# Patient Record
Sex: FEMALE | Race: BLACK OR AFRICAN AMERICAN | NOT HISPANIC OR LATINO | Employment: FULL TIME | URBAN - METROPOLITAN AREA
[De-identification: names, ages, dates, MRNs, and addresses within clinical notes are randomized per-mention and may not be internally consistent; named-entity substitution may affect disease eponyms.]

---

## 2017-01-05 ENCOUNTER — OFFICE VISIT (OUTPATIENT)
Dept: NEPHROLOGY | Facility: CLINIC | Age: 67
End: 2017-01-05
Payer: COMMERCIAL

## 2017-01-05 VITALS
BODY MASS INDEX: 44.92 KG/M2 | SYSTOLIC BLOOD PRESSURE: 130 MMHG | WEIGHT: 269.63 LBS | HEIGHT: 65 IN | DIASTOLIC BLOOD PRESSURE: 70 MMHG

## 2017-01-05 DIAGNOSIS — E66.01 MORBID OBESITY, UNSPECIFIED OBESITY TYPE: ICD-10-CM

## 2017-01-05 DIAGNOSIS — I10 ESSENTIAL HYPERTENSION: ICD-10-CM

## 2017-01-05 DIAGNOSIS — N18.1 CKD (CHRONIC KIDNEY DISEASE) STAGE 1, GFR 90 ML/MIN OR GREATER: Primary | ICD-10-CM

## 2017-01-05 PROCEDURE — 3078F DIAST BP <80 MM HG: CPT | Mod: S$GLB,,, | Performed by: INTERNAL MEDICINE

## 2017-01-05 PROCEDURE — 1160F RVW MEDS BY RX/DR IN RCRD: CPT | Mod: S$GLB,,, | Performed by: INTERNAL MEDICINE

## 2017-01-05 PROCEDURE — 1157F ADVNC CARE PLAN IN RCRD: CPT | Mod: S$GLB,,, | Performed by: INTERNAL MEDICINE

## 2017-01-05 PROCEDURE — 99999 PR PBB SHADOW E&M-EST. PATIENT-LVL III: CPT | Mod: PBBFAC,,, | Performed by: INTERNAL MEDICINE

## 2017-01-05 PROCEDURE — 99213 OFFICE O/P EST LOW 20 MIN: CPT | Mod: S$GLB,,, | Performed by: INTERNAL MEDICINE

## 2017-01-05 PROCEDURE — 1159F MED LIST DOCD IN RCRD: CPT | Mod: S$GLB,,, | Performed by: INTERNAL MEDICINE

## 2017-01-05 PROCEDURE — 3075F SYST BP GE 130 - 139MM HG: CPT | Mod: S$GLB,,, | Performed by: INTERNAL MEDICINE

## 2017-01-05 PROCEDURE — 1126F AMNT PAIN NOTED NONE PRSNT: CPT | Mod: S$GLB,,, | Performed by: INTERNAL MEDICINE

## 2017-01-05 NOTE — PROGRESS NOTES
Patient is here today for annual follow up evaluation of CKD. She has a solitary kidney (donation) ; Baseline Cr 1.0-1.1 mg/dl Her most recent lab (12/30/16) Cr 1.1 mg/dl; eGFR > 60 ml/min; potassium 4.0 mmol/L. She does not have clinically significant proteinuria. There is a hx of morbid obesity and hypertension. Followed by Endocrine for multinoduar goiter. Today she has no new complaints    ROS;    Mood and Affect; Appropriate  Otherwise non-contributory    Exam  Morbidly obese woman; no acute distress; oriented x 3  HEENT; WNL  CHEST; Clear P&A  HEART; RR; S1&S2 no murmur rub gallop  ABD; Obese  EXT; (-)Edema    Impression  CKD Stable  Hypertension Satisfactory control    Plan  Return Visit; 1 year

## 2017-03-16 ENCOUNTER — OFFICE VISIT (OUTPATIENT)
Dept: ENDOCRINOLOGY | Facility: CLINIC | Age: 67
End: 2017-03-16
Payer: COMMERCIAL

## 2017-03-16 VITALS
BODY MASS INDEX: 45.26 KG/M2 | WEIGHT: 271.63 LBS | SYSTOLIC BLOOD PRESSURE: 158 MMHG | HEIGHT: 65 IN | HEART RATE: 61 BPM | DIASTOLIC BLOOD PRESSURE: 87 MMHG

## 2017-03-16 DIAGNOSIS — E55.9 VITAMIN D DEFICIENCY: ICD-10-CM

## 2017-03-16 DIAGNOSIS — E04.2 MULTINODULAR NON-TOXIC GOITER: Primary | ICD-10-CM

## 2017-03-16 DIAGNOSIS — E66.01 MORBID OBESITY WITH BMI OF 45.0-49.9, ADULT: ICD-10-CM

## 2017-03-16 DIAGNOSIS — I10 ESSENTIAL HYPERTENSION: ICD-10-CM

## 2017-03-16 PROCEDURE — 1157F ADVNC CARE PLAN IN RCRD: CPT | Mod: S$GLB,,, | Performed by: INTERNAL MEDICINE

## 2017-03-16 PROCEDURE — 1126F AMNT PAIN NOTED NONE PRSNT: CPT | Mod: S$GLB,,, | Performed by: INTERNAL MEDICINE

## 2017-03-16 PROCEDURE — 3077F SYST BP >= 140 MM HG: CPT | Mod: S$GLB,,, | Performed by: INTERNAL MEDICINE

## 2017-03-16 PROCEDURE — 1159F MED LIST DOCD IN RCRD: CPT | Mod: S$GLB,,, | Performed by: INTERNAL MEDICINE

## 2017-03-16 PROCEDURE — 99999 PR PBB SHADOW E&M-EST. PATIENT-LVL IV: CPT | Mod: PBBFAC,,, | Performed by: INTERNAL MEDICINE

## 2017-03-16 PROCEDURE — 3079F DIAST BP 80-89 MM HG: CPT | Mod: S$GLB,,, | Performed by: INTERNAL MEDICINE

## 2017-03-16 PROCEDURE — 1160F RVW MEDS BY RX/DR IN RCRD: CPT | Mod: S$GLB,,, | Performed by: INTERNAL MEDICINE

## 2017-03-16 PROCEDURE — 99214 OFFICE O/P EST MOD 30 MIN: CPT | Mod: S$GLB,,, | Performed by: INTERNAL MEDICINE

## 2017-03-16 RX ORDER — UBIDECARENONE 75 MG
500 CAPSULE ORAL DAILY
COMMUNITY
End: 2020-08-17 | Stop reason: ALTCHOICE

## 2017-03-16 RX ORDER — VIT C/E/ZN/COPPR/LUTEIN/ZEAXAN 250MG-90MG
1000 CAPSULE ORAL DAILY
Refills: 0
Start: 2017-03-16 | End: 2021-07-02

## 2017-03-16 NOTE — PROGRESS NOTES
Subjective:       Patient ID: Ankita Campo is a 66 y.o. female.    Chief Complaint: Goiter    HPI Comments: Ms. Campo is presenting for follow-up of non-toxic MNG. Previously seen by Dr Garrett in 1/2016    Reports worsening goiter symptoms. When she turns her head to side feels that breathing is affected and dyspneic. Symptoms have been worsening for past few months  Denies hoarseness. Denies dyphagia    Pt noted swelling on the left side of her neck and went to see her PCP in late 2015     Subsequently thyroid nodule was confirmed on thyroid US from 1/2016:  The right lobe of the thyroid measures 8.0 x 1.8 x 1.2 cm. Multiple nodules are seen within the right lobe of the thyroid.    The left lobe of the thyroid measures 9.0 x 3.2 x 3.8 cm. Multiple nodules are seen within the left lobe of the thyroid.    The largest nodule is seen within the interpole the left lobe of the thyroid measures 3.4 cm.     Impression   There is multinodular thyroid goiter with a dominant nodule made for pole the left lobe of the thyroid. If not already performed FNA of this nodule for further evaluation is recommended.   FNA left thyroid nodule from 1/2016 was benign     FNA of left thyroid nodule in 1/2016 was benign     Mom had thyroid surgery - unsure of reason for removal  Sister with total thyroidectomy for goiter    No h/o neck irradiation.  No FH of thyroid cancer.    Works as     Gained 5lbs since 2016    Does gardening      Review of Systems   Constitutional: Negative for fatigue and unexpected weight change.   Eyes: Negative for visual disturbance.   Respiratory: Positive for shortness of breath.    Cardiovascular: Negative for chest pain.   Gastrointestinal: Negative for abdominal pain.   Musculoskeletal: Positive for myalgias. Negative for arthralgias.   Skin: Negative for wound.   Neurological: Negative for headaches.   Hematological: Does not bruise/bleed easily.   Psychiatric/Behavioral: Negative for  sleep disturbance.       Objective:      Physical Exam   Constitutional: She appears well-developed and well-nourished.   HENT:   Head: Normocephalic.   Neck: Normal range of motion. Neck supple. Thyromegaly (left thyroid nodule/lobe prominent) present.   + darek's sign   Cardiovascular: Normal rate and regular rhythm.    Pulmonary/Chest: Effort normal.   Abdominal: Soft.   Musculoskeletal: Normal range of motion. She exhibits no edema.   Neurological: She is alert.   Skin: Skin is warm. No erythema.   Psychiatric: She has a normal mood and affect.   Vitals reviewed.      Assessment:       1. Multinodular non-toxic goiter    2. Morbid obesity with BMI of 45.0-49.9, adult    3. Vitamin D deficiency    4. Essential hypertension        Plan:       1.Repeat US thyroid. Likely worsening goiter size and referral to endocrine surgery ordered for consideration of thyroidectomy. Informed patient of post-operative hypothyroidism and need for thyroid hormone replacement.  2. Recommend exercise, weight loss  3. Recommend vit D 1000 IU daily  4. Bp elevated today. On norvasc 5mg. Follow-up with PCP    Discussed with Dr. Ligia Baltazar MD

## 2017-03-16 NOTE — PROGRESS NOTES
I  Rory Strong have personally taken the history and examined this patient and agree with the fellow's note.

## 2017-03-16 NOTE — MR AVS SNAPSHOT
[facility]  [address]  [facility] [address]  Phone: [phone]  Fax: [phone]                  Ankita Campo   3/16/2017 10:00 AM   Office Visit    Description:  Female : 1950   Provider:  Chester Baltazar MD   Department:  [facility]           Reason for Visit     Goiter           Diagnoses this Visit        Comments    Multinodular non-toxic goiter    -  Primary     Morbid obesity with BMI of 45.0-49.9, adult         Vitamin D deficiency         Essential hypertension                To Do List           Future Appointments        Provider Department Dept Phone    2017 7:30 AM [facility]  [phone]    2017 8:00 AM Lucy Ch MD [facility] - Endo Surgery [phone]      Goals (5 Years of Data)     None       These Medications        Disp Refills Start End    cholecalciferol, vitamin D3, 1,000 unit capsule  0 3/16/2017     Take 1 capsule (1,000 Units total) by mouth once daily. - Oral    [facility] [address] [place] [address] [facility] Ph #: [phone]         [facility]     [facility] Care [facility] -  Assistance  Registered nurses in the [facility] On Call Center provide clinical advisement, health education, appointment booking, and other advisory services.  Call for this free service at [phone].             Medications           Message regarding Medications     Verify the changes and/or additions to your medication regime listed below are the same as discussed with your clinician today.  If any of these changes or additions are incorrect, please notify your healthcare provider.        START taking these NEW medications        Refills    cholecalciferol, vitamin D3, 1,000 unit capsule 0    Sig: Take 1 capsule (1,000 Units total) by mouth once daily.    Class: No Print    Route: Oral      STOP taking these medications     losartan (COZAAR)  "50 MG tablet Take 1 tablet (50 mg total) by mouth 2 (two) times daily.           Verify that the below list of medications is an accurate representation of the medications you are currently taking.  If none reported, the list may be blank. If incorrect, please contact your healthcare provider. Carry this list with you in case of emergency.           Current Medications     amlodipine (NORVASC) 5 MG tablet Take 1 tablet (5 mg total) by mouth once daily.    cyanocobalamin 500 MCG tablet Take 500 mcg by mouth once daily.    multivitamin capsule Take 1 capsule by mouth once daily.    PETROLATUM,WHITE (WHITE PETROLATUM, BULK,) Gel 1 application by Misc.(Non-Drug; Combo Route) route 4 (four) times daily.    VITAMIN E ACETATE (VITAMIN E ORAL) Take by mouth.    cholecalciferol, vitamin D3, 1,000 unit capsule Take 1 capsule (1,000 Units total) by mouth once daily.           Clinical Reference Information           Your Vitals Were     BP Pulse Height Weight BMI    158/87 61 5' 5" (1.651 m) 123.2 kg (271 lb 9.7 oz) 45.2 kg/m2      Blood Pressure          Most Recent Value    BP  (!)  158/87      Allergies as of 3/16/2017     Doxycycline    Neosporin [Benzalkonium Chloride]      Immunizations Administered on Date of Encounter - 3/16/2017     None      Orders Placed During Today's Visit      Normal Orders This Visit    Ambulatory consult to Endocrine Surgery     Future Labs/Procedures Expected by Expires    US Soft Tissue Head Neck Thyroid  3/16/2017 3/16/2018      [facility] Sign-Up     Activating your [facility] account is as easy as 1-2-3!     1) Visit [email or web address], select Sign Up Now, enter this activation code and your date of birth, then select Next.  Activation code not generated  Current Patient Portal Status: Account disabled      2) Create a username and password to use when you visit [facility] in the future and select a security question in case you lose your password and select Next.    3) Enter your e-mail address " and click Sign Up!    Additional Information  If you have questions, please e-mail [email or web address] or call [phone] to talk to our [facility] staff. Remember, [facility] is NOT to be used for urgent needs. For medical emergencies, dial 911.         Language Assistance Services     ATTENTION: Language assistance services are available, free of charge. Please call [phone].      ATENCIÓN: Si habla español, tiene a bruno disposición servicios gratuitos de asistencia lingüística. Llame al [phone].     CHÚ Ý: N?u b?n nói Ti?ng Vi?t, có các d?ch v? h? tr? ngôn ng? mi?n phí dành cho b?n. G?i s? [phone].         [facility] complies with applicable Federal civil rights laws and does not discriminate on the basis of race, color, national origin, age, disability, or sex.

## 2017-04-18 ENCOUNTER — OFFICE VISIT (OUTPATIENT)
Dept: ENDOCRINOLOGY | Facility: CLINIC | Age: 67
End: 2017-04-18
Payer: COMMERCIAL

## 2017-04-18 ENCOUNTER — HOSPITAL ENCOUNTER (OUTPATIENT)
Dept: ENDOCRINOLOGY | Facility: CLINIC | Age: 67
Discharge: HOME | End: 2017-04-18
Attending: INTERNAL MEDICINE
Payer: COMMERCIAL

## 2017-04-18 VITALS
HEIGHT: 65 IN | HEART RATE: 64 BPM | WEIGHT: 259.25 LBS | DIASTOLIC BLOOD PRESSURE: 84 MMHG | BODY MASS INDEX: 43.19 KG/M2 | SYSTOLIC BLOOD PRESSURE: 140 MMHG

## 2017-04-18 DIAGNOSIS — E04.2 MULTINODULAR NON-TOXIC GOITER: ICD-10-CM

## 2017-04-18 DIAGNOSIS — I10 ESSENTIAL HYPERTENSION: ICD-10-CM

## 2017-04-18 DIAGNOSIS — E04.2 MULTINODULAR NON-TOXIC GOITER: Primary | ICD-10-CM

## 2017-04-18 PROCEDURE — 99999 PR PBB SHADOW E&M-EST. PATIENT-LVL III: CPT | Mod: PBBFAC,,, | Performed by: INTERNAL MEDICINE

## 2017-04-18 PROCEDURE — 1159F MED LIST DOCD IN RCRD: CPT | Mod: S$GLB,,, | Performed by: INTERNAL MEDICINE

## 2017-04-18 PROCEDURE — 1126F AMNT PAIN NOTED NONE PRSNT: CPT | Mod: S$GLB,,, | Performed by: INTERNAL MEDICINE

## 2017-04-18 PROCEDURE — 1160F RVW MEDS BY RX/DR IN RCRD: CPT | Mod: S$GLB,,, | Performed by: INTERNAL MEDICINE

## 2017-04-18 PROCEDURE — 76536 US EXAM OF HEAD AND NECK: CPT | Mod: S$GLB,,, | Performed by: INTERNAL MEDICINE

## 2017-04-18 PROCEDURE — 3079F DIAST BP 80-89 MM HG: CPT | Mod: S$GLB,,, | Performed by: INTERNAL MEDICINE

## 2017-04-18 PROCEDURE — 99214 OFFICE O/P EST MOD 30 MIN: CPT | Mod: S$GLB,,, | Performed by: INTERNAL MEDICINE

## 2017-04-18 PROCEDURE — 3077F SYST BP >= 140 MM HG: CPT | Mod: S$GLB,,, | Performed by: INTERNAL MEDICINE

## 2017-04-18 PROCEDURE — 1157F ADVNC CARE PLAN IN RCRD: CPT | Mod: 8P,S$GLB,, | Performed by: INTERNAL MEDICINE

## 2017-04-18 NOTE — MR AVS SNAPSHOT
[facility]  [address]  [facility] [address]  Phone: [phone]  Fax: [phone]                  Ankita Campo   2017 10:00 AM   Office Visit    Description:  Female : 1950   Provider:  Peter Crowe II, MD   Department:  [facility]           Reason for Visit     Thyroid Nodule           Diagnoses this Visit        Comments    Multinodular non-toxic goiter    -  Primary     Essential hypertension                To Do List           Future Appointments        Provider Department Dept Phone    2017 9:10 AM LAB, APPOINTMENT [place] [facility] [phone]    2017 10:00 AM MD [facility] Arte[facility] II [facility] - Endo/Diab/Metab [phone]    2017 7:15 AM [facility] CT2 64- LIMIT 600 LBS [facility] [phone]    2017 8:00 AM Lucy Ch MD [facility] [phone]      Goals (5 Years of Data)     None      [facility] On Call     [facility] Nurse Care Line -  Assistance  Unless otherwise directed by your provider, please contact [facility], our nurse care line that is available for  assistance.     Registered nurses in the [facility] Center provide: appointment scheduling, clinical advisement, health education, and other advisory services.  Call: [phone] (toll free)               Medications           Message regarding Medications     Verify the changes and/or additions to your medication regime listed below are the same as discussed with your clinician today.  If any of these changes or additions are incorrect, please notify your healthcare provider.             Verify that the below list of medications is an accurate representation of the medications you are currently taking.  If none reported, the list may be blank. If incorrect, please contact your healthcare provider. Carry this list with you in case of emergency.           Current  "Medications     amlodipine (NORVASC) 5 MG tablet Take 1 tablet (5 mg total) by mouth once daily.    cholecalciferol, vitamin D3, 1,000 unit capsule Take 1 capsule (1,000 Units total) by mouth once daily.    cyanocobalamin 500 MCG tablet Take 500 mcg by mouth once daily.    multivitamin capsule Take 1 capsule by mouth once daily.    PETROLATUM,WHITE (WHITE PETROLATUM, BULK,) Gel 1 application by Misc.(Non-Drug; Combo Route) route 4 (four) times daily.    VITAMIN E ACETATE (VITAMIN E ORAL) Take 1 capsule by mouth once daily.            Clinical Reference Information           Your Vitals Were     BP Pulse Height Weight BMI    140/84 (BP Location: Left arm, Patient Position: Sitting, BP Method: Manual) 64 5' 5" (1.651 m) 117.6 kg (259 lb 4.2 oz) 43.14 kg/m2      Blood Pressure          Most Recent Value    BP  (!)  140/84      Allergies as of 4/18/2017     Doxycycline    Neosporin [Benzalkonium Chloride]      Immunizations Administered on Date of Encounter - 4/18/2017     None      Orders Placed During Today's Visit     Future Labs/Procedures Expected by Expires    CBC auto differential  4/18/2017 4/18/2018    Comprehensive metabolic panel  4/18/2017 4/18/2018    CT Soft Tissue Neck WO Contrast  4/18/2017 4/18/2018    TSH  4/18/2017 6/17/2018      Language Assistance Services     ATTENTION: Language assistance services are available, free of charge. Please call [phone].      ATENCIÓN: Si habla español, tiene a bruno disposición servicios gratuitos de asistencia lingüística. Llame al [phone].     [facility] Ý: N?u b?n nói Ti?ng Vi?t, có các d?ch v? h? tr? ngôn ng? mi?n phí dành cho b?n. G?i s? [phone].         [facility] complies with applicable Federal civil rights laws and does not discriminate on the basis of race, color, national origin, age, disability, or sex.        "

## 2017-04-18 NOTE — PROGRESS NOTES
Subjective:       Patient ID: Ankita Campo is a 66 y.o. female.    Chief Complaint: Thyroid Nodule    HPI Comments: Ms. Campo is presenting for follow-up of non-toxic MNG. Previously seen by Dr Garrett in 1/2016    Reports worsening goiter symptoms. When she turns her head to side feels that breathing is affected and dyspneic. Symptoms have been worsening for past few months  Denies hoarseness. Denies dyphagia    Pt noted swelling on the left side of her neck and went to see her PCP in late 2015     Subsequently thyroid nodule was confirmed on thyroid US from 1/2016:  The right lobe of the thyroid measures 8.0 x 1.8 x 1.2 cm. Multiple nodules are seen within the right lobe of the thyroid.    The left lobe of the thyroid measures 9.0 x 3.2 x 3.8 cm. Multiple nodules are seen within the left lobe of the thyroid.    The largest nodule is seen within the interpole the left lobe of the thyroid measures 3.4 cm.     Impression   There is multinodular thyroid goiter with a dominant nodule made for pole the left lobe of the thyroid. If not already performed FNA of this nodule for further evaluation is recommended.   FNA left thyroid nodule from 1/2016 was benign     FNA of left thyroid nodule in 1/2016 was benign     Mom had thyroid surgery - unsure of reason for removal  Sister with total thyroidectomy for goiter    No h/o neck irradiation.  No FH of thyroid cancer.    Works as     Gained 5lbs since 2016    Does gardening      Review of Systems   Constitutional: Negative for fatigue and unexpected weight change.   Eyes: Negative for visual disturbance.   Respiratory: Positive for shortness of breath.    Cardiovascular: Negative for chest pain.   Gastrointestinal: Negative for abdominal pain.   Musculoskeletal: Positive for myalgias. Negative for arthralgias.   Skin: Negative for wound.   Neurological: Negative for headaches.   Hematological: Does not bruise/bleed easily.   Psychiatric/Behavioral: Negative  for sleep disturbance.       Objective:      Physical Exam   Constitutional: She appears well-developed and well-nourished.   HENT:   Head: Normocephalic.   Neck: Normal range of motion. Neck supple. Thyromegaly (left thyroid nodule/lobe prominent) present.   + darek's sign   Cardiovascular: Normal rate and regular rhythm.    Pulmonary/Chest: Effort normal.   Abdominal: Soft.   Musculoskeletal: Normal range of motion. She exhibits no edema.   Neurological: She is alert.   Skin: Skin is warm. No erythema.   Psychiatric: She has a normal mood and affect.   Vitals reviewed.      Assessment:       1. Multinodular non-toxic goiter    2. Essential hypertension     3. Hard enlarging Mass left lobe with possible tracheal deviation to right..  Plan:       1.Surgiical consult  2. Check CT of neck.  3. Check labs.

## 2017-04-19 ENCOUNTER — HOSPITAL ENCOUNTER (OUTPATIENT)
Dept: RADIOLOGY | Facility: HOSPITAL | Age: 67
Discharge: HOME | End: 2017-04-19
Attending: INTERNAL MEDICINE
Payer: COMMERCIAL

## 2017-04-19 DIAGNOSIS — E04.2 MULTINODULAR NON-TOXIC GOITER: ICD-10-CM

## 2017-04-19 DIAGNOSIS — I10 ESSENTIAL HYPERTENSION: ICD-10-CM

## 2017-04-19 PROCEDURE — 70490 CT SOFT TISSUE NECK W/O DYE: CPT | Mod: 26,,, | Performed by: RADIOLOGY

## 2017-04-19 PROCEDURE — 70490 CT SOFT TISSUE NECK W/O DYE: CPT | Mod: TC

## 2017-04-25 ENCOUNTER — TELEPHONE (OUTPATIENT)
Dept: ENDOCRINOLOGY | Facility: CLINIC | Age: 67
End: 2017-04-25

## 2017-04-25 NOTE — TELEPHONE ENCOUNTER
Discussed results of ultrasound thyroid and ct neck with patient. Endocrine surgery appt on April 27.

## 2017-04-27 ENCOUNTER — INITIAL CONSULT (OUTPATIENT)
Dept: SURGERY | Facility: CLINIC | Age: 67
End: 2017-04-27
Payer: COMMERCIAL

## 2017-04-27 VITALS
WEIGHT: 267 LBS | DIASTOLIC BLOOD PRESSURE: 88 MMHG | HEIGHT: 65 IN | SYSTOLIC BLOOD PRESSURE: 147 MMHG | TEMPERATURE: 99 F | BODY MASS INDEX: 44.48 KG/M2 | HEART RATE: 57 BPM

## 2017-04-27 DIAGNOSIS — R13.10 DYSPHAGIA, UNSPECIFIED TYPE: ICD-10-CM

## 2017-04-27 DIAGNOSIS — E04.2 MULTINODULAR NON-TOXIC GOITER: Primary | ICD-10-CM

## 2017-04-27 PROCEDURE — 1159F MED LIST DOCD IN RCRD: CPT | Mod: S$GLB,,, | Performed by: SURGERY

## 2017-04-27 PROCEDURE — 3079F DIAST BP 80-89 MM HG: CPT | Mod: S$GLB,,, | Performed by: SURGERY

## 2017-04-27 PROCEDURE — 1157F ADVNC CARE PLAN IN RCRD: CPT | Mod: 8P,S$GLB,, | Performed by: SURGERY

## 2017-04-27 PROCEDURE — 99999 PR PBB SHADOW E&M-EST. PATIENT-LVL III: CPT | Mod: PBBFAC,,, | Performed by: SURGERY

## 2017-04-27 PROCEDURE — 1126F AMNT PAIN NOTED NONE PRSNT: CPT | Mod: S$GLB,,, | Performed by: SURGERY

## 2017-04-27 PROCEDURE — 99204 OFFICE O/P NEW MOD 45 MIN: CPT | Mod: S$GLB,,, | Performed by: SURGERY

## 2017-04-27 PROCEDURE — 3077F SYST BP >= 140 MM HG: CPT | Mod: S$GLB,,, | Performed by: SURGERY

## 2017-04-27 PROCEDURE — 1160F RVW MEDS BY RX/DR IN RCRD: CPT | Mod: S$GLB,,, | Performed by: SURGERY

## 2017-04-27 NOTE — PROGRESS NOTES
Consult Note  Endocrine Surgery    Visit Diagnosis: Multinodular non-toxic goiter [E04.2]    SUBJECTIVE:     Ankita Campo is a 66 y.o. female seen at the request of Dr. Chester Baltazar today in the Endocrine Surgery Clinic for evaluation of a goiter. Her history dates back to 2016 years when patient self-identified a thyroid mass and presented to PCP for evaluation. Subsequent evaluation included referral to an endocrinologist, neck ultrasound and fine needle aspiration. Ankita Campo complains of difficulty with swallowing, difficulty with shortness of breath especially with postural changes and palpable neck mass.  She notes specifically that dysphagia with pills, finds that meat gets stuck, and that she occasionally has SOB when raising her arms .The patient denies hot/cold intolerance, fatigue and unexplained weight changes. She does not have a history of head and neck radiation therapy. She does have a family history of thyroid disease (Mother and sister with MNG, both had surgery).  She is not taking thyroid hormone supplementation. She has not undergone radioactive iodine treatment.    Review of patient's allergies indicates:   Allergen Reactions    Doxycycline     Neosporin [benzalkonium chloride]        Current Outpatient Prescriptions   Medication Sig Dispense Refill    amlodipine (NORVASC) 5 MG tablet Take 1 tablet (5 mg total) by mouth once daily. 30 tablet 11    cholecalciferol, vitamin D3, 1,000 unit capsule Take 1 capsule (1,000 Units total) by mouth once daily.  0    cyanocobalamin 500 MCG tablet Take 500 mcg by mouth once daily. Pt says she only takes it if she is tired.      MULTIVIT-IRON-MIN-FOLIC ACID 3,500-18-0.4 UNIT-MG-MG ORAL CHEW Take by mouth.      multivitamin capsule Take 1 capsule by mouth once daily.      PETROLATUM,WHITE (WHITE PETROLATUM, BULK,) Gel 1 application by Misc.(Non-Drug; Combo Route) route 4 (four) times daily. (Patient taking differently: 1 application by  "Misc.(Non-Drug; Combo Route) route 4 (four) times daily. Pt uses for dry legs due to vein removal) 500 g 0    VITAMIN E ACETATE (VITAMIN E ORAL) Take 1 capsule by mouth once daily.        No current facility-administered medications for this visit.        Past Medical History:   Diagnosis Date    Arthritis     Family history of gastric cancer 2016    Goiter     Gout 2016    Hypertension     Multiple thyroid nodules      Past Surgical History:   Procedure Laterality Date    cararact       SECTION      CHOLECYSTECTOMY      Donor Nephrectomy      fibroidectomy       Social History   Substance Use Topics    Smoking status: Never Smoker    Smokeless tobacco: None    Alcohol use 1.2 oz/week     2 Shots of liquor per week      Comment: Rare use of alcohol          Review of Systems:    Review of Systems  Constitutional: negative for chills, fatigue, fevers, malaise and night sweats  Eyes: negative for icterus and irritation  Respiratory: negative for cough and dyspnea on exertion  Cardiovascular: negative for chest pain and palpitations  Gastrointestinal: negative for constipation, diarrhea and dysphagia  Genitourinary:negative for dysuria, hematuria and nocturia  Integument/breast: negative for rash and skin color change  Hematologic/lymphatic: negative for bleeding and easy bruising  Neurological: negative for gait problems, headaches and seizures  Behavioral/Psych: negative for anxiety and depression  Endocrine: negative    ENT ROS: negative  Endocrine ROS:   negative for - hair pattern changes, malaise/lethargy, mood swings, palpitations or polydipsia/polyuria      OBJECTIVE:     Vital Signs:  BP (!) 147/88 (BP Location: Right arm, Patient Position: Sitting, BP Method: Automatic)  Pulse (!) 57  Temp 98.6 °F (37 °C) (Oral)   Ht 5' 5" (1.651 m)  Wt 121.1 kg (267 lb)  BMI 44.43 kg/m2   Body mass index is 44.43 kg/(m^2).      Physical Exam:    General:  no distress, see vitals for BMI    " Eyes:  conjunctivae/corneas clear   Neck: trachea midline and symmetric, no adenopathy    Thyroid:  thyroid enlarged and nodular and thyroid left side larger than right   Lung: clear to auscultation bilaterally   Heart:  regular rate and rhythm   Abdomen: soft, non-tender; bowel sounds normal; no masses,  no organomegaly   Skin/Extremities: warm and well-perfused   Pulses: 2+ and symmetric   Neuro: normal without focal findings and mental status, speech normal, alert and oriented x3       Laboratory/Radiologic Studies    Studies:  Date:       Results:     DEXA:   1/28/2013 Spine T Score: 1.2, Hip T Score: 1.3,   Femoral neck T Score: 1.8,   Ultrasound:  4/18/2017 THYROID GLAND is enlarged.  Vascularity is normal.    RIGHT LOBE of the thyroid measures:>6x2.77x3.04 cm.  1)Inferior  Complex cyst measures 1.59x1.47x1.9 cm.   2)Midlobe nodule measures 1.5x0.79x1 cm.No microcalcifications or increased vascularity.  3)Superior nodule measures 1.7x1.02x1.37 cm. No microcalcifications or increased vascularity.    ISTHMUS:6cm. Hetereogeneous nodule with microcalcifications and Grade 2 vascularity.      LEFT LOBE measures:>6 cm.Complex nodule measures 3.19x2.72x3.16 cm.     LYMPH NODES:Benign    COMPARISON:1/6/2016. Previous left lobe/isthmus benign in 2016.   Pathology::  1/28/2016 [facility] FOR REPORTING THYROID CYTOPATHOLOGY  II BENIGN  Innocuous follicular clusters. Abundant colloid.   CT Soft Tissue Neck: 4/19/2017 The thyroid is enlarged and heterogenous, with innumerable nodules.    The left lobe measures 3.7 x 4.4 x 9.1 cm. The right lobe measures 2.4 x 2.4 x 5.4 cm.. There is a dominant nodule in the inferior pole of the left lobe of the thyroid  , measuring approximately 4.3 cm in transverse dimension.    There is mass effect of the thyroid on the trachea, which is laterally displaced towards the right.  The trachea appears patent, noting the esophagus protrudes on the posterior aspect of the tracheal wall  in the mid portion of the neck.  The cartilage of the left lateral wall of the trachea appears to pinch the esophagus, and may be a cause of dysphagia.    There are several lymph nodes at the upper limits of normal in the upper portions of the neck, with enlarged lymph nodes within segment II bilaterally.  The largest on the right side measures 1.3 cm and the largest left-sided measures 1.1 cm.      The submandibular and parotid glands are unremarkable.  Paranasal sinuses and mastoid air cells are clear.    The lung apices are unremarkable.      The osseous structures demonstrate mild degenerative changes of the cervical spine.         Component Value Date   TSH 0.765 04/18/2017   Free T4 0.96 01/07/2016   T3, Free 2.1 (L) 01/07/2016   Vit D, 25-Hydroxy 26 (L) 04/28/2016   Sodium 144 04/18/2017   Potassium 4.1 04/18/2017   Chloride 106 04/18/2017   CO2 27 04/18/2017   Glucose 99 04/18/2017   BUN, Bld 17 04/18/2017   Creatinine 1.3 04/18/2017   Calcium 9.3 04/18/2017   Anion Gap 11 04/18/2017   eGFR if  49.1 (A) 04/18/2017   eGFR if non  42.6 (A) 04/18/2017       ASSESSMENT/PLAN:       Assessment    Ms. Campo is a 66 y.o. female who presents with evidence of Multinodular non-toxic goiter [E04.2].    Plan    During this visit, lab and imaging results were reviewed with the patient. Thyroid anatomy and physiology were reviewed and she was given a copy of our patient education booklet, Understanding Thyroid Disease. The above testing and examination support the diagnosis of multinodular thyroid goiter and compressive symptoms.     Total thyroidectomy is recommended. This will alleviate her symptoms of compression.   Continued observation was also discussed with the patient.  Potential complications of this operation were reviewed with the patient.     The risks and benefits of my recommendations, as well as other treatment options were discussed with the patient today. In addition, I  discussed the nature of the disease process and the risk of surgery including, temporary or permanent hypoparathyroidism resulting in low blood calcium levels that require extensive medication to avoid serious degenerative conditions such as cataracts, brittle bones, muscle weakness, and muscle irritability. Other risks include bleeding, infection, injury to the recurrent laryngeal nerves resulting in hoarseness or impairment of speech and the risks of general anesthetic including MI, CVA, sudden death, or even reaction to anesthetic medications. The patient understands the risks, benefits, and treatment alternatives. Any and all questions were answered to the patient's satisfaction.  Patient's questions were answered and she wishes to proceed with surgery.    Thyroid surgery is scheduled in the near future.  She will notify our office when she is ready to proceed. Prior to this we will ensure that the patient is medically optimized prior to procedure.

## 2017-04-27 NOTE — LETTER
Endocrine/General Surgery  [address] [place] [address]  Phone: [phone]  Fax: [phone] April 27, 2017         Peter Crowe II, MD  [address]  [place] [address]    Patient: Ankita Campo   YOB: 1950   Date of Visit: 4/27/2017     Dear Dr. Crowe:    I saw Ms. Campo today in clinic. Attached you will find a copy of my note.    As you know, she is a 66 y.o. female who presented with a multi-nodular goiter and associated compressive symmptoms. I was able to discuss the indications for surgery in addition to the expected rachel-operative course. The current plan includes thyroid surgery in the near future.    If you have any questions or concerns, please don't hesitate to contact my office. Again, thank you kindly for this referral.    Regards,      Lucy Ch MD        Chester Baltazar MD  [address]  [place] [address]  VIA In Basket     Michael Elias MD  [address] [facility] [address]  VIA In Basket

## 2017-04-27 NOTE — Clinical Note
April 27, 2017      Chester Baltazar MD  [address]  [facility] [address]           [facility]  [address]  [facility] [address]  Phone: [phone]          Patient: Ankita Campo   MR Number: [id number]   YOB: 1950   Date of Visit: 4/27/2017       Dear Dr. Chester Baltazar:    Thank you for referring Ankita Campo to me for evaluation. Attached you will find relevant portions of my assessment and plan of care.    If you have questions, please do not hesitate to call me. I look forward to following Ankita Campo along with you.    Sincerely,    Lucy Ch MD    Enclosure  CC:  No Recipients    If you would like to receive this communication electronically, please contact [email or web address] or [phone] to request more information on [facility] Link access.    For providers and/or their staff who would like to refer a patient to [facility], please contact us through our one-stop-shop provider referral line, Nael Smith, at [phone].    If you feel you have received this communication in error or would no longer like to receive these types of communications, please e-mail [email or web address]

## 2017-04-27 NOTE — MR AVS SNAPSHOT
[facility]  [address]  [place] [address]  Phone: [phone]                  Ankita Campo   2017 8:00 AM   Initial consult    Description:  Female : 1950   Provider:  Lucy Ch MD   Department:  [facility] Endo Surgery           Reason for Visit     Consult                To Do List           Goals (5 Years of Data)     None      [facility] On Call     [facility] On Call Nurse Care Line -  Assistance  Unless otherwise directed by your provider, please contact [facility] On-Call, our nurse care line that is available for  assistance.     Registered nurses in the [facility] On Call Center provide: appointment scheduling, clinical advisement, health education, and other advisory services.  Call: [phone] (toll free)               Medications           Message regarding Medications     Verify the changes and/or additions to your medication regime listed below are the same as discussed with your clinician today.  If any of these changes or additions are incorrect, please notify your healthcare provider.             Verify that the below list of medications is an accurate representation of the medications you are currently taking.  If none reported, the list may be blank. If incorrect, please contact your healthcare provider. Carry this list with you in case of emergency.           Current Medications     amlodipine (NORVASC) 5 MG tablet Take 1 tablet (5 mg total) by mouth once daily.    cholecalciferol, vitamin D3, 1,000 unit capsule Take 1 capsule (1,000 Units total) by mouth once daily.    cyanocobalamin 500 MCG tablet Take 500 mcg by mouth once daily. Pt says she only takes it if she is tired.    MULTIVIT-IRON-MIN-FOLIC ACID 3,500-18-0.4 UNIT-MG-MG ORAL CHEW Take by mouth.    multivitamin capsule Take 1 capsule by mouth once daily.    PETROLATUM,WHITE (WHITE PETROLATUM, BULK,) Gel 1 application by Misc.(Non-Drug; Combo Route) route 4 (four)  "times daily.    VITAMIN E ACETATE (VITAMIN E ORAL) Take 1 capsule by mouth once daily.            Clinical Reference Information           Your Vitals Were     BP Pulse Temp Height Weight BMI    147/88 (BP Location: Right arm, Patient Position: Sitting, BP Method: Automatic) 57 98.6 °F (37 °C) (Oral) 5' 5" (1.651 m) 121.1 kg (267 lb) 44.43 kg/m2      Blood Pressure          Most Recent Value    BP  (!)  147/88      Allergies as of 4/27/2017     Doxycycline    Neosporin [Benzalkonium Chloride]      Immunizations Administered on Date of Encounter - 4/27/2017     None      Language Assistance Services     ATTENTION: Language assistance services are available, free of charge. Please call [phone].      ATENCIÓN: Si fatoumatala ewa, tiene a bruno disposición servicios gratuitos de asistencia lingüística. Llame al [phone].     XIAO Ý: N?u b?n nói Ti?ng Vi?t, có các d?ch v? h? tr? ngôn ng? mi?n phí dành cho b?n. G?i s? [phone].         [facility] complies with applicable Federal civil rights laws and does not discriminate on the basis of race, color, national origin, age, disability, or sex.        "

## 2017-05-03 ENCOUNTER — TELEPHONE (OUTPATIENT)
Dept: SURGERY | Facility: CLINIC | Age: 67
End: 2017-05-03

## 2017-05-03 NOTE — TELEPHONE ENCOUNTER
[place] from pt asking for call back.     Called pt back who confirmed that she is ready for sx. Informed that pt i will sched her on 6/9/17 and will call her bACK W/ rest of her appts. She v/u

## 2017-05-09 DIAGNOSIS — E04.2 MULTINODULAR NON-TOXIC GOITER: Primary | ICD-10-CM

## 2017-05-09 RX ORDER — SODIUM CHLORIDE 9 MG/ML
INJECTION, SOLUTION INTRAVENOUS CONTINUOUS
Status: CANCELLED | OUTPATIENT
Start: 2017-05-09

## 2017-05-10 ENCOUNTER — OFFICE VISIT (OUTPATIENT)
Dept: INTERNAL MEDICINE | Facility: CLINIC | Age: 67
End: 2017-05-10
Payer: COMMERCIAL

## 2017-05-10 VITALS
SYSTOLIC BLOOD PRESSURE: 126 MMHG | HEART RATE: 80 BPM | WEIGHT: 267 LBS | DIASTOLIC BLOOD PRESSURE: 80 MMHG | BODY MASS INDEX: 44.48 KG/M2 | HEIGHT: 65 IN

## 2017-05-10 DIAGNOSIS — I83.009 VENOUS STASIS ULCER: Primary | ICD-10-CM

## 2017-05-10 DIAGNOSIS — I87.2 VENOUS STASIS DERMATITIS OF BOTH LOWER EXTREMITIES: ICD-10-CM

## 2017-05-10 DIAGNOSIS — L97.909 VENOUS STASIS ULCER: Primary | ICD-10-CM

## 2017-05-10 DIAGNOSIS — L13.9 BULLOUS DERMATITIS: ICD-10-CM

## 2017-05-10 PROCEDURE — 3079F DIAST BP 80-89 MM HG: CPT | Mod: S$GLB,,, | Performed by: INTERNAL MEDICINE

## 2017-05-10 PROCEDURE — 3074F SYST BP LT 130 MM HG: CPT | Mod: S$GLB,,, | Performed by: INTERNAL MEDICINE

## 2017-05-10 PROCEDURE — 99999 PR PBB SHADOW E&M-EST. PATIENT-LVL III: CPT | Mod: PBBFAC,,, | Performed by: INTERNAL MEDICINE

## 2017-05-10 PROCEDURE — 99214 OFFICE O/P EST MOD 30 MIN: CPT | Mod: S$GLB,,, | Performed by: INTERNAL MEDICINE

## 2017-05-10 RX ORDER — POTASSIUM CHLORIDE 600 MG/1
8 CAPSULE, EXTENDED RELEASE ORAL DAILY
Qty: 90 CAPSULE | Refills: 0 | Status: SHIPPED | OUTPATIENT
Start: 2017-05-10 | End: 2020-05-19 | Stop reason: SDUPTHER

## 2017-05-10 RX ORDER — CLOBETASOL PROPIONATE 0.5 MG/G
CREAM TOPICAL 2 TIMES DAILY
Qty: 30 G | Refills: 0 | Status: SHIPPED | OUTPATIENT
Start: 2017-05-10 | End: 2017-05-30

## 2017-05-10 RX ORDER — FUROSEMIDE 40 MG/1
40 TABLET ORAL DAILY
Qty: 90 TABLET | Refills: 0 | Status: SHIPPED | OUTPATIENT
Start: 2017-05-10 | End: 2020-05-19 | Stop reason: SDUPTHER

## 2017-05-10 NOTE — PATIENT INSTRUCTIONS
Recommendations for today    Blisters along the ankle may be related to multiple factors.  This may be an ALLERGIC reaction.  It may also be a reaction to chronic fluid retention in the ankles.    We recommend starting the medication Lasix and potassium once daily to reduce ankle swelling.  If ankle swelling does not start to improve after taking Lasix for 7 days in a row please contact the clinic.  We may need to recommend Lasix for daily use indefinitely.     We recommend clobetasol steroids cream for the next 14 days.  Apply this around the blisters daily.  If blisters do not improve or they get worse stop using clobetasol and contact the clinic          Chronic Venous Insufficiency: Treating Ulcers    If leg swelling because of chronic venous insufficiency isnt controlled, an open wound (ulcer) can form. Although ulcers vary in size and shape, they usually appear on the inside of the ankle.  Treating an ulcer  · Visit your healthcare provider. Ulcers need frequent medical care. Special dressings may be applied. You may be given antibiotics to fight infection.  · Your healthcare provider may prescribe medicines, such as aspirin or pentoxifylline, to help the ulcer heal.  · Your healthcare provider may prescribe compression hose to help with the swelling.   · Elevate your legs often to reduce swelling. The ulcer needs oxygen-rich blood to heal. This blood cant reach the ulcer until swelling is reduced.  When to call your healthcare provider  Seek immediate medical attention if:   · You have an increase in pain  · You develop a fever of 100.4°F (38°C) or higher, or as directed by your healthcare provider  · The area around the ulcer becomes red, tender, or both  · The ulcer oozes discolored fluid or smells bad  · Swelling increases suddenly or the dressing feels tight   Date Last Reviewed: 5/1/2016  © 6510-5684 [employer]. [address] [place] [address], [place] [address]. All rights reserved. This  information is not intended as a substitute for professional medical care. Always follow your healthcare professional's instructions.

## 2017-05-10 NOTE — MR AVS SNAPSHOT
[facility] Internal Medicine   [place]  [place] [address]  Phone: [phone]  Fax: [phone]                  Ankita Campo   5/10/2017 1:20 PM   Office Visit    Description:  Female : 1950   Provider:  Michael Elias MD   Department:  [facility] Medicine           Reason for Visit     Follow-up           Diagnoses this Visit        Comments    Venous stasis ulcer    -  Primary     Venous stasis dermatitis of both lower extremities         Bullous dermatitis                To Do List           Future Appointments        Provider Department Dept Phone    2017 8:00 AM LAB, [facility] [phone]    2017 8:00 AM Michael Elias MD Counts include 234 beds at the [facility] [phone]    2017 10:00 AM Lucy Ch MD [facility] Surgery [phone]    2017 11:30 AM [facility]ADMIT, [facility] [phone]    2017 2:00 PM Lucy Ch MD [facility] Endo Surgery [phone]      Your Future Surgeries/Procedures     2017   Surgery with Lucy Ch MD   [facility] ([facility])    [address] [place] [address]   [phone]              Goals (5 Years of Data)     None       These Medications        Disp Refills Start End    furosemide (LASIX) 40 MG tablet 90 tablet 0 5/10/2017     Take 1 tablet (40 mg total) by mouth once daily. - Oral    Pharmacy: [facility] [address] [place] [address] [address] [facility] #: [phone]       potassium chloride (MICRO-K) 8 mEq CpSR 90 capsule 0 5/10/2017     Take 1 capsule (8 mEq total) by mouth once daily. - Oral    Pharmacy: [facility] [address] [place] [address] [address] [facility] #: [phone]       clobetasol (TEMOVATE) 0.05 % cream 30 g 0 5/10/2017 2017    Apply topically 2 (two) times daily. stop once blisters resolve or if they worsen - Topical (Top)     Pharmacy: [facility] [address] [place] [address] [facility] #: [phone]         [facility]     [facility] Nurse Care Line - 24/7 Assistance  Unless otherwise directed by your provider, please contact [facility], our nurse care line that is available for 24/7 assistance.     Registered nurses in the [facility] provide: appointment scheduling, clinical advisement, health education, and other advisory services.  Call: [phone] (toll free)               Medications           Message regarding Medications     Verify the changes and/or additions to your medication regime listed below are the same as discussed with your clinician today.  If any of these changes or additions are incorrect, please notify your healthcare provider.        START taking these NEW medications        Refills    potassium chloride (MICRO-K) 8 mEq CpSR 0    Sig: Take 1 capsule (8 mEq total) by mouth once daily.    Class: Normal    Route: Oral    clobetasol (TEMOVATE) 0.05 % cream 0    Sig: Apply topically 2 (two) times daily. stop once blisters resolve or if they worsen    Class: Normal    Route: Topical (Top)      CHANGE how you are taking these medications     Start Taking Instead of    furosemide (LASIX) 40 MG tablet furosemide (LASIX) 40 MG tablet  (Previously Discontinued)    Dosage:  Take 1 tablet (40 mg total) by mouth once daily. Dosage:  Take 1 tablet (40 mg total) by mouth 2 (two) times daily.    Reason for Change:  Alternate therapy       STOP taking these medications     clobetasol 0.05% (TEMOVATE) 0.05 % Oint Apply topically 2 (two) times daily. Not more than 2 weeks straight in same location. Avoid use on face or groin           Verify that the below list of medications is an accurate representation of the medications you are currently taking.  If none reported, the list may be blank. If incorrect, please contact your healthcare provider. Carry this list with you in case of emergency.     "       Current Medications     amlodipine (NORVASC) 5 MG tablet Take 1 tablet (5 mg total) by mouth once daily.    cholecalciferol, vitamin D3, 1,000 unit capsule Take 1 capsule (1,000 Units total) by mouth once daily.    cyanocobalamin 500 MCG tablet Take 500 mcg by mouth once daily. Pt says she only takes it if she is tired.    MULTIVIT-IRON-MIN-FOLIC ACID 3,500-18-0.4 UNIT-MG-MG ORAL CHEW Take by mouth.    multivitamin capsule Take 1 capsule by mouth once daily.    PETROLATUM,WHITE (WHITE PETROLATUM, BULK,) Gel 1 application by Misc.(Non-Drug; Combo Route) route 4 (four) times daily.    VITAMIN E ACETATE (VITAMIN E ORAL) Take 1 capsule by mouth once daily.     clobetasol (TEMOVATE) 0.05 % cream Apply topically 2 (two) times daily. stop once blisters resolve or if they worsen    furosemide (LASIX) 40 MG tablet Take 1 tablet (40 mg total) by mouth once daily.    potassium chloride (MICRO-K) 8 mEq CpSR Take 1 capsule (8 mEq total) by mouth once daily.           Clinical Reference Information           Your Vitals Were     BP Pulse Height Weight BMI    126/80 (BP Location: Right arm, Patient Position: Sitting, BP Method: Manual) 80 5' 5" (1.651 m) 121.1 kg (266 lb 15.6 oz) 44.43 kg/m2      Blood Pressure          Most Recent Value    BP  126/80      Allergies as of 5/10/2017     Doxycycline    Neosporin [Benzalkonium Chloride]      Immunizations Administered on Date of Encounter - 5/10/2017     None      Orders Placed During Today's Visit      Normal Orders This Visit    Ambulatory Referral to Wound Clinic     Future Labs/Procedures Expected by Expires    Basic metabolic panel  5/10/2017 8/8/2017    CBC auto differential  5/10/2017 8/8/2017      Instructions      Recommendations for today    Blisters along the ankle may be related to multiple factors.  This may be an ALLERGIC reaction.  It may also be a reaction to chronic fluid retention in the ankles.    We recommend starting the medication Lasix and potassium once " daily to reduce ankle swelling.  If ankle swelling does not start to improve after taking Lasix for 7 days in a row please contact the clinic.  We may need to recommend Lasix for daily use indefinitely.     We recommend clobetasol steroids cream for the next 14 days.  Apply this around the blisters daily.  If blisters do not improve or they get worse stop using clobetasol and contact the clinic          Chronic Venous Insufficiency: Treating Ulcers    If leg swelling because of chronic venous insufficiency isnt controlled, an open wound (ulcer) can form. Although ulcers vary in size and shape, they usually appear on the inside of the ankle.  Treating an ulcer  · Visit your healthcare provider. Ulcers need frequent medical care. Special dressings may be applied. You may be given antibiotics to fight infection.  · Your healthcare provider may prescribe medicines, such as aspirin or pentoxifylline, to help the ulcer heal.  · Your healthcare provider may prescribe compression hose to help with the swelling.   · Elevate your legs often to reduce swelling. The ulcer needs oxygen-rich blood to heal. This blood cant reach the ulcer until swelling is reduced.  When to call your healthcare provider  Seek immediate medical attention if:   · You have an increase in pain  · You develop a fever of 100.4°F (38°C) or higher, or as directed by your healthcare provider  · The area around the ulcer becomes red, tender, or both  · The ulcer oozes discolored fluid or smells bad  · Swelling increases suddenly or the dressing feels tight   Date Last Reviewed: 5/1/2016  © 9836-1461 [employer] [address] [place] [address], [place] [address]. All rights reserved. This information is not intended as a substitute for professional medical care. Always follow your healthcare professional's instructions.             Language Assistance Services     ATTENTION: Language assistance services are available, free of charge. Please call  [phone].      ATENCIÓN: Si habla español, tiene a bruno disposición servicios gratuitos de asistencia lingüística. Llame al [phone].     CHÚ Ý: N?u b?n nói Ti?ng Vi?t, có các d?ch v? h? tr? ngôn ng? mi?n phí dành cho b?n. G?i s? [phone].         [facility] Internal Medicine complies with applicable Federal civil rights laws and does not discriminate on the basis of race, color, national origin, age, disability, or sex.

## 2017-05-10 NOTE — PROGRESS NOTES
Portions of this note are generated with voice recognition software. Typographical errors may exist.     SUBJECTIVE:    This is a/an 66 y.o. female here for primary care visit for  Chief Complaint   Patient presents with    Follow-up     Urgent Care on 5/2/2017     Patient states that she started to develop blisters along the right ankle approximately 2 weeks ago.  States that she had similar symptoms more than a year ago that prompted dermatology evaluation.  States that she was given steroids cream at the time but in the end she did not receive definitive information on what might have caused the condition.  States that she has been off of diuretic therapy in that ankle swelling continues to be an issue off and on.  She denies claudication symptoms.  Denies constitutional symptoms.  No nausea or vomiting or recent gastroenteritis.  States that she went to urgent care recently for the evaluation and treatment of bullous lesions.  States that she was given hydrocortisone.        Medications Reviewed and Updated    Past medical, family, and social histories were reviewed and updated.    Review of Systems negative unless otherwise noted in history of present illness-   General ROS: negative  Psychological ROS: negative  ENT ROS: negative  Allergy and Immunology ROS: negative  Genito-Urinary ROS: negative  Musculoskeletal ROS: negative  Neurological ROS: negative  Dermatological ROS: negative      Allergic:    Review of patient's allergies indicates:   Allergen Reactions    Doxycycline     Neosporin [benzalkonium chloride]        OBJECTIVE:  BP: 126/80 Pulse: 80    Wt Readings from Last 3 Encounters:   05/10/17 121.1 kg (266 lb 15.6 oz)   04/27/17 121.1 kg (267 lb)   04/18/17 117.6 kg (259 lb 4.2 oz)    Body mass index is 44.43 kg/(m^2).  Previous Blood Pressure Readings :   BP Readings from Last 3 Encounters:   05/10/17 126/80   04/27/17 (!) 147/88   04/18/17 (!) 140/84       GEN: No apparent distress  HEENT:  sclera non-icteric, conjunctiva clear  CV: no peripheral edema  PULM: breathing non-labored  ABD: Obese, protuberant abdomen.  PSYCH: appropriate affect  MSK: able to rise from chair without assistance  SKIN: normal skin turgor    Pertinent Labs Reviewed       ASSESSMENT/PLAN:    Venous stasis ulcer.  Diastolic dysfunction possible.  Plan to discuss scheduling echocardiogram upcoming appointment.  -     Ambulatory Referral to Wound Clinic  -     Basic metabolic panel; Future; Expected date: 5/10/17  -     CBC auto differential; Future; Expected date: 5/10/17    Venous stasis dermatitis of both lower extremities  -     furosemide (LASIX) 40 MG tablet; Take 1 tablet (40 mg total) by mouth once daily.  Dispense: 90 tablet; Refill: 0    Bullous dermatitis.  Etiology unclear.  Patient may benefit from dermatologic follow-up if symptoms fail to improve    Other orders  -     potassium chloride (MICRO-K) 8 mEq CpSR; Take 1 capsule (8 mEq total) by mouth once daily.  Dispense: 90 capsule; Refill: 0  -     clobetasol (TEMOVATE) 0.05 % cream; Apply topically 2 (two) times daily. stop once blisters resolve or if they worsen  Dispense: 30 g; Refill: 0          Future Appointments  Date Time Provider Department Center   5/19/2017 8:00 AM LAB, [facility]   5/24/2017 8:00 AM Michael Elias MD [facility]   6/5/2017 10:00 AM [facility]   6/5/2017 11:30 AM [facility]   6/29/2017 2:00 PM Lucy Ch MD [facility]       Michael Elias  5/11/2017  12:24 PM

## 2017-05-12 ENCOUNTER — TELEPHONE (OUTPATIENT)
Dept: DERMATOLOGY | Facility: CLINIC | Age: 67
End: 2017-05-12

## 2017-05-23 ENCOUNTER — TELEPHONE (OUTPATIENT)
Dept: SURGERY | Facility: CLINIC | Age: 67
End: 2017-05-23

## 2017-05-23 NOTE — TELEPHONE ENCOUNTER
Called pt but no answer.  LVM to remind her that she will need pcp clearance for sx on 6/20/17 w/ Dr. Jimenez.      Requested a call back from pt to make sure that she did receive my message.

## 2017-05-24 ENCOUNTER — HOSPITAL ENCOUNTER (OUTPATIENT)
Dept: WOUND CARE | Facility: HOSPITAL | Age: 67
Discharge: HOME | End: 2017-05-24
Attending: SURGERY
Payer: COMMERCIAL

## 2017-05-24 NOTE — PROGRESS NOTES
Much of the documentation for this visit was completed in the [facility] system. Please see the attached documentation for further details about this patient's care.     Heather Landeros MD

## 2017-05-30 ENCOUNTER — OFFICE VISIT (OUTPATIENT)
Dept: DERMATOLOGY | Facility: CLINIC | Age: 67
End: 2017-05-30
Payer: COMMERCIAL

## 2017-05-30 DIAGNOSIS — L30.9 DERMATITIS: Primary | ICD-10-CM

## 2017-05-30 PROCEDURE — 99213 OFFICE O/P EST LOW 20 MIN: CPT | Mod: S$GLB,,, | Performed by: DERMATOLOGY

## 2017-05-30 PROCEDURE — 1159F MED LIST DOCD IN RCRD: CPT | Mod: S$GLB,,, | Performed by: DERMATOLOGY

## 2017-05-30 PROCEDURE — 99999 PR PBB SHADOW E&M-EST. PATIENT-LVL I: CPT | Mod: PBBFAC,,, | Performed by: DERMATOLOGY

## 2017-05-30 NOTE — PROGRESS NOTES
Subjective:       Patient ID:  Ankita Campo is a 66 y.o. female who presents for   Chief Complaint   Patient presents with    Rash     lower legs     Pt presents for recurrent rash to lower legs x years.   Tender when inflamed and itch.    Pt usually will get 1 or 2 lesions she states but most recent flare in early may was mores severe. She was rx steroid shot and was also on oral abx.  This helped and has not had new lesions since .  Pt does note she has severe swelling in her legs before her last severe flare and was given a fluid pill by her pcp.    denies eye pain  Or oral lesions  Does take tylenol intermittently but does not associate taking this with flares.       Rash         Review of Systems   Constitutional: Negative for fever, chills, weight gain, fatigue and malaise.   HENT: Negative for mouth sores.    Eyes: Negative for eye irritation.   Cardiovascular: Positive for pedal edema.   Skin: Positive for itching, rash and dry skin.   Hematologic/Lymphatic: Bruises/bleeds easily.        Objective:    Physical Exam   Constitutional: She appears well-developed and well-nourished. No distress.   Neurological: She is alert and oriented to person, place, and time. She is not disoriented.   Psychiatric: She has a normal mood and affect.   Skin:   Areas Examined (abnormalities noted in diagram):   RLE Inspected  LLE Inspection Performed  Nails and Digits Inspection Performed                  Diagram Legend     Erythematous scaling macule/papule c/w actinic keratosis       Vascular papule c/w angioma      Pigmented verrucoid papule/plaque c/w seborrheic keratosis      Yellow umbilicated papule c/w sebaceous hyperplasia      Irregularly shaped tan macule c/w lentigo     1-2 mm smooth white papules consistent with Milia      Movable subcutaneous cyst with punctum c/w epidermal inclusion cyst      Subcutaneous movable cyst c/w pilar cyst      Firm pink to brown papule c/w dermatofibroma      Pedunculated fleshy  papule(s) c/w skin tag(s)      Evenly pigmented macule c/w junctional nevus     Mildly variegated pigmented, slightly irregular-bordered macule c/w mildly atypical nevus      Flesh colored to evenly pigmented papule c/w intradermal nevus       Pink pearly papule/plaque c/w basal cell carcinoma      Erythematous hyperkeratotic cursted plaque c/w SCC      Surgical scar with no sign of skin cancer recurrence      Open and closed comedones      Inflammatory papules and pustules      Verrucoid papule consistent consistent with wart     Erythematous eczematous patches and plaques     Dystrophic onycholytic nail with subungual debris c/w onychomycosis     Umbilicated papule    Erythematous-base heme-crusted tan verrucoid plaque consistent with inflamed seborrheic keratosis     Erythematous Silvery Scaling Plaque c/w Psoriasis     See annotation      Assessment / Plan:        Dermatitis  Pt was seen about 3-4 years ago and bx negative for autoimmune bullous disorder  Pt to be very diligent about taking note of any prn meds she takes before a flare  Will consider repeat bx if needed  Use diprolene prn flare   Leg edema may be contributing. Pt states she is taking a diuretic regularly   She will call if an acute flare and we will see her as soon as possible  Good skin care regimen discussed including limiting to one bath or shower/day, using lukewarm water with mild soap and moisturizing cream to skin 1 - 2x/day. Brochure was provided and reviewed with patient.  cerave cream or vaseline to LE           Return if symptoms worsen or fail to improve.

## 2017-06-05 ENCOUNTER — ANESTHESIA EVENT (OUTPATIENT)
Dept: SURGERY | Facility: OTHER | Age: 67
End: 2017-06-05
Payer: COMMERCIAL

## 2017-06-05 ENCOUNTER — OFFICE VISIT (OUTPATIENT)
Dept: SURGERY | Facility: CLINIC | Age: 67
End: 2017-06-05
Attending: SURGERY
Payer: COMMERCIAL

## 2017-06-05 ENCOUNTER — HOSPITAL ENCOUNTER (OUTPATIENT)
Dept: PREADMISSION TESTING | Facility: OTHER | Age: 67
Discharge: HOME | End: 2017-06-05
Attending: SURGERY
Payer: COMMERCIAL

## 2017-06-05 VITALS
OXYGEN SATURATION: 64 % | HEIGHT: 65 IN | BODY MASS INDEX: 44.32 KG/M2 | HEART RATE: 54 BPM | DIASTOLIC BLOOD PRESSURE: 86 MMHG | WEIGHT: 266 LBS | SYSTOLIC BLOOD PRESSURE: 153 MMHG | TEMPERATURE: 98 F

## 2017-06-05 VITALS
WEIGHT: 266.75 LBS | HEART RATE: 60 BPM | BODY MASS INDEX: 44.44 KG/M2 | DIASTOLIC BLOOD PRESSURE: 72 MMHG | SYSTOLIC BLOOD PRESSURE: 138 MMHG | HEIGHT: 65 IN

## 2017-06-05 DIAGNOSIS — E04.2 MULTINODULAR GOITER (NONTOXIC): ICD-10-CM

## 2017-06-05 DIAGNOSIS — E04.2 MULTINODULAR THYROID: Primary | ICD-10-CM

## 2017-06-05 DIAGNOSIS — Z01.818 PREOPERATIVE TESTING: ICD-10-CM

## 2017-06-05 LAB
ANION GAP SERPL CALC-SCNC: 9 MMOL/L
BUN SERPL-MCNC: 26 MG/DL
CALCIUM SERPL-MCNC: 9.1 MG/DL
CHLORIDE SERPL-SCNC: 105 MMOL/L
CO2 SERPL-SCNC: 26 MMOL/L
CREAT SERPL-MCNC: 1.3 MG/DL
EST. GFR  (AFRICAN AMERICAN): 49 ML/MIN/1.73 M^2
EST. GFR  (NON AFRICAN AMERICAN): 43 ML/MIN/1.73 M^2
GLUCOSE SERPL-MCNC: 83 MG/DL
HCT VFR BLD AUTO: 38.1 %
HGB BLD-MCNC: 11.8 G/DL
POTASSIUM SERPL-SCNC: 3.9 MMOL/L
SODIUM SERPL-SCNC: 140 MMOL/L

## 2017-06-05 PROCEDURE — 36415 COLL VENOUS BLD VENIPUNCTURE: CPT

## 2017-06-05 PROCEDURE — 99499 UNLISTED E&M SERVICE: CPT | Mod: S$GLB,,, | Performed by: SURGERY

## 2017-06-05 PROCEDURE — 99999 PR PBB SHADOW E&M-EST. PATIENT-LVL III: CPT | Mod: PBBFAC,,, | Performed by: SURGERY

## 2017-06-05 PROCEDURE — 93005 ELECTROCARDIOGRAM TRACING: CPT

## 2017-06-05 PROCEDURE — 85018 HEMOGLOBIN: CPT

## 2017-06-05 PROCEDURE — 85014 HEMATOCRIT: CPT

## 2017-06-05 PROCEDURE — 80048 BASIC METABOLIC PNL TOTAL CA: CPT

## 2017-06-05 PROCEDURE — 93010 ELECTROCARDIOGRAM REPORT: CPT | Mod: ,,, | Performed by: INTERNAL MEDICINE

## 2017-06-05 RX ORDER — SODIUM CHLORIDE, SODIUM LACTATE, POTASSIUM CHLORIDE, CALCIUM CHLORIDE 600; 310; 30; 20 MG/100ML; MG/100ML; MG/100ML; MG/100ML
INJECTION, SOLUTION INTRAVENOUS CONTINUOUS
Status: CANCELLED | OUTPATIENT
Start: 2017-06-05

## 2017-06-05 RX ORDER — LIDOCAINE HYDROCHLORIDE 10 MG/ML
1 INJECTION, SOLUTION EPIDURAL; INFILTRATION; INTRACAUDAL; PERINEURAL ONCE
Status: CANCELLED | OUTPATIENT
Start: 2017-06-05 | End: 2017-06-05

## 2017-06-05 RX ORDER — ACETAMINOPHEN 325 MG/1
325 TABLET ORAL EVERY 6 HOURS PRN
COMMUNITY
End: 2019-04-17

## 2017-06-05 NOTE — ANESTHESIA PREPROCEDURE EVALUATION
06/05/2017  Ankita Campo is a 66 y.o., female.    Anesthesia Evaluation    I have reviewed the Patient Summary Reports.    I have reviewed the Nursing Notes.   I have reviewed the Medications.     Review of Systems  Anesthesia Hx:  No problems with previous Anesthesia    Social:  Non-Smoker, Alcohol Use    Hematology/Oncology:     Oncology Normal    -- Anemia:   EENT/Dental:EENT/Dental Normal   Cardiovascular:   Exercise tolerance: good Hypertension, well controlled ECG has been reviewed.    Pulmonary:  Pulmonary Normal    Renal/:   Chronic Renal Disease, CRI    Hepatic/GI:   Liver Disease, Hepatomegaly   Musculoskeletal:   Arthritis     Neurological:  Neurology Normal    Endocrine:  Endocrine Normal    Dermatological:  Skin Normal    Psych:  Psychiatric Normal           Physical Exam  General:  Obesity    Airway/Jaw/Neck:  Airway Findings: Mouth Opening: Normal Tongue: Normal  General Airway Assessment: Adult  Mallampati: II  TM Distance: Normal, at least 6 cm  Jaw/Neck Findings:      Dental:  Dental Findings: In tact         Mental Status:  Mental Status Findings:  Cooperative, Alert and Oriented         Anesthesia Plan  Type of Anesthesia, risks & benefits discussed:  Anesthesia Type:  general  Patient's Preference:   Intra-op Monitoring Plan:   Intra-op Monitoring Plan Comments:   Post Op Pain Control Plan:   Post Op Pain Control Plan Comments:   Induction:    Beta Blocker:         Informed Consent: Patient understands risks and agrees with Anesthesia plan.  Questions answered. Anesthesia consent signed with patient.  ASA Score: 3     Day of Surgery Review of History & Physical:    H&P update referred to the surgeon.     Anesthesia Plan Notes: Labs and EKG ordered. To be cleared by primary MD.    preop clinic note.  Apparently there was communication with primary and Dr Chao re proceeding with  surgery and some further cardiac workup that could be done at later date (DK)    Day of surgery update: labs mild anemia and renal insufficiency, EKG SB 55        Ready For Surgery From Anesthesia Perspective.

## 2017-06-05 NOTE — DISCHARGE INSTRUCTIONS
PRE-ADMIT TESTING -  [phone]    [address]  [facility]        OUTPATIENT SURGERY UNIT - [phone]    Your surgery has been scheduled at [facility]. We are pleased to have the opportunity to serve you. For Further Information please call [phone].    On the day of surgery please report to the Information Desk on the 1st floor.    · CONTACT YOUR PHYSICIAN'S OFFICE THE DAY PRIOR TO YOUR SURGERY TO OBTAIN YOUR ARRIVAL TIME.     · The evening before surgery do not eat anything after 9 p.m. ( this includes hard candy, chewing gum and mints).  You may only have GATORADE, POWERADE AND WATER  from 9 p.m. until you leave your home.   DO NOT DRINK ANY LIQUIDS ON THE WAY TO THE HOSPITAL.      SPECIAL MEDICATION INSTRUCTIONS: TAKE medications checked off by the Anesthesiologist on your Medication List.    Angiogram Patients: Take medications as instructed by your physician, including aspirin.     Surgery Patients:    If you take ASPIRIN - Your PHYSICIAN/SURGEON will need to inform you IF/OR when you need to stop taking aspirin prior to your surgery.     Do Not take any medications containing IBUPROFEN.  Do Not Wear any make-up or dark nail polish   (especially eye make-up) to surgery. If you come to surgery with makeup on you will be required to remove the makeup or nail polish.    Do not shave your surgical area at least 5 days prior to your surgery. The surgical prep will be performed at the hospital according to Infection Control regulations.    Leave all valuables at home.   Do Not wear any jewelry or watches, including any metal in body piercings.  Contact Lens must be removed before surgery. Either do not wear the contact lens or bring a case and solution for storage.  Please bring a container for eyeglasses or dentures as required.  Bring any paperwork your physician has provided, such as consent forms,  history and physicals, doctor's orders, etc.   Bring comfortable clothes  that are loose fitting to wear upon discharge. Take into consideration the type of surgery being performed.  Maintain your diet as advised per your physician the day prior to surgery.      Adequate rest the night before surgery is advised.   Park in the Parking lot behind the hospital or in the [facility] across the street from the parking lot. Parking is complimentary.  If you will be discharged the same day as your procedure, please arrange for a responsible adult to drive you home or to accompany you if traveling by taxi.   YOU WILL NOT BE PERMITTED TO DRIVE OR TO LEAVE THE HOSPITAL ALONE AFTER SURGERY.   It is strongly recommended that you arrange for someone to remain with you for the first 24 hrs following your surgery.       Thank you for your cooperation.  The Staff of [facility].        Bathing Instructions                                                                 Please shower the evening before and morning of your procedure with    ANTIBACTERIAL SOAP. ( DIAL, etc )  Concentrate on the surgical area   for at least 3 minutes and rinse completely. Dry off as usual.   Do not use any deodorant, powder, body lotions, perfume, after shave or    cologne.

## 2017-06-11 NOTE — PROGRESS NOTES
Patient presents for follow-up prior to scheduled thyroid surgery.  I discussed the nature of the disease process and the risk of surgery including, temporary or permanent hypoparathyroidism resulting in low blood calcium levels that require extensive medication to avoid serious degenerative conditions such as cataracts, brittle bones, muscle weakness and muscle irritability.  Other risks include bleeding, infection, injury to the recurrent laryngeal nerves resulting in hoarseness or impairment of speech and the risks of general anesthetic including MI, CVA, sudden death or even reaction to anesthetic medications. The patient understands the risks, benefits and treatment alternatives.  Any and all questions were answered to the patient's satisfaction. Written surgery and blood consent were obtained.    See the original consult note below:    Consult Note  Endocrine Surgery    Visit Diagnosis: Multinodular non-toxic goiter [E04.2]    SUBJECTIVE:     Ankita Campo is a 66 y.o. female seen at the request of Dr. Chester Baltazar today in the Endocrine Surgery Clinic for evaluation of a goiter. Her history dates back to 2016 years when patient self-identified a thyroid mass and presented to PCP for evaluation. Subsequent evaluation included referral to an endocrinologist, neck ultrasound and fine needle aspiration. Ankita Campo complains of difficulty with swallowing, difficulty with shortness of breath especially with postural changes and palpable neck mass.  She notes specifically that dysphagia with pills, finds that meat gets stuck, and that she occasionally has SOB when raising her arms .The patient denies hot/cold intolerance, fatigue and unexplained weight changes. She does not have a history of head and neck radiation therapy. She does have a family history of thyroid disease (Mother and sister with MNG, both had surgery).  She is not taking thyroid hormone supplementation. She has not undergone radioactive  iodine treatment.    Review of patient's allergies indicates:   Allergen Reactions    Doxycycline     Neosporin [benzalkonium chloride]        Current Outpatient Prescriptions   Medication Sig Dispense Refill    amlodipine (NORVASC) 5 MG tablet Take 1 tablet (5 mg total) by mouth once daily. 30 tablet 11    cholecalciferol, vitamin D3, 1,000 unit capsule Take 1 capsule (1,000 Units total) by mouth once daily.  0    cyanocobalamin 500 MCG tablet Take 500 mcg by mouth once daily. Pt says she only takes it if she is tired.      MULTIVIT-IRON-MIN-FOLIC ACID 3,500-18-0.4 UNIT-MG-MG ORAL CHEW Take by mouth.      multivitamin capsule Take 1 capsule by mouth once daily.      PETROLATUM,WHITE (WHITE PETROLATUM, BULK,) Gel 1 application by Misc.(Non-Drug; Combo Route) route 4 (four) times daily. (Patient taking differently: 1 application by Misc.(Non-Drug; Combo Route) route 4 (four) times daily. Pt uses for dry legs due to vein removal) 500 g 0    VITAMIN E ACETATE (VITAMIN E ORAL) Take 1 capsule by mouth once daily.        No current facility-administered medications for this visit.        Past Medical History:   Diagnosis Date    Arthritis     Family history of gastric cancer 2016    Goiter     Gout 2016    Hypertension     Multiple thyroid nodules      Past Surgical History:   Procedure Laterality Date    cararact       SECTION      CHOLECYSTECTOMY      Donor Nephrectomy      fibroidectomy       Social History   Substance Use Topics    Smoking status: Never Smoker    Smokeless tobacco: None    Alcohol use 1.2 oz/week     2 Shots of liquor per week      Comment: Rare use of alcohol          Review of Systems:    Review of Systems  Constitutional: negative for chills, fatigue, fevers, malaise and night sweats  Eyes: negative for icterus and irritation  Respiratory: negative for cough and dyspnea on exertion  Cardiovascular: negative for chest pain and palpitations  Gastrointestinal:  "negative for constipation, diarrhea and dysphagia  Genitourinary:negative for dysuria, hematuria and nocturia  Integument/breast: negative for rash and skin color change  Hematologic/lymphatic: negative for bleeding and easy bruising  Neurological: negative for gait problems, headaches and seizures  Behavioral/Psych: negative for anxiety and depression  Endocrine: negative    ENT ROS: negative  Endocrine ROS:   negative for - hair pattern changes, malaise/lethargy, mood swings, palpitations or polydipsia/polyuria      OBJECTIVE:     Vital Signs:  BP (!) 147/88 (BP Location: Right arm, Patient Position: Sitting, BP Method: Automatic)  Pulse (!) 57  Temp 98.6 °F (37 °C) (Oral)   Ht 5' 5" (1.651 m)  Wt 121.1 kg (267 lb)  BMI 44.43 kg/m2   Body mass index is 44.43 kg/(m^2).      Physical Exam:    General:  no distress, see vitals for BMI    Eyes:  conjunctivae/corneas clear   Neck: trachea midline and symmetric, no adenopathy    Thyroid:  thyroid enlarged and nodular and thyroid left side larger than right   Lung: clear to auscultation bilaterally   Heart:  regular rate and rhythm   Abdomen: soft, non-tender; bowel sounds normal; no masses,  no organomegaly   Skin/Extremities: warm and well-perfused   Pulses: 2+ and symmetric   Neuro: normal without focal findings and mental status, speech normal, alert and oriented x3       Laboratory/Radiologic Studies    Studies:  Date:       Results:     DEXA:   1/28/2013 Spine T Score: 1.2, Hip T Score: 1.3,   Femoral neck T Score: 1.8,   Ultrasound:  4/18/2017 THYROID GLAND is enlarged.  Vascularity is normal.    RIGHT LOBE of the thyroid measures:>6x2.77x3.04 cm.  1)Inferior  Complex cyst measures 1.59x1.47x1.9 cm.   2)Midlobe nodule measures 1.5x0.79x1 cm.No microcalcifications or increased vascularity.  3)Superior nodule measures 1.7x1.02x1.37 cm. No microcalcifications or increased vascularity.    ISTHMUS:6cm. Hetereogeneous nodule with microcalcifications and Grade 2 " vascularity.      LEFT LOBE measures:>6 cm.Complex nodule measures 3.19x2.72x3.16 cm.     LYMPH NODES:Benign    COMPARISON:1/6/2016. Previous left lobe/isthmus benign in 2016.   Pathology::  1/28/2016 [facility] FOR REPORTING THYROID CYTOPATHOLOGY  II BENIGN  Innocuous follicular clusters. Abundant colloid.   CT Soft Tissue Neck: 4/19/2017 The thyroid is enlarged and heterogenous, with innumerable nodules.    The left lobe measures 3.7 x 4.4 x 9.1 cm. The right lobe measures 2.4 x 2.4 x 5.4 cm.. There is a dominant nodule in the inferior pole of the left lobe of the thyroid  , measuring approximately 4.3 cm in transverse dimension.    There is mass effect of the thyroid on the trachea, which is laterally displaced towards the right.  The trachea appears patent, noting the esophagus protrudes on the posterior aspect of the tracheal wall in the mid portion of the neck.  The cartilage of the left lateral wall of the trachea appears to pinch the esophagus, and may be a cause of dysphagia.    There are several lymph nodes at the upper limits of normal in the upper portions of the neck, with enlarged lymph nodes within segment II bilaterally.  The largest on the right side measures 1.3 cm and the largest left-sided measures 1.1 cm.      The submandibular and parotid glands are unremarkable.  Paranasal sinuses and mastoid air cells are clear.    The lung apices are unremarkable.      The osseous structures demonstrate mild degenerative changes of the cervical spine.         Component Value Date   TSH 0.765 04/18/2017   Free T4 0.96 01/07/2016   T3, Free 2.1 (L) 01/07/2016   Vit D, 25-Hydroxy 26 (L) 04/28/2016   Sodium 144 04/18/2017   Potassium 4.1 04/18/2017   Chloride 106 04/18/2017   CO2 27 04/18/2017   Glucose 99 04/18/2017   BUN, Bld 17 04/18/2017   Creatinine 1.3 04/18/2017   Calcium 9.3 04/18/2017   Anion Gap 11 04/18/2017   eGFR if  49.1 (A) 04/18/2017   eGFR if non African American 42.6  (A) 04/18/2017       ASSESSMENT/PLAN:       Assessment    Ms. Campo is a 66 y.o. female who presents with evidence of Multinodular non-toxic goiter [E04.2].    Plan    During this visit, lab and imaging results were reviewed with the patient. Thyroid anatomy and physiology were reviewed and she was given a copy of our patient education booklet, Understanding Thyroid Disease. The above testing and examination support the diagnosis of multinodular thyroid goiter and compressive symptoms.     Total thyroidectomy is recommended. This will alleviate her symptoms of compression.   Continued observation was also discussed with the patient.  Potential complications of this operation were reviewed with the patient.     The risks and benefits of my recommendations, as well as other treatment options were discussed with the patient today. In addition, I discussed the nature of the disease process and the risk of surgery including, temporary or permanent hypoparathyroidism resulting in low blood calcium levels that require extensive medication to avoid serious degenerative conditions such as cataracts, brittle bones, muscle weakness, and muscle irritability. Other risks include bleeding, infection, injury to the recurrent laryngeal nerves resulting in hoarseness or impairment of speech and the risks of general anesthetic including MI, CVA, sudden death, or even reaction to anesthetic medications. The patient understands the risks, benefits, and treatment alternatives. Any and all questions were answered to the patient's satisfaction.  Patient's questions were answered and she wishes to proceed with surgery.    Thyroid surgery is scheduled in the near future.  She will notify our office when she is ready to proceed. Prior to this we will ensure that the patient is medically optimized prior to procedure.

## 2017-06-13 ENCOUNTER — OFFICE VISIT (OUTPATIENT)
Dept: INTERNAL MEDICINE | Facility: CLINIC | Age: 67
End: 2017-06-13
Payer: COMMERCIAL

## 2017-06-13 VITALS
WEIGHT: 263.88 LBS | HEART RATE: 76 BPM | BODY MASS INDEX: 43.96 KG/M2 | DIASTOLIC BLOOD PRESSURE: 70 MMHG | HEIGHT: 65 IN | SYSTOLIC BLOOD PRESSURE: 132 MMHG

## 2017-06-13 DIAGNOSIS — Z01.810 PREOP CARDIOVASCULAR EXAM: ICD-10-CM

## 2017-06-13 DIAGNOSIS — R60.0 PERIPHERAL EDEMA: ICD-10-CM

## 2017-06-13 DIAGNOSIS — L13.9 BULLOUS DERMATITIS: Primary | ICD-10-CM

## 2017-06-13 PROCEDURE — 1126F AMNT PAIN NOTED NONE PRSNT: CPT | Mod: S$GLB,,, | Performed by: INTERNAL MEDICINE

## 2017-06-13 PROCEDURE — 99999 PR PBB SHADOW E&M-EST. PATIENT-LVL III: CPT | Mod: PBBFAC,,, | Performed by: INTERNAL MEDICINE

## 2017-06-13 PROCEDURE — 99214 OFFICE O/P EST MOD 30 MIN: CPT | Mod: S$GLB,,, | Performed by: INTERNAL MEDICINE

## 2017-06-13 PROCEDURE — 1159F MED LIST DOCD IN RCRD: CPT | Mod: S$GLB,,, | Performed by: INTERNAL MEDICINE

## 2017-06-13 NOTE — PROGRESS NOTES
Portions of this note are generated with voice recognition software. Typographical errors may exist.     SUBJECTIVE:    This is a/an 66 y.o. female here for primary care visit for  Chief Complaint   Patient presents with    Rash     x3 weeks (both)               Medications Reviewed and Updated    Past medical, family, and social histories were reviewed and updated.    Review of Systems negative unless noted otherwise in history of present illness-  General ROS: negative  Allergy and Immunology ROS: negative  Hematological and Lymphatic ROS: negative  Endocrine ROS: negative  Cardiovascular ROS: negative  Dermatological ROS: negative    Allergic:    Review of patient's allergies indicates:   Allergen Reactions    Neosporin [benzalkonium chloride] Rash    Doxycycline Rash     Skin peels       OBJECTIVE:  BP: 132/70 Pulse: 76    Wt Readings from Last 3 Encounters:   06/13/17 119.7 kg (263 lb 14.3 oz)   06/05/17 120.7 kg (266 lb)   06/05/17 121 kg (266 lb 12.1 oz)    Body mass index is 43.91 kg/m².  Previous Blood Pressure Readings :   BP Readings from Last 3 Encounters:   06/13/17 132/70   06/05/17 (!) 153/86   06/05/17 138/72       GEN: No apparent distress  HEENT: sclera non-icteric, conjunctiva clear  CV: no peripheral edema.  Grade 2/6 systolic murmur heard loudest at the base.  Stable compared to previous examination  PULM: breathing non-labored  ABD: Obese, protuberant abdomen.  PSYCH: appropriate affect  MSK: able to rise from chair without assistance  SKIN: normal skin turgor    Pertinent Labs Reviewed       ASSESSMENT/PLAN:    Bullous dermatitis    Preop cardiovascular exam  -     EKG 12-lead; Future    Peripheral edema  -     2D Echo w/ Color Flow Doppler; Future        Future Appointments  Date Time Provider Department Center   6/29/2017 2:00 PM Lucy Ch MD [facility] ENDSURG [facility]   7/10/2017 1:40 PM Nathalie Mccallum MD [facility] DERM [place]   8/14/2017 3:30 PM LOVE COSTA [facility] CARDIO  [place]   8/22/2017 9:20 AM Michael Elias MD [facility]       Michael Elias  6/13/2017  1:26 PM

## 2017-06-15 ENCOUNTER — OFFICE VISIT (OUTPATIENT)
Dept: DERMATOLOGY | Facility: CLINIC | Age: 67
End: 2017-06-15
Payer: COMMERCIAL

## 2017-06-15 ENCOUNTER — LAB VISIT (OUTPATIENT)
Dept: LAB | Facility: HOSPITAL | Age: 67
End: 2017-06-15
Attending: DERMATOLOGY
Payer: COMMERCIAL

## 2017-06-15 DIAGNOSIS — L13.9 BULLOUS DISORDER: ICD-10-CM

## 2017-06-15 DIAGNOSIS — L13.9 BULLOUS DISORDER: Primary | ICD-10-CM

## 2017-06-15 PROCEDURE — 30000890 ARUP MISCELLANEOUS TEST 1

## 2017-06-15 PROCEDURE — 83516 IMMUNOASSAY NONANTIBODY: CPT

## 2017-06-15 PROCEDURE — 88305 TISSUE EXAM BY PATHOLOGIST: CPT | Performed by: PATHOLOGY

## 2017-06-15 PROCEDURE — 1126F AMNT PAIN NOTED NONE PRSNT: CPT | Mod: S$GLB,,, | Performed by: DERMATOLOGY

## 2017-06-15 PROCEDURE — 36415 COLL VENOUS BLD VENIPUNCTURE: CPT | Mod: PO

## 2017-06-15 PROCEDURE — 99213 OFFICE O/P EST LOW 20 MIN: CPT | Mod: 25,S$GLB,, | Performed by: DERMATOLOGY

## 2017-06-15 PROCEDURE — 99999 PR PBB SHADOW E&M-EST. PATIENT-LVL III: CPT | Mod: PBBFAC,,, | Performed by: DERMATOLOGY

## 2017-06-15 PROCEDURE — 1159F MED LIST DOCD IN RCRD: CPT | Mod: S$GLB,,, | Performed by: DERMATOLOGY

## 2017-06-15 PROCEDURE — 11100 PR BIOPSY OF SKIN LESION: CPT | Mod: S$GLB,,, | Performed by: DERMATOLOGY

## 2017-06-15 NOTE — PROGRESS NOTES
Subjective:       Patient ID:  Ankita Campo is a 66 y.o. female who presents for   Chief Complaint   Patient presents with    Rash     2 weel fu flare left leg     Pt presents for follow up rash lower legs.  Flared on left leg x 6 days ago.  tx with diprolene cream. Lesions itch around the areas affected.  Pt has no new lesions today, only ones from flare last week.no sores elsewhere. Not currently on any oral steroids.  Pt did take tylenol last weel  . No one at home with bites.  Did have stray cats in yard and was sprayed for fleas. Has 2 dogs which have been treated.       Rash         Review of Systems   Constitutional: Positive for fatigue. Negative for fever, chills and malaise.   HENT: Negative for mouth sores.    Genitourinary: Negative for genital sores.   Skin: Positive for itching, rash and dry skin.        Objective:    Physical Exam   Constitutional: She appears well-developed and well-nourished. No distress.   Neurological: She is alert and oriented to person, place, and time. She is not disoriented.   Psychiatric: She has a normal mood and affect.   Skin:   Areas Examined (abnormalities noted in diagram):   RUE Inspected  LUE Inspection Performed  RLE Inspected  LLE Inspection Performed  Nails and Digits Inspection Performed                              Diagram Legend     Erythematous scaling macule/papule c/w actinic keratosis       Vascular papule c/w angioma      Pigmented verrucoid papule/plaque c/w seborrheic keratosis      Yellow umbilicated papule c/w sebaceous hyperplasia      Irregularly shaped tan macule c/w lentigo     1-2 mm smooth white papules consistent with Milia      Movable subcutaneous cyst with punctum c/w epidermal inclusion cyst      Subcutaneous movable cyst c/w pilar cyst      Firm pink to brown papule c/w dermatofibroma      Pedunculated fleshy papule(s) c/w skin tag(s)      Evenly pigmented macule c/w junctional nevus     Mildly variegated pigmented, slightly  irregular-bordered macule c/w mildly atypical nevus      Flesh colored to evenly pigmented papule c/w intradermal nevus       Pink pearly papule/plaque c/w basal cell carcinoma      Erythematous hyperkeratotic cursted plaque c/w SCC      Surgical scar with no sign of skin cancer recurrence      Open and closed comedones      Inflammatory papules and pustules      Verrucoid papule consistent consistent with wart     Erythematous eczematous patches and plaques     Dystrophic onycholytic nail with subungual debris c/w onychomycosis     Umbilicated papule    Erythematous-base heme-crusted tan verrucoid plaque consistent with inflamed seborrheic keratosis     Erythematous Silvery Scaling Plaque c/w Psoriasis     See annotation      Assessment / Plan:      Pathology Orders:      Normal Orders This Visit    Tissue Specimen To Pathology, Dermatology     Questions:    Directional Terms:  Other(comment)    Clinical information:  r/o bullous pemphigoid v fixed drug v arthropod v other-    Specific Site:  left upper foot        Bullous disorder  Shave biopsy procedure note:    Shave biopsy performed after verbal consent including risk of infection, scar, recurrence, need for additional treatment of site. Area prepped with alcohol, anesthetized with approximately 1.0cc of 1% lidocaine with epinephrine. Lesional tissue shaved with razor blade. Hemostasis achieved with application of aluminum chloride followed by hyfrecation. No complications. Dressing applied. Wound care explained.    Diprolene cream bid or Olux foam bid  Cannot use doxy as steroid sparing agent as pt allergic  -     Tissue Specimen To Pathology, Dermatology  -     Miscellaneous Sendout Test Blood; Future             Return in about 2 weeks (around 6/29/2017).

## 2017-06-19 ENCOUNTER — TELEPHONE (OUTPATIENT)
Dept: SURGERY | Facility: CLINIC | Age: 67
End: 2017-06-19

## 2017-06-19 NOTE — TELEPHONE ENCOUNTER
Discussed case w/ Dr. Jimenez since this AM.  She advised that per pcp, pt has diastolic dysfunction in which can be worked up after surgery.  She also advised that this note was sent to the anesthesiologist Dr. Chao and informed of this also. Spoke w/ supervisor Tina PERRY In preadmit who called inquiring about clearance. Informed her of the above info regarding diastolic dysfx.  She also called Dr. Chao in which he confirmed the same but is not here to day.   Dr. Jimenez advised that pt will be ok for surgery.

## 2017-06-19 NOTE — TELEPHONE ENCOUNTER
Advised pt to come in for 500 am to [facility] for sx. Advised her sx time will start at 0700. Reminded of a few preop instructions. Pt v/u.

## 2017-06-20 ENCOUNTER — ANESTHESIA (OUTPATIENT)
Dept: SURGERY | Facility: OTHER | Age: 67
End: 2017-06-20
Payer: COMMERCIAL

## 2017-06-20 ENCOUNTER — HOSPITAL ENCOUNTER (OUTPATIENT)
Facility: OTHER | Age: 67
Discharge: HOME | End: 2017-06-21
Attending: SURGERY | Admitting: SURGERY
Payer: COMMERCIAL

## 2017-06-20 DIAGNOSIS — E04.2 MULTINODULAR NON-TOXIC GOITER: Primary | ICD-10-CM

## 2017-06-20 DIAGNOSIS — E04.2 MULTINODULAR THYROID: ICD-10-CM

## 2017-06-20 LAB
ABO + RH BLD: NORMAL
BLD GP AB SCN CELLS X3 SERPL QL: NORMAL
PTH-INTACT SERPL-MCNC: 64.6 PG/ML

## 2017-06-20 PROCEDURE — 71000033 HC RECOVERY, INTIAL HOUR: Performed by: SURGERY

## 2017-06-20 PROCEDURE — 63600175 PHARM REV CODE 636 W HCPCS: Performed by: NURSE ANESTHETIST, CERTIFIED REGISTERED

## 2017-06-20 PROCEDURE — 63600175 PHARM REV CODE 636 W HCPCS: Performed by: SURGERY

## 2017-06-20 PROCEDURE — 88305 TISSUE EXAM BY PATHOLOGIST: CPT | Performed by: PATHOLOGY

## 2017-06-20 PROCEDURE — 36000707: Performed by: SURGERY

## 2017-06-20 PROCEDURE — 36415 COLL VENOUS BLD VENIPUNCTURE: CPT

## 2017-06-20 PROCEDURE — 25000003 PHARM REV CODE 250: Performed by: SPECIALIST

## 2017-06-20 PROCEDURE — 25000003 PHARM REV CODE 250: Performed by: SURGERY

## 2017-06-20 PROCEDURE — 25000003 PHARM REV CODE 250: Performed by: NURSE ANESTHETIST, CERTIFIED REGISTERED

## 2017-06-20 PROCEDURE — 37000008 HC ANESTHESIA 1ST 15 MINUTES: Performed by: SURGERY

## 2017-06-20 PROCEDURE — 71000039 HC RECOVERY, EACH ADD'L HOUR: Performed by: SURGERY

## 2017-06-20 PROCEDURE — 27201423 OPTIME MED/SURG SUP & DEVICES STERILE SUPPLY: Performed by: SURGERY

## 2017-06-20 PROCEDURE — 94761 N-INVAS EAR/PLS OXIMETRY MLT: CPT

## 2017-06-20 PROCEDURE — 63600175 PHARM REV CODE 636 W HCPCS: Performed by: ANESTHESIOLOGY

## 2017-06-20 PROCEDURE — C1729 CATH, DRAINAGE: HCPCS | Performed by: SURGERY

## 2017-06-20 PROCEDURE — 88305 TISSUE EXAM BY PATHOLOGIST: CPT | Mod: 26,,, | Performed by: PATHOLOGY

## 2017-06-20 PROCEDURE — 83970 ASSAY OF PARATHORMONE: CPT

## 2017-06-20 PROCEDURE — 88307 TISSUE EXAM BY PATHOLOGIST: CPT | Mod: 26,,, | Performed by: PATHOLOGY

## 2017-06-20 PROCEDURE — 86901 BLOOD TYPING SEROLOGIC RH(D): CPT

## 2017-06-20 PROCEDURE — 88331 PATH CONSLTJ SURG 1 BLK 1SPC: CPT | Mod: 26,,, | Performed by: PATHOLOGY

## 2017-06-20 PROCEDURE — 37000009 HC ANESTHESIA EA ADD 15 MINS: Performed by: SURGERY

## 2017-06-20 PROCEDURE — 86920 COMPATIBILITY TEST SPIN: CPT

## 2017-06-20 PROCEDURE — 86900 BLOOD TYPING SEROLOGIC ABO: CPT

## 2017-06-20 PROCEDURE — 36000706: Performed by: SURGERY

## 2017-06-20 PROCEDURE — 99900035 HC TECH TIME PER 15 MIN (STAT)

## 2017-06-20 PROCEDURE — 25000242 PHARM REV CODE 250 ALT 637 W/ HCPCS: Performed by: ANESTHESIOLOGY

## 2017-06-20 RX ORDER — RAMELTEON 8 MG/1
8 TABLET ORAL NIGHTLY PRN
Status: DISCONTINUED | OUTPATIENT
Start: 2017-06-20 | End: 2017-06-21 | Stop reason: HOSPADM

## 2017-06-20 RX ORDER — ONDANSETRON 2 MG/ML
INJECTION INTRAMUSCULAR; INTRAVENOUS
Status: DISCONTINUED | OUTPATIENT
Start: 2017-06-20 | End: 2017-06-20

## 2017-06-20 RX ORDER — OXYCODONE AND ACETAMINOPHEN 5; 325 MG/1; MG/1
1 TABLET ORAL EVERY 6 HOURS PRN
Qty: 31 TABLET | Refills: 0 | Status: SHIPPED | OUTPATIENT
Start: 2017-06-20 | End: 2017-06-30

## 2017-06-20 RX ORDER — PROPOFOL 10 MG/ML
VIAL (ML) INTRAVENOUS
Status: DISCONTINUED | OUTPATIENT
Start: 2017-06-20 | End: 2017-06-20

## 2017-06-20 RX ORDER — FENTANYL CITRATE 50 UG/ML
INJECTION, SOLUTION INTRAMUSCULAR; INTRAVENOUS
Status: DISCONTINUED | OUTPATIENT
Start: 2017-06-20 | End: 2017-06-20

## 2017-06-20 RX ORDER — ACETAMINOPHEN 10 MG/ML
1000 INJECTION, SOLUTION INTRAVENOUS EVERY 8 HOURS
Status: COMPLETED | OUTPATIENT
Start: 2017-06-20 | End: 2017-06-20

## 2017-06-20 RX ORDER — SODIUM CHLORIDE 0.9 % (FLUSH) 0.9 %
3 SYRINGE (ML) INJECTION EVERY 8 HOURS
Status: DISCONTINUED | OUTPATIENT
Start: 2017-06-20 | End: 2017-06-20 | Stop reason: HOSPADM

## 2017-06-20 RX ORDER — SODIUM CHLORIDE, SODIUM LACTATE, POTASSIUM CHLORIDE, CALCIUM CHLORIDE 600; 310; 30; 20 MG/100ML; MG/100ML; MG/100ML; MG/100ML
INJECTION, SOLUTION INTRAVENOUS CONTINUOUS
Status: DISCONTINUED | OUTPATIENT
Start: 2017-06-20 | End: 2017-06-21 | Stop reason: HOSPADM

## 2017-06-20 RX ORDER — OXYCODONE HYDROCHLORIDE 5 MG/1
5 TABLET ORAL EVERY 4 HOURS PRN
Status: DISCONTINUED | OUTPATIENT
Start: 2017-06-20 | End: 2017-06-21 | Stop reason: HOSPADM

## 2017-06-20 RX ORDER — MIDAZOLAM HYDROCHLORIDE 1 MG/ML
INJECTION INTRAMUSCULAR; INTRAVENOUS
Status: DISCONTINUED | OUTPATIENT
Start: 2017-06-20 | End: 2017-06-20

## 2017-06-20 RX ORDER — IPRATROPIUM BROMIDE AND ALBUTEROL SULFATE 2.5; .5 MG/3ML; MG/3ML
3 SOLUTION RESPIRATORY (INHALATION) ONCE
Status: COMPLETED | OUTPATIENT
Start: 2017-06-20 | End: 2017-06-20

## 2017-06-20 RX ORDER — AMOXICILLIN 250 MG
1 CAPSULE ORAL 2 TIMES DAILY
Status: DISCONTINUED | OUTPATIENT
Start: 2017-06-20 | End: 2017-06-21 | Stop reason: HOSPADM

## 2017-06-20 RX ORDER — ROCURONIUM BROMIDE 10 MG/ML
INJECTION, SOLUTION INTRAVENOUS
Status: DISCONTINUED | OUTPATIENT
Start: 2017-06-20 | End: 2017-06-20

## 2017-06-20 RX ORDER — SUCCINYLCHOLINE CHLORIDE 20 MG/ML
INJECTION INTRAMUSCULAR; INTRAVENOUS
Status: DISCONTINUED | OUTPATIENT
Start: 2017-06-20 | End: 2017-06-20

## 2017-06-20 RX ORDER — HYDROXYZINE HYDROCHLORIDE 25 MG/1
25 TABLET, FILM COATED ORAL EVERY 4 HOURS PRN
Status: DISCONTINUED | OUTPATIENT
Start: 2017-06-20 | End: 2017-06-21 | Stop reason: HOSPADM

## 2017-06-20 RX ORDER — HYDROMORPHONE HYDROCHLORIDE 2 MG/ML
0.4 INJECTION, SOLUTION INTRAMUSCULAR; INTRAVENOUS; SUBCUTANEOUS EVERY 5 MIN PRN
Status: DISCONTINUED | OUTPATIENT
Start: 2017-06-20 | End: 2017-06-20 | Stop reason: HOSPADM

## 2017-06-20 RX ORDER — LIDOCAINE HYDROCHLORIDE 10 MG/ML
1 INJECTION, SOLUTION EPIDURAL; INFILTRATION; INTRACAUDAL; PERINEURAL ONCE
Status: DISCONTINUED | OUTPATIENT
Start: 2017-06-20 | End: 2017-06-20 | Stop reason: HOSPADM

## 2017-06-20 RX ORDER — FENTANYL CITRATE 50 UG/ML
25 INJECTION, SOLUTION INTRAMUSCULAR; INTRAVENOUS EVERY 5 MIN PRN
Status: DISCONTINUED | OUTPATIENT
Start: 2017-06-20 | End: 2017-06-20 | Stop reason: HOSPADM

## 2017-06-20 RX ORDER — SODIUM CHLORIDE 0.9 % (FLUSH) 0.9 %
3 SYRINGE (ML) INJECTION
Status: DISCONTINUED | OUTPATIENT
Start: 2017-06-20 | End: 2017-06-21 | Stop reason: HOSPADM

## 2017-06-20 RX ORDER — HYDROMORPHONE HYDROCHLORIDE 2 MG/ML
0.2 INJECTION, SOLUTION INTRAMUSCULAR; INTRAVENOUS; SUBCUTANEOUS
Status: DISCONTINUED | OUTPATIENT
Start: 2017-06-20 | End: 2017-06-21 | Stop reason: HOSPADM

## 2017-06-20 RX ORDER — OXYCODONE HYDROCHLORIDE 5 MG/1
10 TABLET ORAL EVERY 4 HOURS PRN
Status: DISCONTINUED | OUTPATIENT
Start: 2017-06-20 | End: 2017-06-21 | Stop reason: HOSPADM

## 2017-06-20 RX ORDER — LEVOTHYROXINE SODIUM 125 UG/1
125 TABLET ORAL
Status: DISCONTINUED | OUTPATIENT
Start: 2017-06-21 | End: 2017-06-21 | Stop reason: HOSPADM

## 2017-06-20 RX ORDER — DOCUSATE SODIUM 100 MG/1
100 CAPSULE, LIQUID FILLED ORAL 2 TIMES DAILY
Refills: 0 | COMMUNITY
Start: 2017-06-20 | End: 2017-08-15

## 2017-06-20 RX ORDER — MEPERIDINE HYDROCHLORIDE 50 MG/ML
12.5 INJECTION INTRAMUSCULAR; INTRAVENOUS; SUBCUTANEOUS ONCE AS NEEDED
Status: DISCONTINUED | OUTPATIENT
Start: 2017-06-20 | End: 2017-06-20 | Stop reason: HOSPADM

## 2017-06-20 RX ORDER — ONDANSETRON 8 MG/1
8 TABLET, ORALLY DISINTEGRATING ORAL EVERY 8 HOURS PRN
Status: DISCONTINUED | OUTPATIENT
Start: 2017-06-20 | End: 2017-06-21 | Stop reason: HOSPADM

## 2017-06-20 RX ORDER — ACETAMINOPHEN 10 MG/ML
INJECTION, SOLUTION INTRAVENOUS
Status: DISCONTINUED | OUTPATIENT
Start: 2017-06-20 | End: 2017-06-20

## 2017-06-20 RX ORDER — LIDOCAINE HCL/PF 100 MG/5ML
SYRINGE (ML) INTRAVENOUS
Status: DISCONTINUED | OUTPATIENT
Start: 2017-06-20 | End: 2017-06-20

## 2017-06-20 RX ORDER — SODIUM CHLORIDE 9 MG/ML
INJECTION, SOLUTION INTRAVENOUS CONTINUOUS
Status: DISCONTINUED | OUTPATIENT
Start: 2017-06-20 | End: 2017-06-21 | Stop reason: HOSPADM

## 2017-06-20 RX ORDER — DIPHENHYDRAMINE HYDROCHLORIDE 50 MG/ML
12.5 INJECTION INTRAMUSCULAR; INTRAVENOUS EVERY 30 MIN PRN
Status: DISCONTINUED | OUTPATIENT
Start: 2017-06-20 | End: 2017-06-20 | Stop reason: HOSPADM

## 2017-06-20 RX ORDER — LEVOTHYROXINE SODIUM 112 UG/1
125 TABLET ORAL DAILY
Qty: 30 TABLET | Refills: 2 | Status: SHIPPED | OUTPATIENT
Start: 2017-06-20 | End: 2017-08-15 | Stop reason: SDUPTHER

## 2017-06-20 RX ORDER — ONDANSETRON 2 MG/ML
4 INJECTION INTRAMUSCULAR; INTRAVENOUS ONCE AS NEEDED
Status: DISCONTINUED | OUTPATIENT
Start: 2017-06-20 | End: 2017-06-20 | Stop reason: HOSPADM

## 2017-06-20 RX ORDER — DEXAMETHASONE SODIUM PHOSPHATE 4 MG/ML
INJECTION, SOLUTION INTRA-ARTICULAR; INTRALESIONAL; INTRAMUSCULAR; INTRAVENOUS; SOFT TISSUE
Status: DISCONTINUED | OUTPATIENT
Start: 2017-06-20 | End: 2017-06-20

## 2017-06-20 RX ORDER — OXYCODONE HYDROCHLORIDE 5 MG/1
5 TABLET ORAL
Status: DISCONTINUED | OUTPATIENT
Start: 2017-06-20 | End: 2017-06-20 | Stop reason: HOSPADM

## 2017-06-20 RX ADMIN — ROCURONIUM BROMIDE 10 MG: 10 INJECTION, SOLUTION INTRAVENOUS at 07:06

## 2017-06-20 RX ADMIN — FENTANYL CITRATE 50 MCG: 50 INJECTION, SOLUTION INTRAMUSCULAR; INTRAVENOUS at 07:06

## 2017-06-20 RX ADMIN — PROPOFOL 20 MG: 10 INJECTION, EMULSION INTRAVENOUS at 08:06

## 2017-06-20 RX ADMIN — STANDARDIZED SENNA CONCENTRATE AND DOCUSATE SODIUM 1 TABLET: 8.6; 5 TABLET, FILM COATED ORAL at 09:06

## 2017-06-20 RX ADMIN — PROPOFOL 10 MG: 10 INJECTION, EMULSION INTRAVENOUS at 07:06

## 2017-06-20 RX ADMIN — EPHEDRINE SULFATE 10 MG: 50 INJECTION INTRAMUSCULAR; INTRAVENOUS; SUBCUTANEOUS at 10:06

## 2017-06-20 RX ADMIN — EPHEDRINE SULFATE 10 MG: 50 INJECTION INTRAMUSCULAR; INTRAVENOUS; SUBCUTANEOUS at 09:06

## 2017-06-20 RX ADMIN — ONDANSETRON 4 MG: 2 INJECTION INTRAMUSCULAR; INTRAVENOUS at 09:06

## 2017-06-20 RX ADMIN — SODIUM CHLORIDE, SODIUM LACTATE, POTASSIUM CHLORIDE, AND CALCIUM CHLORIDE: 600; 310; 30; 20 INJECTION, SOLUTION INTRAVENOUS at 06:06

## 2017-06-20 RX ADMIN — SODIUM CHLORIDE: 0.9 INJECTION, SOLUTION INTRAVENOUS at 03:06

## 2017-06-20 RX ADMIN — EPHEDRINE SULFATE 5 MG: 50 INJECTION INTRAMUSCULAR; INTRAVENOUS; SUBCUTANEOUS at 07:06

## 2017-06-20 RX ADMIN — ACETAMINOPHEN 1000 MG: 10 INJECTION, SOLUTION INTRAVENOUS at 04:06

## 2017-06-20 RX ADMIN — ACETAMINOPHEN 1000 MG: 10 INJECTION, SOLUTION INTRAVENOUS at 07:06

## 2017-06-20 RX ADMIN — LIDOCAINE HYDROCHLORIDE 75 MG: 20 INJECTION, SOLUTION INTRAVENOUS at 07:06

## 2017-06-20 RX ADMIN — PROPOFOL 100 MG: 10 INJECTION, EMULSION INTRAVENOUS at 07:06

## 2017-06-20 RX ADMIN — CARBOXYMETHYLCELLULOSE SODIUM 2 DROP: 2.5 SOLUTION/ DROPS OPHTHALMIC at 07:06

## 2017-06-20 RX ADMIN — PROPOFOL 40 MG: 10 INJECTION, EMULSION INTRAVENOUS at 07:06

## 2017-06-20 RX ADMIN — ACETAMINOPHEN 1000 MG: 10 INJECTION, SOLUTION INTRAVENOUS at 09:06

## 2017-06-20 RX ADMIN — FENTANYL CITRATE 25 MCG: 50 INJECTION, SOLUTION INTRAMUSCULAR; INTRAVENOUS at 10:06

## 2017-06-20 RX ADMIN — PROPOFOL 20 MG: 10 INJECTION, EMULSION INTRAVENOUS at 09:06

## 2017-06-20 RX ADMIN — EPHEDRINE SULFATE 5 MG: 50 INJECTION INTRAMUSCULAR; INTRAVENOUS; SUBCUTANEOUS at 10:06

## 2017-06-20 RX ADMIN — CEFAZOLIN SODIUM 150 ML: 2 SOLUTION INTRAVENOUS at 07:06

## 2017-06-20 RX ADMIN — IPRATROPIUM BROMIDE AND ALBUTEROL SULFATE 3 ML: .5; 3 SOLUTION RESPIRATORY (INHALATION) at 11:06

## 2017-06-20 RX ADMIN — SUCCINYLCHOLINE CHLORIDE 160 MG: 20 INJECTION, SOLUTION INTRAMUSCULAR; INTRAVENOUS at 07:06

## 2017-06-20 RX ADMIN — PROPOFOL 10 MG: 10 INJECTION, EMULSION INTRAVENOUS at 09:06

## 2017-06-20 RX ADMIN — PROPOFOL 50 MG: 10 INJECTION, EMULSION INTRAVENOUS at 07:06

## 2017-06-20 RX ADMIN — SODIUM CHLORIDE, SODIUM LACTATE, POTASSIUM CHLORIDE, AND CALCIUM CHLORIDE: 600; 310; 30; 20 INJECTION, SOLUTION INTRAVENOUS at 09:06

## 2017-06-20 RX ADMIN — DEXAMETHASONE SODIUM PHOSPHATE 8 MG: 4 INJECTION, SOLUTION INTRAMUSCULAR; INTRAVENOUS at 07:06

## 2017-06-20 RX ADMIN — FENTANYL CITRATE 150 MCG: 50 INJECTION, SOLUTION INTRAMUSCULAR; INTRAVENOUS at 07:06

## 2017-06-20 RX ADMIN — HYDROMORPHONE HYDROCHLORIDE 0.4 MG: 2 INJECTION INTRAMUSCULAR; INTRAVENOUS; SUBCUTANEOUS at 11:06

## 2017-06-20 RX ADMIN — MIDAZOLAM HYDROCHLORIDE 2 MG: 1 INJECTION, SOLUTION INTRAMUSCULAR; INTRAVENOUS at 06:06

## 2017-06-20 RX ADMIN — FENTANYL CITRATE 25 MCG: 50 INJECTION, SOLUTION INTRAMUSCULAR; INTRAVENOUS at 09:06

## 2017-06-20 NOTE — ANESTHESIA POSTPROCEDURE EVALUATION
"Anesthesia Post Evaluation    Patient: Ankita Campo    Procedure(s) Performed: Procedure(s) (LRB):  THYROIDECTOMY Total (N/A)    Final Anesthesia Type: general  Patient location during evaluation: PACU  Patient participation: Yes- Able to Participate  Level of consciousness: awake and alert  Post-procedure vital signs: reviewed and stable  Pain management: adequate  Airway patency: patent  PONV status at discharge: No PONV  Anesthetic complications: no      Cardiovascular status: blood pressure returned to baseline  Respiratory status: unassisted  Hydration status: euvolemic  Follow-up not needed.        Visit Vitals  BP (!) 168/81   Pulse 77   Temp 36.7 °C (98.1 °F)   Resp 18   Ht 5' 5" (1.651 m)   Wt 120.7 kg (266 lb)   SpO2 98%   Breastfeeding? No   BMI 44.26 kg/m²       Pain/Ellen Score: Pain Assessment Performed: Yes (6/20/2017 12:12 PM)  Presence of Pain: complains of pain/discomfort (6/20/2017 12:12 PM)  Pain Rating Prior to Med Admin: 5 (6/20/2017 11:04 AM)  Pain Rating Post Med Admin: 4 (6/20/2017 12:12 PM)  Ellen Score: 8 (6/20/2017 11:28 AM)  Modified Ellen Score: 18 (6/20/2017 12:12 PM)      "

## 2017-06-20 NOTE — PLAN OF CARE
Patient prefers to have Monica (sister) present for discharge teaching. Please contact them @[phone].

## 2017-06-20 NOTE — OP NOTE
[facility]  Endocrine Surgery  Operative Report    SUMMARY     Date of Procedure: 6/20/2017     Procedure: Procedure(s) (LRB):  THYROIDECTOMY Total (N/A)     Indications: This patient presents with a palpable nodule on the bilateral side of the neck (left greater than right.   Fine needle aspiration cytology of largest left nodule revealed findings consistent with a colloid nodule. The patient now presents for a total thyroidectomy.     Surgeon(s) and Role:     * Lucy Ch MD - Primary    Assisting Surgeon: None    Pre-Operative Diagnosis: Multinodular non-toxic goiter [E04.2]    Post-Operative Diagnosis: Multinodular non-toxic goiter [E04.2]    Anesthesia: General    Intraoperative Findings:     1.  A nodule was found within the thyroid lobe, located in the lower pole.  This measured approximately 6 cm in diameter and mobile from surrounding structures, there was no adenopathy noted upon exploration of the neck.   2. The bilateral recurrent laryngeal nerve was identified.  Function was verified using the NIMS system.  3. Parathyroid tissue was identified and preserved with a viable blood supply.     Description of the Findings of the Procedure:    The patient was seen in the Holding Room. The risks, benefits, complications, treatment options, and expected outcomes were discussed with the patient. The possibilities of reaction to medication, pulmonary aspiration, perforation of viscus, bleeding, recurrent infection, finding a normal thyroid, recurrently laryngeal nerve damage, the need for additional procedures, failure to diagnose a condition, and creating a complication requiring transfusion or operation were discussed with the patient. The patient concurred with the proposed plan, giving informed consent.  The site of surgery properly noted/marked. The patient was taken to Operating Room, identified as Ankita Campo and the procedure verified as Thyroidectomy. A Time Out was held and the  above information confirmed.    The patient was placed supine after induction of a general anesthetic. The neck was extended and prepped and draped in standard fashion. An 9 cm transverse cervical incision was created above the sternal notch within a natural skin fold. Dissection was carried down through the platysma layer. Once this was completed, sub-platysmal flaps were raised superiorly to the thyroid cartilage and inferiorly to the sternal notch. The strap muscles were identified and divided at the midline. Sharp and blunt dissection were used to mobilize the left thyroid lobe in a medial direction. A nodule was found within the thyroid lobe, located in the lower pole.  This measured approximately 6 cm in diameter and mobile from surrounding structures, there was no adenopathy noted upon exploration of the neck.  Dissection continued posteriorly to expose the tracheoesophageal groove and left carotid artery. The left thyroid lobe was mobilized further and the superior and inferior pole vessels were divided with silk ties and the harmonic scalpel. The middle thyroid vein was similarly divided with the harmonic scalpel. The left recurrent laryngeal nerve was identified and preserved. Function was verified using the NIMS.The left inferior parathyroid gland was not identified, and the left superior parathyroid was noted dorsal to the nerve and left in situ. Small vessels were likewise divided. The gland was rotated in a medial direction and taken off the trachea using the bipolar cautery. The isthmus was removed off the thyroid cartilage also using the bipolar cautery.     Attention was then give to the right lobe, which was much smaller than the left.  Sharp and blunt dissection were again used to mobilize the right thyroid lobe in a medial direction. Dissection continued posteriorly to expose the tracheoesophageal groove and right carotid artery. Similar to the left, the right thyroid lobe was mobilized further  and the superior and inferior pole vessels were divided with silk ties and the harmonic scalpel. The middle thyroid vein was similarly divided with the harmonic scalpel. The right recurrent laryngeal nerve was identified and preserved. Function was verified using the NIMS. Small vessels were likewise divided. The right superior and inferior parathyroid glands were identified and preserved in situ. The gland was rotated in a medial direction and taken off the trachea using the bipolar cautery. The specimen was submitted to pathology for permanent evaluation. A suture was placed for orientation purposes (stitch marks the right superior pole).     The wound was irrigated and inspected carefully. Multiple Valsalva maneuvers were performed at 30 cm of water and additional hemostasis was achieved as necessary with focal application of bipolar cautery. This was augmented with Fibrillar which was placed in the thyroid fossa. The parathyroid tissue was found to be viable and the bilateral recurrent laryngeal nerve were left intact in their anatomic locations. Function was again verified using the NIMS prior to closure. 20 mL of Exparel mixed with Marcaine was placed into the strap muscles and subcutaneous tissues for post-op analgesia. The strap muscles were then closed with interrupted 3-0 Vicryl suture.  The platysma was closed with interrupted 3-0 Vicryl suture, and the skin incision was closed with a 5-0 Monocryl subcuticular knot-less closure. Sterile dressings were was applied across the incision.    Instrument, sponge, and needle counts were correct prior to closure and at the conclusion of the case.     Significant Surgical Tasks Conducted by the Assistant(s), if Applicable: none    Complications: No    Total IV Fluids: see anesthesia record    Estimated Blood Loss (EBL): less than 50 mL           Drains: 10 Fr. Round Jj    Implants: none    Specimens: Total thyroid - stitch marks the right superior pole             Condition: stable    Disposition: PACU - hemodynamically stable.    Attestation: I was present and scrubbed for the entire procedure.

## 2017-06-20 NOTE — INTERVAL H&P NOTE
The patient has been examined and the H&P has been reviewed:    I concur with the findings and no changes have occurred since H&P was written.    Anesthesia/Surgery risks, benefits and alternative options discussed and understood by patient/family.          Active Hospital Problems    Diagnosis  POA    Multinodular goiter (nontoxic) [E04.2]  Yes    Multinodular non-toxic goiter [E04.2]  Yes      Resolved Hospital Problems    Diagnosis Date Resolved POA   No resolved problems to display.       Will proceed with total thyroidectomy.

## 2017-06-20 NOTE — TRANSFER OF CARE
"Anesthesia Transfer of Care Note    Patient: Ankita Campo    Procedure(s) Performed: Procedure(s) (LRB):  THYROIDECTOMY Total (N/A)    Patient location: PACU    Transport from OR: Transported from OR on 2-3 L/min O2 by NC with adequate spontaneous ventilation    Post pain: adequate analgesia    Post assessment: no apparent anesthetic complications    Post vital signs: stable    Level of consciousness: awake, alert and oriented    Nausea/Vomiting: no nausea/vomiting    Complications: none    Transfer of care protocol was followed      Last vitals:   Visit Vitals  BP (!) 171/81 (BP Location: Right arm, Patient Position: Sitting, BP Method: Automatic)   Pulse (!) 56   Temp 36.8 °C (98.3 °F) (Oral)   Resp 16   Ht 5' 5" (1.651 m)   Wt 120.7 kg (266 lb)   SpO2 96%   Breastfeeding? No   BMI 44.26 kg/m²     "

## 2017-06-20 NOTE — PLAN OF CARE
06/20/17 1654   Discharge Assessment   Assessment Type Discharge Planning Assessment   Confirmed/corrected address and phone number on facesheet? No   Assessment information obtained from? Caregiver;Medical Record   Current cognitive status: Coma/Sedated/Intubated   Able to Return to Prior Arrangements unable to determine at this time (comments)   Is patient able to care for self after discharge? Unable to determine at this time (comments)   Patient currently being followed by outpatient case management? Unable to determine (comments)   On Dialysis? No   Discharge Plan A Home with family   Patient/Family In Agreement With Plan unable to assess

## 2017-06-20 NOTE — DISCHARGE INSTRUCTIONS
Recovering after Endocrine Surgery    Type of Procedure: Thyroidectomy    Postoperative clinic appointment date: One week after surgery    Activity:  You may resume normal daily activities upon discharge.  This includes walking and climbing stairs.  Avoid heavy lifting and vigorous exercise for approximately 2 weeks.    Showering:  You may resume normal showering and bathing approximately 2 days after surgery.  Your incision does not require special care and it may get wet.    Incision:  Your neck incision is closed with absorbable sutures under the skin.  You will not need to have any stitches removed.  Small pieces of tape called Steri-Strips cover the outside of your incision.  These strips will be removed during your postoperative visit.    Pain:  After surgery you may experience pain at the incision, generalized neck pain, or a sore throat.  You will be given a prescription for pain relief.  Most patients will still have discomfort 2-3 days after surgery.  Many times, over the counter pain medications, such as Extra Strength Tylenol, are effective.    Driving:  Use your judgment in resuming to drive.  Avoid driving if you are still experiencing neck pain and are using prescription pain medications.      Return to Work:  Recovery after surgery depends on the individual.  Most patients can expect to return to work approximately 1-2 weeks after surgery.    Diet:  You may resume your normal diet after surgery without any restrictions.    Constipation:  Anesthesia and pain medication can cause a decrease in bowel motility.  If you experience constipation postoperatively, you may take over the counter laxatives such as Milk of Magnesia.    Calcium Supplements:  Calcium tablets may be recommended after your surgery.  Most often this is to promote bone health and prevent low blood calcium during recovery.  Numbness and tingling in your fingertips or around your mouth may be experienced when calcium levels are low.  If  tingling or numbness occurs, call you doctor.  You may chew up several extra tablets to relieve symptoms from low calcium.  Tums may be substituted for calcium tablets.    Medications:  You may resume taking medication as instructed by your MD at discharge from the hospital.     Questions/Concerns:  If you have any questions or concerns please call your doctor's office: Lucy Ch MD,  [phone] or after hours call [phone] and ask for the General Surgery Resident on call.

## 2017-06-21 ENCOUNTER — TELEPHONE (OUTPATIENT)
Dept: DERMATOLOGY | Facility: CLINIC | Age: 67
End: 2017-06-21

## 2017-06-21 VITALS
BODY MASS INDEX: 44.32 KG/M2 | WEIGHT: 266 LBS | HEIGHT: 65 IN | DIASTOLIC BLOOD PRESSURE: 77 MMHG | HEART RATE: 60 BPM | TEMPERATURE: 98 F | RESPIRATION RATE: 18 BRPM | SYSTOLIC BLOOD PRESSURE: 173 MMHG | OXYGEN SATURATION: 97 %

## 2017-06-21 PROCEDURE — 25000003 PHARM REV CODE 250: Performed by: SURGERY

## 2017-06-21 RX ADMIN — LEVOTHYROXINE SODIUM 125 MCG: 125 TABLET ORAL at 05:06

## 2017-06-21 RX ADMIN — STANDARDIZED SENNA CONCENTRATE AND DOCUSATE SODIUM 1 TABLET: 8.6; 5 TABLET, FILM COATED ORAL at 09:06

## 2017-06-21 NOTE — DISCHARGE SUMMARY
[facility]  Discharge Summary  Endocrine Surgery    Admit Date: 6/20/2017    Discharge Date and Time:  06/21/2017 12:01 PM    Attending Physician: Lucy Ch MD     Discharge Provider: Lucy Ch    Reason for Admission: s/p total thyroidectomy    Procedures Performed: Procedure(s) (LRB):  THYROIDECTOMY Total (N/A)    Hospital Course (synopsis of major diagnoses, care, treatment, and services provided during the course of the hospital stay): The patient underwent a THYROIDECTOMY Total (N/A) on 6/20/2017. On the evening of surgery, the patient's serum parathyroid hormone was 64.9.  The patient did not have symptoms of hypocalcemia.. Patient is not taking calcium supplementation.. The patient was not started on Calcitriol supplementation. The patient had a Jared-Cox drain placed at the time of surgery.  The drain output was 65 cc over the last 18 hours and was serous drainage.  The drain was not removed prior to discharge. . At the time of discharge, the patient's pain was well-controlled.  She had voided, was ambulatory, and tolerating a diet.    Consults: none    Significant Diagnostic Studies: see above    Final Diagnoses:   Principal Problem: Multinodular non-toxic goiter   Secondary Diagnoses:   Active Hospital Problems    Diagnosis  POA    *Multinodular non-toxic goiter [E04.2]  Yes    Multinodular goiter (nontoxic) [E04.2]  Yes      Resolved Hospital Problems    Diagnosis Date Resolved POA   No resolved problems to display.       Discharged Condition: good    Disposition: Home or Self Care    Follow Up/Patient Instructions:   The thyroidectomy discharge instruction sheet was provided. The patient was instructed to call the surgeon's office with any additional questions or concerns.    Medications:  Reconciled Home Medications:   Current Discharge Medication List      START taking these medications    Details   docusate sodium (COLACE) 100 MG capsule Take 1 capsule  (100 mg total) by mouth 2 (two) times daily. Take in conjunction with Calcium and narcotic pain medication.    May substitute any OTC stool softener.  Refills: 0      levothyroxine (SYNTHROID) 112 MCG tablet Take 1 tablet (112 mcg total) by mouth once daily.  Qty: 30 tablet, Refills: 2      oxycodone-acetaminophen (PERCOCET) 5-325 mg per tablet Take 1 tablet by mouth every 6 (six) hours as needed for Pain. Maximum dose of acetaminophen is 3000 mg from all sources in 24 hours.  Qty: 31 tablet, Refills: 0         CONTINUE these medications which have NOT CHANGED    Details   acetaminophen (TYLENOL) 325 MG tablet Take 325 mg by mouth every 6 (six) hours as needed for Pain.      amlodipine (NORVASC) 5 MG tablet Take 1 tablet (5 mg total) by mouth once daily.  Qty: 30 tablet, Refills: 11    Associated Diagnoses: Essential hypertension      cyanocobalamin 500 MCG tablet Take 500 mcg by mouth once daily. Pt says she only takes it if she is tired.      multivitamin capsule Take 1 capsule by mouth once daily.      potassium chloride (MICRO-K) 8 mEq CpSR Take 1 capsule (8 mEq total) by mouth once daily.  Qty: 90 capsule, Refills: 0      cholecalciferol, vitamin D3, 1,000 unit capsule Take 1 capsule (1,000 Units total) by mouth once daily.  Refills: 0      furosemide (LASIX) 40 MG tablet Take 1 tablet (40 mg total) by mouth once daily.  Qty: 90 tablet, Refills: 0    Associated Diagnoses: Venous stasis dermatitis of both lower extremities      MULTIVIT-IRON-MIN-FOLIC ACID 3,500-18-0.4 UNIT-MG-MG ORAL CHEW Take by mouth.      omega-3 fatty acids-vitamin E (FISH OIL) 1,000 mg Cap Take by mouth. Kal red      PETROLATUM,WHITE (WHITE PETROLATUM, BULK,) Gel 1 application by Misc.(Non-Drug; Combo Route) route 4 (four) times daily.  Qty: 500 g, Refills: 0    Associated Diagnoses: Venous stasis dermatitis of both lower extremities      VITAMIN E ACETATE (VITAMIN E ORAL) Take 1 capsule by mouth once daily.              Discharge  Procedure Orders  Diet general     Activity as tolerated     Ice to affected area   Order Comments: 30 minutes on and then 30 minutes off. Repeat     Other restrictions (specify):   Order Comments: May shower immediately.  NO baths or soaks.  No driving while using narcotic pain medication.     Call MD for:  temperature >100.4     Call MD for:  persistent nausea and vomiting     Call MD for:  severe uncontrolled pain     Call MD for:  difficulty breathing, headache or visual disturbances     Call MD for:  redness, tenderness, or signs of infection (pain, swelling, redness, odor or green/yellow discharge around incision site)     Call MD for:  hives     Call MD for:  persistent dizziness or light-headedness     Call MD for:  extreme fatigue     Call MD for:   Order Comments: If you have numbness and/or tingling around the face, lips, fingertips, toes take four(4) 500mg tablets of calcium carbonate (Tums).  The symptoms should go away in 15-30 minutes.   If the symptoms persist at 30 minutes you can repeat this.  If they still don't go away, call the physician.     Remove dressing in 48 hours   Order Comments: May remove clear bandage and gauze 48 hours after surgery.  Leave steri strips in place.  The steri's are the only dressing needed after 48 hours.       Follow-up Information     Lucy Ch MD In 1 week.    Specialty:  General Surgery  Contact information:  [address] [facility]  [address] [address] [phone]

## 2017-06-21 NOTE — NURSING
Discharge instructions and pxs given and verbalizes understanding.  Family at bedside.  Hep loc out and catheter intact.  Verbalizes understanding of after care.  No distress noted and ready for discharge.

## 2017-06-21 NOTE — NURSING
PAT Drain education - pt verbalized understanding of how to maintain suction and how to empty drain.

## 2017-06-21 NOTE — PROGRESS NOTES
Progress Note     Endocrine Surgery   Postoperative Thyroidectomy    SUBJECTIVE:     Diet:  tolerating by mouth (clears currently)  Patient reports:  no symptoms of hypocalcemia  Patient:  Patient is not taking calcium supplementation.  Patient:  Patient is not taking vitamin D supplementation  The patient reports that her voice is: at baseline    OBJECTIVE:     Vitals  Temp:  [97.4 °F (36.3 °C)-98.3 °F (36.8 °C)] 97.8 °F (36.6 °C) (06/21 0735)  Pulse:  [60-80] 60 (06/21 0735)  BP: (147-175)/(67-86) 173/77 (06/21 0735)  Resp:  [16-18] 18 (06/21 0735)  SpO2:  [93 %-100 %] 97 % (06/21 0735)  Intake/Output:    Intake/Output Summary (Last 24 hours) at 06/21/17 1159  Last data filed at 06/21/17 0500   Gross per 24 hour   Intake              623 ml   Output              965 ml   Net             -342 ml     PAT present: yes       Closed/Suction Drain 06/20/17 0952 Left;Anterior Chest Bulb 10 Fr.-Output (mL): 5 mL  Laboratory values:       Ref. Range 6/20/2017 16:07   PTH Latest Ref Range: 9.0 - 77.0 pg/mL 64.6     Physical Examination  Overall: no apparent distress  Neck exam: Neck is: flat. Swelling is minimal. There is no evidence of ecchymosis or hematoma formation. Incision is clean, dry and intact. Phonation is at baseline.    ASSESSMENT/PLAN:     Assessment: patient is postoperative day # 1 s/p: Total thyroidectomy    Plan  Calcium supplementation: Patient is not taking calcium supplementation.  Vitamin D supplementation: Patient is not taking vitamin D supplementation  Other Medications: 125 mcg levothyroxine  daily and Percocet prn  Patient may be discharged pending tolerating by mouth and voiding  Drain: Drain teaching completed.   Patient verbalized understanding.  Follow-up visit with Lucy Ch M.D.  in 1 week.

## 2017-06-21 NOTE — PLAN OF CARE
Problem: Patient Care Overview  Goal: Plan of Care Review  Pt currently on   RA. Pt in no distress at this time. Sats 95  %. Will continue to monitor. IS complete

## 2017-06-22 ENCOUNTER — OFFICE VISIT (OUTPATIENT)
Dept: SURGERY | Facility: CLINIC | Age: 67
End: 2017-06-22
Attending: SURGERY
Payer: COMMERCIAL

## 2017-06-22 ENCOUNTER — TELEPHONE (OUTPATIENT)
Dept: SURGERY | Facility: CLINIC | Age: 67
End: 2017-06-22

## 2017-06-22 DIAGNOSIS — Z09 POSTOP CHECK: Primary | ICD-10-CM

## 2017-06-22 DIAGNOSIS — E04.2 MULTINODULAR THYROID: ICD-10-CM

## 2017-06-22 LAB — ARUP MISCELLANEOUS TEST 1: NORMAL

## 2017-06-22 PROCEDURE — 99999 PR PBB SHADOW E&M-EST. PATIENT-LVL II: CPT | Mod: PBBFAC,,, | Performed by: SURGERY

## 2017-06-22 PROCEDURE — 99024 POSTOP FOLLOW-UP VISIT: CPT | Mod: S$GLB,,, | Performed by: SURGERY

## 2017-06-22 NOTE — TELEPHONE ENCOUNTER
Pt reports that she is leaking outside of her drain. Says, drainage is not in the bulb.  Dr. NASRIN castillo and pt will be seen in [facility] by [facility].

## 2017-06-24 LAB
BLD PROD TYP BPU: NORMAL
BLD PROD TYP BPU: NORMAL
BLOOD UNIT EXPIRATION DATE: NORMAL
BLOOD UNIT EXPIRATION DATE: NORMAL
BLOOD UNIT TYPE CODE: 7300
BLOOD UNIT TYPE CODE: 7300
BLOOD UNIT TYPE: NORMAL
BLOOD UNIT TYPE: NORMAL
CODING SYSTEM: NORMAL
CODING SYSTEM: NORMAL
DISPENSE STATUS: NORMAL
DISPENSE STATUS: NORMAL
TRANS ERYTHROCYTES VOL PATIENT: NORMAL ML
TRANS ERYTHROCYTES VOL PATIENT: NORMAL ML

## 2017-06-29 ENCOUNTER — OFFICE VISIT (OUTPATIENT)
Dept: SURGERY | Facility: CLINIC | Age: 67
End: 2017-06-29
Attending: SURGERY
Payer: COMMERCIAL

## 2017-06-29 ENCOUNTER — TELEPHONE (OUTPATIENT)
Dept: ENDOCRINOLOGY | Facility: CLINIC | Age: 67
End: 2017-06-29

## 2017-06-29 VITALS
TEMPERATURE: 98 F | SYSTOLIC BLOOD PRESSURE: 133 MMHG | BODY MASS INDEX: 43.76 KG/M2 | HEART RATE: 58 BPM | HEIGHT: 65 IN | DIASTOLIC BLOOD PRESSURE: 76 MMHG | WEIGHT: 262.69 LBS

## 2017-06-29 DIAGNOSIS — E89.0 POST-SURGICAL HYPOTHYROIDISM: ICD-10-CM

## 2017-06-29 DIAGNOSIS — Z09 POSTOP CHECK: Primary | ICD-10-CM

## 2017-06-29 DIAGNOSIS — E04.2 MULTINODULAR NON-TOXIC GOITER: ICD-10-CM

## 2017-06-29 PROCEDURE — 99024 POSTOP FOLLOW-UP VISIT: CPT | Mod: S$GLB,,, | Performed by: SURGERY

## 2017-06-29 PROCEDURE — 99999 PR PBB SHADOW E&M-EST. PATIENT-LVL III: CPT | Mod: PBBFAC,,, | Performed by: SURGERY

## 2017-06-29 RX ORDER — BETAMETHASONE DIPROPIONATE 0.5 MG/G
OINTMENT TOPICAL DAILY PRN
COMMUNITY
Start: 2017-05-02 | End: 2018-04-12 | Stop reason: SDUPTHER

## 2017-06-29 NOTE — PROGRESS NOTES
"Subjective:       Ankita Campo presents to the clinic 1 weeks following total thyroidectomy. Eating a regular diet without difficulty. Bowel movements are Abnormal- she continues to have mild constipation. she is currently on daily stool softener.  Pain is controlled without any medications.. Patient admits swelling under her chin. Patient denies trouble swallowing, trouble speaking and fingers, toes and perioral numbness or tingling. She is not currently on Calcium or Rocaltrol. Patient is taking Levothyroxine (112 mcg daily).    She has had the drain and notes that it decreased in amount over the last 4-5 days.    Objective:      /76 (BP Location: Right arm, Patient Position: Sitting, BP Method: Automatic)   Pulse (!) 58   Temp 98 °F (36.7 °C) (Oral)   Ht 5' 5" (1.651 m)   Wt 119.2 kg (262 lb 10.9 oz)   BMI 43.71 kg/m²     General:   alert, appears stated age and cooperative. Chovstek's sign absent.   Incision:   well approximated, healing well, no signs of drainage, no erythema, no dehiscence, no hematoma, no ecchymosis, no seroma and swelling (moderate).  Drain was removed in the clinic today.   Voice:  normal       FINAL PATHOLOGIC DIAGNOSIS  1. THYROID: BENIGN THYROID TISSUE, NO PARATHYROID PRESENT.  2. THYROID (TOTAL THYROIDECTOMY, 129g): MULTINODULAR COLLOID GOITER.      Labs:  Lab Results   Component Value Date    CALCIUM 9.1 06/05/2017    CALCIUM 9.3 04/18/2017    CALCIUM 9.0 12/30/2016    TSH 0.765 04/18/2017    TSH 1.078 01/07/2016    TSH 0.631 06/02/2015    FREET4 0.96 01/07/2016    RUZFMMHN05CN 26 (L) 04/28/2016    [id number] 26 (L) 03/08/2016    PTH 64.6 06/20/2017    PTH 94 (H) 04/02/2007     (H) 02/07/2007    PHOS 2.9 12/30/2016    PHOS 3.4 09/18/2015    PHOS 3.6 10/23/2014            Assessment:     Ankita Campo is doing well post-operatively after total thyroidectomy.         Plan:        1. Continue any current medications.  2. Wound care and scar massage discussed.  3. Pt " is to increase activities as tolerated.  4. Follow up with endocrinology for the possibility for further treatment.

## 2017-06-29 NOTE — LETTER
Endocrine/General Surgery  [address] [place] [address]  Phone: [phone]  Fax: [phone] June 29, 2017         Peter Crowe II, MD  [address]  [place] [address]    Patient: Ankita Campo   YOB: 1950   Date of Visit: 6/29/2017     Dear Dr. Crowe:    I wanted to let you know that I operated on Ankita Campo. She underwent total thyroidectomy on 6/20/2017. You will find a copy of the operative report and final pathology in the [facility] system for your review. She did well after the surgery, and I saw her back in the clinic today. She is on thyroid replacement (Levothyroxine 112 mcg daily), and is not on calcium supplementation.  She plans on seeing you in 6-8 weeks with repeat laboratory studies for possible thyroid supplementation adjustment.     If you have any questions or concerns, please contact me. Again, thank you for allowing me to participate in the care of this patient.     Regards,        Lucy Ch MD      Michael Elias MD  [address] [facility] [place] [address]  VIA In Basket     Chester Baltazar MD  [address] [facility] [place] [address]  VIA In Basket

## 2017-06-29 NOTE — LETTER
Endocrine/General Surgery  [address] [place] [address]  Phone: [phone]  Fax: [phone] June 29, 2017     Patient: Ankita Campo   YOB: 1950   Date of Visit: 6/29/2017       To whom it may concern,    I saw Ms. Campo today in clinic. Ankita Marie has been under my care since 6/20/2017when she underwent surgery.    She are clear to return to school/work on 7/10/2017.  She will be on light duty from then until 7/17/2017, at which point they can perform all duties without restriction.     If you have any questions or concerns, please don't hesitate to contact my office. Thank you for accomodating Ankita  during this time of her treatment.        Regards,        Lucy Ch MD

## 2017-06-29 NOTE — Clinical Note
This patient did well after surgery and needs follow-up in 8 weeks with labs for possible thyroid hormone supplementation.

## 2017-06-30 ENCOUNTER — TELEPHONE (OUTPATIENT)
Dept: ENDOCRINOLOGY | Facility: CLINIC | Age: 67
End: 2017-06-30

## 2017-07-10 ENCOUNTER — OFFICE VISIT (OUTPATIENT)
Dept: DERMATOLOGY | Facility: CLINIC | Age: 67
End: 2017-07-10
Payer: COMMERCIAL

## 2017-07-10 DIAGNOSIS — L27.1 FIXED DRUG ERUPTION: Primary | ICD-10-CM

## 2017-07-10 DIAGNOSIS — M79.3 LIPODERMATOSCLEROSIS: ICD-10-CM

## 2017-07-10 PROCEDURE — 99213 OFFICE O/P EST LOW 20 MIN: CPT | Mod: S$GLB,,, | Performed by: DERMATOLOGY

## 2017-07-10 PROCEDURE — 1159F MED LIST DOCD IN RCRD: CPT | Mod: S$GLB,,, | Performed by: DERMATOLOGY

## 2017-07-10 PROCEDURE — 99999 PR PBB SHADOW E&M-EST. PATIENT-LVL III: CPT | Mod: PBBFAC,,, | Performed by: DERMATOLOGY

## 2017-07-10 RX ORDER — DIOSMIN COMPLEX NO.1 630 MG
630 TABLET ORAL DAILY
Qty: 30 TABLET | Refills: 5 | Status: SHIPPED | OUTPATIENT
Start: 2017-07-10 | End: 2018-08-27 | Stop reason: ALTCHOICE

## 2017-07-10 NOTE — PROGRESS NOTES
Subjective:       Patient ID:  Ankita Campo is a 66 y.o. female who presents for   Chief Complaint   Patient presents with    Rash     fu     Pt presents for 2 week post bx follow up rash left foot.  Better.  Uses diprolene cream PRN flares  Pt labs for BP ag were negative.  Path most consistent with fixed drug reaction. Pt has taken tylenol and did not get a rash           Rash         Review of Systems   Constitutional: Positive for fatigue. Negative for fever, chills and malaise.   HENT: Negative for mouth sores.    Skin: Negative for itching and rash.        Objective:    Physical Exam   Constitutional: She appears well-developed and well-nourished. No distress.   Neurological: She is alert and oriented to person, place, and time. She is not disoriented.   Psychiatric: She has a normal mood and affect.   Skin:   Areas Examined (abnormalities noted in diagram):   RLE Inspected  LLE Inspection Performed              Diagram Legend     Erythematous scaling macule/papule c/w actinic keratosis       Vascular papule c/w angioma      Pigmented verrucoid papule/plaque c/w seborrheic keratosis      Yellow umbilicated papule c/w sebaceous hyperplasia      Irregularly shaped tan macule c/w lentigo     1-2 mm smooth white papules consistent with Milia      Movable subcutaneous cyst with punctum c/w epidermal inclusion cyst      Subcutaneous movable cyst c/w pilar cyst      Firm pink to brown papule c/w dermatofibroma      Pedunculated fleshy papule(s) c/w skin tag(s)      Evenly pigmented macule c/w junctional nevus     Mildly variegated pigmented, slightly irregular-bordered macule c/w mildly atypical nevus      Flesh colored to evenly pigmented papule c/w intradermal nevus       Pink pearly papule/plaque c/w basal cell carcinoma      Erythematous hyperkeratotic cursted plaque c/w SCC      Surgical scar with no sign of skin cancer recurrence      Open and closed comedones      Inflammatory papules and pustules       Verrucoid papule consistent consistent with wart     Erythematous eczematous patches and plaques     Dystrophic onycholytic nail with subungual debris c/w onychomycosis     Umbilicated papule    Erythematous-base heme-crusted tan verrucoid plaque consistent with inflamed seborrheic keratosis     Erythematous Silvery Scaling Plaque c/w Psoriasis     See annotation      Assessment / Plan:        Fixed drug eruption  Pt to strictly monitor any otc meds or meds she takes intermittently as this rash is not continuous and is intermittent  Continue diprolene prn flare  Call with new outbreak   BP AG neg     Lipodermatosclerosis  Compression hose   -     VASCULERA 630 mg Tab; Take 630 mg by mouth once daily.  Dispense: 30 tablet; Refill: 5             Return if symptoms worsen or fail to improve.

## 2017-08-08 ENCOUNTER — LAB VISIT (OUTPATIENT)
Dept: LAB | Facility: HOSPITAL | Age: 67
End: 2017-08-08
Attending: INTERNAL MEDICINE
Payer: COMMERCIAL

## 2017-08-08 DIAGNOSIS — E89.0 POST-SURGICAL HYPOTHYROIDISM: ICD-10-CM

## 2017-08-08 LAB — TSH SERPL DL<=0.005 MIU/L-ACNC: 0.41 UIU/ML

## 2017-08-08 PROCEDURE — 36415 COLL VENOUS BLD VENIPUNCTURE: CPT | Mod: PO

## 2017-08-08 PROCEDURE — 84443 ASSAY THYROID STIM HORMONE: CPT

## 2017-08-09 ENCOUNTER — TELEPHONE (OUTPATIENT)
Dept: ENDOCRINOLOGY | Facility: CLINIC | Age: 67
End: 2017-08-09

## 2017-08-09 NOTE — TELEPHONE ENCOUNTER
Spoke with patient regarding thyroid function labs. Follow-up appt scheduled with Dr. Crowe next week.

## 2017-08-15 ENCOUNTER — OFFICE VISIT (OUTPATIENT)
Dept: ENDOCRINOLOGY | Facility: CLINIC | Age: 67
End: 2017-08-15
Payer: COMMERCIAL

## 2017-08-15 VITALS
SYSTOLIC BLOOD PRESSURE: 128 MMHG | BODY MASS INDEX: 43.93 KG/M2 | HEART RATE: 57 BPM | HEIGHT: 65 IN | WEIGHT: 263.69 LBS | DIASTOLIC BLOOD PRESSURE: 76 MMHG

## 2017-08-15 DIAGNOSIS — E89.0 POST-SURGICAL HYPOTHYROIDISM: Primary | ICD-10-CM

## 2017-08-15 DIAGNOSIS — Z80.0 FAMILY HISTORY OF GASTRIC CANCER: ICD-10-CM

## 2017-08-15 PROCEDURE — 1159F MED LIST DOCD IN RCRD: CPT | Mod: S$GLB,,, | Performed by: INTERNAL MEDICINE

## 2017-08-15 PROCEDURE — 3078F DIAST BP <80 MM HG: CPT | Mod: S$GLB,,, | Performed by: INTERNAL MEDICINE

## 2017-08-15 PROCEDURE — 3074F SYST BP LT 130 MM HG: CPT | Mod: S$GLB,,, | Performed by: INTERNAL MEDICINE

## 2017-08-15 PROCEDURE — 99214 OFFICE O/P EST MOD 30 MIN: CPT | Mod: S$GLB,,, | Performed by: INTERNAL MEDICINE

## 2017-08-15 PROCEDURE — 99999 PR PBB SHADOW E&M-EST. PATIENT-LVL III: CPT | Mod: PBBFAC,,, | Performed by: INTERNAL MEDICINE

## 2017-08-15 PROCEDURE — 3008F BODY MASS INDEX DOCD: CPT | Mod: S$GLB,,, | Performed by: INTERNAL MEDICINE

## 2017-08-15 PROCEDURE — 1125F AMNT PAIN NOTED PAIN PRSNT: CPT | Mod: S$GLB,,, | Performed by: INTERNAL MEDICINE

## 2017-08-15 RX ORDER — LEVOTHYROXINE SODIUM 112 UG/1
125 TABLET ORAL DAILY
Qty: 30 TABLET | Refills: 12 | Status: SHIPPED | OUTPATIENT
Start: 2017-08-15 | End: 2018-09-19 | Stop reason: SDUPTHER

## 2017-08-15 NOTE — PROGRESS NOTES
Subjective:       Patient ID: Ankita Campo is a 67 y.o. female.    Chief Complaint: Hypothyroidism    Ms. Campo is presenting for follow-up of non-toxic MNG. Previously seen by Dr Garrett in 1/2016    Reports worsening goiter symptoms. When she turns her head to side feels that breathing is affected and dyspneic. Symptoms have been worsening for past few months  Denies hoarseness. Denies dyphagia    Pt noted swelling on the left side of her neck and went to see her PCP in late 2015     Subsequently thyroid nodule was confirmed on thyroid US from 1/2016:  The right lobe of the thyroid measures 8.0 x 1.8 x 1.2 cm. Multiple nodules are seen within the right lobe of the thyroid.    The left lobe of the thyroid measures 9.0 x 3.2 x 3.8 cm. Multiple nodules are seen within the left lobe of the thyroid.    The largest nodule is seen within the interpole the left lobe of the thyroid measures 3.4 cm.     Impression   There is multinodular thyroid goiter with a dominant nodule made for pole the left lobe of the thyroid. If not already performed FNA of this nodule for further evaluation is recommended.   FNA left thyroid nodule from 1/2016 was benign     FNA of left thyroid nodule in 1/2016 was benign     Mom had thyroid surgery - unsure of reason for removal  Sister with total thyroidectomy for goiter    No h/o neck irradiation.  No FH of thyroid cancer.    Works as     Gained 5lbs since 2016    Does gardening      Pt had total thyroidectomy June, 2017.  Dose of levothyroxine 112 lupis.  Review of Systems   Constitutional: Positive for fatigue. Negative for unexpected weight change.   HENT: Negative for facial swelling, hearing loss and trouble swallowing.    Eyes: Negative for visual disturbance.   Respiratory: Positive for shortness of breath.    Cardiovascular: Negative for chest pain.   Gastrointestinal: Positive for constipation. Negative for abdominal pain and nausea.   Endocrine: Positive for cold  intolerance. Negative for heat intolerance.   Genitourinary: Positive for urgency. Negative for difficulty urinating and hematuria.   Musculoskeletal: Positive for myalgias. Negative for arthralgias.   Skin: Negative for wound.   Neurological: Positive for headaches.   Hematological: Does not bruise/bleed easily.   Psychiatric/Behavioral: Negative for sleep disturbance.       Objective:      Physical Exam   Constitutional: She appears well-developed and well-nourished.   HENT:   Head: Normocephalic.   Neck: Normal range of motion. Neck supple. Thyromegaly (left thyroid nodule/lobe prominent) present.   + darek's sign   Cardiovascular: Normal rate and regular rhythm.    Pulmonary/Chest: Effort normal.   Abdominal: Soft.   Musculoskeletal: Normal range of motion. She exhibits no edema.   Neurological: She is alert.   Skin: Skin is warm. No erythema.   Psychiatric: She has a normal mood and affect.   Vitals reviewed.    FINAL PATHOLOGIC DIAGNOSIS  1. THYROID: BENIGN THYROID TISSUE, NO PARATHYROID PRESENT.  2. THYROID (TOTAL THYROIDECTOMY, 129g): MULTINODULAR COLLOID GOITER.  [facility]  Diagnosed by: Milad Tavares M.D.  (Electronically Signed: 2017-06-23 08:23:39)  Microscopic Examination  Multiple sections reveal distended thyroid acini with abundant colloid. The follicular cells are compressed as they  surround the expanded acini. There are no atypical papillary nuclear changes. Some areas contain abundant  granulation tissue with hemosiderin, possibly representing an area of previous FNA.  Frozen Section Diagnosis  1. Thyroid tissue. BF to Dr Jimenez  [facility]  Milad Tavares M.D.    Results for orders placed or performed in visit on 08/08/17   TSH   Result Value Ref Range    TSH 0.410 0.400 - 4.000 uIU/mL     Assessment:       Post surgical hypothyroidism for large benign goiter  Plan:       1.GI consult 2 family members with gastric cancer.  2. Continue levothyroxine 112 lupis daily.

## 2017-10-03 ENCOUNTER — TELEPHONE (OUTPATIENT)
Dept: DERMATOLOGY | Facility: CLINIC | Age: 67
End: 2017-10-03

## 2017-10-03 NOTE — TELEPHONE ENCOUNTER
----- Message from Ashley Chavez sent at 10/3/2017  3:03 PM CDT -----  Contact: pt at [phone]  Xena pt-Pt states that she needs a fu appt before January due to the meds that she is on.

## 2017-10-05 ENCOUNTER — TELEPHONE (OUTPATIENT)
Dept: INTERNAL MEDICINE | Facility: CLINIC | Age: 67
End: 2017-10-05

## 2017-10-05 NOTE — TELEPHONE ENCOUNTER
----- Message from Jane Hannon sent at 10/5/2017  1:35 PM CDT -----  Contact: Self/ [phone]  Patient would like to speak with you about having a reoccurrence of Gout and needs medication sent to [facility] in [place]. Please advise.

## 2017-10-07 DIAGNOSIS — M10.9 GOUT, ARTHRITIS: Primary | ICD-10-CM

## 2017-10-07 RX ORDER — PREDNISONE 10 MG/1
20 TABLET ORAL 2 TIMES DAILY WITH MEALS
Qty: 20 TABLET | Refills: 0 | Status: SHIPPED | OUTPATIENT
Start: 2017-10-07 | End: 2017-10-09 | Stop reason: SDDI

## 2017-10-09 ENCOUNTER — LAB VISIT (OUTPATIENT)
Dept: LAB | Facility: HOSPITAL | Age: 67
End: 2017-10-09
Attending: INTERNAL MEDICINE
Payer: COMMERCIAL

## 2017-10-09 ENCOUNTER — OFFICE VISIT (OUTPATIENT)
Dept: INTERNAL MEDICINE | Facility: CLINIC | Age: 67
End: 2017-10-09
Payer: COMMERCIAL

## 2017-10-09 VITALS
DIASTOLIC BLOOD PRESSURE: 80 MMHG | HEIGHT: 65 IN | WEIGHT: 259.5 LBS | BODY MASS INDEX: 43.24 KG/M2 | HEART RATE: 54 BPM | SYSTOLIC BLOOD PRESSURE: 136 MMHG | OXYGEN SATURATION: 97 %

## 2017-10-09 DIAGNOSIS — M10.271 ACUTE DRUG-INDUCED GOUT OF RIGHT ANKLE: Primary | ICD-10-CM

## 2017-10-09 DIAGNOSIS — N18.30 CKD (CHRONIC KIDNEY DISEASE) STAGE 3, GFR 30-59 ML/MIN: ICD-10-CM

## 2017-10-09 DIAGNOSIS — M10.271 ACUTE DRUG-INDUCED GOUT OF RIGHT ANKLE: ICD-10-CM

## 2017-10-09 DIAGNOSIS — M1A.39X0 CHRONIC GOUT DUE TO RENAL IMPAIRMENT OF MULTIPLE SITES WITHOUT TOPHUS: ICD-10-CM

## 2017-10-09 DIAGNOSIS — Z13.220 ENCOUNTER FOR SCREENING FOR LIPID DISORDER: ICD-10-CM

## 2017-10-09 LAB
ALBUMIN SERPL BCP-MCNC: 3.3 G/DL
ANION GAP SERPL CALC-SCNC: 6 MMOL/L
BUN SERPL-MCNC: 24 MG/DL
CALCIUM SERPL-MCNC: 9 MG/DL
CHLORIDE SERPL-SCNC: 107 MMOL/L
CO2 SERPL-SCNC: 29 MMOL/L
CREAT SERPL-MCNC: 1.2 MG/DL
EST. GFR  (AFRICAN AMERICAN): 53.7 ML/MIN/1.73 M^2
EST. GFR  (NON AFRICAN AMERICAN): 46.5 ML/MIN/1.73 M^2
GLUCOSE SERPL-MCNC: 95 MG/DL
PHOSPHATE SERPL-MCNC: 3.4 MG/DL
POTASSIUM SERPL-SCNC: 4.1 MMOL/L
SODIUM SERPL-SCNC: 142 MMOL/L

## 2017-10-09 PROCEDURE — 99999 PR PBB SHADOW E&M-EST. PATIENT-LVL III: CPT | Mod: PBBFAC,,, | Performed by: INTERNAL MEDICINE

## 2017-10-09 PROCEDURE — 99213 OFFICE O/P EST LOW 20 MIN: CPT | Mod: S$GLB,,, | Performed by: INTERNAL MEDICINE

## 2017-10-09 PROCEDURE — 80069 RENAL FUNCTION PANEL: CPT

## 2017-10-09 PROCEDURE — 36415 COLL VENOUS BLD VENIPUNCTURE: CPT | Mod: PO

## 2017-10-09 NOTE — PROGRESS NOTES
Portions of this note are generated with voice recognition software. Typographical errors may exist.     SUBJECTIVE:    This is a/an 67 y.o. female here for primary care visit for  Chief Complaint   Patient presents with    Gout     x1 week     Patient states that episode was classic for a gout attack.  Nontraumatic sudden onset of ankle pain very severe with hyperalgesia of the skin.  Patient couldn't even tolerate putting on socks.  States that the location was on the lateral aspect of the ankle right lower extremity.  States that symptoms have gradually improved with over-the-counter medication.  She never received prednisone that was prescribed on Saturday from [facility].  Nemesio factors include significant dietary indiscretions including red meat and fatty foods.  Denies alcohol abuse.  Previous exacerbation last year resulting in emergency room evaluation and treatment.  Patient is reluctant to pursue daily treatment for the time being.    Medications Reviewed and Updated    Past medical, family, and social histories were reviewed and updated.    Review of Systems negative unless otherwise noted in history of present illness-  ROS    General ROS: negative  Psychological ROS: negative  ENT ROS: negative  Allergy and Immunology ROS: negative  Cardiovascular ROS: negative  Gastrointestinal ROS: negative  Genito-Urinary ROS: negative  Musculoskeletal ROS: negative  Neurological ROS: negative  Dermatological ROS: negative        Allergic:    Review of patient's allergies indicates:   Allergen Reactions    Neosporin [benzalkonium chloride] Rash    Doxycycline Rash     Skin peels       OBJECTIVE:  BP: 136/80 Pulse: (!) 54    Wt Readings from Last 3 Encounters:   10/09/17 117.7 kg (259 lb 7.7 oz)   08/15/17 119.6 kg (263 lb 10.7 oz)   06/29/17 119.2 kg (262 lb 10.9 oz)    Body mass index is 43.18 kg/m².  Previous Blood Pressure Readings :   BP Readings from Last 3 Encounters:   10/09/17 136/80   08/15/17 128/76    06/29/17 133/76       Physical Exam    GEN: No apparent distress  HEENT: sclera non-icteric, conjunctiva clear  CV: no peripheral edema  PULM: breathing non-labored  ABD: Obese, protuberant abdomen.  PSYCH: appropriate affect  MSK: able to rise from chair without assistance  SKIN: normal skin turgor. Possible tophus right foot.  Second digit.    Pertinent Labs Reviewed       ASSESSMENT/PLAN:    Acute drug-induced gout of right ankle  -     Renal function panel; Future; Expected date: 10/09/2017    Encounter for screening for lipid disorder  -     Lipid panel; Future; Expected date: 10/09/2017    Chronic gout due to renal impairment of multiple sites without tophus  -     Comprehensive metabolic panel; Future; Expected date: 10/09/2017  -     URIC ACID; Future; Expected date: 10/09/2017          Future Appointments  Date Time Provider Department [place]   4/9/2018 7:00 AM LAB, SHRADDHA KENH LAB [place]   4/12/2018 9:00 AM Michael Elias MD [facility] Marissa Elias  10/10/2017  9:57 AM

## 2017-10-09 NOTE — PATIENT INSTRUCTIONS
Recommendations for today    Please Contact [facility] pharmacy to verify that they have a good contact number for you. Inform them that you did not receive call for prednisone but that doctor no longer recommends the medicine because symptoms have resolved.           Eating to Prevent Gout  Gout is a painful form of arthritis caused by an excess of uric acid. This is a waste product made by the body. It builds up in the body and forms crystals that collect in the joints, bringing on a gout attack. Alcohol and certain foods can trigger a gout attack. Below are some guidelines for changing your diet to help you manage gout. Your healthcare provider can work with you to determine the best eating plan for you. Know that diet is only one part of managing gout. Take your medicines as prescribed and follow the other guidelines your healthcare provider has given you.  Foods to limit  Eating too many foods containing purines may increase the levels of uric acid in your body and increase your risk for a gout attack. It may be best to limit these high-purine foods:  · Alcohol (beer, red wine). You may be told to avoid alcohol completely.  · Certain fish (anchovies, sardines, fish roes, herring, tuna, mussels, codfish, scallops, trout, and chio)  · Certain meats (red meat, processed meat, kidd, turkey, wild game, and goose)  · Sauces and gravies made with meat  · Organ meats (such as liver, kidneys, sweetbreads, and tripe)  · Legumes (such as dried beans, peas)  · Mushrooms, spinach, asparagus, and cauliflower  · Yeast and yeast extract supplements  Foods to try  Some foods may be helpful for people with gout. You may want to try adding some of the following foods to your diet:  · Dark berries: These include blueberries, blackberries, and cherries. These berries contain chemicals that may lower uric acid.  · Tofu: Tofu, which is made from soy, is a good source of protein. Studies have shown that it may be a better choice than  meat for people with gout.  · Omega fatty acids: These acids are found in fatty fish (such as salmon), certain oils (such as flax, olive, or nut oils), or nuts. They may help prevent inflammation due to gout.  The following guidelines are recommended by the American Medical Association for people with gout. Your diet should be:  · High in fiber, whole grains, fruits, and vegetables.  · Low in protein (15% of calories should come from protein. Choose lean sources such as soy, lean meats, and poultry).  · Low in fat (no more than 30% of calories should come from fat, with only 10% coming from animal fat).   Date Last Reviewed: 6/17/2015  © 4052-2554 [employer]. [address] [place] [address], [place] [address]. All rights reserved. This information is not intended as a substitute for professional medical care. Always follow your healthcare professional's instructions.

## 2017-10-10 PROBLEM — M1A.39X0 CHRONIC GOUT DUE TO RENAL IMPAIRMENT OF MULTIPLE SITES WITHOUT TOPHUS: Status: ACTIVE | Noted: 2017-10-10

## 2017-10-10 PROBLEM — N18.30 CKD (CHRONIC KIDNEY DISEASE) STAGE 3, GFR 30-59 ML/MIN: Status: ACTIVE | Noted: 2017-10-10

## 2017-11-05 DIAGNOSIS — I10 ESSENTIAL HYPERTENSION: ICD-10-CM

## 2017-11-06 RX ORDER — AMLODIPINE BESYLATE 5 MG/1
TABLET ORAL
Qty: 90 TABLET | Refills: 2 | Status: SHIPPED | OUTPATIENT
Start: 2017-11-06 | End: 2018-01-23 | Stop reason: SDUPTHER

## 2018-01-23 ENCOUNTER — OFFICE VISIT (OUTPATIENT)
Dept: INTERNAL MEDICINE | Facility: CLINIC | Age: 68
End: 2018-01-23
Payer: COMMERCIAL

## 2018-01-23 ENCOUNTER — OFFICE VISIT (OUTPATIENT)
Dept: NEPHROLOGY | Facility: CLINIC | Age: 68
End: 2018-01-23
Payer: COMMERCIAL

## 2018-01-23 ENCOUNTER — LAB VISIT (OUTPATIENT)
Dept: LAB | Facility: HOSPITAL | Age: 68
End: 2018-01-23
Attending: INTERNAL MEDICINE
Payer: COMMERCIAL

## 2018-01-23 VITALS
HEART RATE: 64 BPM | DIASTOLIC BLOOD PRESSURE: 90 MMHG | WEIGHT: 261.94 LBS | BODY MASS INDEX: 43.64 KG/M2 | SYSTOLIC BLOOD PRESSURE: 134 MMHG | HEIGHT: 65 IN

## 2018-01-23 VITALS
WEIGHT: 262.38 LBS | BODY MASS INDEX: 43.71 KG/M2 | DIASTOLIC BLOOD PRESSURE: 80 MMHG | OXYGEN SATURATION: 95 % | HEIGHT: 65 IN | SYSTOLIC BLOOD PRESSURE: 140 MMHG | HEART RATE: 89 BPM

## 2018-01-23 DIAGNOSIS — M1A.39X0 CHRONIC GOUT DUE TO RENAL IMPAIRMENT OF MULTIPLE SITES WITHOUT TOPHUS: ICD-10-CM

## 2018-01-23 DIAGNOSIS — N18.30 CHRONIC KIDNEY DISEASE (CKD), STAGE III (MODERATE): Primary | ICD-10-CM

## 2018-01-23 DIAGNOSIS — I10 ESSENTIAL HYPERTENSION: Primary | ICD-10-CM

## 2018-01-23 DIAGNOSIS — N18.30 CHRONIC KIDNEY DISEASE, STAGE III (MODERATE): Primary | ICD-10-CM

## 2018-01-23 DIAGNOSIS — M17.0 PRIMARY OSTEOARTHRITIS OF BOTH KNEES: ICD-10-CM

## 2018-01-23 DIAGNOSIS — N18.30 CKD (CHRONIC KIDNEY DISEASE) STAGE 3, GFR 30-59 ML/MIN: ICD-10-CM

## 2018-01-23 DIAGNOSIS — N18.30 CHRONIC KIDNEY DISEASE, STAGE III (MODERATE): ICD-10-CM

## 2018-01-23 DIAGNOSIS — I51.7 LEFT ATRIAL ENLARGEMENT: ICD-10-CM

## 2018-01-23 LAB
ALBUMIN SERPL BCP-MCNC: 3.7 G/DL
ANION GAP SERPL CALC-SCNC: 13 MMOL/L
BUN SERPL-MCNC: 28 MG/DL
CALCIUM SERPL-MCNC: 9.7 MG/DL
CHLORIDE SERPL-SCNC: 104 MMOL/L
CO2 SERPL-SCNC: 24 MMOL/L
CREAT SERPL-MCNC: 1.4 MG/DL
EST. GFR  (AFRICAN AMERICAN): 44.5 ML/MIN/1.73 M^2
EST. GFR  (NON AFRICAN AMERICAN): 38.6 ML/MIN/1.73 M^2
GLUCOSE SERPL-MCNC: 100 MG/DL
PHOSPHATE SERPL-MCNC: 3.8 MG/DL
POTASSIUM SERPL-SCNC: 4 MMOL/L
SODIUM SERPL-SCNC: 141 MMOL/L
URATE SERPL-MCNC: 7.9 MG/DL

## 2018-01-23 PROCEDURE — 3008F BODY MASS INDEX DOCD: CPT | Mod: S$GLB,,, | Performed by: INTERNAL MEDICINE

## 2018-01-23 PROCEDURE — 99999 PR PBB SHADOW E&M-EST. PATIENT-LVL III: CPT | Mod: PBBFAC,,, | Performed by: INTERNAL MEDICINE

## 2018-01-23 PROCEDURE — 1159F MED LIST DOCD IN RCRD: CPT | Mod: S$GLB,,, | Performed by: INTERNAL MEDICINE

## 2018-01-23 PROCEDURE — 84550 ASSAY OF BLOOD/URIC ACID: CPT

## 2018-01-23 PROCEDURE — 99213 OFFICE O/P EST LOW 20 MIN: CPT | Mod: S$GLB,,, | Performed by: INTERNAL MEDICINE

## 2018-01-23 PROCEDURE — 36415 COLL VENOUS BLD VENIPUNCTURE: CPT | Mod: PO

## 2018-01-23 PROCEDURE — 99214 OFFICE O/P EST MOD 30 MIN: CPT | Mod: S$GLB,,, | Performed by: INTERNAL MEDICINE

## 2018-01-23 PROCEDURE — 1125F AMNT PAIN NOTED PAIN PRSNT: CPT | Mod: S$GLB,,, | Performed by: INTERNAL MEDICINE

## 2018-01-23 PROCEDURE — 80069 RENAL FUNCTION PANEL: CPT

## 2018-01-23 RX ORDER — AMLODIPINE BESYLATE 5 MG/1
5 TABLET ORAL DAILY
Qty: 90 TABLET | Refills: 3 | Status: SHIPPED | OUTPATIENT
Start: 2018-01-23 | End: 2019-01-25

## 2018-01-23 RX ORDER — LOSARTAN POTASSIUM 50 MG/1
50 TABLET ORAL DAILY
Qty: 90 TABLET | Refills: 1 | Status: SHIPPED | OUTPATIENT
Start: 2018-01-23 | End: 2019-01-25

## 2018-01-23 NOTE — PROGRESS NOTES
Portions of this note are generated with voice recognition software. Typographical errors may exist.     SUBJECTIVE:    This is a/an 67 y.o. female here for primary care visit for  Chief Complaint   Patient presents with    Medication Review     States that she has sporadic palpitations only with exertion.  Short duration.  Lasting less than 5 seconds.  No orthostatic episodes.    Patient unsure if she is still on losartan.  Medication  from the patient's medication list without clear indication.  She is certain that she stop hydrochlorothiazide as recommended by this provider to help prevent gouty arthritis symptoms in the foot.    States that she has had at least one mild episode since the last medical evaluation of podagra.  Self-limited.  Only lasted for a few hours and resolved without needing pharmacotherapy.      Medications Reviewed and Updated    Past medical, family, and social histories were reviewed and updated.    Review of Systems negative unless otherwise noted in history of present illness-  ROS    General ROS: negative  Psychological ROS: negative  ENT ROS: negative  Allergy and Immunology ROS: negative  Cardiovascular ROS: negative  Gastrointestinal ROS: negative  Genito-Urinary ROS: negative  Musculoskeletal ROS: negative  Neurological ROS: negative  Dermatological ROS: negative        Allergic:    Review of patient's allergies indicates:   Allergen Reactions    Neosporin [benzalkonium chloride] Rash    Doxycycline Rash     Skin peels       OBJECTIVE:  BP: (!) 134/90 Pulse: 64    Wt Readings from Last 3 Encounters:   18 118.8 kg (261 lb 14.5 oz)   10/09/17 117.7 kg (259 lb 7.7 oz)   08/15/17 119.6 kg (263 lb 10.7 oz)    Body mass index is 43.58 kg/m².  Previous Blood Pressure Readings :   BP Readings from Last 3 Encounters:   18 (!) 134/90   10/09/17 136/80   08/15/17 128/76       Physical Exam    GEN: No apparent distress  HEENT: sclera non-icteric, conjunctiva clear  CV:  stable peripheral edema.  Regular rate and rhythm.  PULM: breathing non-labored  ABD: Obese, protuberant abdomen.  PSYCH: appropriate affect  MSK: able to rise from chair without assistance  SKIN: normal skin turgor    Pertinent Labs Reviewed       ASSESSMENT/PLAN:    Essential hypertension.This is a Chronic problem. The etiology is known. The problem is not adequately controlled. The risk of medical complications is moderate. Treatment recommendations are to Refill medication that has run out.. The patient advised if symptoms change or intensify to seek medical care.   -     losartan (COZAAR) 50 MG tablet; Take 1 tablet (50 mg total) by mouth once daily.  Dispense: 90 tablet; Refill: 1  -     amLODIPine (NORVASC) 5 MG tablet; Take 1 tablet (5 mg total) by mouth once daily.  Dispense: 90 tablet; Refill: 3    Primary osteoarthritis of both knees.This is a Chronic problem. The etiology is known. The problem is not adequately controlled. The risk of medical complications is moderate. Treatment recommendations are to Recommend interventional strategies so patient can avoid NSAID therapies.. The patient advised if symptoms change or intensify to seek medical care.   -     X-Ray Knee Complete 4 Or More Views Bilat; Future; Expected date: 01/23/2018  -     Ambulatory referral to Orthopedics    CKD (chronic kidney disease) stage 3, GFR 30-59 ml/min.This is a Chronic problem. The etiology is Likely multifactorial.  Gout.  Hypertension.  NSAIDs. The problem is worsening. The risk of medical complications is moderate. Treatment recommendations are to Abstain from NSAIDs.  Closer control of blood pressure.  May need nephrology referral soon.. The patient advised if symptoms change or intensify to seek medical care.   -     losartan (COZAAR) 50 MG tablet; Take 1 tablet (50 mg total) by mouth once daily.  Dispense: 90 tablet; Refill: 1    Left atrial enlargement.This is a Chronic problem. The etiology is known. The problem is  stable. The risk of medical complications is low. Treatment recommendations are to Continue to monitor for A. fib signs and symptoms. The patient advised if symptoms change or intensify to seek medical care.     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 The etiology is known. The problem is not adequately controlled. The risk of medical complications is moderate. Treatment recommendations are to Consider allopurinol at future date.. The patient advised if symptoms change or intensify to seek medical care.   -     URIC ACID; Future; Expected date: 01/23/2018        Future Appointments  Date Time Provider Department Center   1/23/2018 9:15 AM SPE[facility] [place]   1/23/2018 11:40 AM [facility] Palmer [facility]   4/9/2018 7:00 AM LAB, SHR[facility]HA KENH LAB [place]   4/12/2018 9:00 AM Michael Elias MD [facility]       Michael Elias  1/23/2018  8:44 AM

## 2018-01-30 NOTE — PROGRESS NOTES
Patient is here today for annual evaluation of CKD. Last seen in enal office 1/5/17 Baseline Cr 1.0-1.2 mg/dl  Lab today Cr 1.4 mg d;    Impression  CKD III. Interval change in Cr    Plan  Repeat lab; 1 mo

## 2018-02-02 ENCOUNTER — HOSPITAL ENCOUNTER (OUTPATIENT)
Dept: RADIOLOGY | Facility: HOSPITAL | Age: 68
Discharge: HOME | End: 2018-02-02
Attending: PHYSICIAN ASSISTANT
Payer: COMMERCIAL

## 2018-02-02 ENCOUNTER — OFFICE VISIT (OUTPATIENT)
Dept: ORTHOPEDICS | Facility: CLINIC | Age: 68
End: 2018-02-02
Payer: COMMERCIAL

## 2018-02-02 VITALS — HEIGHT: 65 IN | BODY MASS INDEX: 44.25 KG/M2 | WEIGHT: 265.56 LBS

## 2018-02-02 DIAGNOSIS — M76.62 ACHILLES TENDINITIS OF LEFT LOWER EXTREMITY: ICD-10-CM

## 2018-02-02 DIAGNOSIS — M25.561 PAIN IN BOTH KNEES, UNSPECIFIED CHRONICITY: ICD-10-CM

## 2018-02-02 DIAGNOSIS — M17.0 PRIMARY OSTEOARTHRITIS OF BOTH KNEES: Primary | ICD-10-CM

## 2018-02-02 DIAGNOSIS — M25.562 PAIN IN BOTH KNEES, UNSPECIFIED CHRONICITY: ICD-10-CM

## 2018-02-02 DIAGNOSIS — M79.672 PAIN OF LEFT HEEL: ICD-10-CM

## 2018-02-02 PROCEDURE — 99214 OFFICE O/P EST MOD 30 MIN: CPT | Mod: 25,S$GLB,, | Performed by: PHYSICIAN ASSISTANT

## 2018-02-02 PROCEDURE — 1159F MED LIST DOCD IN RCRD: CPT | Mod: S$GLB,,, | Performed by: PHYSICIAN ASSISTANT

## 2018-02-02 PROCEDURE — 73564 X-RAY EXAM KNEE 4 OR MORE: CPT | Mod: 26,50,, | Performed by: RADIOLOGY

## 2018-02-02 PROCEDURE — 3008F BODY MASS INDEX DOCD: CPT | Mod: S$GLB,,, | Performed by: PHYSICIAN ASSISTANT

## 2018-02-02 PROCEDURE — 73564 X-RAY EXAM KNEE 4 OR MORE: CPT | Mod: TC,50

## 2018-02-02 PROCEDURE — 73610 X-RAY EXAM OF ANKLE: CPT | Mod: 26,LT,, | Performed by: RADIOLOGY

## 2018-02-02 PROCEDURE — 73610 X-RAY EXAM OF ANKLE: CPT | Mod: TC,LT

## 2018-02-02 PROCEDURE — 99999 PR PBB SHADOW E&M-EST. PATIENT-LVL III: CPT | Mod: PBBFAC,,, | Performed by: PHYSICIAN ASSISTANT

## 2018-02-02 PROCEDURE — 20610 DRAIN/INJ JOINT/BURSA W/O US: CPT | Mod: 50,S$GLB,, | Performed by: PHYSICIAN ASSISTANT

## 2018-02-02 PROCEDURE — 1125F AMNT PAIN NOTED PAIN PRSNT: CPT | Mod: S$GLB,,, | Performed by: PHYSICIAN ASSISTANT

## 2018-02-02 RX ORDER — METHYLPREDNISOLONE ACETATE 80 MG/ML
80 INJECTION, SUSPENSION INTRA-ARTICULAR; INTRALESIONAL; INTRAMUSCULAR; SOFT TISSUE
Status: COMPLETED | OUTPATIENT
Start: 2018-02-02 | End: 2018-02-02

## 2018-02-02 RX ADMIN — METHYLPREDNISOLONE ACETATE 80 MG: 80 INJECTION, SUSPENSION INTRA-ARTICULAR; INTRALESIONAL; INTRAMUSCULAR; SOFT TISSUE at 01:02

## 2018-02-02 NOTE — LETTER
February 2, 2018      Michael Elias MD  [address] [facility] [address]           [facility]  [address], [address]th [address]  [facility] [address]  Phone: [phone]          Patient: Ankita Campo   MR Number: [id number]   YOB: 1950   Date of Visit: 2/2/2018       Dear Dr. Michael Elias:    Thank you for referring Ankita Campo to me for evaluation. Attached you will find relevant portions of my assessment and plan of care.    If you have questions, please do not hesitate to call me. I look forward to following Ankita Campo along with you.    Sincerely,    Pili Iyer PA-C    Enclosure  CC:  No Recipients    If you would like to receive this communication electronically, please contact [email or web address] or [phone] to request more information on [email or web address] Link access.    For providers and/or their staff who would like to refer a patient to [facility], please contact us through our one-stop-shop provider referral line, Nael Smith, at [phone].    If you feel you have received this communication in error or would no longer like to receive these types of communications, please e-mail [email or web address]

## 2018-02-02 NOTE — PROGRESS NOTES
Subjective:      Patient ID: Ankita Campo is a 67 y.o. female.    Chief Complaint: No chief complaint on file.    HPI  67 year old female presents with chief complaint of bilateral knee pain L>R since November 2017. She denies trauma. Pain is posterior. It is worse with sitting and standing. Sometimes she has pain with walking. Tylenol gives some relief. She reports swelling, popping, and giving way. She sometimes uses a cane. She has received cortisone injection in the past with good relief.   Patient reports left heel pain x couple of months. She denies trauma. Pain is posterior aspect. She has pain with walking. She does ankle exercises which help. She reports mild swelling.   Review of Systems   Constitution: Negative for chills, fever and night sweats.   Cardiovascular: Negative for chest pain.   Respiratory: Negative for cough and shortness of breath.    Hematologic/Lymphatic: Does not bruise/bleed easily.   Skin: Negative for color change.   Gastrointestinal: Negative for heartburn.   Genitourinary: Negative for dysuria.   Neurological: Negative for numbness and paresthesias.   Psychiatric/Behavioral: Negative for altered mental status.   Allergic/Immunologic: Negative for persistent infections.         Objective:                    Left Ankle/Foot Exam     Inspection  Erythema: absent    Tenderness   The patient is tender to palpation of the Achilles insertion.    Range of Motion   The patient has normal left ankle ROM.   Hairston Test:  normal    Muscle Strength   The patient has normal left ankle strength.    Other   Sensation: normal      Vascular Exam       Left Pulses  Dorsalis Pedis:      2+            Bilateral Knee Exam:  ROM 0-125 degrees  +crepitus  No effusion  TTP medial joint line        X-ray knee: ordered and reviewed by myself. Advanced DJD.  X-ray ankle: ordered and reviewed by myself. No fx or dislocation.       Assessment:       Encounter Diagnoses   Name Primary?    Primary  osteoarthritis of both knees Yes    Achilles tendinitis of left lower extremity           Plan:       Discussed treatment options with patient. She is not interested in surgery. She would like bilateral knee injections. She was placed in a boot. RTC in 4 weeks for re-evaluation of her heel.     PROCEDURE:  I have explained the risks, benefits, and alternatives of the procedure in detail.  The patient voices understanding and all questions have been answered.  The patient agrees to proceed as planned. So after I performed a sterile prep of the skin in the normal fashion the bilateral knee is injected using a 22 gauge needle from the anterolateral approach with a combination of 4cc 1% plain lidocaine and 80 mg of depo medrol.  The patient is cautioned and immediate relief of pain is secondary to the local anesthetic and will be temporary.  After the anesthetic wears off there may be a increase in pain that may last for a few hours or a few days and they should use ice to help alleviate this flair up of pain.

## 2018-03-02 ENCOUNTER — OFFICE VISIT (OUTPATIENT)
Dept: ORTHOPEDICS | Facility: CLINIC | Age: 68
End: 2018-03-02
Payer: COMMERCIAL

## 2018-03-02 VITALS — BODY MASS INDEX: 44.11 KG/M2 | HEIGHT: 65 IN | WEIGHT: 264.75 LBS

## 2018-03-02 DIAGNOSIS — M17.0 PRIMARY OSTEOARTHRITIS OF BOTH KNEES: Primary | ICD-10-CM

## 2018-03-02 DIAGNOSIS — M76.62 ACHILLES TENDINITIS OF LEFT LOWER EXTREMITY: ICD-10-CM

## 2018-03-02 PROCEDURE — 99999 PR PBB SHADOW E&M-EST. PATIENT-LVL III: CPT | Mod: PBBFAC,,, | Performed by: PHYSICIAN ASSISTANT

## 2018-03-02 PROCEDURE — 99214 OFFICE O/P EST MOD 30 MIN: CPT | Mod: S$GLB,,, | Performed by: PHYSICIAN ASSISTANT

## 2018-03-02 NOTE — PROGRESS NOTES
- Baseline creatine around 2.6 - 3.0  - Given presentation of suspected HHS, likely pre renal  - UA and lytes with IVF, consider US given history of obstruction currently with lindsay   - Strict ins and outs, will monitor         Subjective:      Patient ID: Ankita Campo is a 67 y.o. female.    Chief Complaint: No chief complaint on file.    HPI  Patient returns to clinic regarding her left heel and bilateral knee pain L>R. She was seen 4 weeks ago and placed in a boot for achilles tendinitis. She wore the boot for 2 weeks and says it helped. She denies heel pain at this time. She received bilateral cortisone injections in her knees 4 weeks ago for her advanced OA. She says it gave short relief. She takes tylenol prn with mild relief. She reports giving way. She cannot take nsaids due to CKD.   Review of Systems   Constitution: Negative for chills, fever and night sweats.   Cardiovascular: Negative for chest pain.   Respiratory: Negative for cough and shortness of breath.    Hematologic/Lymphatic: Does not bruise/bleed easily.   Skin: Negative for color change.   Gastrointestinal: Negative for heartburn.   Genitourinary: Negative for dysuria.   Neurological: Negative for numbness and paresthesias.   Psychiatric/Behavioral: Negative for altered mental status.   Allergic/Immunologic: Negative for persistent infections.         Objective:                    Left Ankle/Foot Exam     Inspection  Erythema: absent    Tenderness   The patient is tender to palpation of the Achilles insertion.    Range of Motion   The patient has normal left ankle ROM.   Hairston Test:  normal    Other   Sensation: normal    Comments:  Mild TTP.       Vascular Exam       Left Pulses  Dorsalis Pedis:      2+            Bilateral Knee Exam:  ROM 0-125 degrees  +crepitus  No effusion  TTP medial joint line            Assessment:       Encounter Diagnoses   Name Primary?    Primary osteoarthritis of both knees Yes    Achilles tendinitis of left lower extremity           Plan:       Discussed treatment options with patient. Her knees are still painful but her left heel is better. She would like to try PT. Order was placed. She was told to d/c PT if it causes increased  knee pain. She is not interested in knee surgery. She will think about euflexxa injections and call if she wants to try that. Pamphlet was provided. RTC prn.

## 2018-04-09 ENCOUNTER — LAB VISIT (OUTPATIENT)
Dept: LAB | Facility: HOSPITAL | Age: 68
End: 2018-04-09
Attending: INTERNAL MEDICINE
Payer: COMMERCIAL

## 2018-04-09 DIAGNOSIS — Z13.220 ENCOUNTER FOR SCREENING FOR LIPID DISORDER: ICD-10-CM

## 2018-04-09 DIAGNOSIS — M1A.39X0 CHRONIC GOUT DUE TO RENAL IMPAIRMENT OF MULTIPLE SITES WITHOUT TOPHUS: ICD-10-CM

## 2018-04-09 LAB
ALBUMIN SERPL BCP-MCNC: 3.7 G/DL
ALP SERPL-CCNC: 128 U/L
ALT SERPL W/O P-5'-P-CCNC: 11 U/L
ANION GAP SERPL CALC-SCNC: 10 MMOL/L
AST SERPL-CCNC: 20 U/L
BILIRUB SERPL-MCNC: 0.5 MG/DL
BUN SERPL-MCNC: 26 MG/DL
CALCIUM SERPL-MCNC: 9.2 MG/DL
CHLORIDE SERPL-SCNC: 105 MMOL/L
CHOLEST SERPL-MCNC: 196 MG/DL
CHOLEST/HDLC SERPL: 2.8 {RATIO}
CO2 SERPL-SCNC: 26 MMOL/L
CREAT SERPL-MCNC: 1.2 MG/DL
EST. GFR  (AFRICAN AMERICAN): 53.7 ML/MIN/1.73 M^2
EST. GFR  (NON AFRICAN AMERICAN): 46.5 ML/MIN/1.73 M^2
GLUCOSE SERPL-MCNC: 94 MG/DL
HDLC SERPL-MCNC: 70 MG/DL
HDLC SERPL: 35.7 %
LDLC SERPL CALC-MCNC: 111.8 MG/DL
NONHDLC SERPL-MCNC: 126 MG/DL
POTASSIUM SERPL-SCNC: 4.6 MMOL/L
PROT SERPL-MCNC: 7.9 G/DL
SODIUM SERPL-SCNC: 141 MMOL/L
TRIGL SERPL-MCNC: 71 MG/DL
URATE SERPL-MCNC: 7.4 MG/DL

## 2018-04-09 PROCEDURE — 36415 COLL VENOUS BLD VENIPUNCTURE: CPT | Mod: PO

## 2018-04-09 PROCEDURE — 80053 COMPREHEN METABOLIC PANEL: CPT

## 2018-04-09 PROCEDURE — 80061 LIPID PANEL: CPT

## 2018-04-09 PROCEDURE — 84550 ASSAY OF BLOOD/URIC ACID: CPT

## 2018-04-12 ENCOUNTER — TELEPHONE (OUTPATIENT)
Dept: INTERNAL MEDICINE | Facility: CLINIC | Age: 68
End: 2018-04-12

## 2018-04-12 ENCOUNTER — OFFICE VISIT (OUTPATIENT)
Dept: INTERNAL MEDICINE | Facility: CLINIC | Age: 68
End: 2018-04-12
Payer: COMMERCIAL

## 2018-04-12 VITALS
WEIGHT: 263 LBS | HEART RATE: 100 BPM | OXYGEN SATURATION: 98 % | SYSTOLIC BLOOD PRESSURE: 138 MMHG | DIASTOLIC BLOOD PRESSURE: 86 MMHG | BODY MASS INDEX: 43.77 KG/M2

## 2018-04-12 DIAGNOSIS — L13.9 BULLOUS DERMATITIS: Primary | ICD-10-CM

## 2018-04-12 DIAGNOSIS — R52 ACUTE PAIN: ICD-10-CM

## 2018-04-12 DIAGNOSIS — M1A.30X0 CHRONIC GOUT DUE TO RENAL IMPAIRMENT WITHOUT TOPHUS, UNSPECIFIED SITE: ICD-10-CM

## 2018-04-12 PROCEDURE — 99214 OFFICE O/P EST MOD 30 MIN: CPT | Mod: S$GLB,,, | Performed by: INTERNAL MEDICINE

## 2018-04-12 PROCEDURE — 3075F SYST BP GE 130 - 139MM HG: CPT | Mod: CPTII,S$GLB,, | Performed by: INTERNAL MEDICINE

## 2018-04-12 PROCEDURE — 3079F DIAST BP 80-89 MM HG: CPT | Mod: CPTII,S$GLB,, | Performed by: INTERNAL MEDICINE

## 2018-04-12 PROCEDURE — 99999 PR PBB SHADOW E&M-EST. PATIENT-LVL III: CPT | Mod: PBBFAC,,, | Performed by: INTERNAL MEDICINE

## 2018-04-12 RX ORDER — TRAMADOL HYDROCHLORIDE 50 MG/1
50 TABLET ORAL EVERY 8 HOURS PRN
Qty: 21 TABLET | Refills: 0 | Status: SHIPPED | OUTPATIENT
Start: 2018-04-12 | End: 2018-04-19

## 2018-04-12 RX ORDER — BETAMETHASONE DIPROPIONATE 0.5 MG/G
OINTMENT TOPICAL DAILY PRN
Qty: 45 G | Refills: 0 | Status: SHIPPED | OUTPATIENT
Start: 2018-04-12 | End: 2021-04-19

## 2018-04-12 NOTE — PATIENT INSTRUCTIONS
Recommendations for today    We recommend that you start prescription cream to help prevent blisters from spreading and worsening.  Do not apply cream directly to the blisters.  If blisters progress despite using prescription cream please contact the clinic for additional recommendations.  You should receive a phone call from dermatology in the next several days regarding follow-up appointment.  If you do not please contact my clinic for additional support.        Tramadol tablets  What is this medicine?  TRAMADOL (TRA ma dole) is a pain reliever. It is used to treat moderate to severe pain in adults.  How should I use this medicine?  Take this medicine by mouth with a full glass of water. Follow the directions on the prescription label. You can take it with or without food. If it upsets your stomach, take it with food. Do not take your medicine more often than directed.  A special MedGuide will be given to you by the pharmacist with each prescription and refill. Be sure to read this information carefully each time.  Talk to your pediatrician regarding the use of this medicine in children. Special care may be needed.    What side effects may I notice from receiving this medicine?  Side effects like these are very rare but that you should report to your doctor or health care professional as soon as possible:  · allergic reactions like skin rash, itching or hives, swelling of the face, lips, or tongue  · confusion  · signs and symptoms of low blood pressure like dizziness; feeling faint or lightheaded, falls; unusually weak or tired  · trouble passing urine or change in the amount of urine  Side effects that usually do not require medical attention (report to your doctor or health care professional if they continue or are bothersome):  · constipation  · dry mouth  · nausea, vomiting  · tiredness  What may interact with this medicine?  Do not take this medication with any of the following medicines:  This medicine may  also interact with the following medications:  · alcohol  · antihistamines for allergy, cough and cold  · certain medicines for anxiety or sleep    What if I miss a dose?  If you miss a dose, take it as soon as you can. If it is almost time for your next dose, take only that dose. Do not take double or extra doses.  Where should I keep my medicine?  Keep out of the reach of children.  This medicine may cause accidental overdose and death if it taken by other adults, children, or pets. Mix any unused medicine with a substance like cat litter or coffee grounds. Then throw the medicine away in a sealed container like a sealed bag or a coffee can with a lid. Do not use the medicine after the expiration date.  Store at room temperature between 15 and 30 degrees C (59 and 86 degrees F).    What should I watch for while using this medicine?  Tell your doctor or health care professional if your pain does not go away, if it gets worse, or if you have new or a different type of pain. You may develop tolerance to the medicine. Tolerance means that you will need a higher dose of the medicine for pain relief. Tolerance is normal and is expected if you take this medicine for a long time.  Do not suddenly stop taking your medicine because you may develop a severe reaction. Your body becomes used to the medicine. This does NOT mean you are addicted. Addiction is a behavior related to getting and using a drug for a non-medical reason. If you have pain, you have a medical reason to take pain medicine. Your doctor will tell you how much medicine to take. If your doctor wants you to stop the medicine, the dose will be slowly lowered over time to avoid any side effects.  There are different types of narcotic medicines (opiates). If you take more than one type at the same time or if you are taking another medicine that also causes drowsiness, you may have more side effects. Give your health care provider a list of all medicines you use.  Your doctor will tell you how much medicine to take. Do not take more medicine than directed. Call emergency for help if you have problems breathing or unusual sleepiness.  You may get drowsy or dizzy. Do not drive, use machinery, or do anything that needs mental alertness until you know how this medicine affects you. Do not stand or sit up quickly, especially if you are an older patient. This reduces the risk of dizzy or fainting spells. Alcohol can increase or decrease the effects of this medicine. Avoid alcoholic drinks.  You may have constipation. Try to have a bowel movement at least every 2 to 3 days. If you do not have a bowel movement for 3 days, call your doctor or health care professional.  Your mouth may get dry. Chewing sugarless gum or sucking hard candy, and drinking plenty of water may help. Contact your doctor if the problem does not go away or is severe.  NOTE:This sheet is a summary. It may not cover all possible information. If you have questions about this medicine, talk to your doctor, pharmacist, or health care provider. Copyright© 2017 [employer]

## 2018-04-12 NOTE — PROGRESS NOTES
Portions of this note are generated with voice recognition software. Typographical errors may exist.     SUBJECTIVE:    This is a/an 67 y.o. female here for primary care visit for  Chief Complaint   Patient presents with    Hypertension     follow up     Rash     on right ankle     Patient states that starting on Saturday she had the sudden development of vesicular rash again on the right ankle.  Vesicular rash turning into bullae.  They are tense.  Patient denies constitutional symptoms.  Patient has not started any new prescription or over-the-counter medications prior to the onset of the rash.  She no longer has high potency betamethasone.  States that at times the pain associated with the rash is 10 out of 10 with typical activities of daily living.  Patient is taking Tylenol and avoiding NSAID medication to avoid problems with kidney function.  Patient stating that pain control and adequate.  She has taken opiate medications postoperatively in the past without problems.  She denies a history of opiate misuse or abuse.    Patient states that she has not been having significant gout arthritis symptoms.  She has never been on allopurinol before but would consider starting this medication if necessary.    Medications Reviewed and Updated    Past medical, family, and social histories were reviewed and updated.    Review of Systems negative unless otherwise noted in history of present illness-  ROS    General ROS: negative  Psychological ROS: negative  Allergy and Immunology ROS: negative  Gastrointestinal ROS: negative  Genito-Urinary ROS: negative  Musculoskeletal ROS: negative  Neurological ROS: negative  Dermatological ROS: negative        Allergic:    Review of patient's allergies indicates:   Allergen Reactions    Neosporin [benzalkonium chloride] Rash    Doxycycline Rash     Skin peels       OBJECTIVE:  BP: 138/86 Pulse: 100    Wt Readings from Last 3 Encounters:   04/12/18 119.3 kg (263 lb 0.1 oz)    03/02/18 120.1 kg (264 lb 12.4 oz)   02/02/18 120.5 kg (265 lb 8.7 oz)    Body mass index is 43.77 kg/m².  Previous Blood Pressure Readings :   BP Readings from Last 3 Encounters:   04/12/18 138/86   01/23/18 (!) 140/80   01/23/18 (!) 134/90       Physical Exam    GEN: No apparent distress  HEENT: sclera non-icteric, conjunctiva clear  CV: no peripheral edema  PULM: breathing non-labored  ABD: Obese, protuberant abdomen.  PSYCH: appropriate affect  MSK: able to rise from chair without assistance  SKIN: normal skin turgor.  Skin changes as photographed.    Pertinent Labs Reviewed       ASSESSMENT/PLAN:    Bullous dermatitis.This is a Acute on Chronic problem. The etiology is unknown. The problem is not adequately controlled. The risk of medical complications is moderate. Treatment/diagnostic recommendations are to modify the diagnostic/treatment plan as follows in addition to instructions noted on the After Visit Summary. The patient advised if symptoms change or intensify to seek medical care.   -     betamethasone dipropionate (DIPROLENE) 0.05 % ointment; Apply topically daily as needed. Do not apply to damaged skin  Dispense: 45 g; Refill: 0    Acute pain.Condition not optimally controlled. Detailed counseling on self care measures. Plan to monitor clinically in addition to plan below.   -     traMADol (ULTRAM) 50 mg tablet; Take 1 tablet (50 mg total) by mouth every 8 (eight) hours as needed for Pain.  Dispense: 21 tablet; Refill: 0    Chronic gout due to renal impairment without tophus, unspecified site.Condition stable.  Counseling on self-care measures. Plan to monitor clinically. Continue current medical plan.       Future Appointments  Date Time Provider Department Center   [facility]2018 [facility]   5/3/2018 10:40 AM Michael Elias MD [facility]       Michael Elias  4/12/2018  12:24 PM

## 2018-04-12 NOTE — TELEPHONE ENCOUNTER
----- Message from Nathalie Mccallum MD sent at 4/12/2018  4:42 PM CDT -----  She should see us.  We will get her an appt asap   ----- Message -----  From: Michael Elias MD  Sent: 4/12/2018   9:24 AM  To: Nathalie Mccallum MD    Patient comes back again with vesicular rash. Very painful. Took pictures. Wondering if she should get back in with you or if you would recommend someone in the allergy division?

## 2018-04-17 ENCOUNTER — TELEPHONE (OUTPATIENT)
Dept: DERMATOLOGY | Facility: CLINIC | Age: 68
End: 2018-04-17

## 2018-04-17 ENCOUNTER — TELEPHONE (OUTPATIENT)
Dept: FAMILY MEDICINE | Facility: CLINIC | Age: 68
End: 2018-04-17

## 2018-04-17 NOTE — TELEPHONE ENCOUNTER
----- Message from Michael Elias MD sent at 4/16/2018  5:24 PM CDT -----  Contact: [phone]/ self   Patient calling saying she's getting worse. Tried to page Dr. Mccallum and reach over Skype today but couldn't get through. Is there an opening at all this week for Mrs Campo?    ----- Message -----  From: Roz Hernandez MA  Sent: 4/16/2018   1:45 PM  To: Michael Elias MD    Please advise  ----- Message -----  From: Mer Gonzalez  Sent: 4/16/2018   8:13 AM  To: Curt ALVAREZ Staff    Pt called stating she saw Dr Elias on 04/12/18 for a rash on her leg . Pt states she is still has the rash and its getting bigger . Pt want to know what else she can do . Please advise

## 2018-04-17 NOTE — TELEPHONE ENCOUNTER
----- Message from Maggie Khan MA sent at 4/17/2018  9:45 AM CDT -----  I called the pt yesterday and this morning. I left a message for her to call me.   ----- Message -----  From: Michael Elias MD  Sent: 4/16/2018   6:41 PM  To: Nathalie Mccallum MD, Xena ALVAREZ Staff    Can your staff please coordinate and contact the patient about this time? I don't know the patient's personal schedule. Sorry I couldn't get in touch with you faster    ----- Message -----  From: Nathalie Mccallum MD  Sent: 4/16/2018   5:05 PM  To: Michael Elias MD, #    Can pt come wed at 3?  Sorry chelsy I am not in the office trev  ----- Message -----  From: Nathalie Mccallum MD  Sent: 4/12/2018   4:42 PM  To: Maggie Khan MA, #    She should see us.  We will get her an appt asap   ----- Message -----  From: Michael Elias MD  Sent: 4/12/2018   9:24 AM  To: Nathalie Mccallum MD    Patient comes back again with vesicular rash. Very painful. Took pictures. Wondering if she should get back in with you or if you would recommend someone in the allergy division?

## 2018-04-19 ENCOUNTER — OFFICE VISIT (OUTPATIENT)
Dept: DERMATOLOGY | Facility: CLINIC | Age: 68
End: 2018-04-19
Payer: COMMERCIAL

## 2018-04-19 ENCOUNTER — TELEPHONE (OUTPATIENT)
Dept: ADMINISTRATIVE | Facility: HOSPITAL | Age: 68
End: 2018-04-19

## 2018-04-19 DIAGNOSIS — L13.9 BULLOUS DISORDER: Primary | ICD-10-CM

## 2018-04-19 PROCEDURE — 99999 PR PBB SHADOW E&M-EST. PATIENT-LVL II: CPT | Mod: PBBFAC,,, | Performed by: DERMATOLOGY

## 2018-04-19 PROCEDURE — 99213 OFFICE O/P EST LOW 20 MIN: CPT | Mod: S$GLB,,, | Performed by: DERMATOLOGY

## 2018-04-19 NOTE — PROGRESS NOTES
Subjective:       Patient ID:  Ankita Campo is a 67 y.o. female who presents for   Chief Complaint   Patient presents with    Rash     Pt presnets for blister/rash right ankle.  X 1 week.  First blisters appeared wed April 11th. Pt did not take any new meds . Lesions are  Tender, very itchy in periphery when they start. She has no other lesions elsewhere on her body . .  Sometimes swells, particularly at night/  tx with tylenol for swelling and diprolene ointment for itch. Has helped a little.   Pt has not had a flare since July, if she did it was very minor and she used topical steroid cream and it resolved.  She does take tylenol intermittently.  Pt last dose of tylenol was Wednesday. She is unsure what BP meds she is on and what she takes regularly v prn.       Rash         Review of Systems   Constitutional: Positive for fatigue. Negative for fever, chills and malaise.   HENT: Negative for mouth sores.    Genitourinary: Negative for genital sores.   Skin: Positive for itching, rash and tendency to form keloidal scars.   Hematologic/Lymphatic: Does not bruise/bleed easily.        Objective:    Physical Exam   Constitutional: She appears well-developed and well-nourished. No distress.   Neurological: She is alert and oriented to person, place, and time. She is not disoriented.   Psychiatric: She has a normal mood and affect.   Skin:   Areas Examined (abnormalities noted in diagram):   RUE Inspected  LUE Inspection Performed  RLE Inspected  LLE Inspection Performed              Diagram Legend     Erythematous scaling macule/papule c/w actinic keratosis       Vascular papule c/w angioma      Pigmented verrucoid papule/plaque c/w seborrheic keratosis      Yellow umbilicated papule c/w sebaceous hyperplasia      Irregularly shaped tan macule c/w lentigo     1-2 mm smooth white papules consistent with Milia      Movable subcutaneous cyst with punctum c/w epidermal inclusion cyst      Subcutaneous movable cyst c/w  pilar cyst      Firm pink to brown papule c/w dermatofibroma      Pedunculated fleshy papule(s) c/w skin tag(s)      Evenly pigmented macule c/w junctional nevus     Mildly variegated pigmented, slightly irregular-bordered macule c/w mildly atypical nevus      Flesh colored to evenly pigmented papule c/w intradermal nevus       Pink pearly papule/plaque c/w basal cell carcinoma      Erythematous hyperkeratotic cursted plaque c/w SCC      Surgical scar with no sign of skin cancer recurrence      Open and closed comedones      Inflammatory papules and pustules      Verrucoid papule consistent consistent with wart     Erythematous eczematous patches and plaques     Dystrophic onycholytic nail with subungual debris c/w onychomycosis     Umbilicated papule    Erythematous-base heme-crusted tan verrucoid plaque consistent with inflamed seborrheic keratosis     Erythematous Silvery Scaling Plaque c/w Psoriasis     See annotation          Assessment / Plan:        Bullous disorder  Fixed drug v EM  Pt will monitor very closely meds she takes and when she flares.   Consider valtrex for next flare  Diprolene cream bid   no signs of in fection and non inflammatory today.   Other consideration is pseudo-PCT but way too many inflammatory cells on path   Her BP ag was neg in the past.          Follow-up in about 3 weeks (around 5/10/2018).

## 2018-04-19 NOTE — TELEPHONE ENCOUNTER
Outgoing call to patient regarding scheduling mammogram. Left message to contact oofice to schedule.

## 2018-04-19 NOTE — Clinical Note
Hi, do you know what BP meds she is taking? She is unsure.  Her path indicates fixed drug so I am trying to figure out what she takes regularly v prn.  Its also possible this is EM and if she gets another outbreak I can consider doing valtrex mainetance and see if this helps. Thanks, alhaji

## 2018-05-02 ENCOUNTER — TELEPHONE (OUTPATIENT)
Dept: INTERNAL MEDICINE | Facility: CLINIC | Age: 68
End: 2018-05-02

## 2018-05-02 NOTE — TELEPHONE ENCOUNTER
----- Message from Roly Palmer MD sent at 5/2/2018  3:44 PM CDT -----  Needs to be renal dose adjusted  ----- Message -----  From: Michael Elias MD  Sent: 4/12/2018   9:26 AM  To: Roly Palmer MD    Patient seems to have chronic gout. I am tempted to start her on allopurinol in like of the CKD. What do you think?

## 2018-06-15 DIAGNOSIS — Z12.39 BREAST CANCER SCREENING: ICD-10-CM

## 2018-07-09 ENCOUNTER — LAB VISIT (OUTPATIENT)
Dept: LAB | Facility: HOSPITAL | Age: 68
End: 2018-07-09
Attending: INTERNAL MEDICINE
Payer: COMMERCIAL

## 2018-07-09 DIAGNOSIS — N18.30 CHRONIC KIDNEY DISEASE (CKD), STAGE III (MODERATE): ICD-10-CM

## 2018-07-09 LAB
ALBUMIN SERPL BCP-MCNC: 3.5 G/DL
ANION GAP SERPL CALC-SCNC: 8 MMOL/L
BUN SERPL-MCNC: 17 MG/DL
CALCIUM SERPL-MCNC: 8.9 MG/DL
CHLORIDE SERPL-SCNC: 108 MMOL/L
CO2 SERPL-SCNC: 26 MMOL/L
CREAT SERPL-MCNC: 0.9 MG/DL
EST. GFR  (AFRICAN AMERICAN): >60 ML/MIN/1.73 M^2
EST. GFR  (NON AFRICAN AMERICAN): >60 ML/MIN/1.73 M^2
GLUCOSE SERPL-MCNC: 87 MG/DL
PHOSPHATE SERPL-MCNC: 3.3 MG/DL
POTASSIUM SERPL-SCNC: 4.1 MMOL/L
SODIUM SERPL-SCNC: 142 MMOL/L

## 2018-07-09 PROCEDURE — 80069 RENAL FUNCTION PANEL: CPT

## 2018-07-09 PROCEDURE — 36415 COLL VENOUS BLD VENIPUNCTURE: CPT | Mod: PO

## 2018-08-23 ENCOUNTER — OFFICE VISIT (OUTPATIENT)
Dept: INTERNAL MEDICINE | Facility: CLINIC | Age: 68
End: 2018-08-23
Payer: COMMERCIAL

## 2018-08-23 ENCOUNTER — LAB VISIT (OUTPATIENT)
Dept: LAB | Facility: HOSPITAL | Age: 68
End: 2018-08-23
Attending: INTERNAL MEDICINE
Payer: COMMERCIAL

## 2018-08-23 VITALS
SYSTOLIC BLOOD PRESSURE: 134 MMHG | HEART RATE: 86 BPM | HEIGHT: 65 IN | WEIGHT: 261.44 LBS | BODY MASS INDEX: 43.56 KG/M2 | DIASTOLIC BLOOD PRESSURE: 90 MMHG

## 2018-08-23 DIAGNOSIS — Z12.31 BREAST CANCER SCREENING BY MAMMOGRAM: ICD-10-CM

## 2018-08-23 DIAGNOSIS — E89.0 POST-SURGICAL HYPOTHYROIDISM: ICD-10-CM

## 2018-08-23 DIAGNOSIS — L13.9 BULLOUS DERMATITIS: ICD-10-CM

## 2018-08-23 DIAGNOSIS — N18.30 CKD (CHRONIC KIDNEY DISEASE) STAGE 3, GFR 30-59 ML/MIN: ICD-10-CM

## 2018-08-23 DIAGNOSIS — R60.0 PEDAL EDEMA: Primary | ICD-10-CM

## 2018-08-23 DIAGNOSIS — Z00.00 LABORATORY EXAMINATION ORDERED AS PART OF A ROUTINE GENERAL MEDICAL EXAMINATION: ICD-10-CM

## 2018-08-23 DIAGNOSIS — R60.0 PEDAL EDEMA: ICD-10-CM

## 2018-08-23 LAB
ALBUMIN SERPL BCP-MCNC: 3.7 G/DL
ALP SERPL-CCNC: 116 U/L
ALT SERPL W/O P-5'-P-CCNC: 11 U/L
ANION GAP SERPL CALC-SCNC: 8 MMOL/L
AST SERPL-CCNC: 18 U/L
BILIRUB SERPL-MCNC: 0.7 MG/DL
BNP SERPL-MCNC: 23 PG/ML
BUN SERPL-MCNC: 21 MG/DL
CALCIUM SERPL-MCNC: 9.2 MG/DL
CHLORIDE SERPL-SCNC: 106 MMOL/L
CO2 SERPL-SCNC: 27 MMOL/L
CREAT SERPL-MCNC: 1.3 MG/DL
EST. GFR  (AFRICAN AMERICAN): 48.4 ML/MIN/1.73 M^2
EST. GFR  (NON AFRICAN AMERICAN): 42 ML/MIN/1.73 M^2
GLUCOSE SERPL-MCNC: 90 MG/DL
POTASSIUM SERPL-SCNC: 4.3 MMOL/L
PROT SERPL-MCNC: 7.6 G/DL
SODIUM SERPL-SCNC: 141 MMOL/L
TSH SERPL DL<=0.005 MIU/L-ACNC: 1.26 UIU/ML

## 2018-08-23 PROCEDURE — 99214 OFFICE O/P EST MOD 30 MIN: CPT | Mod: S$GLB,,, | Performed by: INTERNAL MEDICINE

## 2018-08-23 PROCEDURE — 83880 ASSAY OF NATRIURETIC PEPTIDE: CPT

## 2018-08-23 PROCEDURE — 99999 PR PBB SHADOW E&M-EST. PATIENT-LVL III: CPT | Mod: PBBFAC,,, | Performed by: INTERNAL MEDICINE

## 2018-08-23 PROCEDURE — 84443 ASSAY THYROID STIM HORMONE: CPT

## 2018-08-23 PROCEDURE — 3080F DIAST BP >= 90 MM HG: CPT | Mod: CPTII,S$GLB,, | Performed by: INTERNAL MEDICINE

## 2018-08-23 PROCEDURE — 36415 COLL VENOUS BLD VENIPUNCTURE: CPT | Mod: PO

## 2018-08-23 PROCEDURE — 80053 COMPREHEN METABOLIC PANEL: CPT

## 2018-08-23 PROCEDURE — 3075F SYST BP GE 130 - 139MM HG: CPT | Mod: CPTII,S$GLB,, | Performed by: INTERNAL MEDICINE

## 2018-08-23 NOTE — PATIENT INSTRUCTIONS
Recommendations for today    Blood work to be scheduled soon will be to help evaluate whether swelling in the lower extremities is related to blood pressure medication amlodipine or if it is related to an underlying heart condition.  Once blood work and heart ultrasound are completed we will contact you with recommendations based on the results.  For the time being simply continue with the medications as they have been prescribed.

## 2018-08-23 NOTE — PROGRESS NOTES
Portions of this note are generated with voice recognition software. Typographical errors may exist.     SUBJECTIVE:    This is a/an 68 y.o. female here for primary care visit for  Chief Complaint   Patient presents with    Edema     The patient states that on a recurring basis she has painless swelling involving the ankles and the wrists.  She states that it tends to get worse as the day goes on and improves over night.  She denies any unusual dyspnea with typical activities of daily living.  Denies any unusual cardiac symptoms such as palpitations or tachycardia.  No dizzy episodes.  The patient feels that this might be related to amlodipine exposure.    The patient has a history of solitary kidney.  She denies NSAID misuse or abuse    .  The patient states that she has not had a recurrence of bolus dermatitis involving the ankles.  She does have problems with dryness and scaling of the skin of the hands that has been recurring for about the last 3 weeks.  She states that the dermatologist has given her some corticosteroid cream and she finds this to be a half effective but she still has recurrence of the dermatitis and she does not know what is triggering it.        Medications Reviewed and Updated    Past medical, family, and social histories were reviewed and updated.    Review of Systems negative unless otherwise noted in history of present illness-  ROS    General ROS: negative  Psychological ROS: negative  ENT ROS: negative  Endocrine ROS: Negative  Allergy and Immunology ROS: negative  Cardiovascular ROS: negative  Pulmonary ROS: Negative  Gastrointestinal ROS: negative  Genito-Urinary ROS: negative  Musculoskeletal ROS: negative  Dermatological ROS: negative        Allergic:    Review of patient's allergies indicates:   Allergen Reactions    Neosporin [benzalkonium chloride] Rash    Doxycycline Rash     Skin peels       OBJECTIVE:  BP: (!) 134/90 Pulse: 86    Wt Readings from Last 3 Encounters:    08/23/18 118.6 kg (261 lb 7.5 oz)   04/12/18 119.3 kg (263 lb 0.1 oz)   03/02/18 120.1 kg (264 lb 12.4 oz)    Body mass index is 43.51 kg/m².  Previous Blood Pressure Readings :   BP Readings from Last 3 Encounters:   08/23/18 (!) 134/90   04/12/18 138/86   01/23/18 (!) 140/80       Physical Exam    GEN: No apparent distress  HEENT: sclera non-icteric, conjunctiva clear  CV:  1+ peripheral edema bilateral lower extremities at the level of the ankle.  Regular rate and rhythm.   PULM: breathing non-labored  ABD: Obese, protuberant abdomen.  PSYCH: appropriate affect  MSK: able to rise from chair without assistance  SKIN: normal skin turgor.  Pigmented areas along the skin of the ankles.  Bullous changes completely healed.    Pertinent Labs Reviewed       ASSESSMENT/PLAN:    Pedal edema.Etiology unclear. Not controlled. Further evaluation warranted.  Recommendations as below or as written on After Visit Summary.   -     Brain natriuretic peptide; Standing  -     Comprehensive metabolic panel; Standing  -     2D Echo w/ Color Flow Doppler; Future    Breast cancer screening by mammogram  -     Mammo Digital Screening Bilat with CAD; Standing    Post-surgical hypothyroidism.Condition stable.  Counseling given today on self-care measures. Plan to monitor clinically. Continue current medical plan.   -     TSH; Standing    Bullous dermatitis.Condition improving.  Counseling on self-care measures today.  Continue with current plan in addition to recommendations written on After Visit Summary.     Laboratory examination ordered as part of a routine general medical examination  -     Lipid panel; Standing    CKD (chronic kidney disease) stage 3, GFR 30-59 ml/min.Etiology unclear. Not controlled. Further evaluation warranted.  Recommendations as below or as written on After Visit Summary.   -     Uric acid; Standing        Future Appointments   Date Time Provider Department Center   9/28/2018 10:00 AM [facility] OI-M[facility]2 [facility]  [facility]   9/28/2018 11:00 [facility]   1/21/2019  8:45 AM LAB, [facility] LAB [place]   1/25/2019 10:40 AM Michael Elias MD \[facility] [place] [facility]\"" Marissa Elias  8/24/2018  12:34 PM

## 2018-08-27 ENCOUNTER — OFFICE VISIT (OUTPATIENT)
Dept: DERMATOLOGY | Facility: CLINIC | Age: 68
End: 2018-08-27
Payer: COMMERCIAL

## 2018-08-27 DIAGNOSIS — L23.9 ALLERGIC CONTACT DERMATITIS, UNSPECIFIED TRIGGER: Primary | ICD-10-CM

## 2018-08-27 PROCEDURE — 99213 OFFICE O/P EST LOW 20 MIN: CPT | Mod: S$GLB,,, | Performed by: DERMATOLOGY

## 2018-08-27 PROCEDURE — 99999 PR PBB SHADOW E&M-EST. PATIENT-LVL II: CPT | Mod: PBBFAC,,, | Performed by: DERMATOLOGY

## 2018-08-27 NOTE — PROGRESS NOTES
Subjective:       Patient ID:  Ankita Campo is a 68 y.o. female who presents for   Chief Complaint   Patient presents with    Rash     Pt presnets for break out on right dorsal hand x 2 months.  Itchy, spreading.  Unsure if related to leg swelling.  PCP waiting on lab results.  tx with diprolene cream. No change. Pt states rash never clears completely.  Worse with a lot of hand washing.  Does wear artificial nails regularly.   Pt has been taking tylenol as only otc medication. No herbal supplements, takes centrum daily .   Pt has been washing her hands a lot and using different cleaning products.    Pt with hx of bullous dermatitis on ankles.  IIF and DIF  neg for BP in the past. - either bullous em v fixed drug.  Hands appear different than her ankles today.         Rash         Review of Systems   Constitutional: Positive for fatigue. Negative for fever, chills and malaise.   Skin: Positive for itching and rash.        Objective:    Physical Exam   Constitutional: She appears well-developed and well-nourished. No distress.   Neurological: She is alert and oriented to person, place, and time. She is not disoriented.   Psychiatric: She has a normal mood and affect.   Skin:   Areas Examined (abnormalities noted in diagram):   RUE Inspected  LUE Inspection Performed  RLE Inspected  LLE Inspection Performed                  Diagram Legend     Erythematous scaling macule/papule c/w actinic keratosis       Vascular papule c/w angioma      Pigmented verrucoid papule/plaque c/w seborrheic keratosis      Yellow umbilicated papule c/w sebaceous hyperplasia      Irregularly shaped tan macule c/w lentigo     1-2 mm smooth white papules consistent with Milia      Movable subcutaneous cyst with punctum c/w epidermal inclusion cyst      Subcutaneous movable cyst c/w pilar cyst      Firm pink to brown papule c/w dermatofibroma      Pedunculated fleshy papule(s) c/w skin tag(s)      Evenly pigmented macule c/w junctional  nevus     Mildly variegated pigmented, slightly irregular-bordered macule c/w mildly atypical nevus      Flesh colored to evenly pigmented papule c/w intradermal nevus       Pink pearly papule/plaque c/w basal cell carcinoma      Erythematous hyperkeratotic cursted plaque c/w SCC      Surgical scar with no sign of skin cancer recurrence      Open and closed comedones      Inflammatory papules and pustules      Verrucoid papule consistent consistent with wart     Erythematous eczematous patches and plaques     Dystrophic onycholytic nail with subungual debris c/w onychomycosis     Umbilicated papule    Erythematous-base heme-crusted tan verrucoid plaque consistent with inflamed seborrheic keratosis     Erythematous Silvery Scaling Plaque c/w Psoriasis     See annotation      Assessment / Plan:        Allergic contact dermatitis, unspecified trigger  I feel this is different than her lower leg bullous disorder.   Diprolene cream bid and moisturize hands as often as possible and avoid contact with cleaning products.   -     Patch Testing; Future             Follow-up for as above.

## 2018-08-31 ENCOUNTER — TELEPHONE (OUTPATIENT)
Dept: ENDOCRINOLOGY | Facility: CLINIC | Age: 68
End: 2018-08-31

## 2018-08-31 NOTE — TELEPHONE ENCOUNTER
----- Message from Najma Fernando sent at 8/30/2018  9:07 AM CDT -----  Contact: pt  Pt needs appt    12 mo check up   For her medication    I put on wait list and told her to keep calling to check for opening    Don't know if you have a work in     Or  If he wants to run blood work   To check med level      Please call pt if needs labs   [phone]  if no answer   Leave message with instructions     Thanks

## 2018-09-10 ENCOUNTER — CLINICAL SUPPORT (OUTPATIENT)
Dept: DERMATOLOGY | Facility: CLINIC | Age: 68
End: 2018-09-10
Payer: COMMERCIAL

## 2018-09-10 DIAGNOSIS — L23.9 ALLERGIC CONTACT DERMATITIS, UNSPECIFIED TRIGGER: ICD-10-CM

## 2018-09-10 PROCEDURE — 95044 PATCH/APPLICATION TESTS: CPT | Mod: S$GLB,,, | Performed by: DERMATOLOGY

## 2018-09-10 PROCEDURE — 99999 PR PBB SHADOW E&M-EST. PATIENT-LVL II: CPT | Mod: PBBFAC,,,

## 2018-09-10 NOTE — PROGRESS NOTES
[employer] screening series applied to patients back. Follow up on Wed for 1st reading.   70 allergens applied

## 2018-09-12 ENCOUNTER — OFFICE VISIT (OUTPATIENT)
Dept: DERMATOLOGY | Facility: CLINIC | Age: 68
End: 2018-09-12
Payer: COMMERCIAL

## 2018-09-12 DIAGNOSIS — L23.3 ALLERGIC CONTACT DERMATITIS DUE TO DRUGS IN CONTACT WITH SKIN: Primary | ICD-10-CM

## 2018-09-12 PROCEDURE — 99212 OFFICE O/P EST SF 10 MIN: CPT | Mod: S$GLB,,, | Performed by: DERMATOLOGY

## 2018-09-12 PROCEDURE — 1101F PT FALLS ASSESS-DOCD LE1/YR: CPT | Mod: CPTII,S$GLB,, | Performed by: DERMATOLOGY

## 2018-09-13 ENCOUNTER — OFFICE VISIT (OUTPATIENT)
Dept: DERMATOLOGY | Facility: CLINIC | Age: 68
End: 2018-09-13
Payer: COMMERCIAL

## 2018-09-13 DIAGNOSIS — L23.9 ALLERGIC CONTACT DERMATITIS, UNSPECIFIED TRIGGER: Primary | ICD-10-CM

## 2018-09-13 PROCEDURE — 99999 PR PBB SHADOW E&M-EST. PATIENT-LVL I: CPT | Mod: PBBFAC,,, | Performed by: DERMATOLOGY

## 2018-09-13 PROCEDURE — 99212 OFFICE O/P EST SF 10 MIN: CPT | Mod: S$GLB,,, | Performed by: DERMATOLOGY

## 2018-09-13 PROCEDURE — 1101F PT FALLS ASSESS-DOCD LE1/YR: CPT | Mod: CPTII,S$GLB,, | Performed by: DERMATOLOGY

## 2018-09-13 NOTE — PROGRESS NOTES
Pt here for final patch test reading  3+ reaction to bacitracin  Handout on bacitracin provided  Pt to call if has a flare and note any new OTC meds she has taken in past week.

## 2018-09-19 DIAGNOSIS — E89.0 POSTSURGICAL HYPOTHYROIDISM: Primary | ICD-10-CM

## 2018-09-19 RX ORDER — LEVOTHYROXINE SODIUM 112 UG/1
125 TABLET ORAL DAILY
Qty: 90 TABLET | Refills: 3 | Status: SHIPPED | OUTPATIENT
Start: 2018-09-19 | End: 2018-12-10 | Stop reason: SDUPTHER

## 2018-09-19 NOTE — TELEPHONE ENCOUNTER
----- Message from Linda Cheng sent at 9/19/2018  1:41 PM CDT -----  Contact: Self [phone]  .Refill request.  levothyroxine (SYNTHROID) 112 MCG tablet     ..  [facility] [address] [place], [place] [address] [address] [facility]  [address] [facility] [address]  Phone: [phone] Fax: [phone]    PT is out of medication.

## 2018-09-28 ENCOUNTER — HOSPITAL ENCOUNTER (OUTPATIENT)
Dept: CARDIOLOGY | Facility: CLINIC | Age: 68
Discharge: HOME | End: 2018-09-28
Attending: INTERNAL MEDICINE
Payer: COMMERCIAL

## 2018-09-28 ENCOUNTER — HOSPITAL ENCOUNTER (OUTPATIENT)
Dept: RADIOLOGY | Facility: HOSPITAL | Age: 68
Discharge: HOME | End: 2018-09-28
Attending: INTERNAL MEDICINE
Payer: COMMERCIAL

## 2018-09-28 DIAGNOSIS — R60.0 PEDAL EDEMA: ICD-10-CM

## 2018-09-28 DIAGNOSIS — Z12.31 BREAST CANCER SCREENING BY MAMMOGRAM: ICD-10-CM

## 2018-09-28 DIAGNOSIS — I36.9 NONRHEUMATIC TRICUSPID VALVE DISORDER: ICD-10-CM

## 2018-09-28 LAB
ESTIMATED PA SYSTOLIC PRESSURE: 27.01
MITRAL VALVE REGURGITATION: NORMAL
RETIRED EF AND QEF - SEE NOTES: 60 (ref 55–65)
TRICUSPID VALVE REGURGITATION: NORMAL

## 2018-09-28 PROCEDURE — 77063 BREAST TOMOSYNTHESIS BI: CPT | Mod: 26,,, | Performed by: RADIOLOGY

## 2018-09-28 PROCEDURE — 93306 TTE W/DOPPLER COMPLETE: CPT | Mod: S$GLB,,, | Performed by: INTERNAL MEDICINE

## 2018-09-28 PROCEDURE — 77063 BREAST TOMOSYNTHESIS BI: CPT | Mod: TC

## 2018-09-28 PROCEDURE — 77067 SCR MAMMO BI INCL CAD: CPT | Mod: 26,,, | Performed by: RADIOLOGY

## 2018-11-07 ENCOUNTER — TELEPHONE (OUTPATIENT)
Dept: DERMATOLOGY | Facility: CLINIC | Age: 68
End: 2018-11-07

## 2018-11-07 DIAGNOSIS — L30.9 DERMATITIS: Primary | ICD-10-CM

## 2018-11-07 RX ORDER — PREDNISONE 10 MG/1
TABLET ORAL
Qty: 21 TABLET | Refills: 0 | Status: SHIPPED | OUTPATIENT
Start: 2018-11-07 | End: 2019-03-13

## 2018-11-07 NOTE — TELEPHONE ENCOUNTER
----- Message from Maggie Khan MA sent at 11/7/2018 12:40 PM CST -----  Contact: pt       ----- Message -----  From: Regina Curiel  Sent: 11/7/2018   9:52 AM  To: Xena ALVAREZ Staff    DILMA- ptPatient Requesting Sooner Appointment.     Reason for sooner appt.: pt is calling to speak with the nurse pt needs to be seen asap for a rash  flare up on her leg   When is the first available appointment?Jorge next year   Communication Preference: pt   Additional Information:[phone]    NINO

## 2018-12-10 ENCOUNTER — OFFICE VISIT (OUTPATIENT)
Dept: ENDOCRINOLOGY | Facility: CLINIC | Age: 68
End: 2018-12-10
Payer: COMMERCIAL

## 2018-12-10 VITALS
WEIGHT: 255.75 LBS | HEIGHT: 65 IN | DIASTOLIC BLOOD PRESSURE: 84 MMHG | HEART RATE: 72 BPM | BODY MASS INDEX: 42.61 KG/M2 | SYSTOLIC BLOOD PRESSURE: 124 MMHG

## 2018-12-10 DIAGNOSIS — E89.0 HYPOTHYROIDISM, POSTSURGICAL: ICD-10-CM

## 2018-12-10 DIAGNOSIS — E55.9 VITAMIN D DEFICIENCY: ICD-10-CM

## 2018-12-10 DIAGNOSIS — E89.0 POSTSURGICAL HYPOTHYROIDISM: ICD-10-CM

## 2018-12-10 DIAGNOSIS — E04.2 MULTINODULAR NON-TOXIC GOITER: ICD-10-CM

## 2018-12-10 DIAGNOSIS — E89.0 POST-SURGICAL HYPOTHYROIDISM: Primary | ICD-10-CM

## 2018-12-10 PROCEDURE — 3079F DIAST BP 80-89 MM HG: CPT | Mod: CPTII,S$GLB,, | Performed by: INTERNAL MEDICINE

## 2018-12-10 PROCEDURE — 3074F SYST BP LT 130 MM HG: CPT | Mod: CPTII,S$GLB,, | Performed by: INTERNAL MEDICINE

## 2018-12-10 PROCEDURE — 99214 OFFICE O/P EST MOD 30 MIN: CPT | Mod: S$GLB,,, | Performed by: INTERNAL MEDICINE

## 2018-12-10 PROCEDURE — 1101F PT FALLS ASSESS-DOCD LE1/YR: CPT | Mod: CPTII,S$GLB,, | Performed by: INTERNAL MEDICINE

## 2018-12-10 PROCEDURE — 99999 PR PBB SHADOW E&M-EST. PATIENT-LVL III: CPT | Mod: PBBFAC,,, | Performed by: INTERNAL MEDICINE

## 2018-12-10 RX ORDER — LEVOTHYROXINE SODIUM 112 UG/1
112 TABLET ORAL DAILY
Qty: 90 TABLET | Refills: 3 | Status: SHIPPED | OUTPATIENT
Start: 2018-12-10 | End: 2018-12-11

## 2018-12-10 NOTE — PROGRESS NOTES
Endocrinology Return Visit     Ankita Campo is a 68 y.o. female who returns for follow-up of postsurgical hypothyroidism following total thyroidectomy for benign nodule    Previously seen by Dr. Crowe and Gerry, new to me    HPI  Pt noted swelling on the left side of her neck and went to see her PCP in late 2015     Subsequently thyroid nodule was confirmed on thyroid US from 1/2016    Due to worsening compressive symptoms pt had total thyroidectomy June, 2017.    Path 6/2017:  FINAL PATHOLOGIC DIAGNOSIS  1. THYROID: BENIGN THYROID TISSUE, NO PARATHYROID PRESENT.  2. THYROID (TOTAL THYROIDECTOMY, 129g): MULTINODULAR COLLOID GOITER.    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    Vitamin D deficiency  Taking 1000 units daily  Last checked in 2016 and was 26      REVIEW  OF SYSTEMS:  Constitutional: Weight loss with diet change, +fatigue which is stable and chronic  Eyes: itchy eyes today  Thyroid: no heat/cold intolerance  CV: no CP, palpitations, LE swelling  GI: no diarrhea, constipation  MSK: L shoulder pain    MEDICATIONS    Current Outpatient Medications:     acetaminophen (TYLENOL) 325 MG tablet, Take 325 mg by mouth every 6 (six) hours as needed for Pain., Disp: , Rfl:     amLODIPine (NORVASC) 5 MG tablet, Take 1 tablet (5 mg total) by mouth once daily., Disp: 90 tablet, Rfl: 3    betamethasone dipropionate (DIPROLENE) 0.05 % ointment, Apply topically daily as needed. Do not apply to damaged skin, Disp: 45 g, Rfl: 0    cholecalciferol, vitamin D3, 1,000 unit capsule, Take 1 capsule (1,000 Units total) by mouth once daily., Disp: , Rfl: 0    cyanocobalamin 500 MCG tablet, Take 500 mcg by mouth once daily. Pt says she only takes it if she is tired., Disp: , Rfl:     furosemide (LASIX) 40 MG tablet, Take 1 tablet (40 mg total)  "by mouth once daily., Disp: 90 tablet, Rfl: 0    levothyroxine (SYNTHROID) 112 MCG tablet, Take 1 tablet (112 mcg total) by mouth once daily., Disp: 90 tablet, Rfl: 3    losartan (COZAAR) 50 MG tablet, Take 1 tablet (50 mg total) by mouth once daily., Disp: 90 tablet, Rfl: 1    multivitamin capsule, Take 1 capsule by mouth once daily., Disp: , Rfl:     omega-3 fatty acids-vitamin E (FISH OIL) 1,000 mg Cap, Take by mouth. Kal red, Disp: , Rfl:     PETROLATUM,WHITE (WHITE PETROLATUM, BULK,) Gel, 1 application by Misc.(Non-Drug; Combo Route) route 4 (four) times daily. (Patient taking differently: 1 application by Misc.(Non-Drug; Combo Route) route 4 (four) times daily. Pt uses for dry legs due to vein removal), Disp: 500 g, Rfl: 0    potassium chloride (MICRO-K) 8 mEq CpSR, Take 1 capsule (8 mEq total) by mouth once daily., Disp: 90 capsule, Rfl: 0    predniSONE (DELTASONE) 10 MG tablet, Take 4 pills po qam x 3 days then 2 pills po qam x 3 days then 1 pill po qam x 3 days, Disp: 21 tablet, Rfl: 0    VITAMIN E ACETATE (VITAMIN E ORAL), Take 1 capsule by mouth once a week. , Disp: , Rfl:     ALLERGIES  Review of patient's allergies indicates:   Allergen Reactions    Neosporin [benzalkonium chloride] Rash    Doxycycline Rash     Skin peels       PHYSICAL EXAM  /84   Pulse 72   Ht 5' 5" (1.651 m)   Wt 116 kg (255 lb 11.7 oz)   BMI 42.56 kg/m²   Body mass index is 42.56 kg/m².   Wt Readings from Last 3 Encounters:   12/10/18 116 kg (255 lb 11.7 oz)   08/23/18 118.6 kg (261 lb 7.5 oz)   04/12/18 119.3 kg (263 lb 0.1 oz)   General Appearance:  Normal appearing, pleasant, NAD  HEENT:  Red, watery eyes  Neck: well healed necklace scar; no palpable thyroid tissue or LAD  Neurologic:  A&O x3  Psychiatric:  normal mood and affect      ASSESSMENT/PLAN  1. Post-surgical hypothyroidism    2. Multinodular non-toxic goiter    3. Hypothyroidism, postsurgical    4. Vitamin D deficiency    5. Postsurgical " hypothyroidism      Postsurgical hypothyroidism  MNG s/p thyroidectomy  Repeat TSH today  Continue LT4 112 mcg daily    Vitamin D deficiency  Repeat level today as last 2 years ago  Continue 1000 units daily with further titration pending results    Weight loss  Encouraged to continue lifestyle changes    RTC 1 year    Sarah Hadley MD

## 2018-12-11 ENCOUNTER — TELEPHONE (OUTPATIENT)
Dept: ENDOCRINOLOGY | Facility: CLINIC | Age: 68
End: 2018-12-11

## 2018-12-11 DIAGNOSIS — E89.0 POST-SURGICAL HYPOTHYROIDISM: Primary | ICD-10-CM

## 2018-12-11 RX ORDER — LEVOTHYROXINE SODIUM 112 UG/1
TABLET ORAL
Qty: 96 TABLET | Refills: 3 | Status: SHIPPED | OUTPATIENT
Start: 2018-12-11 | End: 2020-04-02 | Stop reason: SDUPTHER

## 2018-12-11 NOTE — TELEPHONE ENCOUNTER
----- Message from Linda Cheng sent at 12/11/2018 11:26 AM CST -----  Contact: Self [phone]  PT returned call.

## 2018-12-11 NOTE — TELEPHONE ENCOUNTER
"Attempted to call pt per Dr. Hadley's request to give her the new directions related below:     "Currently taking levothyroxine 112 mcg daily. Will increase by half a tab a week for total 7.5 tabs.  Increase vitamin D to 2000 units daily  Repeat TSH in 6-8 weeks"      Pt did not answer phone. I left a message requesting a call back.     "

## 2018-12-11 NOTE — TELEPHONE ENCOUNTER
"Spoke with pt and relayed below information:   "Currently taking levothyroxine 112 mcg daily. Will increase by half a tab a week for total 7.5 tabs.  Increase vitamin D to 2000 units daily  Repeat TSH in 6-8 weeks"       Pt verbalized understanding.   Linked pt's lab order to a lab she has scheduled on 1/21/19.     "

## 2018-12-11 NOTE — TELEPHONE ENCOUNTER
Component      Latest Ref Rng & Units 12/10/2018   TSH      0.400 - 4.000 uIU/mL 5.326 (H)   Vit D, 25-Hydroxy      30 - 96 ng/mL 22 (L)   Free T4      0.71 - 1.51 ng/dL 0.90     Currently taking levothyroxine 112 mcg daily. Will increase by half a tab a week for total 7.5 tabs.  Increase vitamin D to 2000 units daily    Repeat TSH in 6-8 weeks

## 2019-01-22 ENCOUNTER — TELEPHONE (OUTPATIENT)
Dept: ENDOCRINOLOGY | Facility: CLINIC | Age: 69
End: 2019-01-22

## 2019-01-22 ENCOUNTER — LAB VISIT (OUTPATIENT)
Dept: LAB | Facility: HOSPITAL | Age: 69
End: 2019-01-22
Attending: INTERNAL MEDICINE
Payer: COMMERCIAL

## 2019-01-22 DIAGNOSIS — E89.0 POST-SURGICAL HYPOTHYROIDISM: ICD-10-CM

## 2019-01-22 DIAGNOSIS — R60.0 PEDAL EDEMA: ICD-10-CM

## 2019-01-22 LAB
ALBUMIN SERPL BCP-MCNC: 3.4 G/DL
ALP SERPL-CCNC: 111 U/L
ALT SERPL W/O P-5'-P-CCNC: 9 U/L
ANION GAP SERPL CALC-SCNC: 10 MMOL/L
AST SERPL-CCNC: 15 U/L
BILIRUB SERPL-MCNC: 0.6 MG/DL
BUN SERPL-MCNC: 19 MG/DL
CALCIUM SERPL-MCNC: 8.9 MG/DL
CHLORIDE SERPL-SCNC: 108 MMOL/L
CO2 SERPL-SCNC: 25 MMOL/L
CREAT SERPL-MCNC: 1.1 MG/DL
EST. GFR  (AFRICAN AMERICAN): 59.2 ML/MIN/1.73 M^2
EST. GFR  (NON AFRICAN AMERICAN): 51.3 ML/MIN/1.73 M^2
GLUCOSE SERPL-MCNC: 99 MG/DL
POTASSIUM SERPL-SCNC: 4.3 MMOL/L
PROT SERPL-MCNC: 7.4 G/DL
SODIUM SERPL-SCNC: 143 MMOL/L
TSH SERPL DL<=0.005 MIU/L-ACNC: 0.84 UIU/ML

## 2019-01-22 PROCEDURE — 84443 ASSAY THYROID STIM HORMONE: CPT

## 2019-01-22 PROCEDURE — 36415 COLL VENOUS BLD VENIPUNCTURE: CPT | Mod: PO

## 2019-01-22 PROCEDURE — 80053 COMPREHEN METABOLIC PANEL: CPT

## 2019-01-22 NOTE — TELEPHONE ENCOUNTER
Component      Latest Ref Rng & Units 1/22/2019   TSH      0.400 - 4.000 uIU/mL 0.838     TSH at goal. Continue LT4 112 mcg 7.5 tabs weekly

## 2019-01-25 ENCOUNTER — OFFICE VISIT (OUTPATIENT)
Dept: INTERNAL MEDICINE | Facility: CLINIC | Age: 69
End: 2019-01-25
Payer: COMMERCIAL

## 2019-01-25 VITALS
HEART RATE: 80 BPM | HEIGHT: 65 IN | DIASTOLIC BLOOD PRESSURE: 70 MMHG | SYSTOLIC BLOOD PRESSURE: 126 MMHG | WEIGHT: 246.5 LBS | BODY MASS INDEX: 41.07 KG/M2

## 2019-01-25 DIAGNOSIS — I10 ESSENTIAL HYPERTENSION: ICD-10-CM

## 2019-01-25 DIAGNOSIS — Z90.5 ACQUIRED SOLITARY KIDNEY: ICD-10-CM

## 2019-01-25 DIAGNOSIS — R60.9 EDEMA, UNSPECIFIED TYPE: ICD-10-CM

## 2019-01-25 DIAGNOSIS — E55.9 VITAMIN D INSUFFICIENCY: ICD-10-CM

## 2019-01-25 DIAGNOSIS — N18.2 CKD (CHRONIC KIDNEY DISEASE) STAGE 2, GFR 60-89 ML/MIN: ICD-10-CM

## 2019-01-25 DIAGNOSIS — Z00.00 ANNUAL PHYSICAL EXAM: Primary | ICD-10-CM

## 2019-01-25 PROCEDURE — 3078F DIAST BP <80 MM HG: CPT | Mod: CPTII,S$GLB,, | Performed by: INTERNAL MEDICINE

## 2019-01-25 PROCEDURE — 99397 PR PREVENTIVE VISIT,EST,65 & OVER: ICD-10-PCS | Mod: S$GLB,,, | Performed by: INTERNAL MEDICINE

## 2019-01-25 PROCEDURE — 99397 PER PM REEVAL EST PAT 65+ YR: CPT | Mod: S$GLB,,, | Performed by: INTERNAL MEDICINE

## 2019-01-25 PROCEDURE — 3078F PR MOST RECENT DIASTOLIC BLOOD PRESSURE < 80 MM HG: ICD-10-PCS | Mod: CPTII,S$GLB,, | Performed by: INTERNAL MEDICINE

## 2019-01-25 PROCEDURE — 3074F PR MOST RECENT SYSTOLIC BLOOD PRESSURE < 130 MM HG: ICD-10-PCS | Mod: CPTII,S$GLB,, | Performed by: INTERNAL MEDICINE

## 2019-01-25 PROCEDURE — 99999 PR PBB SHADOW E&M-EST. PATIENT-LVL III: CPT | Mod: PBBFAC,,, | Performed by: INTERNAL MEDICINE

## 2019-01-25 PROCEDURE — 99999 PR PBB SHADOW E&M-EST. PATIENT-LVL III: ICD-10-PCS | Mod: PBBFAC,,, | Performed by: INTERNAL MEDICINE

## 2019-01-25 PROCEDURE — 3074F SYST BP LT 130 MM HG: CPT | Mod: CPTII,S$GLB,, | Performed by: INTERNAL MEDICINE

## 2019-01-25 RX ORDER — LOSARTAN POTASSIUM 100 MG/1
100 TABLET ORAL DAILY
Qty: 90 TABLET | Refills: 1 | Status: SHIPPED | OUTPATIENT
Start: 2019-01-25 | End: 2019-04-17 | Stop reason: SDUPTHER

## 2019-01-25 NOTE — PROGRESS NOTES
Portions of this note are generated with voice recognition software. Typographical errors may exist.     SUBJECTIVE:    This is a/an 68 y.o. female here for primary care visit for  Chief Complaint   Patient presents with    Results     Patient states that she continues to have recurrence of puffiness painless recurring mostly toward the end of the day bilateral wrists and bilateral ankles.  Patient feels that this is something that comes and goes but it is frustrating to her.  She has been on amlodipine for many months at 5 mg.  She discontinued diuretic because of concerns of possible contribution to gout arthritis.  She has been on angiotensin receptor blocker at 50 mg and has been tolerating it well.  The patient does check her blood pressure at home periodically but has not been checking recently.  She would like to make an adjustment for amlodipine and agrees to check blood pressure at home after changing the medication.    Patient states that she is taking over-the-counter vitamin-D supplementation.    Patient states that the blistering rash that was addressed September last year has resolved without recurrences.    Medications Reviewed and Updated    Past medical, family, and social histories were reviewed and updated.    Review of Systems negative unless otherwise noted in history of present illness-  ROS    General ROS: negative  Psychological ROS: negative  ENT ROS: negative  Endocrine ROS: Negative  Allergy and Immunology ROS: negative  Cardiovascular ROS: negative  Pulmonary ROS: Negative  Gastrointestinal ROS: negative  Genito-Urinary ROS: negative  Musculoskeletal ROS: negative  Dermatological ROS: negative        Allergic:    Review of patient's allergies indicates:   Allergen Reactions    Neosporin [benzalkonium chloride] Rash    Doxycycline Rash     Skin peels       OBJECTIVE:  BP: 126/70 Pulse: 80    Wt Readings from Last 3 Encounters:   01/25/19 111.8 kg (246 lb 7.6 oz)   12/10/18 116 kg (255  lb 11.7 oz)   08/23/18 118.6 kg (261 lb 7.5 oz)    Body mass index is 41.02 kg/m².  Previous Blood Pressure Readings :   BP Readings from Last 3 Encounters:   01/25/19 126/70   12/10/18 124/84   08/23/18 (!) 134/90       Physical Exam    GEN: No apparent distress  HEENT: sclera non-icteric, conjunctiva clear  CV: no peripheral edema at this time.  Regular rate and rhythm.  No murmurs.  PULM: breathing non-labored  ABD: Obese, protuberant abdomen.  PSYCH: appropriate affect  MSK: able to rise from chair without assistance  SKIN: normal skin turgor dry skin bilateral ankles and hands.    Pertinent Labs Reviewed       ASSESSMENT/PLAN:    Annual physical exam    Essential hypertension    Acquired solitary kidney  -     losartan (COZAAR) 100 MG tablet; Take 1 tablet (100 mg total) by mouth once daily.  Dispense: 90 tablet; Refill: 1    CKD (chronic kidney disease) stage 2, GFR 60-89 ml/min  -     losartan (COZAAR) 100 MG tablet; Take 1 tablet (100 mg total) by mouth once daily.  Dispense: 90 tablet; Refill: 1    Vitamin D insufficiency  -     Vitamin D; Standing    Edema, unspecified type          Future Appointments   Date Time Provider Department Center   3/8/2019  7:30 AM LAB, [facility] [place]   3/13/2019  9:00 AM Michael Elias MD [facility] Marissa Elias  1/25/2019  4:31 PM

## 2019-01-25 NOTE — PATIENT INSTRUCTIONS
Recommendations for today    It is possible that amlodipine 5 mg is contributing to fluid retention.  We recommend that you take a holiday from amlodipine but at the same time increase the dose of your other blood pressure medication losartan.    The new recommended dose of losartan will be 100 mg once daily.  The pharmacy will have 100 mg strength tablets for you when ready to pick them up.    Two weeks after stopping amlodipine please contact our office letting us know if the swelling has improved or not.  At the same time 2 weeks after stopping amlodipine and adjusting the dosage of losartan the top number on the blood pressure should be consistently less than 140 in the bottom number should be consistently less that 90.  If the blood pressure is significantly elevated contact our office for additional recommendations

## 2019-02-20 ENCOUNTER — TELEPHONE (OUTPATIENT)
Dept: DERMATOLOGY | Facility: CLINIC | Age: 69
End: 2019-02-20

## 2019-02-20 NOTE — TELEPHONE ENCOUNTER
----- Message from Ashley Byrne sent at 2/20/2019  9:09 AM CST -----  Contact: pt at [phone]  Needs Advice    Reason for call:Pt is requestingting appt asap for her veins/Varicose in legs/having pain ..  Tried to send her to vascular but  refused.  She has already been there. States that [facility] is  treating her for this.    Wears her support hose and not helping her.      Communication Preference:call at above number [phone]    Additional Information:

## 2019-03-08 ENCOUNTER — LAB VISIT (OUTPATIENT)
Dept: LAB | Facility: HOSPITAL | Age: 69
End: 2019-03-08
Attending: INTERNAL MEDICINE
Payer: COMMERCIAL

## 2019-03-08 DIAGNOSIS — E55.9 VITAMIN D INSUFFICIENCY: ICD-10-CM

## 2019-03-08 DIAGNOSIS — R60.0 PEDAL EDEMA: ICD-10-CM

## 2019-03-08 LAB
25(OH)D3+25(OH)D2 SERPL-MCNC: 24 NG/ML
ALBUMIN SERPL BCP-MCNC: 3.7 G/DL
ALP SERPL-CCNC: 104 U/L
ALT SERPL W/O P-5'-P-CCNC: 10 U/L
ANION GAP SERPL CALC-SCNC: 8 MMOL/L
AST SERPL-CCNC: 20 U/L
BILIRUB SERPL-MCNC: 0.8 MG/DL
BUN SERPL-MCNC: 25 MG/DL
CALCIUM SERPL-MCNC: 9.3 MG/DL
CHLORIDE SERPL-SCNC: 106 MMOL/L
CO2 SERPL-SCNC: 26 MMOL/L
CREAT SERPL-MCNC: 1.3 MG/DL
EST. GFR  (AFRICAN AMERICAN): 48.4 ML/MIN/1.73 M^2
EST. GFR  (NON AFRICAN AMERICAN): 42 ML/MIN/1.73 M^2
GLUCOSE SERPL-MCNC: 95 MG/DL
POTASSIUM SERPL-SCNC: 4.1 MMOL/L
PROT SERPL-MCNC: 7.5 G/DL
SODIUM SERPL-SCNC: 140 MMOL/L

## 2019-03-08 PROCEDURE — 36415 COLL VENOUS BLD VENIPUNCTURE: CPT | Mod: PO

## 2019-03-08 PROCEDURE — 80053 COMPREHEN METABOLIC PANEL: CPT

## 2019-03-08 PROCEDURE — 82306 VITAMIN D 25 HYDROXY: CPT

## 2019-04-01 ENCOUNTER — OFFICE VISIT (OUTPATIENT)
Dept: DERMATOLOGY | Facility: CLINIC | Age: 69
End: 2019-04-01
Payer: COMMERCIAL

## 2019-04-01 DIAGNOSIS — M79.3 LIPODERMATOSCLEROSIS: Primary | ICD-10-CM

## 2019-04-01 DIAGNOSIS — L13.9 BULLOUS DERMATITIS: ICD-10-CM

## 2019-04-01 PROCEDURE — 99999 PR PBB SHADOW E&M-EST. PATIENT-LVL II: ICD-10-PCS | Mod: PBBFAC,,, | Performed by: DERMATOLOGY

## 2019-04-01 PROCEDURE — 1101F PR PT FALLS ASSESS DOC 0-1 FALLS W/OUT INJ PAST YR: ICD-10-PCS | Mod: CPTII,S$GLB,, | Performed by: DERMATOLOGY

## 2019-04-01 PROCEDURE — 1101F PT FALLS ASSESS-DOCD LE1/YR: CPT | Mod: CPTII,S$GLB,, | Performed by: DERMATOLOGY

## 2019-04-01 PROCEDURE — 99213 PR OFFICE/OUTPT VISIT, EST, LEVL III, 20-29 MIN: ICD-10-PCS | Mod: S$GLB,,, | Performed by: DERMATOLOGY

## 2019-04-01 PROCEDURE — 99999 PR PBB SHADOW E&M-EST. PATIENT-LVL II: CPT | Mod: PBBFAC,,, | Performed by: DERMATOLOGY

## 2019-04-01 PROCEDURE — 99213 OFFICE O/P EST LOW 20 MIN: CPT | Mod: S$GLB,,, | Performed by: DERMATOLOGY

## 2019-04-01 RX ORDER — DIOSMIN COMPLEX NO.1 630 MG
630 TABLET ORAL DAILY
Qty: 30 TABLET | Refills: 10 | Status: SHIPPED | OUTPATIENT
Start: 2019-04-01 | End: 2019-04-18 | Stop reason: SDUPTHER

## 2019-04-01 NOTE — PROGRESS NOTES
Subjective:       Patient ID:  Ankita Campo is a 68 y.o. female who presents for   Chief Complaint   Patient presents with    Follow-up     Pt c/o Lipo dermatosclerosis on both lower legs x a few years. Tx with VASCULERA 630 mg and Compression hose . Tx helps.  No new blisters on her legs  Pt has bumps on her hands.  Were itching. Used clobetasol and vaseline and this helps.  Does not get blisters.         Review of Systems   Constitutional: Negative for fever and chills.   Skin: Positive for itching and rash.        Objective:    Physical Exam   Constitutional: She appears well-developed and well-nourished. No distress.   Neurological: She is alert and oriented to person, place, and time. She is not disoriented.   Psychiatric: She has a normal mood and affect.   Skin:   Areas Examined (abnormalities noted in diagram):   RUE Inspected  LUE Inspection Performed  RLE Inspected  LLE Inspection Performed                      Diagram Legend     Erythematous scaling macule/papule c/w actinic keratosis       Vascular papule c/w angioma      Pigmented verrucoid papule/plaque c/w seborrheic keratosis      Yellow umbilicated papule c/w sebaceous hyperplasia      Irregularly shaped tan macule c/w lentigo     1-2 mm smooth white papules consistent with Milia      Movable subcutaneous cyst with punctum c/w epidermal inclusion cyst      Subcutaneous movable cyst c/w pilar cyst      Firm pink to brown papule c/w dermatofibroma      Pedunculated fleshy papule(s) c/w skin tag(s)      Evenly pigmented macule c/w junctional nevus     Mildly variegated pigmented, slightly irregular-bordered macule c/w mildly atypical nevus      Flesh colored to evenly pigmented papule c/w intradermal nevus       Pink pearly papule/plaque c/w basal cell carcinoma      Erythematous hyperkeratotic cursted plaque c/w SCC      Surgical scar with no sign of skin cancer recurrence      Open and closed comedones      Inflammatory papules and pustules       Verrucoid papule consistent consistent with wart     Erythematous eczematous patches and plaques     Dystrophic onycholytic nail with subungual debris c/w onychomycosis     Umbilicated papule    Erythematous-base heme-crusted tan verrucoid plaque consistent with inflamed seborrheic keratosis     Erythematous Silvery Scaling Plaque c/w Psoriasis     See annotation      Assessment / Plan:        Lipodermatosclerosis  -     VASCULERA 630 mg Tab; Take 630 mg by mouth once daily.  Dispense: 30 tablet; Refill: 10  Will message vascular surgery to see if there is anything that can be done to benefit.     ** vascular messaged back and recommended she f/u with dr katz as it has been about 3 years since last visit.  We will let pt know  Bullous dermatitis  CUrrently under control  cerave cream for maintenance  Clobetasol or diprolene for flares  Pt has been patch tested in the past and 3+ to bacitracin         Follow up if symptoms worsen or fail to improve.

## 2019-04-01 NOTE — Clinical Note
Hi, do you have anything to offer for pts like this with progressing lipodermatosclerosis? Looks like last vasular visit was 2016. She's gotten much worse in past year since I have been seeing her for other issues.  Thanks, alhaji Photo in chart

## 2019-04-04 ENCOUNTER — TELEPHONE (OUTPATIENT)
Dept: DERMATOLOGY | Facility: CLINIC | Age: 69
End: 2019-04-04

## 2019-04-05 ENCOUNTER — TELEPHONE (OUTPATIENT)
Dept: DERMATOLOGY | Facility: CLINIC | Age: 69
End: 2019-04-05

## 2019-04-05 NOTE — TELEPHONE ENCOUNTER
----- Message from Ashley Byrne sent at 4/5/2019  8:23 AM CDT -----  Contact: pt at [phone]  Patient Returning Call from [facility]    Who Left Message for Patient:[facility] staff  Communication Preference:call  Additional Information:Missed call yesterday.  Pl;ease try again

## 2019-04-17 ENCOUNTER — OFFICE VISIT (OUTPATIENT)
Dept: INTERNAL MEDICINE | Facility: CLINIC | Age: 69
End: 2019-04-17
Payer: COMMERCIAL

## 2019-04-17 VITALS
HEART RATE: 62 BPM | DIASTOLIC BLOOD PRESSURE: 92 MMHG | BODY MASS INDEX: 42.28 KG/M2 | SYSTOLIC BLOOD PRESSURE: 140 MMHG | WEIGHT: 253.75 LBS | TEMPERATURE: 98 F | HEIGHT: 65 IN

## 2019-04-17 DIAGNOSIS — N39.41 URINARY INCONTINENCE, URGE: ICD-10-CM

## 2019-04-17 DIAGNOSIS — E55.9 VITAMIN D INSUFFICIENCY: ICD-10-CM

## 2019-04-17 DIAGNOSIS — N18.2 CKD (CHRONIC KIDNEY DISEASE) STAGE 2, GFR 60-89 ML/MIN: ICD-10-CM

## 2019-04-17 DIAGNOSIS — I10 ESSENTIAL HYPERTENSION: Primary | ICD-10-CM

## 2019-04-17 LAB
BILIRUB UR QL STRIP: NEGATIVE
CLARITY UR REFRACT.AUTO: CLEAR
COLOR UR AUTO: ABNORMAL
GLUCOSE UR QL STRIP: NEGATIVE
HGB UR QL STRIP: ABNORMAL
KETONES UR QL STRIP: NEGATIVE
LEUKOCYTE ESTERASE UR QL STRIP: NEGATIVE
MICROSCOPIC COMMENT: ABNORMAL
NITRITE UR QL STRIP: NEGATIVE
PH UR STRIP: 6 [PH] (ref 5–8)
PROT UR QL STRIP: NEGATIVE
RBC #/AREA URNS AUTO: 5 /HPF (ref 0–4)
SP GR UR STRIP: 1.01 (ref 1–1.03)
SQUAMOUS #/AREA URNS AUTO: 2 /HPF
URN SPEC COLLECT METH UR: ABNORMAL
WBC #/AREA URNS AUTO: 1 /HPF (ref 0–5)

## 2019-04-17 PROCEDURE — 3077F SYST BP >= 140 MM HG: CPT | Mod: CPTII,S$GLB,, | Performed by: INTERNAL MEDICINE

## 2019-04-17 PROCEDURE — 1101F PT FALLS ASSESS-DOCD LE1/YR: CPT | Mod: CPTII,S$GLB,, | Performed by: INTERNAL MEDICINE

## 2019-04-17 PROCEDURE — 81001 URINALYSIS AUTO W/SCOPE: CPT

## 2019-04-17 PROCEDURE — 99213 OFFICE O/P EST LOW 20 MIN: CPT | Mod: S$GLB,,, | Performed by: INTERNAL MEDICINE

## 2019-04-17 PROCEDURE — 3077F PR MOST RECENT SYSTOLIC BLOOD PRESSURE >= 140 MM HG: ICD-10-PCS | Mod: CPTII,S$GLB,, | Performed by: INTERNAL MEDICINE

## 2019-04-17 PROCEDURE — 87086 URINE CULTURE/COLONY COUNT: CPT

## 2019-04-17 PROCEDURE — 99999 PR PBB SHADOW E&M-EST. PATIENT-LVL III: ICD-10-PCS | Mod: PBBFAC,,, | Performed by: INTERNAL MEDICINE

## 2019-04-17 PROCEDURE — 99999 PR PBB SHADOW E&M-EST. PATIENT-LVL III: CPT | Mod: PBBFAC,,, | Performed by: INTERNAL MEDICINE

## 2019-04-17 PROCEDURE — 3080F PR MOST RECENT DIASTOLIC BLOOD PRESSURE >= 90 MM HG: ICD-10-PCS | Mod: CPTII,S$GLB,, | Performed by: INTERNAL MEDICINE

## 2019-04-17 PROCEDURE — 3080F DIAST BP >= 90 MM HG: CPT | Mod: CPTII,S$GLB,, | Performed by: INTERNAL MEDICINE

## 2019-04-17 PROCEDURE — 1101F PR PT FALLS ASSESS DOC 0-1 FALLS W/OUT INJ PAST YR: ICD-10-PCS | Mod: CPTII,S$GLB,, | Performed by: INTERNAL MEDICINE

## 2019-04-17 PROCEDURE — 99213 PR OFFICE/OUTPT VISIT, EST, LEVL III, 20-29 MIN: ICD-10-PCS | Mod: S$GLB,,, | Performed by: INTERNAL MEDICINE

## 2019-04-17 RX ORDER — CALCIUM CARBONATE 600 MG
600 TABLET ORAL DAILY
Refills: 0 | COMMUNITY
Start: 2019-04-17 | End: 2021-07-02

## 2019-04-17 RX ORDER — LOSARTAN POTASSIUM 100 MG/1
100 TABLET ORAL DAILY
Qty: 90 TABLET | Refills: 1 | Status: SHIPPED | OUTPATIENT
Start: 2019-04-17 | End: 2020-04-02 | Stop reason: SDUPTHER

## 2019-04-18 DIAGNOSIS — M79.3 LIPODERMATOSCLEROSIS: ICD-10-CM

## 2019-04-18 LAB
BACTERIA UR CULT: NORMAL
BACTERIA UR CULT: NORMAL

## 2019-04-18 RX ORDER — DIOSMIN COMPLEX NO.1 630 MG
630 TABLET ORAL DAILY
Qty: 30 TABLET | Refills: 10 | Status: SHIPPED | OUTPATIENT
Start: 2019-04-18 | End: 2020-05-19

## 2019-04-30 ENCOUNTER — TELEPHONE (OUTPATIENT)
Dept: PHARMACY | Facility: CLINIC | Age: 69
End: 2019-04-30

## 2019-05-17 ENCOUNTER — LAB VISIT (OUTPATIENT)
Dept: LAB | Facility: HOSPITAL | Age: 69
End: 2019-05-17
Attending: INTERNAL MEDICINE
Payer: COMMERCIAL

## 2019-05-17 DIAGNOSIS — N18.30 CKD (CHRONIC KIDNEY DISEASE) STAGE 3, GFR 30-59 ML/MIN: ICD-10-CM

## 2019-05-17 DIAGNOSIS — R60.0 PEDAL EDEMA: ICD-10-CM

## 2019-05-17 LAB
ALBUMIN SERPL BCP-MCNC: 3.4 G/DL (ref 3.5–5.2)
ALP SERPL-CCNC: 99 U/L (ref 55–135)
ALT SERPL W/O P-5'-P-CCNC: 7 U/L (ref 10–44)
ANION GAP SERPL CALC-SCNC: 8 MMOL/L (ref 8–16)
AST SERPL-CCNC: 18 U/L (ref 10–40)
BILIRUB SERPL-MCNC: 0.7 MG/DL (ref 0.1–1)
BUN SERPL-MCNC: 19 MG/DL (ref 8–23)
CALCIUM SERPL-MCNC: 9 MG/DL (ref 8.7–10.5)
CHLORIDE SERPL-SCNC: 108 MMOL/L (ref 95–110)
CO2 SERPL-SCNC: 25 MMOL/L (ref 23–29)
CREAT SERPL-MCNC: 1 MG/DL (ref 0.5–1.4)
EST. GFR  (AFRICAN AMERICAN): >60 ML/MIN/1.73 M^2
EST. GFR  (NON AFRICAN AMERICAN): 57.6 ML/MIN/1.73 M^2
GLUCOSE SERPL-MCNC: 85 MG/DL (ref 70–110)
POTASSIUM SERPL-SCNC: 4.1 MMOL/L (ref 3.5–5.1)
PROT SERPL-MCNC: 7.1 G/DL (ref 6–8.4)
SODIUM SERPL-SCNC: 141 MMOL/L (ref 136–145)
URATE SERPL-MCNC: 7 MG/DL (ref 2.4–5.7)

## 2019-05-17 PROCEDURE — 84550 ASSAY OF BLOOD/URIC ACID: CPT

## 2019-05-17 PROCEDURE — 80053 COMPREHEN METABOLIC PANEL: CPT

## 2019-05-17 PROCEDURE — 36415 COLL VENOUS BLD VENIPUNCTURE: CPT | Mod: PO

## 2019-07-07 ENCOUNTER — HOSPITAL ENCOUNTER (EMERGENCY)
Facility: HOSPITAL | Age: 69
Discharge: HOME | End: 2019-07-07
Attending: SURGERY
Payer: COMMERCIAL

## 2019-07-07 VITALS
HEART RATE: 61 BPM | DIASTOLIC BLOOD PRESSURE: 72 MMHG | TEMPERATURE: 99 F | WEIGHT: 252 LBS | HEIGHT: 65 IN | BODY MASS INDEX: 41.99 KG/M2 | RESPIRATION RATE: 17 BRPM | OXYGEN SATURATION: 100 % | SYSTOLIC BLOOD PRESSURE: 151 MMHG

## 2019-07-07 DIAGNOSIS — H02.843 SWELLING OF RIGHT EYELID: ICD-10-CM

## 2019-07-07 DIAGNOSIS — H02.846 SWELLING OF LEFT EYELID: Primary | ICD-10-CM

## 2019-07-07 PROCEDURE — 99282 EMERGENCY DEPT VISIT SF MDM: CPT | Mod: ER

## 2019-07-07 NOTE — ED PROVIDER NOTES
Encounter Date: 2019       History     Chief Complaint   Patient presents with    Facial Swelling     c/o swelling to bilat upper eyelids. states on friday her eyes were itching used visine. when she woke up yesterday morning lids were swollen. mild swelling noted. clear drainage when she woke up this mornig.     68-year-old female presents to the emergency department for evaluation of swelling to her upper eyelids.  She reports that yesterday morning she began experiencing some mild itching and irritation to her eyes.  She reports that she put in some plain Visine drops which resolved the itching in her eyes.  She reports that this morning she has noticed some swelling to her upper eyelids bilaterally in that she wanted to get checked.  She reports that the same mildly swollen compared to normal, but she denies any redness, warmth or tenderness to palpation. She denies any new medications, soaps, facial cleansers, foods or makeup.  No treatment was attempted prior to arrival.  She denies any drainage from the eyes.  She denies any eye pain, vision changes or foreign body sensation.  She denies any history of styes or pain to the upper eyelids.  No treatment was attempted prior to arrival.  She reports that she does not were contact lenses or glasses.        Review of patient's allergies indicates:   Allergen Reactions    Neosporin [benzalkonium chloride] Rash    Doxycycline Rash     Skin peels     Past Medical History:   Diagnosis Date    Arthritis     Family history of gastric cancer 2016    Goiter     Gout 2016    Hypertension     Hypothyroidism, postsurgical     Multiple thyroid nodules      Past Surgical History:   Procedure Laterality Date    cararact       SECTION      CHOLECYSTECTOMY      Donor Nephrectomy      fibroidectomy      HAND SURGERY      left    THYROIDECTOMY Total N/A 2017    [facility]    TOTAL THYROIDECTOMY        Family History   Problem Relation Age of Onset    Cancer Father         stomach    Goiter Mother     Breast cancer Paternal Aunt     Cancer Brother         stomach    Kidney disease Brother     Diabetes Sister     Seizures Sister     Hypertension Sister     Eczema Neg Hx     Lupus Neg Hx     Psoriasis Neg Hx     Melanoma Neg Hx      Social History     Tobacco Use    Smoking status: Never Smoker    Smokeless tobacco: Never Used   Substance Use Topics    Alcohol use: Yes     Alcohol/week: 1.2 oz     Types: 2 Shots of liquor per week     Comment: Rare use of alcohol    Drug use: No     Review of Systems   Constitutional: Negative for activity change, appetite change and fever.   HENT: Negative for postnasal drip, rhinorrhea, sore throat, trouble swallowing and voice change.    Eyes: Positive for itching (Resolved). Negative for photophobia, pain, discharge, redness and visual disturbance.   Respiratory: Negative for cough, chest tightness, shortness of breath and wheezing.    Cardiovascular: Negative for chest pain.   Gastrointestinal: Negative for abdominal pain, constipation, diarrhea, nausea and vomiting.   Genitourinary: Negative for decreased urine volume.   Musculoskeletal: Negative for back pain and neck pain.   Neurological: Negative for dizziness, syncope, weakness, light-headedness and headaches.       Physical Exam     Initial Vitals [07/07/19 1416]   BP Pulse Resp Temp SpO2   (!) 151/72 61 17 98.7 °F (37.1 °C) 100 %      MAP       --         Physical Exam    Nursing note and vitals reviewed.  Constitutional: She appears well-developed and well-nourished. She is not diaphoretic. No distress.   HENT:   Head: Normocephalic and atraumatic.   Right Ear: External ear normal.   Left Ear: External ear normal.   Nose: Nose normal.   Mouth/Throat: Oropharynx is clear and moist.   Eyes: Conjunctivae and EOM are normal. Pupils are equal, round, and reactive to light. Lids are everted and swept, no  foreign bodies found. Right eye exhibits no chemosis and no discharge. No foreign body present in the right eye. Left eye exhibits no chemosis and no discharge. No foreign body present in the left eye.       Neck: Normal range of motion. Neck supple.   Cardiovascular: Normal rate, regular rhythm and normal heart sounds.   Pulmonary/Chest: Breath sounds normal. No respiratory distress. She has no wheezes. She has no rhonchi. She has no rales. She exhibits no tenderness.   Abdominal: Soft. There is no tenderness.   Lymphadenopathy:     She has no cervical adenopathy.   Neurological: She is alert and oriented to person, place, and time.   Skin: Skin is warm and dry.   Psychiatric: She has a normal mood and affect.         ED Course   Procedures  Labs Reviewed - No data to display       Imaging Results    None          Medical Decision Making:   Initial Assessment:   68-year-old female presents for evaluation of bilateral eyelid swelling. Physical exam reveals a nontoxic-appearing female in no acute distress. Patient is afebrile vital signs within normal limits.  Neurological exam reveals an alert and oriented patient.Mild edema to the upper eyelids bilaterally.  No erythema, induration or warmth noted.  No fluctuance or abscesses noted. No foreign bodies noted. No vesicles, pustules or bulla noted. No periorbital erythema or warmth noted.  Conjunctiva are not injected bilaterally. No discharge noted bilaterally. No other facial swelling noted. TMs reveal no erythema.  Posterior pharynx reveals erythema, edema or tonsillar exudate.  Neck is supple, no meningeal signs noted. Lungs clear to auscultation bilaterally. No respiratory distress or accessory muscle use noted.  Differential Diagnosis:   Likely allergic response.  I carefully considered but doubt serious etiology including periorbital cellulitis.  ED Management:  Discussed these findings at length with the patient who verbalizes understanding and agreement course  of treatment.  Patient reports that she drove herself to the emergency department and will be driving herself home, so no Benadryl will be able to be given in the emergency department.  Instructed the patient to use Benadryl and cool compresses for symptom control.  Instructed patient to follow up with her primary care provider for re-evaluation and to return to the emergency department immediately for any new or worsening symptoms. I discussed this patient at length  who is in agreement course of treatment.                      Clinical Impression:       ICD-10-CM ICD-9-CM   1. Swelling of left eyelid H02.846 374.82   2. Swelling of right eyelid H02.843 374.82                                Fouzia Ahuja PA-C  07/07/19 9115

## 2019-07-07 NOTE — DISCHARGE INSTRUCTIONS
You are advised to take Benadryl home and  use cool compresses on your eyes to help with the swelling.   You are advised to follow up with her primary care provider for re-evaluation within 3 days.  You are instructed to return to the emergency department immediately for any new or worsening symptoms.

## 2019-10-24 ENCOUNTER — OFFICE VISIT (OUTPATIENT)
Dept: FAMILY MEDICINE | Facility: CLINIC | Age: 69
End: 2019-10-24
Attending: FAMILY MEDICINE
Payer: COMMERCIAL

## 2019-10-24 VITALS
BODY MASS INDEX: 42.83 KG/M2 | DIASTOLIC BLOOD PRESSURE: 82 MMHG | TEMPERATURE: 98 F | WEIGHT: 257.06 LBS | SYSTOLIC BLOOD PRESSURE: 128 MMHG | HEART RATE: 59 BPM | HEIGHT: 65 IN | OXYGEN SATURATION: 99 %

## 2019-10-24 DIAGNOSIS — L03.211 CELLULITIS OF FACE: Primary | ICD-10-CM

## 2019-10-24 DIAGNOSIS — H02.849 SWELLING OF EYELID, UNSPECIFIED LATERALITY: ICD-10-CM

## 2019-10-24 PROCEDURE — 3079F DIAST BP 80-89 MM HG: CPT | Mod: CPTII,S$GLB,, | Performed by: FAMILY MEDICINE

## 2019-10-24 PROCEDURE — 99214 OFFICE O/P EST MOD 30 MIN: CPT | Mod: S$GLB,,, | Performed by: FAMILY MEDICINE

## 2019-10-24 PROCEDURE — 99999 PR PBB SHADOW E&M-EST. PATIENT-LVL III: ICD-10-PCS | Mod: PBBFAC,,, | Performed by: FAMILY MEDICINE

## 2019-10-24 PROCEDURE — 3074F SYST BP LT 130 MM HG: CPT | Mod: CPTII,S$GLB,, | Performed by: FAMILY MEDICINE

## 2019-10-24 PROCEDURE — 1101F PT FALLS ASSESS-DOCD LE1/YR: CPT | Mod: CPTII,S$GLB,, | Performed by: FAMILY MEDICINE

## 2019-10-24 PROCEDURE — 99214 PR OFFICE/OUTPT VISIT, EST, LEVL IV, 30-39 MIN: ICD-10-PCS | Mod: S$GLB,,, | Performed by: FAMILY MEDICINE

## 2019-10-24 PROCEDURE — 99999 PR PBB SHADOW E&M-EST. PATIENT-LVL III: CPT | Mod: PBBFAC,,, | Performed by: FAMILY MEDICINE

## 2019-10-24 PROCEDURE — 3079F PR MOST RECENT DIASTOLIC BLOOD PRESSURE 80-89 MM HG: ICD-10-PCS | Mod: CPTII,S$GLB,, | Performed by: FAMILY MEDICINE

## 2019-10-24 PROCEDURE — 3074F PR MOST RECENT SYSTOLIC BLOOD PRESSURE < 130 MM HG: ICD-10-PCS | Mod: CPTII,S$GLB,, | Performed by: FAMILY MEDICINE

## 2019-10-24 PROCEDURE — 1101F PR PT FALLS ASSESS DOC 0-1 FALLS W/OUT INJ PAST YR: ICD-10-PCS | Mod: CPTII,S$GLB,, | Performed by: FAMILY MEDICINE

## 2019-10-24 RX ORDER — METHYLPREDNISOLONE 4 MG/1
TABLET ORAL
Qty: 1 PACKAGE | Refills: 0 | Status: SHIPPED | OUTPATIENT
Start: 2019-10-24 | End: 2019-11-14

## 2019-10-24 RX ORDER — OFLOXACIN 3 MG/ML
SOLUTION/ DROPS OPHTHALMIC
Refills: 0 | COMMUNITY
Start: 2019-08-07 | End: 2020-08-17 | Stop reason: ALTCHOICE

## 2019-10-24 RX ORDER — SULFAMETHOXAZOLE AND TRIMETHOPRIM 800; 160 MG/1; MG/1
1 TABLET ORAL 2 TIMES DAILY
Qty: 20 TABLET | Refills: 0 | Status: SHIPPED | OUTPATIENT
Start: 2019-10-24 | End: 2019-11-03

## 2019-10-24 NOTE — PATIENT INSTRUCTIONS
Facial Cellulitis  Cellulitis is an infection of the deep layers of skin. A break in the skin, such as a cut or scratch, can let bacteria under the skin. It may also occur from an infected oil gland (pimple) or hair follicle. If the bacteria get to deep layers of the skin, it can be serious. If not treated, cellulitis can get into the bloodstream and lymph nodes. The infection can then spread throughout the body. This causes serious illness.  Cellulitis causes the affected skin to become red, swollen, warm, and sore. The reddened areas have a visible border. You may have a fever, chills, and pain.  Cellulitis is treated with antibiotics taken for 7 to 10 days. Symptoms should get better 1 to 2 days after treatment is started. Make sure to take all the antibiotics for the full number of days until they are gone. Keep taking the medication even if your symptoms go away.  Home care  Follow these tips:  · Take all of the antibiotic medicine exactly as directed until it is gone. Dont miss any doses, especially during the first 7 days. Dont stop taking it when your symptoms get better.  · Use a cool compress (face cloth soaked in cool water) on your face to help reduce swelling and pain.  · You may use acetaminophen or ibuprofen to reduce pain. Dont use these if you have chronic liver or kidney disease, or ever had a stomach ulcer or gastrointestinal bleeding. Talk with your healthcare provider first.  Follow-up care  Follow up with your healthcare provider, or as advised. If your infection does not go away on the first antibiotic, your healthcare provider will prescribe a different one.  When to seek medical advice  Call your healthcare provider right away if any of these occur:  · Fever higher of 100.4º F (38.0º C) or higher after 2 days on antibiotics  · Red areas that spread  · Swelling or pain that gets worse  · Fluid leaking from the skin (pus)  · An eyelid that swells shut or leaks fluid (pus)  · Headache or  neck pain that gets worse  · Unusual drowsiness or confusion  · Convulsions (seizure)  · Change in eyesight     Date Last Reviewed: 9/1/2016  © 2173-4980 [employer]. [address] [place] [address], [place] [address]. All rights reserved. This information is not intended as a substitute for professional medical care. Always follow your healthcare professional's instructions.

## 2019-10-25 NOTE — PROGRESS NOTES
Subjective:       Patient ID: Ankita Campo is a 69 y.o. female.    Chief Complaint: Edema (polina eyes x 5 days)    69 yr old pleasant female with multiple co morbidities presents today for urgent care c/o eyelid swelling and facial swelling. Onset 5 days ago and worsening. Mild itching as well. It all started after eating some seafood. No SOB or tongue/lip swelling. No fever or chills.     Edema   This is a new problem. The current episode started in the past 7 days. The problem occurs constantly. The problem has been unchanged. Pertinent negatives include no abdominal pain, arthralgias, chest pain, congestion, diaphoresis, fatigue, headaches, myalgias, nausea, neck pain, rash, sore throat, urinary symptoms or vomiting. Nothing aggravates the symptoms. She has tried nothing for the symptoms. The treatment provided no relief.     Review of Systems   Constitutional: Negative.  Negative for activity change, diaphoresis, fatigue and unexpected weight change.   HENT: Negative.  Negative for congestion, ear discharge, hearing loss, rhinorrhea, sore throat and voice change.    Eyes: Negative.  Negative for pain, discharge and visual disturbance.   Respiratory: Negative.  Negative for chest tightness, shortness of breath and wheezing.    Cardiovascular: Negative.  Negative for chest pain.   Gastrointestinal: Negative.  Negative for abdominal distention, abdominal pain, anal bleeding, constipation, nausea and vomiting.   Endocrine: Negative.  Negative for cold intolerance, polydipsia and polyuria.   Genitourinary: Negative.  Negative for decreased urine volume, difficulty urinating, dysuria, frequency, menstrual problem and vaginal pain.   Musculoskeletal: Negative.  Negative for arthralgias, gait problem, myalgias and neck pain.   Skin: Negative.  Negative for color change, pallor, rash and wound.   Allergic/Immunologic: Negative.  Negative for environmental allergies and immunocompromised state.   Neurological: Negative.   Negative for dizziness, tremors, seizures, speech difficulty and headaches.   Hematological: Negative.  Negative for adenopathy. Does not bruise/bleed easily.   Psychiatric/Behavioral: Negative.  Negative for agitation, confusion, decreased concentration, hallucinations, self-injury and suicidal ideas. The patient is not nervous/anxious.        PMH/PSH/FH/SH/MED/ALLERGY reviewed    Objective:       Vitals:    10/24/19 1805   BP: 128/82   Pulse: (!) 59   Temp: 97.7 °F (36.5 °C)       Physical Exam   Constitutional: She is oriented to person, place, and time. She appears well-developed and well-nourished. No distress.   HENT:   Head: Normocephalic and atraumatic.   Right Ear: External ear normal.   Left Ear: External ear normal.   Nose: Nose normal.   Mouth/Throat: Oropharynx is clear and moist. No oropharyngeal exudate.   Eyes: Pupils are equal, round, and reactive to light. Conjunctivae and EOM are normal. Right eye exhibits no discharge. Left eye exhibits chemosis. Left eye exhibits no discharge. No scleral icterus.       Neck: Normal range of motion. Neck supple. No JVD present. No tracheal deviation present. No thyromegaly present.   Cardiovascular: Normal rate, regular rhythm, normal heart sounds and intact distal pulses. Exam reveals no gallop and no friction rub.   No murmur heard.  Pulmonary/Chest: Effort normal and breath sounds normal. No stridor. She has no wheezes. She has no rales. She exhibits no tenderness.   Abdominal: Soft. Bowel sounds are normal. She exhibits no distension and no mass. There is no tenderness. There is no rebound and no guarding. No hernia.   Musculoskeletal: Normal range of motion. She exhibits no edema or tenderness.   Lymphadenopathy:     She has no cervical adenopathy.   Neurological: She is alert and oriented to person, place, and time. She has normal reflexes. She displays normal reflexes. No cranial nerve deficit. She exhibits normal muscle tone. Coordination normal.   Skin:  Skin is warm and dry. No rash noted. She is not diaphoretic. No erythema. No pallor.   Psychiatric: She has a normal mood and affect. Her behavior is normal. Judgment and thought content normal.       Assessment:       1. Cellulitis of face    2. Swelling of eyelid, unspecified laterality        Plan:           Ankita was seen today for edema.    Diagnoses and all orders for this visit:    Cellulitis of face  -     methylPREDNISolone (MEDROL DOSEPACK) 4 mg tablet; use as directed  -     sulfamethoxazole-trimethoprim 800-160mg (BACTRIM DS) 800-160 mg Tab; Take 1 tablet by mouth 2 (two) times daily. for 10 days    Swelling of eyelid, unspecified laterality  -     methylPREDNISolone (MEDROL DOSEPACK) 4 mg tablet; use as directed  -     sulfamethoxazole-trimethoprim 800-160mg (BACTRIM DS) 800-160 mg Tab; Take 1 tablet by mouth 2 (two) times daily. for 10 days      Cellulitis/swelling eyelid left  -medrol dose pack  -bactrim ds x 10 days    Spent adequate time in obtaining history and explaining differentials    Follow up if symptoms worsen or fail to improve.

## 2020-01-20 ENCOUNTER — TELEPHONE (OUTPATIENT)
Dept: ENDOCRINOLOGY | Facility: CLINIC | Age: 70
End: 2020-01-20

## 2020-01-20 NOTE — TELEPHONE ENCOUNTER
----- Message from Alejandra Wilkes sent at 1/20/2020  9:30 AM CST -----  Contact: Pt  Pt requesting a call back     Pt states that she needs to schedule appt with Dr Hadley for a F/U Appt,...  Appt that's available on 1/21/2020 she stated that was not good for her    Please call and advise    Phone [phone]

## 2020-02-18 NOTE — TELEPHONE ENCOUNTER
Talk to pt she was offer Jan 21 appointment but she will not be able to make to that appointment because she work in [facility] she garrett not get off until 8am. Told her the next appointment garrett be in June. Please call back the first week of February when dr muñoz scheduled automatically open please called earlier in the morning to scheduled. Pt said she will called back at that time.     Nsaids Counseling: NSAID Counseling: I discussed with the patient that NSAIDs should be taken with food. Prolonged use of NSAIDs can result in the development of stomach ulcers.  Patient advised to stop taking NSAIDs if abdominal pain occurs.  The patient verbalized understanding of the proper use and possible adverse effects of NSAIDs.  All of the patient's questions and concerns were addressed.

## 2020-04-02 ENCOUNTER — LAB VISIT (OUTPATIENT)
Dept: LAB | Facility: HOSPITAL | Age: 70
End: 2020-04-02
Attending: INTERNAL MEDICINE
Payer: COMMERCIAL

## 2020-04-02 ENCOUNTER — TELEPHONE (OUTPATIENT)
Dept: INTERNAL MEDICINE | Facility: CLINIC | Age: 70
End: 2020-04-02

## 2020-04-02 DIAGNOSIS — N18.2 CKD (CHRONIC KIDNEY DISEASE) STAGE 2, GFR 60-89 ML/MIN: ICD-10-CM

## 2020-04-02 DIAGNOSIS — E89.0 POST-SURGICAL HYPOTHYROIDISM: ICD-10-CM

## 2020-04-02 LAB
T4 FREE SERPL-MCNC: <0.4 NG/DL (ref 0.71–1.51)
TSH SERPL DL<=0.005 MIU/L-ACNC: 92.62 UIU/ML (ref 0.4–4)

## 2020-04-02 PROCEDURE — 84439 ASSAY OF FREE THYROXINE: CPT

## 2020-04-02 PROCEDURE — 84443 ASSAY THYROID STIM HORMONE: CPT

## 2020-04-02 PROCEDURE — 36415 COLL VENOUS BLD VENIPUNCTURE: CPT | Mod: PO

## 2020-04-02 RX ORDER — LOSARTAN POTASSIUM 100 MG/1
100 TABLET ORAL DAILY
Qty: 30 TABLET | Refills: 0 | Status: SHIPPED | OUTPATIENT
Start: 2020-04-02 | End: 2020-05-19 | Stop reason: SDUPTHER

## 2020-04-02 RX ORDER — LEVOTHYROXINE SODIUM 112 UG/1
TABLET ORAL
Qty: 96 TABLET | Refills: 0 | Status: SHIPPED | OUTPATIENT
Start: 2020-04-02 | End: 2020-05-19 | Stop reason: SDUPTHER

## 2020-04-02 NOTE — TELEPHONE ENCOUNTER
----- Message from Michael Elias MD sent at 4/2/2020 11:30 AM CDT -----    No labs.  Just make sure that the patient is reminded of the time for the phone call  ----- Message -----  From: Geeta Baez MA  Sent: 4/1/2020  11:07 AM CDT  To: Michael Elias MD    Does patient need any extra lab work?

## 2020-04-02 NOTE — TELEPHONE ENCOUNTER
Spoke with patient informed her lab results. Also advised patient to go to pharmacy to  medication patient voices understanding

## 2020-04-02 NOTE — TELEPHONE ENCOUNTER
Please contact patient about thyroid results.  Shows that she is not getting enough of the thyroid medication.  If she has run out of medication she needs to go to her local [facility] to get medication as soon as possible.

## 2020-04-07 ENCOUNTER — OFFICE VISIT (OUTPATIENT)
Dept: INTERNAL MEDICINE | Facility: CLINIC | Age: 70
End: 2020-04-07
Payer: COMMERCIAL

## 2020-04-07 DIAGNOSIS — N95.0 ABNORMAL VAGINAL BLEEDING IN POSTMENOPAUSAL PATIENT: ICD-10-CM

## 2020-04-07 DIAGNOSIS — E89.0 POST-SURGICAL HYPOTHYROIDISM: Primary | ICD-10-CM

## 2020-04-07 DIAGNOSIS — N18.2 CKD (CHRONIC KIDNEY DISEASE) STAGE 2, GFR 60-89 ML/MIN: ICD-10-CM

## 2020-04-07 DIAGNOSIS — I10 ESSENTIAL HYPERTENSION: ICD-10-CM

## 2020-04-07 PROCEDURE — 99212 PR OFFICE/OUTPT VISIT, EST, LEVL II, 10-19 MIN: ICD-10-PCS | Mod: 95,,, | Performed by: INTERNAL MEDICINE

## 2020-04-07 PROCEDURE — 99212 OFFICE O/P EST SF 10 MIN: CPT | Mod: 95,,, | Performed by: INTERNAL MEDICINE

## 2020-04-07 NOTE — PROGRESS NOTES
The patient location is: home  Visit type: Virtual visit with synchronous audio   Total time spent with patient: 20 min    Each patient to whom he or she provides medical services by telemedicine is:  (1) informed of the relationship between the physician and patient and the respective role of any other health care provider with respect to management of the patient; and (2) notified that he or she may decline to receive medical services by telemedicine and may withdraw from such care at any time.       Portions of this note are generated with voice recognition software. Typographical errors may exist.     SUBJECTIVE:    This is a/an 69 y.o. female here for primary care visit for  Chief Complaint   Patient presents with    Thyroid Problem     Patient states that she has been having occupational obligations which have made it very difficult to follow-up as planned with blood work and medical appointments.  Going forward the patient understands that there are flexible ways to stay connected to clinic and she will try harder to follow-up.  She has resume medication for blood pressure and hypothyroidism.  States that she is feeling better.    States that a few months ago she started to have abnormal vaginal bleeding.  States that she established with a gynecologist [facility].  Unfortunately she does not come prepared with the name of the physician or the practice.  States that after evaluation she was told that her treatment would be a D&C.  States that this has been postponed due to COVID-19 but that she plans to follow-up with this gynecologist to complete the evaluation process.    Medications Reviewed and Updated    Past medical, family, and social histories were reviewed and updated.    Review of Systems negative unless otherwise noted in history of present illness-  ROS    General ROS: negative  Psychological ROS: negative  ENT ROS: negative  Endocrine ROS: Negative  Allergy and Immunology ROS:  negative  Cardiovascular ROS: negative  Pulmonary ROS: Negative  Gastrointestinal ROS: negative  Genito-Urinary ROS: negative        Allergic:    Review of patient's allergies indicates:   Allergen Reactions    Neosporin [benzalkonium chloride] Rash    Doxycycline Rash     Skin peels       OBJECTIVE:         Wt Readings from Last 3 Encounters:   10/24/19 116.6 kg (257 lb 0.9 oz)   07/07/19 114.3 kg (252 lb)   04/17/19 115.1 kg (253 lb 12 oz)    There is no height or weight on file to calculate BMI.  Previous Blood Pressure Readings :   BP Readings from Last 3 Encounters:   10/24/19 128/82   07/07/19 (!) 151/72   04/17/19 (!) 140/92       Physical Exam      Pertinent Labs Reviewed       ASSESSMENT/PLAN:    Post-surgical hypothyroidism.Condition improving.  Counseling on self-care measures today.  Continue with current plan in addition to recommendations written on After Visit Summary.    - TSH 4 weeks    Abnormal vaginal bleeding in postmenopausal patient.Condition not optimally controlled. Detailed counseling on self care measures. Plan to monitor clinically in addition to plan below or as listed on After Visit Summary.   -     CBC auto differential; Standing; Expected date: 04/07/2020  - Follow with Gyn     CKD (chronic kidney disease) stage 2, GFR 60-89 ml/min  Further evaluation warranted.  Recommendations as below or as written on After Visit Summary.    - labs 4 weeks     Essential hypertension  Further evaluation warranted.  Recommendations as below or as written on After Visit Summary.     - labs 4 weeks           Future Appointments   Date Time Provider Department Center   5/15/2020  8:30 AM LAB, [facility] [place]   5/20/2020  1:40 PM Michael Elias MD [facility] [place]       Michael Elias  4/7/2020  1:49 PM

## 2020-05-05 ENCOUNTER — OFFICE VISIT (OUTPATIENT)
Dept: URGENT CARE | Facility: CLINIC | Age: 70
End: 2020-05-05
Payer: COMMERCIAL

## 2020-05-05 VITALS
OXYGEN SATURATION: 100 % | HEART RATE: 65 BPM | TEMPERATURE: 98 F | HEIGHT: 65 IN | WEIGHT: 257 LBS | BODY MASS INDEX: 42.82 KG/M2

## 2020-05-05 DIAGNOSIS — L13.9 BULLOUS DERMATITIS: Primary | ICD-10-CM

## 2020-05-05 PROCEDURE — U0002 COVID-19 LAB TEST NON-CDC: HCPCS

## 2020-05-05 PROCEDURE — 99214 OFFICE O/P EST MOD 30 MIN: CPT | Mod: 25,S$GLB,, | Performed by: PHYSICIAN ASSISTANT

## 2020-05-05 PROCEDURE — 96372 PR INJECTION,THERAP/PROPH/DIAG2ST, IM OR SUBCUT: ICD-10-PCS | Mod: S$GLB,,, | Performed by: EMERGENCY MEDICINE

## 2020-05-05 PROCEDURE — 96372 THER/PROPH/DIAG INJ SC/IM: CPT | Mod: S$GLB,,, | Performed by: EMERGENCY MEDICINE

## 2020-05-05 PROCEDURE — 99214 PR OFFICE/OUTPT VISIT, EST, LEVL IV, 30-39 MIN: ICD-10-PCS | Mod: 25,S$GLB,, | Performed by: PHYSICIAN ASSISTANT

## 2020-05-05 RX ORDER — TRIAMCINOLONE ACETONIDE 1 MG/G
CREAM TOPICAL 2 TIMES DAILY
Qty: 80 G | Refills: 0 | Status: SHIPPED | OUTPATIENT
Start: 2020-05-05 | End: 2021-04-19

## 2020-05-05 RX ORDER — DEXAMETHASONE SODIUM PHOSPHATE 100 MG/10ML
6 INJECTION INTRAMUSCULAR; INTRAVENOUS ONCE
Status: COMPLETED | OUTPATIENT
Start: 2020-05-05 | End: 2020-05-05

## 2020-05-05 RX ADMIN — DEXAMETHASONE SODIUM PHOSPHATE 6 MG: 100 INJECTION INTRAMUSCULAR; INTRAVENOUS at 02:05

## 2020-05-05 NOTE — PATIENT INSTRUCTIONS
Nonspecific Dermatitis (Child)  Dermatitis is a skin rash caused by something that makes the skin irritated and inflamed. Your childs skin may be red, swollen, dry, and cracked. Blisters may form and ooze. The rash will itch.  Dermatitis can form on the face and neck, backs of hands, forearms, genitals, and lower legs. Dermatitis is not passed from person to person.  Talk with your healthcare provider about what may be causing your childs rash. This will help to rule out any serious causes of a skin rash. In some cases, the cause of the dermatitis may not be found.  Treatment is done to relieve itching and prevent the rash from coming back. The rash should go away in a few days to a few weeks.  Home care  The healthcare provider may prescribe medicines to relieve swelling and itching. Follow all instructions when using these medicines on your child.  · Follow your healthcare providers instructions on how to care for your childs rash.  · Bathe your child in warm, but not hot, water with mild soap. Ask your childs healthcare provider if you should apply petroleum jelly or cream after bathing.  · Expose the affected skin to the air so that it dries completely. Don't use a hair dryer on the skin.  · Dress your child in loose cotton clothing.  · Make sure your child does not scratch the affected area. This can delay healing. It can also cause a bacterial infection. You may need to use soft scratch mittens that cover your childs hands.  · Apply cold compresses to your childs sores to help soothe symptoms. Do this for 30 minutes 3 to 4 times a day. You can make a cold compress by soaking a cloth in cold water. Squeeze out excess water. You can add colloidal oatmeal to the water to help reduce itching.  · You can also help relieve large areas of itching by giving your child a lukewarm bath with colloidal oatmeal added to the water.  Follow-up care  Follow up with your childs healthcare provider, or as advised.  Call your childs healthcare provider if the rash is not better in 2 weeks.  Special note to parents  Wash your hands well with soap and warm water before and after caring for your child.  When to seek medical advice  Call your child's healthcare provider right away if any of these occur:  · Fever of 100.4°F (38°C) or higher, or as directed by your child's healthcare provider  · Redness or swelling that gets worse  · Pain that gets worse. Babies may show pain with fussiness that cant be soothed.  · Foul-smelling fluid leaking from the skin  · Blisters  Date Last Reviewed: 1/1/2017 © 2000-2017 [employer]. [address] [place] [address], [place] [address]. All rights reserved. This information is not intended as a substitute for professional medical care. Always follow your healthcare professional's instructions.      Follow-up with your dermatologist as discussed.    Please follow up with your Primary care provider within 2-5 days if your signs and symptoms have not resolved or worsen.     If your condition worsens or fails to improve we recommend that you receive another evaluation at the emergency room immediately or contact your primary medical clinic to discuss your concerns.   You must understand that you have received an Urgent Care treatment only and that you may be released before all of your medical problems are known or treated. You, the patient, will arrange for follow up care as instructed.     RED FLAGS/WARNING SYMPTOMS DISCUSSED WITH PATIENT THAT WOULD WARRANT EMERGENT MEDICAL ATTENTION. PATIENT VERBALIZED UNDERSTANDING.

## 2020-05-05 NOTE — PROGRESS NOTES
"Subjective:       Patient ID: Ankita Campo is a 69 y.o. female.    Vitals:  height is 5' 5" (1.651 m) and weight is 116.6 kg (257 lb). Her oral temperature is 98 °F (36.7 °C). Her pulse is 65. Her oxygen saturation is 100%.     Chief Complaint: Rash    Rash   This is a new problem. Episode onset: one week ago. The problem has been gradually worsening since onset. The affected locations include the right ankle. The rash is characterized by blistering and itchiness. She was exposed to plant contact. Pertinent negatives include no anorexia, congestion, cough, diarrhea, eye pain, facial edema, fatigue, fever, joint pain, nail changes, rhinorrhea, shortness of breath, sore throat or vomiting. Treatments tried: alcohol, vaseline, iodine. The treatment provided no relief. There is no history of allergies, asthma, eczema or varicella.       Constitution: Negative for chills, fatigue and fever.   HENT: Negative for congestion and sore throat.    Neck: Negative for painful lymph nodes.   Cardiovascular: Negative for chest pain and leg swelling.   Eyes: Negative for eye pain, double vision and blurred vision.   Respiratory: Negative for cough and shortness of breath.    Gastrointestinal: Negative for nausea, vomiting and diarrhea.   Genitourinary: Negative for dysuria, frequency, urgency and history of kidney stones.   Musculoskeletal: Negative for joint pain, joint swelling, muscle cramps and muscle ache.   Skin: Positive for rash. Negative for color change, pale, erythema and bruising.   Allergic/Immunologic: Negative for seasonal allergies.   Neurological: Negative for dizziness, history of vertigo, light-headedness, passing out and headaches.   Hematologic/Lymphatic: Negative for swollen lymph nodes.   Psychiatric/Behavioral: Negative for nervous/anxious, sleep disturbance and depression. The patient is not nervous/anxious.        Objective:      Physical Exam   Constitutional: She is oriented to person, place, and time. " She appears well-developed and well-nourished.   HENT:   Head: Normocephalic and atraumatic. Head is without abrasion, without contusion and without laceration.   Right Ear: External ear normal.   Left Ear: External ear normal.   Nose: Nose normal.   Mouth/Throat: Oropharynx is clear and moist and mucous membranes are normal.   Eyes: Pupils are equal, round, and reactive to light. Conjunctivae, EOM and lids are normal.   Neck: Trachea normal, full passive range of motion without pain and phonation normal. Neck supple.   Cardiovascular: Normal rate, regular rhythm and normal heart sounds.   Pulmonary/Chest: Effort normal and breath sounds normal. No stridor. No respiratory distress.   Musculoskeletal: Normal range of motion.   Neurological: She is alert and oriented to person, place, and time.   Skin: Skin is warm, dry, intact and rash. Capillary refill takes less than 2 seconds. abrasion, burn, bruising, erythema and ecchymosis       Psychiatric: She has a normal mood and affect. Her speech is normal and behavior is normal. Judgment and thought content normal. Cognition and memory are normal.   Nursing note and vitals reviewed.            Assessment:       1. Bullous dermatitis        Plan:         Bullous dermatitis  -     dexamethasone injection 6 mg  -     triamcinolone acetonide 0.1% (KENALOG) 0.1 % cream; Apply topically 2 (two) times daily.  Dispense: 80 g; Refill: 0  -     COVID-19 Routine Screening     Patient also evaluated by Dr. Chi.  He agreed with assessment and plan. Discussed diagnosis with patient as well as treatment and home care. Discussed return to clinic precautions vs ER precautions. All patients questions answered. Patient verbalized understanding. Patient agreed with plan of care.         Nonspecific Dermatitis (Child)  Dermatitis is a skin rash caused by something that makes the skin irritated and inflamed. Your childs skin may be red, swollen, dry, and cracked. Blisters may form and  ooze. The rash will itch.  Dermatitis can form on the face and neck, backs of hands, forearms, genitals, and lower legs. Dermatitis is not passed from person to person.  Talk with your healthcare provider about what may be causing your childs rash. This will help to rule out any serious causes of a skin rash. In some cases, the cause of the dermatitis may not be found.  Treatment is done to relieve itching and prevent the rash from coming back. The rash should go away in a few days to a few weeks.  Home care  The healthcare provider may prescribe medicines to relieve swelling and itching. Follow all instructions when using these medicines on your child.  · Follow your healthcare providers instructions on how to care for your childs rash.  · Bathe your child in warm, but not hot, water with mild soap. Ask your childs healthcare provider if you should apply petroleum jelly or cream after bathing.  · Expose the affected skin to the air so that it dries completely. Don't use a hair dryer on the skin.  · Dress your child in loose cotton clothing.  · Make sure your child does not scratch the affected area. This can delay healing. It can also cause a bacterial infection. You may need to use soft scratch mittens that cover your childs hands.  · Apply cold compresses to your childs sores to help soothe symptoms. Do this for 30 minutes 3 to 4 times a day. You can make a cold compress by soaking a cloth in cold water. Squeeze out excess water. You can add colloidal oatmeal to the water to help reduce itching.  · You can also help relieve large areas of itching by giving your child a lukewarm bath with colloidal oatmeal added to the water.  Follow-up care  Follow up with your childs healthcare provider, or as advised. Call your childs healthcare provider if the rash is not better in 2 weeks.  Special note to parents  Wash your hands well with soap and warm water before and after caring for your child.  When to seek  medical advice  Call your child's healthcare provider right away if any of these occur:  · Fever of 100.4°F (38°C) or higher, or as directed by your child's healthcare provider  · Redness or swelling that gets worse  · Pain that gets worse. Babies may show pain with fussiness that cant be soothed.  · Foul-smelling fluid leaking from the skin  · Blisters  Date Last Reviewed: 1/1/2017 © 2000-2017 [employer]. [address] [place] [address]. All rights reserved. This information is not intended as a substitute for professional medical care. Always follow your healthcare professional's instructions.      Follow-up with your dermatologist as discussed.    Please follow up with your Primary care provider within 2-5 days if your signs and symptoms have not resolved or worsen.     If your condition worsens or fails to improve we recommend that you receive another evaluation at the emergency room immediately or contact your primary medical clinic to discuss your concerns.   You must understand that you have received an Urgent Care treatment only and that you may be released before all of your medical problems are known or treated. You, the patient, will arrange for follow up care as instructed.     RED FLAGS/WARNING SYMPTOMS DISCUSSED WITH PATIENT THAT WOULD WARRANT EMERGENT MEDICAL ATTENTION. PATIENT VERBALIZED UNDERSTANDING.

## 2020-05-06 ENCOUNTER — TELEPHONE (OUTPATIENT)
Dept: INTERNAL MEDICINE | Facility: CLINIC | Age: 70
End: 2020-05-06

## 2020-05-06 ENCOUNTER — TELEPHONE (OUTPATIENT)
Dept: URGENT CARE | Facility: CLINIC | Age: 70
End: 2020-05-06

## 2020-05-06 LAB — SARS-COV-2 RNA RESP QL NAA+PROBE: NOT DETECTED

## 2020-05-06 NOTE — TELEPHONE ENCOUNTER
----- Message from Jah Grayson sent at 5/6/2020 11:22 AM CDT -----  Contact: [phone] / self   Patient is returning a call from your office. Please Advise.

## 2020-05-15 ENCOUNTER — LAB VISIT (OUTPATIENT)
Dept: LAB | Facility: HOSPITAL | Age: 70
End: 2020-05-15
Attending: INTERNAL MEDICINE
Payer: COMMERCIAL

## 2020-05-15 DIAGNOSIS — N95.0 ABNORMAL VAGINAL BLEEDING IN POSTMENOPAUSAL PATIENT: ICD-10-CM

## 2020-05-15 DIAGNOSIS — R60.0 PEDAL EDEMA: ICD-10-CM

## 2020-05-15 DIAGNOSIS — E89.0 POST-SURGICAL HYPOTHYROIDISM: ICD-10-CM

## 2020-05-15 LAB
ALBUMIN SERPL BCP-MCNC: 3.7 G/DL (ref 3.5–5.2)
ALP SERPL-CCNC: 99 U/L (ref 55–135)
ALT SERPL W/O P-5'-P-CCNC: 7 U/L (ref 10–44)
ANION GAP SERPL CALC-SCNC: 7 MMOL/L (ref 8–16)
AST SERPL-CCNC: 16 U/L (ref 10–40)
BASOPHILS # BLD AUTO: 0.05 K/UL (ref 0–0.2)
BASOPHILS NFR BLD: 1 % (ref 0–1.9)
BILIRUB SERPL-MCNC: 0.8 MG/DL (ref 0.1–1)
BUN SERPL-MCNC: 21 MG/DL (ref 8–23)
CALCIUM SERPL-MCNC: 8.9 MG/DL (ref 8.7–10.5)
CHLORIDE SERPL-SCNC: 108 MMOL/L (ref 95–110)
CO2 SERPL-SCNC: 27 MMOL/L (ref 23–29)
CREAT SERPL-MCNC: 1.2 MG/DL (ref 0.5–1.4)
DIFFERENTIAL METHOD: ABNORMAL
EOSINOPHIL # BLD AUTO: 0.1 K/UL (ref 0–0.5)
EOSINOPHIL NFR BLD: 2.3 % (ref 0–8)
ERYTHROCYTE [DISTWIDTH] IN BLOOD BY AUTOMATED COUNT: 15.7 % (ref 11.5–14.5)
EST. GFR  (AFRICAN AMERICAN): 52.9 ML/MIN/1.73 M^2
EST. GFR  (NON AFRICAN AMERICAN): 45.9 ML/MIN/1.73 M^2
GLUCOSE SERPL-MCNC: 90 MG/DL (ref 70–110)
HCT VFR BLD AUTO: 39.3 % (ref 37–48.5)
HGB BLD-MCNC: 11.4 G/DL (ref 12–16)
IMM GRANULOCYTES # BLD AUTO: 0.01 K/UL (ref 0–0.04)
IMM GRANULOCYTES NFR BLD AUTO: 0.2 % (ref 0–0.5)
LYMPHOCYTES # BLD AUTO: 1.8 K/UL (ref 1–4.8)
LYMPHOCYTES NFR BLD: 37.9 % (ref 18–48)
MCH RBC QN AUTO: 28.1 PG (ref 27–31)
MCHC RBC AUTO-ENTMCNC: 29 G/DL (ref 32–36)
MCV RBC AUTO: 97 FL (ref 82–98)
MONOCYTES # BLD AUTO: 0.4 K/UL (ref 0.3–1)
MONOCYTES NFR BLD: 9 % (ref 4–15)
NEUTROPHILS # BLD AUTO: 2.4 K/UL (ref 1.8–7.7)
NEUTROPHILS NFR BLD: 49.6 % (ref 38–73)
NRBC BLD-RTO: 0 /100 WBC
PLATELET # BLD AUTO: 206 K/UL (ref 150–350)
PMV BLD AUTO: 13.5 FL (ref 9.2–12.9)
POTASSIUM SERPL-SCNC: 4.1 MMOL/L (ref 3.5–5.1)
PROT SERPL-MCNC: 7.7 G/DL (ref 6–8.4)
RBC # BLD AUTO: 4.06 M/UL (ref 4–5.4)
SODIUM SERPL-SCNC: 142 MMOL/L (ref 136–145)
TSH SERPL DL<=0.005 MIU/L-ACNC: 3.65 UIU/ML (ref 0.4–4)
WBC # BLD AUTO: 4.77 K/UL (ref 3.9–12.7)

## 2020-05-15 PROCEDURE — 80053 COMPREHEN METABOLIC PANEL: CPT

## 2020-05-15 PROCEDURE — 36415 COLL VENOUS BLD VENIPUNCTURE: CPT | Mod: PO

## 2020-05-15 PROCEDURE — 85025 COMPLETE CBC W/AUTO DIFF WBC: CPT

## 2020-05-15 PROCEDURE — 84443 ASSAY THYROID STIM HORMONE: CPT

## 2020-05-19 ENCOUNTER — OFFICE VISIT (OUTPATIENT)
Dept: INTERNAL MEDICINE | Facility: CLINIC | Age: 70
End: 2020-05-19
Payer: COMMERCIAL

## 2020-05-19 DIAGNOSIS — N18.2 CKD (CHRONIC KIDNEY DISEASE) STAGE 2, GFR 60-89 ML/MIN: ICD-10-CM

## 2020-05-19 DIAGNOSIS — L13.9 BULLOUS DERMATITIS: ICD-10-CM

## 2020-05-19 DIAGNOSIS — E89.0 POST-SURGICAL HYPOTHYROIDISM: Primary | ICD-10-CM

## 2020-05-19 PROCEDURE — 99214 OFFICE O/P EST MOD 30 MIN: CPT | Mod: 95,,, | Performed by: INTERNAL MEDICINE

## 2020-05-19 PROCEDURE — 99214 PR OFFICE/OUTPT VISIT, EST, LEVL IV, 30-39 MIN: ICD-10-PCS | Mod: 95,,, | Performed by: INTERNAL MEDICINE

## 2020-05-19 RX ORDER — POTASSIUM CHLORIDE 600 MG/1
8 CAPSULE, EXTENDED RELEASE ORAL DAILY
Qty: 90 CAPSULE | Refills: 0 | Status: SHIPPED | OUTPATIENT
Start: 2020-05-19 | End: 2020-09-15

## 2020-05-19 RX ORDER — LOSARTAN POTASSIUM 100 MG/1
100 TABLET ORAL DAILY
Qty: 90 TABLET | Refills: 0 | Status: SHIPPED | OUTPATIENT
Start: 2020-05-19 | End: 2020-08-17 | Stop reason: SDUPTHER

## 2020-05-19 RX ORDER — FUROSEMIDE 40 MG/1
40 TABLET ORAL DAILY
Qty: 90 TABLET | Refills: 0 | Status: SHIPPED | OUTPATIENT
Start: 2020-05-19 | End: 2020-09-15

## 2020-05-19 RX ORDER — LOSARTAN POTASSIUM 100 MG/1
100 TABLET ORAL DAILY
Qty: 30 TABLET | Refills: 0 | Status: CANCELLED | OUTPATIENT
Start: 2020-05-19 | End: 2020-06-18

## 2020-05-19 RX ORDER — LEVOTHYROXINE SODIUM 112 UG/1
TABLET ORAL
Qty: 96 TABLET | Refills: 0 | Status: SHIPPED | OUTPATIENT
Start: 2020-05-19 | End: 2020-09-15 | Stop reason: SDUPTHER

## 2020-05-19 NOTE — PROGRESS NOTES
The patient location is: home  Visit type: Virtual visit with synchronous audio and video  Total time spent with patient: 20 min    Each patient to whom he or she provides medical services by telemedicine is:  (1) informed of the relationship between the physician and patient and the respective role of any other health care provider with respect to management of the patient; and (2) notified that he or she may decline to receive medical services by telemedicine and may withdraw from such care at any time.       Portions of this note are generated with voice recognition software. Typographical errors may exist.     SUBJECTIVE:    This is a/an 69 y.o. female here for primary care visit for  Chief Complaint   Patient presents with    Thyroid Problem     Patient has been lost to medical follow-up for more than 8 months.  During that time she has stopped taking diuretic medication furosemide and has had resultant persistent bilateral ankle edema.     Other symptoms include around 5/5 development of some blisters along the left ankle.  Because of COVID-19 and access issues in primary care office the patient went to local urgent care that treated this as an allergic dermatitis giving her an injection of Decadron in addition to triamcinolone cream.  Patient states that the blisters that she had at the time of the visit dried up a little bit but about 2 weeks later new blisters developed along the same ankle.  Topical triamcinolone did not seem to make much of a difference although it is not clear from this conversation how often and how consistent the patient has been using triamcinolone cream.    Patient denies fevers chills or rapidly progressing skin changes.    Patient is reluctant to return to Dermatology.  However the patient also understands that despite diuretic therapy in compression stockings the skin changes have not resolved in the past and that specialist supervision will be necessary     Patient voices  limited understanding regarding her underlying diagnosis and therefore not trusting specialist evaluation.  States that she might have taken Vasculera prescribed by initial dermatologist but cannot specify how long or what affect it had.        Medications Reviewed and Updated    Past medical, family, and social histories were reviewed and updated.    Review of Systems negative unless otherwise noted in history of present illness-  ROS    General ROS: negative  Psychological ROS: negative  ENT ROS: negative  Endocrine ROS: Negative  Allergy and Immunology ROS: negative  Cardiovascular ROS: negative  Pulmonary ROS: Negative  Gastrointestinal ROS: negative  Genito-Urinary ROS: negative  Musculoskeletal ROS: negative      Allergic:    Review of patient's allergies indicates:   Allergen Reactions    Neosporin [benzalkonium chloride] Rash    Doxycycline Rash     Skin peels       OBJECTIVE:         Wt Readings from Last 3 Encounters:   05/05/20 116.6 kg (257 lb)   10/24/19 116.6 kg (257 lb 0.9 oz)   07/07/19 114.3 kg (252 lb)    There is no height or weight on file to calculate BMI.  Previous Blood Pressure Readings :   BP Readings from Last 3 Encounters:   10/24/19 128/82   07/07/19 (!) 151/72   04/17/19 (!) 140/92       Physical Exam    GEN: No apparent distress    Pertinent Labs Reviewed       ASSESSMENT/PLAN:    Post-surgical hypothyroidism.Condition stable.  Counseling given today on self-care measures. Plan to monitor clinically. Continue current medical plan.   -     levothyroxine (SYNTHROID) 112 MCG tablet; Take one tab 6 days a week, 1.5 tabs one day a week  Dispense: 96 tablet; Refill: 0    CKD (chronic kidney disease) stage 2, GFR 60-89 ml/min.Condition stable.  Counseling given today on self-care measures. Plan to monitor clinically. Continue current medical plan.   -     losartan (COZAAR) 100 MG tablet; Take 1 tablet (100 mg total) by mouth once daily. Further refills require visit with new PCP  Dispense:  90 tablet; Refill: 0    Bullous dermatitis.Condition not optimally controlled. Detailed counseling on self care measures. Plan to monitor clinically in addition to plan below or as listed on After Visit Summary.   -     furosemide (LASIX) 40 MG tablet; Take 1 tablet (40 mg total) by mouth once daily.  Dispense: 90 tablet; Refill: 0  -     potassium chloride (MICRO-K) 8 mEq CpSR; Take 1 capsule (8 mEq total) by mouth once daily.  Dispense: 90 capsule; Refill: 0  -     Ambulatory referral/consult to Dermatology; Future; Expected date: 05/26/2020          Future Appointments   Date Time Provider Department Center   6/1/2020 10:00 AM Brook Robledo MD [facility] DERM [facility]   7/8/2020 11:00 AM Lisa Bowman DO [facility]       Michael Elias  5/19/2020  1:31 PM

## 2020-05-28 ENCOUNTER — PATIENT OUTREACH (OUTPATIENT)
Dept: ADMINISTRATIVE | Facility: OTHER | Age: 70
End: 2020-05-28

## 2020-06-01 ENCOUNTER — OFFICE VISIT (OUTPATIENT)
Dept: DERMATOLOGY | Facility: CLINIC | Age: 70
End: 2020-06-01
Payer: COMMERCIAL

## 2020-06-01 DIAGNOSIS — M79.3 LIPODERMATOSCLEROSIS OF BOTH LOWER EXTREMITIES: Primary | ICD-10-CM

## 2020-06-01 DIAGNOSIS — L13.9 BULLOUS DERMATITIS: ICD-10-CM

## 2020-06-01 PROCEDURE — 1159F PR MEDICATION LIST DOCUMENTED IN MEDICAL RECORD: ICD-10-PCS | Mod: S$GLB,,, | Performed by: DERMATOLOGY

## 2020-06-01 PROCEDURE — 1159F MED LIST DOCD IN RCRD: CPT | Mod: S$GLB,,, | Performed by: DERMATOLOGY

## 2020-06-01 PROCEDURE — 99213 PR OFFICE/OUTPT VISIT, EST, LEVL III, 20-29 MIN: ICD-10-PCS | Mod: S$GLB,,, | Performed by: DERMATOLOGY

## 2020-06-01 PROCEDURE — 1101F PR PT FALLS ASSESS DOC 0-1 FALLS W/OUT INJ PAST YR: ICD-10-PCS | Mod: CPTII,S$GLB,, | Performed by: DERMATOLOGY

## 2020-06-01 PROCEDURE — 99213 OFFICE O/P EST LOW 20 MIN: CPT | Mod: S$GLB,,, | Performed by: DERMATOLOGY

## 2020-06-01 PROCEDURE — 1126F PR PAIN SEVERITY QUANTIFIED, NO PAIN PRESENT: ICD-10-PCS | Mod: S$GLB,,, | Performed by: DERMATOLOGY

## 2020-06-01 PROCEDURE — 99999 PR PBB SHADOW E&M-EST. PATIENT-LVL II: CPT | Mod: PBBFAC,,, | Performed by: DERMATOLOGY

## 2020-06-01 PROCEDURE — 99999 PR PBB SHADOW E&M-EST. PATIENT-LVL II: ICD-10-PCS | Mod: PBBFAC,,, | Performed by: DERMATOLOGY

## 2020-06-01 PROCEDURE — 1101F PT FALLS ASSESS-DOCD LE1/YR: CPT | Mod: CPTII,S$GLB,, | Performed by: DERMATOLOGY

## 2020-06-01 PROCEDURE — 1126F AMNT PAIN NOTED NONE PRSNT: CPT | Mod: S$GLB,,, | Performed by: DERMATOLOGY

## 2020-06-01 RX ORDER — BETAMETHASONE DIPROPIONATE 0.5 MG/G
OINTMENT TOPICAL 2 TIMES DAILY
Qty: 45 G | Refills: 3 | Status: SHIPPED | OUTPATIENT
Start: 2020-06-01 | End: 2020-08-17 | Stop reason: ALTCHOICE

## 2020-06-01 NOTE — LETTER
June 1, 2020      Michael Elias MD  [address] [facility] [address]           [facility]  [address]  [place] [address]  Phone: [phone]  Fax: [phone]          Patient: Ankita Campo   MR Number: [id number]   YOB: 1950   Date of Visit: 6/1/2020       Dear Dr. Michael Elias:    Thank you for referring Ankita Campo to me for evaluation. Attached you will find relevant portions of my assessment and plan of care.    If you have questions, please do not hesitate to call me. I look forward to following Ankita Campo along with you.    Sincerely,    Brook Robledo MD    Enclosure  CC:  No Recipients    If you would like to receive this communication electronically, please contact [email or web address] or [phone] to request more information on [employer] Link access.    For providers and/or their staff who would like to refer a patient to [facility], please contact us through our one-stop-shop provider referral line, [facility], at [phone].    If you feel you have received this communication in error or would no longer like to receive these types of communications, please e-mail [email or web address]

## 2020-06-01 NOTE — PROGRESS NOTES
Subjective:       Patient ID:  Ankita Campo is a 69 y.o. female who presents for   Chief Complaint   Patient presents with    Rash     Left ankle     Patient is a 70 yo female with hx of CKD, lipodermatosclerosus who comes in for recurrent blisters on right ankle. She has followed with Dr. Mccallum for these in the past and had a skin biopsy in 2017 raising question of bullous fixed drug eruption.  Pt reports that she has continued to take naproxen (Flanax OTC) intermittently for knee pain.    The blistering on her right ankle started > 5 years ago but had been latent since this most recent episode. It began >3 weeks ago on a Sunday. She had been gardening the day before. Blisters always occur on right ankle and are very itchy.    She also describes having blisters occur on her hands in the past as well, but not active at this time.  She does not know what medications or OTC she took prior to the blisters coming out 3 weeks ago.  Also c/o hardening of skin of lower extremities; has seen vascular and had vein treatments in the past and ultrasounds. Uses shea butter and compression stockings but texture still firm.      Review of Systems   Skin: Positive for itching and rash.        Objective:    Physical Exam   Constitutional: She appears well-developed and well-nourished.   Neurological: She is alert and oriented to person, place, and time.   Psychiatric: She has a normal mood and affect.   Skin:   Areas Examined (abnormalities noted in diagram):   Scalp / Hair Palpated and Inspected  Head / Face Inspection Performed  RUE Inspected  LUE Inspection Performed  RLE Inspected  LLE Inspection Performed              Diagram Legend     Erythematous scaling macule/papule c/w actinic keratosis       Vascular papule c/w angioma      Pigmented verrucoid papule/plaque c/w seborrheic keratosis      Yellow umbilicated papule c/w sebaceous hyperplasia      Irregularly shaped tan macule c/w lentigo     1-2 mm smooth white papules  consistent with Milia      Movable subcutaneous cyst with punctum c/w epidermal inclusion cyst      Subcutaneous movable cyst c/w pilar cyst      Firm pink to brown papule c/w dermatofibroma      Pedunculated fleshy papule(s) c/w skin tag(s)      Evenly pigmented macule c/w junctional nevus     Mildly variegated pigmented, slightly irregular-bordered macule c/w mildly atypical nevus      Flesh colored to evenly pigmented papule c/w intradermal nevus       Pink pearly papule/plaque c/w basal cell carcinoma      Erythematous hyperkeratotic cursted plaque c/w SCC      Surgical scar with no sign of skin cancer recurrence      Open and closed comedones      Inflammatory papules and pustules      Verrucoid papule consistent consistent with wart     Erythematous eczematous patches and plaques     Dystrophic onycholytic nail with subungual debris c/w onychomycosis     Umbilicated papule    Erythematous-base heme-crusted tan verrucoid plaque consistent with inflamed seborrheic keratosis     Erythematous Silvery Scaling Plaque c/w Psoriasis     See annotation      Assessment / Plan:        Lipodermatosclerosis of both lower extremities  Trial betamethasone ointment bid to AA  vaseline  Compression stockings daily    Bullous dermatitis - prior pathology reviewed.  Per Dr Mccallum, plan was to repeat biopsy and DIF to rule out bullous pemphigoid given ongoing rashes.  The patient declines biopsies today as she has not had an outbreak x 1 year.  Will treat topically with potent steroid ointment, keep leg elevated and covered with ACE wrap  Our working diagnosis is bullous fixed drug - she continues to take Naproxen which could certainly be the culprit. Advised for ache/pain she take XS Tylenol in the future. Added naproxen to her allergy list.  If/when bulla start again, she should keep a diary of all ingested pills and foods for the 24 hours prior    -     betamethasone dipropionate (DIPROLENE) 0.05 % ointment; Apply topically 2  (two) times daily. To rash on foot x 2-3 weeks  Dispense: 45 g; Refill: 3           Follow up if symptoms worsen or fail to improve.

## 2020-07-06 ENCOUNTER — OUTSIDE PLACE OF SERVICE (OUTPATIENT)
Dept: CARDIOLOGY | Facility: CLINIC | Age: 70
End: 2020-07-06
Payer: COMMERCIAL

## 2020-07-06 PROCEDURE — 93010 PR ELECTROCARDIOGRAM REPORT: ICD-10-PCS | Mod: ,,, | Performed by: INTERNAL MEDICINE

## 2020-07-06 PROCEDURE — 93010 ELECTROCARDIOGRAM REPORT: CPT | Mod: ,,, | Performed by: INTERNAL MEDICINE

## 2020-07-28 ENCOUNTER — TELEPHONE (OUTPATIENT)
Dept: GYNECOLOGIC ONCOLOGY | Facility: CLINIC | Age: 70
End: 2020-07-28

## 2020-07-28 ENCOUNTER — OFFICE VISIT (OUTPATIENT)
Dept: GYNECOLOGIC ONCOLOGY | Facility: CLINIC | Age: 70
End: 2020-07-28
Payer: COMMERCIAL

## 2020-07-28 VITALS
HEART RATE: 60 BPM | WEIGHT: 242 LBS | DIASTOLIC BLOOD PRESSURE: 86 MMHG | SYSTOLIC BLOOD PRESSURE: 191 MMHG | BODY MASS INDEX: 40.27 KG/M2

## 2020-07-28 DIAGNOSIS — Z01.818 PRE-OP TESTING: ICD-10-CM

## 2020-07-28 DIAGNOSIS — C54.1 ENDOMETRIAL CA: Primary | ICD-10-CM

## 2020-07-28 DIAGNOSIS — I10 ESSENTIAL HYPERTENSION: ICD-10-CM

## 2020-07-28 PROCEDURE — 3077F SYST BP >= 140 MM HG: CPT | Mod: CPTII,S$GLB,, | Performed by: OBSTETRICS & GYNECOLOGY

## 2020-07-28 PROCEDURE — 99205 PR OFFICE/OUTPT VISIT, NEW, LEVL V, 60-74 MIN: ICD-10-PCS | Mod: S$GLB,,, | Performed by: OBSTETRICS & GYNECOLOGY

## 2020-07-28 PROCEDURE — 3008F BODY MASS INDEX DOCD: CPT | Mod: CPTII,S$GLB,, | Performed by: OBSTETRICS & GYNECOLOGY

## 2020-07-28 PROCEDURE — 99999 PR PBB SHADOW E&M-EST. PATIENT-LVL III: ICD-10-PCS | Mod: PBBFAC,,, | Performed by: OBSTETRICS & GYNECOLOGY

## 2020-07-28 PROCEDURE — 3079F PR MOST RECENT DIASTOLIC BLOOD PRESSURE 80-89 MM HG: ICD-10-PCS | Mod: CPTII,S$GLB,, | Performed by: OBSTETRICS & GYNECOLOGY

## 2020-07-28 PROCEDURE — 99999 PR PBB SHADOW E&M-EST. PATIENT-LVL III: CPT | Mod: PBBFAC,,, | Performed by: OBSTETRICS & GYNECOLOGY

## 2020-07-28 PROCEDURE — 1159F PR MEDICATION LIST DOCUMENTED IN MEDICAL RECORD: ICD-10-PCS | Mod: S$GLB,,, | Performed by: OBSTETRICS & GYNECOLOGY

## 2020-07-28 PROCEDURE — 1159F MED LIST DOCD IN RCRD: CPT | Mod: S$GLB,,, | Performed by: OBSTETRICS & GYNECOLOGY

## 2020-07-28 PROCEDURE — 1101F PR PT FALLS ASSESS DOC 0-1 FALLS W/OUT INJ PAST YR: ICD-10-PCS | Mod: CPTII,S$GLB,, | Performed by: OBSTETRICS & GYNECOLOGY

## 2020-07-28 PROCEDURE — 3077F PR MOST RECENT SYSTOLIC BLOOD PRESSURE >= 140 MM HG: ICD-10-PCS | Mod: CPTII,S$GLB,, | Performed by: OBSTETRICS & GYNECOLOGY

## 2020-07-28 PROCEDURE — 1101F PT FALLS ASSESS-DOCD LE1/YR: CPT | Mod: CPTII,S$GLB,, | Performed by: OBSTETRICS & GYNECOLOGY

## 2020-07-28 PROCEDURE — 99205 OFFICE O/P NEW HI 60 MIN: CPT | Mod: S$GLB,,, | Performed by: OBSTETRICS & GYNECOLOGY

## 2020-07-28 PROCEDURE — 1126F PR PAIN SEVERITY QUANTIFIED, NO PAIN PRESENT: ICD-10-PCS | Mod: S$GLB,,, | Performed by: OBSTETRICS & GYNECOLOGY

## 2020-07-28 PROCEDURE — 1126F AMNT PAIN NOTED NONE PRSNT: CPT | Mod: S$GLB,,, | Performed by: OBSTETRICS & GYNECOLOGY

## 2020-07-28 PROCEDURE — 3079F DIAST BP 80-89 MM HG: CPT | Mod: CPTII,S$GLB,, | Performed by: OBSTETRICS & GYNECOLOGY

## 2020-07-28 PROCEDURE — 3008F PR BODY MASS INDEX (BMI) DOCUMENTED: ICD-10-PCS | Mod: CPTII,S$GLB,, | Performed by: OBSTETRICS & GYNECOLOGY

## 2020-07-28 RX ORDER — LIDOCAINE HYDROCHLORIDE 10 MG/ML
1 INJECTION, SOLUTION EPIDURAL; INFILTRATION; INTRACAUDAL; PERINEURAL ONCE
Status: CANCELLED | OUTPATIENT
Start: 2020-07-28 | End: 2020-07-28

## 2020-07-28 RX ORDER — MUPIROCIN 20 MG/G
OINTMENT TOPICAL
Status: CANCELLED | OUTPATIENT
Start: 2020-07-28

## 2020-07-28 RX ORDER — SODIUM CHLORIDE 0.9 % (FLUSH) 0.9 %
10 SYRINGE (ML) INJECTION
Status: CANCELLED | OUTPATIENT
Start: 2020-07-28

## 2020-07-28 NOTE — LETTER
July 29, 2020      Zoë Brito MD  [address]  [place] [address]           [facility]  [address]  [place] [address]  Phone: [phone]          Patient: Ankita Campo   MR Number: [id number]   YOB: 1950   Date of Visit: 7/28/2020       Dear Dr. Zoë Brito:    Thank you for referring Ankita Campo to me for evaluation. Attached you will find relevant portions of my assessment and plan of care.    If you have questions, please do not hesitate to call me. I look forward to following Ankita Campo along with you.    Sincerely,    Donte Weeks MD    Enclosure  CC:  No Recipients    If you would like to receive this communication electronically, please contact [email or web address] or [phone] to request more information on [employer] Link access.    For providers and/or their staff who would like to refer a patient to [facility], please contact us through our one-stop-shop provider referral line, [facility], at [phone].    If you feel you have received this communication in error or would no longer like to receive these types of communications, please e-mail [email or web address]

## 2020-07-28 NOTE — H&P (VIEW-ONLY)
Subjective:      Patient ID: Ankita Campo is a 70 y.o. female.    Chief Complaint: Endometrial Cancer (Referred by Dr. Linares)      HPI  71 y/o referred by Dr. 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 Patient complained of PMB onset 5 months ago. Subsequently had a hysteroscopy w/ Myosure and D&C 7/7/2020 with final pathology revealing high grade serous endometrial carcinoma. Surgical history includes cholecystectomy and CS x 1. Family history significant for maternal aunt with breast cancer. Co-morbidities include HTN and obesity.    TVUS 3/2020  Uterus 7.72 x 4.27 cm with mass 2.7 x 2.8 cm. No evidence of L or R ovary.    P2. Vaginal delivery x 1. Csecton x 1.         Review of Systems   Constitutional: Negative for chills, fatigue and fever.   Eyes: Negative for visual disturbance.   Respiratory: Negative for cough, shortness of breath and wheezing.    Cardiovascular: Negative for chest pain, palpitations and leg swelling.   Gastrointestinal: Negative for abdominal distention, abdominal pain, constipation, diarrhea, nausea and vomiting.   Genitourinary: Negative for difficulty urinating, dysuria, frequency, genital sores, hematuria, pelvic pain, urgency, vaginal bleeding, vaginal discharge and vaginal pain.   Musculoskeletal: Negative for gait problem and neck stiffness.   Skin: Positive for rash.   Neurological: Negative for seizures and weakness.   Hematological: Negative for adenopathy. Does not bruise/bleed easily.   Psychiatric/Behavioral: The patient is not nervous/anxious.        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     Endometrial ca 7/28/2020    Family history of gastric cancer 2/8/2016    Goiter     Gout 06/2016    Hypertension     Hypothyroidism, postsurgical     Multiple thyroid nodules      Past Surgical History:   Procedure Laterality Date     cararact       SECTION      CHOLECYSTECTOMY      Donor Nephrectomy      fibroidectomy      HAND SURGERY      left    TOTAL THYROIDECTOMY       Family History   Problem Relation Age of Onset    Cancer Father         stomach    Goiter Mother     Breast cancer Paternal Aunt     Cancer Brother         stomach    Kidney disease Brother     Diabetes Sister     Seizures Sister     Hypertension Sister     Uterine cancer Sister         cancer in a fibroid    Eczema Neg Hx     Lupus Neg Hx     Psoriasis Neg Hx     Melanoma Neg Hx     Ovarian cancer Neg Hx      Social History     Socioeconomic History    Marital status: Single     Spouse name: Not on file    Number of children: Not on file    Years of education: Not on file    Highest education level: Not on file   Occupational History    Occupation:      Employer: OTHER   Social Needs    Financial resource strain: Not on file    Food insecurity     Worry: Not on file     Inability: Not on file    Transportation needs     Medical: Not on file     Non-medical: Not on file   Tobacco Use    Smoking status: Never Smoker    Smokeless tobacco: Never Used   Substance and Sexual Activity    Alcohol use: Yes     Alcohol/week: 2.0 standard drinks     Types: 2 Shots of liquor per week     Comment: Rare use of alcohol    Drug use: No    Sexual activity: Not Currently   Lifestyle    Physical activity     Days per week: Not on file     Minutes per session: Not on file    Stress: Not on file   Relationships    Social connections     Talks on phone: Not on file     Gets together: Not on file     Attends [employer]ism service: Not on file     Active member of club or organization: Not on file     Attends meetings of clubs or organizations: Not on file     Relationship status: Not on file   Other Topics Concern    Are you pregnant or think you may be? No    Breast-feeding No   Social History Narrative    Not on file     Current  Outpatient Medications   Medication Sig    betamethasone dipropionate (DIPROLENE) 0.05 % ointment Apply topically daily as needed. Do not apply to damaged skin    betamethasone dipropionate (DIPROLENE) 0.05 % ointment Apply topically 2 (two) times daily. To rash on foot x 2-3 weeks    calcium carbonate (OS-JONNY) 600 mg calcium (1,500 mg) Tab Take 1 tablet (600 mg total) by mouth once daily.    cholecalciferol, vitamin D3, 1,000 unit capsule Take 1 capsule (1,000 Units total) by mouth once daily.    levothyroxine (SYNTHROID) 112 MCG tablet Take one tab 6 days a week, 1.5 tabs one day a week    losartan (COZAAR) 100 MG tablet Take 1 tablet (100 mg total) by mouth once daily. Further refills require visit with new PCP    multivitamin capsule Take 1 capsule by mouth once daily.    PETROLATUM,WHITE (WHITE PETROLATUM, BULK,) Gel 1 application by Misc.(Non-Drug; Combo Route) route 4 (four) times daily.    cyanocobalamin 500 MCG tablet Take 500 mcg by mouth once daily. Pt says she only takes it if she is tired.    furosemide (LASIX) 40 MG tablet Take 1 tablet (40 mg total) by mouth once daily. (Patient not taking: Reported on 7/28/2020)    ofloxacin (OCUFLOX) 0.3 % ophthalmic solution INSTILL 1 DROP INTO LEFT EYE 4 TIMES DAILY    omega-3 fatty acids-vitamin E (FISH OIL) 1,000 mg Cap Take by mouth. Kal red    potassium chloride (MICRO-K) 8 mEq CpSR Take 1 capsule (8 mEq total) by mouth once daily. (Patient not taking: Reported on 7/28/2020)    triamcinolone acetonide 0.1% (KENALOG) 0.1 % cream Apply topically 2 (two) times daily. (Patient not taking: Reported on 7/28/2020)     No current facility-administered medications for this visit.      Review of patient's allergies indicates:   Allergen Reactions    Neosporin [benzalkonium chloride] Rash    Flanax [naproxen sodium]      Suspected bullous fixed drug eruption right ankle    Doxycycline Rash     Skin peels       Objective:   Physical Exam:    Constitutional: She is oriented to person, place, and time. She appears well-developed and well-nourished.    HENT:   Head: Normocephalic.     Neck: Neck supple. No thyromegaly present.   Thyroidectomy scar     Cardiovascular: Normal rate and regular rhythm.     Pulmonary/Chest: Effort normal and breath sounds normal.        Abdominal: Soft. She exhibits abdominal incision (midline lower abdomen. ). She exhibits no distension and no mass. There is no abdominal tenderness. There is no rebound and no guarding. No hernia.     Genitourinary:    Genitourinary Comments: Bimanual exam:  Vulva: no lesions. Normal appearance  Urethra: Normal size and location. No lesions  Bladder: No masses or tenderness.  Vagina: normal mucosa. No lesion  Cervix: normal   Uterus: normal   Adnexa: no masses.  Rectovaginal: No posterior cul de sac thickening or nodularity.  Rectal: no masses. Nontender. Normal tone.                    Neurological: She is alert and oriented to person, place, and time.    Skin: Skin is warm and dry.    Psychiatric: She has a normal mood and affect. Her behavior is normal. Judgment and thought content normal.       Assessment:     1. Endometrial ca    2. Essential hypertension        Plan:   Serous carcinoma of the uterus.         Discussed proceeding with RALH/BSO/BSLN/omentectomy for serous carcinoma of the uterus.   Consent forms were reviewed with patient. Questions were answered. Patient voiced understanding. Consents were signed.  Preop orders placed.

## 2020-07-28 NOTE — PROGRESS NOTES
Subjective:      Patient ID: Ankita Campo is a 70 y.o. female.    Chief Complaint: Endometrial Cancer (Referred by Dr. Linares)      HPI  69 y/o referred by Dr. 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 Patient complained of PMB onset 5 months ago. Subsequently had a hysteroscopy w/ Myosure and D&C 7/7/2020 with final pathology revealing high grade serous endometrial carcinoma. Surgical history includes cholecystectomy and CS x 1. Family history significant for maternal aunt with breast cancer. Co-morbidities include HTN and obesity.    TVUS 3/2020  Uterus 7.72 x 4.27 cm with mass 2.7 x 2.8 cm. No evidence of L or R ovary.    P2. Vaginal delivery x 1. Csecton x 1.         Review of Systems   Constitutional: Negative for chills, fatigue and fever.   Eyes: Negative for visual disturbance.   Respiratory: Negative for cough, shortness of breath and wheezing.    Cardiovascular: Negative for chest pain, palpitations and leg swelling.   Gastrointestinal: Negative for abdominal distention, abdominal pain, constipation, diarrhea, nausea and vomiting.   Genitourinary: Negative for difficulty urinating, dysuria, frequency, genital sores, hematuria, pelvic pain, urgency, vaginal bleeding, vaginal discharge and vaginal pain.   Musculoskeletal: Negative for gait problem and neck stiffness.   Skin: Positive for rash.   Neurological: Negative for seizures and weakness.   Hematological: Negative for adenopathy. Does not bruise/bleed easily.   Psychiatric/Behavioral: The patient is not nervous/anxious.        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     Endometrial ca 7/28/2020    Family history of gastric cancer 2/8/2016    Goiter     Gout 06/2016    Hypertension     Hypothyroidism, postsurgical     Multiple thyroid nodules      Past Surgical History:   Procedure Laterality Date     cararact       SECTION      CHOLECYSTECTOMY      Donor Nephrectomy      fibroidectomy      HAND SURGERY      left    TOTAL THYROIDECTOMY       Family History   Problem Relation Age of Onset    Cancer Father         stomach    Goiter Mother     Breast cancer Paternal Aunt     Cancer Brother         stomach    Kidney disease Brother     Diabetes Sister     Seizures Sister     Hypertension Sister     Uterine cancer Sister         cancer in a fibroid    Eczema Neg Hx     Lupus Neg Hx     Psoriasis Neg Hx     Melanoma Neg Hx     Ovarian cancer Neg Hx      Social History     Socioeconomic History    Marital status: Single     Spouse name: Not on file    Number of children: Not on file    Years of education: Not on file    Highest education level: Not on file   Occupational History    Occupation:      Employer: OTHER   Social Needs    Financial resource strain: Not on file    Food insecurity     Worry: Not on file     Inability: Not on file    Transportation needs     Medical: Not on file     Non-medical: Not on file   Tobacco Use    Smoking status: Never Smoker    Smokeless tobacco: Never Used   Substance and Sexual Activity    Alcohol use: Yes     Alcohol/week: 2.0 standard drinks     Types: 2 Shots of liquor per week     Comment: Rare use of alcohol    Drug use: No    Sexual activity: Not Currently   Lifestyle    Physical activity     Days per week: Not on file     Minutes per session: Not on file    Stress: Not on file   Relationships    Social connections     Talks on phone: Not on file     Gets together: Not on file     Attends Mandaeism service: Not on file     Active member of club or organization: Not on file     Attends meetings of clubs or organizations: Not on file     Relationship status: Not on file   Other Topics Concern    Are you pregnant or think you may be? No    Breast-feeding No   Social History Narrative    Not on file     Current  Outpatient Medications   Medication Sig    betamethasone dipropionate (DIPROLENE) 0.05 % ointment Apply topically daily as needed. Do not apply to damaged skin    betamethasone dipropionate (DIPROLENE) 0.05 % ointment Apply topically 2 (two) times daily. To rash on foot x 2-3 weeks    calcium carbonate (OS-JONNY) 600 mg calcium (1,500 mg) Tab Take 1 tablet (600 mg total) by mouth once daily.    cholecalciferol, vitamin D3, 1,000 unit capsule Take 1 capsule (1,000 Units total) by mouth once daily.    levothyroxine (SYNTHROID) 112 MCG tablet Take one tab 6 days a week, 1.5 tabs one day a week    losartan (COZAAR) 100 MG tablet Take 1 tablet (100 mg total) by mouth once daily. Further refills require visit with new PCP    multivitamin capsule Take 1 capsule by mouth once daily.    PETROLATUM,WHITE (WHITE PETROLATUM, BULK,) Gel 1 application by Misc.(Non-Drug; Combo Route) route 4 (four) times daily.    cyanocobalamin 500 MCG tablet Take 500 mcg by mouth once daily. Pt says she only takes it if she is tired.    furosemide (LASIX) 40 MG tablet Take 1 tablet (40 mg total) by mouth once daily. (Patient not taking: Reported on 7/28/2020)    ofloxacin (OCUFLOX) 0.3 % ophthalmic solution INSTILL 1 DROP INTO LEFT EYE 4 TIMES DAILY    omega-3 fatty acids-vitamin E (FISH OIL) 1,000 mg Cap Take by mouth. Kal red    potassium chloride (MICRO-K) 8 mEq CpSR Take 1 capsule (8 mEq total) by mouth once daily. (Patient not taking: Reported on 7/28/2020)    triamcinolone acetonide 0.1% (KENALOG) 0.1 % cream Apply topically 2 (two) times daily. (Patient not taking: Reported on 7/28/2020)     No current facility-administered medications for this visit.      Review of patient's allergies indicates:   Allergen Reactions    Neosporin [benzalkonium chloride] Rash    Flanax [naproxen sodium]      Suspected bullous fixed drug eruption right ankle    Doxycycline Rash     Skin peels       Objective:   Physical Exam:    Constitutional: She is oriented to person, place, and time. She appears well-developed and well-nourished.    HENT:   Head: Normocephalic.     Neck: Neck supple. No thyromegaly present.   Thyroidectomy scar     Cardiovascular: Normal rate and regular rhythm.     Pulmonary/Chest: Effort normal and breath sounds normal.        Abdominal: Soft. She exhibits abdominal incision (midline lower abdomen. ). She exhibits no distension and no mass. There is no abdominal tenderness. There is no rebound and no guarding. No hernia.     Genitourinary:    Genitourinary Comments: Bimanual exam:  Vulva: no lesions. Normal appearance  Urethra: Normal size and location. No lesions  Bladder: No masses or tenderness.  Vagina: normal mucosa. No lesion  Cervix: normal   Uterus: normal   Adnexa: no masses.  Rectovaginal: No posterior cul de sac thickening or nodularity.  Rectal: no masses. Nontender. Normal tone.                    Neurological: She is alert and oriented to person, place, and time.    Skin: Skin is warm and dry.    Psychiatric: She has a normal mood and affect. Her behavior is normal. Judgment and thought content normal.       Assessment:     1. Endometrial ca    2. Essential hypertension        Plan:   Serous carcinoma of the uterus.         Discussed proceeding with RALH/BSO/BSLN/omentectomy for serous carcinoma of the uterus.   Consent forms were reviewed with patient. Questions were answered. Patient voiced understanding. Consents were signed.  Preop orders placed.

## 2020-08-06 NOTE — ANESTHESIA PAT ROS NOTE
08/06/2020  Ankita Campo is a 70 y.o., female.      Pre-op Assessment          Review of Systems         Anesthesia Assessment: Preoperative EQUATION    Planned Procedure: Procedure(s) (LRB):  XI ROBOTIC SALPINGO-OOPHORECTOMY (N/A)  XI ROBOTIC HYSTERECTOMY (N/A)  MAPPING, LYMPH NODE, SENTINEL (N/A)  OMENTECTOMY (N/A)  Requested Anesthesia Type:General  Surgeon: Donte Weeks MD  Service: OB/GYN  Known or anticipated Date of Surgery:8/24/2020    Surgeon notes: reviewed      Previous anesthesia records:GETA and No problems     06/20/17 THYROIDECTOMY:    Airway/Jaw/Neck:  Airway Findings: Mouth Opening: Normal Tongue: Normal  General Airway Assessment: Adult  Mallampati: II  TM Distance: Normal, at least 6 cm  Jaw/Neck Findings:       Placement Time: 0703 Method of Intubation: Mesa Inserted by: CRNA Airway Device: Other (Comment) , NIMS  Mask Ventilation: Easy Intubated: Postinduction Blade: Other (see comments) , mesa#3  Airway Device Size: 7.0 Style: Cuffed Inflation Amount (mL): 6 Placement Verified By: Auscultation;Capnometry Grade: Grade I Complicating Factors: Deviated trachea , small right deviation  Findings Post-Intubation: Positive EtCO2;Bilateral breath sounds;Atraumatic/Condition of teeth unchanged Depth of Insertion (cm): 22 Secured at: Lips Complications: None Breath Sounds: Equal Bilateral Insertion attempts (enter comment if more than 2 attempts): 1    Last PCP note: within 3 months , within [facility]      Subspecialty notes: Dermatology, Gyn/ONC    Other important co-morbidities:Per Epic: HTN, Hypothyroid, Morbid Obesity and CKD2      Tests already available:  No recent tests.   5/15/20  CBC with diff   TSH  CMP    9/18/18 Echo: EF 60-65%             Instructions given. (See in Nurse's note)    Optimization:  Anesthesia Preop Clinic Assessment  Indicated/    Medical Opinion Indicated/             Plan:    Testing:  BMP, EKG, Hematology Profile and T&S   Pre-anesthesia  visit       Visit focus: concerns in complex and/or prolonged anesthesia, position other than supine     Consultation:IM Perioperative Hospitalist or NP      Patient  has previously scheduled Medical Appointment: 08/14/20lab, EKG, Ct chest/abd    Navigation: Tests Scheduled.              Consults scheduled.             Results will be tracked by Preop Clinic.

## 2020-08-07 ENCOUNTER — TELEPHONE (OUTPATIENT)
Dept: PREADMISSION TESTING | Facility: HOSPITAL | Age: 70
End: 2020-08-07

## 2020-08-07 NOTE — TELEPHONE ENCOUNTER
8/14 Spoke with Patient. Patient ask to move EKG time so she could be seen in Preop at 11 am. Patient said she was righting info down.

## 2020-08-07 NOTE — TELEPHONE ENCOUNTER
----- Message from Mariah Medellin RN sent at 8/6/2020  3:38 PM CDT -----  ##Disregard last msg about only needing a POC##        Surgery Date: 08/24/20  Pre-op Appt: 08/14/20    Please call pt and schedule:    POC  [place] or  NP       Thank you,  Mariah Medellin RN

## 2020-08-14 ENCOUNTER — HOSPITAL ENCOUNTER (OUTPATIENT)
Dept: CARDIOLOGY | Facility: CLINIC | Age: 70
Discharge: HOME | End: 2020-08-14
Payer: COMMERCIAL

## 2020-08-14 ENCOUNTER — HOSPITAL ENCOUNTER (OUTPATIENT)
Dept: RADIOLOGY | Facility: HOSPITAL | Age: 70
Discharge: HOME | End: 2020-08-14
Attending: OBSTETRICS & GYNECOLOGY
Payer: COMMERCIAL

## 2020-08-14 DIAGNOSIS — C54.1 ENDOMETRIAL CA: ICD-10-CM

## 2020-08-14 LAB
CREAT SERPL-MCNC: 1.3 MG/DL (ref 0.5–1.4)
SAMPLE: NORMAL

## 2020-08-14 PROCEDURE — 71260 CT THORAX DX C+: CPT | Mod: 26,,, | Performed by: RADIOLOGY

## 2020-08-14 PROCEDURE — 93010 ELECTROCARDIOGRAM REPORT: CPT | Mod: S$GLB,,, | Performed by: INTERNAL MEDICINE

## 2020-08-14 PROCEDURE — 74177 CT ABD & PELVIS W/CONTRAST: CPT | Mod: TC

## 2020-08-14 PROCEDURE — 93010 EKG 12-LEAD: ICD-10-PCS | Mod: S$GLB,,, | Performed by: INTERNAL MEDICINE

## 2020-08-14 PROCEDURE — 93005 ELECTROCARDIOGRAM TRACING: CPT | Mod: S$GLB,,, | Performed by: OBSTETRICS & GYNECOLOGY

## 2020-08-14 PROCEDURE — 74177 CT CHEST ABDOMEN PELVIS WITH CONTRAST (XPD): ICD-10-PCS | Mod: 26,,, | Performed by: RADIOLOGY

## 2020-08-14 PROCEDURE — 93005 EKG 12-LEAD: ICD-10-PCS | Mod: S$GLB,,, | Performed by: OBSTETRICS & GYNECOLOGY

## 2020-08-14 PROCEDURE — 71260 CT CHEST ABDOMEN PELVIS WITH CONTRAST (XPD): ICD-10-PCS | Mod: 26,,, | Performed by: RADIOLOGY

## 2020-08-14 PROCEDURE — 25500020 PHARM REV CODE 255: Performed by: OBSTETRICS & GYNECOLOGY

## 2020-08-14 PROCEDURE — 74177 CT ABD & PELVIS W/CONTRAST: CPT | Mod: 26,,, | Performed by: RADIOLOGY

## 2020-08-14 RX ADMIN — IOHEXOL 15 ML: 350 INJECTION, SOLUTION INTRAVENOUS at 09:08

## 2020-08-14 RX ADMIN — IOHEXOL 100 ML: 350 INJECTION, SOLUTION INTRAVENOUS at 10:08

## 2020-08-17 ENCOUNTER — INITIAL CONSULT (OUTPATIENT)
Dept: INTERNAL MEDICINE | Facility: CLINIC | Age: 70
End: 2020-08-17
Payer: COMMERCIAL

## 2020-08-17 ENCOUNTER — TELEPHONE (OUTPATIENT)
Dept: GYNECOLOGIC ONCOLOGY | Facility: CLINIC | Age: 70
End: 2020-08-17

## 2020-08-17 VITALS
WEIGHT: 239.5 LBS | OXYGEN SATURATION: 98 % | HEART RATE: 55 BPM | TEMPERATURE: 98 F | SYSTOLIC BLOOD PRESSURE: 179 MMHG | DIASTOLIC BLOOD PRESSURE: 81 MMHG | BODY MASS INDEX: 39.85 KG/M2 | RESPIRATION RATE: 18 BRPM

## 2020-08-17 DIAGNOSIS — N18.30 CKD (CHRONIC KIDNEY DISEASE) STAGE 3, GFR 30-59 ML/MIN: ICD-10-CM

## 2020-08-17 DIAGNOSIS — M1A.39X0 CHRONIC GOUT DUE TO RENAL IMPAIRMENT OF MULTIPLE SITES WITHOUT TOPHUS: ICD-10-CM

## 2020-08-17 DIAGNOSIS — D64.9 NORMOCYTIC ANEMIA: ICD-10-CM

## 2020-08-17 DIAGNOSIS — E66.9 CLASS 2 OBESITY WITH BODY MASS INDEX (BMI) OF 39.0 TO 39.9 IN ADULT, UNSPECIFIED OBESITY TYPE, UNSPECIFIED WHETHER SERIOUS COMORBIDITY PRESENT: ICD-10-CM

## 2020-08-17 DIAGNOSIS — M54.9 BACK PAIN, UNSPECIFIED BACK LOCATION, UNSPECIFIED BACK PAIN LATERALITY, UNSPECIFIED CHRONICITY: ICD-10-CM

## 2020-08-17 DIAGNOSIS — Z52.4 RENAL DONOR: ICD-10-CM

## 2020-08-17 DIAGNOSIS — C54.1 ENDOMETRIAL CA: ICD-10-CM

## 2020-08-17 DIAGNOSIS — N39.41 URGE URINARY INCONTINENCE: ICD-10-CM

## 2020-08-17 DIAGNOSIS — R19.00 RETROPERITONEAL MASS: ICD-10-CM

## 2020-08-17 DIAGNOSIS — R16.0 HEPATOMEGALY: ICD-10-CM

## 2020-08-17 DIAGNOSIS — N18.2 CKD (CHRONIC KIDNEY DISEASE) STAGE 2, GFR 60-89 ML/MIN: ICD-10-CM

## 2020-08-17 DIAGNOSIS — Z90.5 ACQUIRED SOLITARY KIDNEY: ICD-10-CM

## 2020-08-17 DIAGNOSIS — C54.1 ENDOMETRIAL CA: Primary | ICD-10-CM

## 2020-08-17 DIAGNOSIS — I10 ESSENTIAL HYPERTENSION: ICD-10-CM

## 2020-08-17 DIAGNOSIS — L13.9 BULLOUS DERMATITIS: ICD-10-CM

## 2020-08-17 DIAGNOSIS — I83.12 VARICOSE VEINS OF LEFT LOWER EXTREMITY WITH INFLAMMATION: ICD-10-CM

## 2020-08-17 DIAGNOSIS — E89.0 HYPOTHYROIDISM, POSTSURGICAL: ICD-10-CM

## 2020-08-17 PROBLEM — E66.812 CLASS 2 OBESITY IN ADULT: Status: ACTIVE | Noted: 2020-08-17

## 2020-08-17 PROCEDURE — 99999 PR PBB SHADOW E&M-EST. PATIENT-LVL IV: CPT | Mod: PBBFAC,,, | Performed by: NURSE PRACTITIONER

## 2020-08-17 PROCEDURE — 3079F PR MOST RECENT DIASTOLIC BLOOD PRESSURE 80-89 MM HG: ICD-10-PCS | Mod: CPTII,S$GLB,, | Performed by: NURSE PRACTITIONER

## 2020-08-17 PROCEDURE — 3077F PR MOST RECENT SYSTOLIC BLOOD PRESSURE >= 140 MM HG: ICD-10-PCS | Mod: CPTII,S$GLB,, | Performed by: NURSE PRACTITIONER

## 2020-08-17 PROCEDURE — 1159F MED LIST DOCD IN RCRD: CPT | Mod: S$GLB,,, | Performed by: NURSE PRACTITIONER

## 2020-08-17 PROCEDURE — 3079F DIAST BP 80-89 MM HG: CPT | Mod: CPTII,S$GLB,, | Performed by: NURSE PRACTITIONER

## 2020-08-17 PROCEDURE — 99214 OFFICE O/P EST MOD 30 MIN: CPT | Mod: S$GLB,,, | Performed by: NURSE PRACTITIONER

## 2020-08-17 PROCEDURE — 99999 PR PBB SHADOW E&M-EST. PATIENT-LVL IV: ICD-10-PCS | Mod: PBBFAC,,, | Performed by: NURSE PRACTITIONER

## 2020-08-17 PROCEDURE — 1101F PT FALLS ASSESS-DOCD LE1/YR: CPT | Mod: CPTII,S$GLB,, | Performed by: NURSE PRACTITIONER

## 2020-08-17 PROCEDURE — 1101F PR PT FALLS ASSESS DOC 0-1 FALLS W/OUT INJ PAST YR: ICD-10-PCS | Mod: CPTII,S$GLB,, | Performed by: NURSE PRACTITIONER

## 2020-08-17 PROCEDURE — 1159F PR MEDICATION LIST DOCUMENTED IN MEDICAL RECORD: ICD-10-PCS | Mod: S$GLB,,, | Performed by: NURSE PRACTITIONER

## 2020-08-17 PROCEDURE — 3008F PR BODY MASS INDEX (BMI) DOCUMENTED: ICD-10-PCS | Mod: CPTII,S$GLB,, | Performed by: NURSE PRACTITIONER

## 2020-08-17 PROCEDURE — 3008F BODY MASS INDEX DOCD: CPT | Mod: CPTII,S$GLB,, | Performed by: NURSE PRACTITIONER

## 2020-08-17 PROCEDURE — 99214 PR OFFICE/OUTPT VISIT, EST, LEVL IV, 30-39 MIN: ICD-10-PCS | Mod: S$GLB,,, | Performed by: NURSE PRACTITIONER

## 2020-08-17 PROCEDURE — 3077F SYST BP >= 140 MM HG: CPT | Mod: CPTII,S$GLB,, | Performed by: NURSE PRACTITIONER

## 2020-08-17 RX ORDER — LOSARTAN POTASSIUM 100 MG/1
100 TABLET ORAL DAILY
Qty: 30 TABLET | Refills: 0 | Status: SHIPPED | OUTPATIENT
Start: 2020-08-17 | End: 2020-09-15 | Stop reason: ALTCHOICE

## 2020-08-17 NOTE — PROGRESS NOTES
Justin [facility]arg - Pre Op Consult  Progress Note    Patient Name: Ankita Campo  MRN: [id number]  Date of Evaluation- 08/21/2020  PCP- Michael Elias MD (Inactive)    Future cases for Ankita Campo [[id number]]     Case ID Status Date Time Luis Procedure Provider Location    [id number] [facility] 8/24/2020 10:00  XI ROBOTIC SALPINGO-OOPHORECTOMY Donte Weeks MD [[id number]] [facility] OR 2ND FLR          HPI:  This is a 70 y.o. female  who presents today for a preoperative evaulation in preparation for GYN  surgery. Scheduled forRobotic hysterectomy and Omentectomy .  States surgery is indicated for endometrial cancer.   Patient is new to me.  Details of current problem: The duration of problem is  5-6 months ago .   Reports symptoms of postmenopausal bleeding; had D&C done with patho revealing endometrial cancer.  Denies abdominal pain, nausea or vomiting.  Reports occasional constipation. Relieving factors are Dulcolax prn.  Denies pain today.   Patient is crying after receiving upsetting news from Dr. Weeks. Initially, patient reluctant to answer questions but eventually relaxed and was able to get through interview.  The history has been obtained by speaking with the patient and reviewing the electronic medical record and/or outside health information. Significant health conditions for the perioperative period are discussed below in assessment and plan.     Patient reports current health status to be Fair.  Denies any new symptoms before surgery.       Subjective/ Objective:     Chief Complaint: Preoperative evaulation, perioperative medical management, and complication reduction plan.     Functional Capacity: Very physical job:      Anesthesia issues: None  Difficulty mouth opening: No  Steroid use in the last 12 months:  Yes    Family anesthesia difficulty: None       Active Cardiac Conditions: None    Revised Cardiac Risk Index Predictors: None         Family Hx of Thrombosis: None    Past Medical History Pertinent  Negatives:   Diagnosis Date Noted    Asthma 2020    Depression 2020    Diabetes mellitus 10/08/2012    Diabetes mellitus 2020    Diabetes mellitus, type 2 2020    Emphysema of lung 2020    GERD (gastroesophageal reflux disease) 2020    Myocardial infarction 2020    Seizures 2020    Stroke 2020       Past Surgical History:   Procedure Laterality Date    cararact       SECTION      CHOLECYSTECTOMY      Donor Nephrectomy Left     fibroidectomy      HAND SURGERY      left    TOTAL THYROIDECTOMY         Review of Systems   Constitutional: Positive for unexpected weight change (loss of 20  pounds in the last six months). Negative for chills, fatigue and fever.   HENT: Negative for dental problem, hearing loss, postnasal drip, rhinorrhea, sore throat, tinnitus and trouble swallowing.    Eyes: Negative for photophobia, pain, discharge and visual disturbance.   Respiratory: Negative for apnea, cough, chest tightness, shortness of breath and wheezing.         STOP BANG risk factors:  HTN  BMI > 35     Cardiovascular: Negative for chest pain, palpitations and leg swelling.   Gastrointestinal: Negative for abdominal pain, blood in stool, constipation, nausea and vomiting.   Endocrine: Negative for cold intolerance, heat intolerance, polydipsia, polyphagia and polyuria.   Genitourinary: Negative for decreased urine volume, difficulty urinating, dysuria, frequency, hematuria and urgency.        Urge incontinence   Musculoskeletal: Negative for arthralgias, back pain, neck pain and neck stiffness.   Skin: Positive for wound (right ankle). Negative for rash.   Allergic/Immunologic: Negative for immunocompromised state.   Neurological: Negative for dizziness, tremors, seizures, syncope, weakness, numbness and headaches.   Hematological: Negative for adenopathy. Does not bruise/bleed easily.   Psychiatric/Behavioral: Negative for confusion, hallucinations,  sleep disturbance and suicidal ideas.              VITALS    Vitals - 1 value per visit 8/17/2020   SYSTOLIC 179   DIASTOLIC 81   PULSE 55   TEMPERATURE 98   RESPIRATIONS 18   SPO2 98   Weight (lb) 239.5   Weight (kg) 108.636   HEIGHT    BODY MASS INDEX 39.85       Physical Exam  Vitals signs reviewed.   Constitutional:       General: She is not in acute distress.     Appearance: She is well-developed. She is obese.   HENT:      Head: Normocephalic.      Nose: Nose normal.      Mouth/Throat:      Pharynx: No oropharyngeal exudate.   Eyes:      General:         Right eye: No discharge.         Left eye: No discharge.      Conjunctiva/sclera: Conjunctivae normal.      Pupils: Pupils are equal, round, and reactive to light.   Neck:      Musculoskeletal: Normal range of motion.      Thyroid: No thyromegaly.      Vascular: No carotid bruit or JVD.      Trachea: No tracheal deviation.   Cardiovascular:      Rate and Rhythm: Regular rhythm. Bradycardia present.      Pulses:           Carotid pulses are 2+ on the right side and 2+ on the left side.       Dorsalis pedis pulses are 2+ on the right side and 2+ on the left side.        Posterior tibial pulses are 2+ on the right side and 2+ on the left side.      Heart sounds: Normal heart sounds. No murmur.   Pulmonary:      Effort: Pulmonary effort is normal. No respiratory distress.      Breath sounds: Normal breath sounds. No stridor. No wheezing, rhonchi or rales.   Abdominal:      General: Bowel sounds are normal. There is no distension.      Palpations: Abdomen is soft.      Tenderness: There is no guarding.   Musculoskeletal:        Feet:    Lymphadenopathy:      Cervical: No cervical adenopathy.   Skin:     General: Skin is warm and dry.      Capillary Refill: Capillary refill takes 2 to 3 seconds.      Findings: No erythema or rash.   Neurological:      Mental Status: She is alert and oriented to person, place, and time.      Coordination: Coordination normal.           Significant Labs:  Lab Results   Component Value Date    WBC 5.15 08/14/2020    HGB 11.6 (L) 08/14/2020    HCT 38.9 08/14/2020     08/14/2020    CHOL 196 04/09/2018    TRIG 71 04/09/2018    HDL 70 04/09/2018    ALT 7 (L) 05/15/2020    AST 16 05/15/2020     08/14/2020    K 4.0 08/14/2020     08/14/2020    CREATININE 1.3 08/14/2020    BUN 23 08/14/2020    CO2 25 08/14/2020    TSH 3.650 05/15/2020    INR 1.0 02/24/2010       Diagnostic Studies: No relevant studies.    EKG:   Results for orders placed or performed during the hospital encounter of 08/14/20   EKG 12-lead    Collection Time: 08/14/20 11:17 AM    Narrative    Test Reason : C54.1,    Vent. Rate : 056 BPM     Atrial Rate : 056 BPM     P-R Int : 200 ms          QRS Dur : 084 ms      QT Int : 418 ms       P-R-T Axes : 065 018 040 degrees     QTc Int : 403 ms    Sinus bradycardia  Within normal limits    When compared with ECG of 06-JUL-2020 16:00,  No significant change was found  Confirmed by Luciano Henao MD ([id number]) on 8/14/2020 11:21:40 PM    Referred By: BREANNE CASTELAN           Confirmed By:Luciano Henao MD       2D ECHO:  TTE:  CONCLUSIONS     1 - Normal left ventricular systolic function (EF 60-65%).     2 - No wall motion abnormalities.     3 - Biatrial enlargement.     4 - Indeterminate LV diastolic function.     5 - Normal right ventricular systolic function .     6 - The estimated PA systolic pressure is 27 mmHg.     7 - Trivial mitral regurgitation.     8 - Mild tricuspid regurgitation.         This document has been electronically    SIGNED BY: Rebecca Bagley MD On: 09/28/2018 13:18       STEPHANIE:  No results found for this or any previous visit.         Revised Cardiac Risk Index   High -Risk Surgery  Intraperitoneal; Intrathoracic; suprainguinal vascular Yes- + 1 No- 0   History of Ischemic Heart Disease   (Hx of MI/positive exercise test/current chest pain due to ischemia/use of nitrate therapy/EKG with pathological Q waves) Yes-  + 1 No- 0   History of CHF  (Pulmonary edema/bilateral rales or S3 gallop/PND/CXR showing pulmonary vascular redistribution) Yes- + 1 No- 0   History of CVA   (Prior stroke or TIA) Yes- + 1 No- 0   Pre-operative treatment with insulin Yes- + 1 No- 0   Pre-operative creatinine > 2mg/dl Yes- + 1 No- 0   Total: 0      Risk Status:  Estimated risk of cardiac complications after non-cardiac surgery using the Revised Cardiac Risk Index for Preoperative risk is 3.9 %      ARISCAT (Canet) risk index: Low    American Society of Anesthesiologists Physical Status classification (ASA): 3    Suresh respiratory failure index: 0.5 %    WOO postop respiratory failure risk (For General Anesthesia): 0.7 %       No further cardiac workup needed prior to surgery.          Orders Placed This Encounter    losartan (COZAAR) 100 MG tablet           Assessment/Plan:     Bullous dermatitis  Last seen by Derm 6/1/20; prescribed steroid ointment. States almost resolved except for one small lesion near right ankle. Per Derm note- dermatitis possibly linked to Naproxen which patient is still talking prn for knee pain.     Essential hypertension  Current BP  uncontrolled today. 179/81 at POC visit- ran out of HTN medication (Losartan 100 mg daily).  Primary Care doctor has left [facility] and needs a new provider. Will prescribe 30 days of Losartan until an appt. is made with a new provider.   No regular BP checks. Instructed patient to check BP daily  at a drug store or friend's home. She has a friend who is a nurse and will get BP check there; she will call with results on Friday 8/21.  Encouraged keeping a healthy weight and BMI  Education was provided regarding lifestyle changes to reduce systolic BP;  Exercise 30 minutes per day,  5 days per week or 150 minutes weekly;  Sodium reduction and avoidance of high salt foods such as processed meats, frozen meals, fast foods.         Urge urinary incontinence  Not bothered by it at this time and  it is not being treated.    Back pain  No back pain; denies new symtpoms.     Chronic gout due to renal impairment of multiple sites without tophus  No maintenance medication; 2017 - last episode    Varicose veins of lower extremities with inflammation  Encouraged compression stockings to reduce leg fatigue, swelling, or pain. Increased risk of thrombosis.    CKD (chronic kidney disease) stage 3, GFR 30-59 ml/min  Denies nausea/vomiting, hematuria, edema, or fatigue.  No changes in urine volume. Most recent GFR: 47.7.  BUN = 23 and Cr = 1.3.  Encouraged to avoid NSAIDs, reduce salt intake, and exercise.    Endometrial ca  Scheduled with Dr. Weeks on 8/24/20 for robotic hysterectomy and omentectomy.    Hypothyroidism, postsurgical  Currently treated with Levothyroxine 112 mcg. Last TSH in May was normal.  Needs follow-up Endocrinology appt.     Class 2 obesity in adult  Patient would benefit from weight loss and has not  tried to set realistic goals to achieve success. Lifestyle changes were discussed on eating healthy, exercising at least 150 minutes weekly, and reducing sedentary behavior.   Discussed the risk factors associated with obesity: Arthritis/LAURA/Diabetes/Fatty Liver/Cardiovascular disease/GERD/HTN/HLP.    Hepatomegaly  Per US May 2011.   Does not have significant coagulopathy. No recent INR. Platelet count 191.    Encouraged healthy diet: avoid sugar intake and  fatty foods; avoid alcohol consumption. Increase physical activity; exercise at least 150 minutes weekly.  Would benefit from weight loss. Not followed by Hepatology.    Normocytic anemia  Current labs:   Hgb= 11.6, Hct= 38.9; MCV is normal at 94. Like attributed to NSAIDs use and endometrial cancer. Recommend monitoring during the perioperative period.   Denies symptoms of weakness, fatigue, exercise intolerance or CP/SOB.    Acquired solitary kidney  Donated left kidney to brother many years ago.  Now with CKD stage 3; stable.          Discussion/Management of Perioperative Care    Thromboembolic prophylaxis (VTE) Care: Risk factors for thrombosis include: obesity, surgical procedure and varicose veins.  I recommend prophylaxis of thromboembolism with the use of compression stockings/pneumatic devices, and/or pharmacologic agents. The benefits should outweigh the risks for pharmacologic prophylaxis in the perioperative period. I also encourage early ambulation if not contraindicated during the post-operative period.    Risk factors for post-operative pulmonary complications include:HTN. To reduce the risk of pulmonary complications, prophylactic recommendations include: incentive spirometry use/deep breathing, early ambulation and pain control.    Risk factors for renal complications include: HTN. To reduce the risk of postoperative renal complications, I recommend the patient maintain adequate fluid volume status by drinking 2 liters of water daily.  Avoid/reduce NSAIDS and SEYMOUR-2 inhibitors use as well as IV contrast for renal protection.    I recommend the use of appropriate prophylactic antibiotics to reduce the risk of surgical site infections.    I recommend to avoid/reduce use of benzodiazepine use (not for patients who take on a regular basis), anticholinergics, Benadryl,  and agents that may cause postoperative serotonin syndrome.  Controlled pain can decrease the risk for postop delirium and since opioids are used for postoperative pain control, I suggest using the lowest dose for the shortest amount of time necessary for pain management.     The patient is at an increased risk for urinary retention due to : gynecological procedure. I recommend to avoid/decrease the use of benzodiazepines, anticholinergics, and Benadryl in the perioperative period. I also recommend using opioids for the shortest period of time if possible.          This visit was focused on Preoperative evaluation, Perioperative Medical management, complication  reduction plans. I suggest that the patient follows up with primary care or relevant sub specialists for ongoing health care.    I appreciate the opportunity to be involved in this patients care. Please feel free to contact me if there were any questions about this consultation.    Patient is optimized- better BP control    Awaiting BP readings on Friday 8/21.    8/21/20: Home BP readings: range (Sys) 145-165 and (Degroot) 89-99. Average: 155/93. Although BP measurements are still elevated, BP is acceptable for surgery. Recently restarted on BP medication this week. Recommend follow-up after surgery with PCP for refills and further eval.    Socorro Abernathy NP  Perioperative Medicine  [facility]

## 2020-08-17 NOTE — ASSESSMENT & PLAN NOTE
Currently treated with Levothyroxine 112 mcg. Last TSH in May was normal.  Needs follow-up Endocrinology appt.

## 2020-08-17 NOTE — ASSESSMENT & PLAN NOTE
Current labs:   Hgb= 11.6, Hct= 38.9; MCV is normal at 94. Like attributed to NSAIDs use and endometrial cancer. Recommend monitoring during the perioperative period.   Denies symptoms of weakness, fatigue, exercise intolerance or CP/SOB.

## 2020-08-17 NOTE — PATIENT INSTRUCTIONS
Your surgery has been scheduled for:8/24    You should report to:    ____[facility], located on the [place] side of the first floor of the           [facility] ([phone])    __X__The Second Floor Surgery Center, located on the [facility] side of the            Second floor of the [facility] ([phone])    Please Note   - Tell your doctor if you take Aspirin, products containing Aspirin, herbal medications or blood thinners, such as Coumadin, Ticlid, or Plavix.  (Consult your provider regarding holding or stopping before surgery).  - Arrange for someone to drive you home following surgery.  You will not be allowed to leave the surgical facility alone or drive yourself home following sedation and anesthesia.    Before Surgery  - Stop taking all vitamins/ herbal medications 7 days prior to surgery  - No Motrin/Advil (Ibuprofen) 7 days before surgery  - No Aleve (Naproxen) 7 days before surgery  - Stop taking Aspirin, products containing Aspirin 7 days before surgery  - Stop taking blood thinners_7_days before surgery  - No Goody's/BC  Powder 7 days before surgery  - Refrain from drinking alcoholic beverages for 24 hours before and after surgery  - Stop or limit smoking 14 days before surgery  - You may take Tylenol for pain    Night before Surgery   Stop ALL solid food, gum, candy (including vitamins) 8 hours before arrival time.  (Please note: If your surgeon gives you different eating and drinking instructions, please follow surgeon's directions.)   Stop all CLOUDY liquids: coffee with creamer, formula, tube feeds, cloudy juices, non-human milk and breast milk with additives, 6 hours prior to arrival time.   The patient should be ENCOURAGED to drink carbohydrate-rich clear liquids (sports drinks, clear juices) until 2 hours prior to arrival time.   CLEAR liquids include only water, black coffee NO creamer, clear oral rehydration drinks, clear sports  drinks or clear fruit juices (no orange juice, no pulpy juices, no apple cider). Advise patients if they can read newsprint through the liquid, it qualifies as clear liquid.    IF IN DOUBT, drink water instead.   - Take a shower or bath (shower is recommended).  Bathe with Hibiclens soap or an antibacterial soap from the neck down.  If not supplied by your surgeon, hibiclens soap will need to be purchased over the counter in pharmacy.  Rinse soap off thoroughly.  - Shampoo your hair with your regular shampoo    The Day of Surgery    NOTHING TO  DRINK 2 hours before arrival time. If you are told to take medication on the morning of surgery, it may be taken with a sip of water.   - Take another bath or shower with hibiclens or any antibacterial soap, to reduce the chance of infection.  - Take heart and blood pressure medications with a small sip of water, as advised by the perioperative team.  - Do not take fluid pills  - You may brush your teeth and rinse your mouth, but do not swallow any additional water.   - Do not apply perfumes, powder, body lotions or deodorant on the day of surgery.  - Nail polish should be removed.  - Do not wear makeup or moisturizer.  - Wear comfortable clothes, such as a button front shirt and loose fitting pants.  - Leave all jewelry, including body piercings, and valuables at home.    - Bring any devices you will neeed after surgery such as crutches or canes.  - If you have sleep apnea, please bring your CPAP machine.    In the event that your physical condition changes including the onset of a cold or respiratory illness, or if you have to delay or cancel your surgery, please notify your surgeon.  Anesthesia: General Anesthesia     You are watched continuously during your procedure by your anesthesia provider.     Youre due to have surgery. During surgery, youll be given medicine called anesthesia or anesthetic. This will keep you comfortable and pain-free. Your anesthesia  provider will use general anesthesia.  What is general anesthesia?  General anesthesia puts you into a state like deep sleep. It goes into the bloodstream (IV anesthetics), into the lungs (gas anesthetics), or both. You feel nothing during the procedure. You will not remember it. During the procedure, the anesthesia provider monitors you continuously. He or she checks your heart rate and rhythm, blood pressure, breathing, and blood oxygen.  · IV anesthetics. IV anesthetics are given through an IV line in your arm. Theyre often given first. This is so you are asleep before a gas anesthetic is started. Some kinds of IV anesthetics relieve pain. Others relax you. Your doctor will decide which kind is best in your case.  · Gas anesthetics. Gas anesthetics are breathed into the lungs. They are often used to keep you asleep. They can be given through a facemask or a tube placed in your larynx or trachea (breathing tube).  ? If you have a facemask, your anesthesia provider will most likely place it over your nose and mouth while youre still awake. Youll breathe oxygen through the mask as your IV anesthetic is started. Gas anesthetic may be added through the mask.  ? If you have a tube in the larynx or trachea, it will be inserted into your throat after youre asleep.  Anesthesia tools and medicines  You will likely have:  · IV anesthetics. These are put into an IV line into your bloodstream.  · Gas anesthetics. You breathe these anesthetics into your lungs, where they pass into your bloodstream.  · Pulse oximeter. This is a small clip that is attached to the end of your finger. This measures your blood oxygen level.  · Electrocardiography leads (electrodes). These are small sticky pads that are placed on your chest. They record your heart rate and rhythm.  · Blood pressure cuff. This reads your blood pressure.  Risks and possible complications  General anesthesia has some risks. These include:  · Breathing  problems  · Nausea and vomiting  · Sore throat or hoarseness (usually temporary)  · Allergic reaction to the anesthetic  · Irregular heartbeat (rare)  · Cardiac arrest (rare)   Anesthesia safety  · Follow all instructions you are given for how long not to eat or drink before your procedure.  · Be sure your doctor knows what medicines and drugs you take. This includes over-the-counter medicines, herbs, supplements, alcohol or other drugs. You will be asked when those were last taken.  · Have an adult family member or friend drive you home after the procedure.  · For the first 24 hours after your surgery:  ? Do not drive or use heavy equipment.  ? Do not make important decisions or sign legal documents. If important decisions or signing legal documents is necessary during the first 24 hours after surgery, have a trusted family member or spouse act on your behalf.  ? Avoid alcohol.  ? Have a responsible adult stay with you. He or she can watch for problems and help keep you safe.  Date Last Reviewed: 12/1/2016 © 2000-2017 [employer], [employer]. [address] [place] [address], [place] [address]. All rights reserved. This information is not intended as a substitute for professional medical care. Always follow your healthcare professional's instructions

## 2020-08-17 NOTE — TELEPHONE ENCOUNTER
----- Message from Donte Weeks MD sent at 8/17/2020 12:55 PM CDT -----  Please schedule for PET ASAP. Thank you.

## 2020-08-17 NOTE — ASSESSMENT & PLAN NOTE
Last seen by Derm 6/1/20; prescribed steroid ointment. States almost resolved except for one small lesion near right ankle. Per Derm note- dermatitis possibly linked to Naproxen which patient is still talking prn for knee pain.

## 2020-08-17 NOTE — TELEPHONE ENCOUNTER
Spoke with our patient about her schedule appointment on the 21st  she agreed she voiced understanding of the date, time and location. All questions answered appointment mail. MA/NIALL /Preceptor Efren BRYANT

## 2020-08-17 NOTE — OUTPATIENT SUBJECTIVE & OBJECTIVE
Outpatient Subjective & Objective      Chief Complaint: Preoperative evaulation, perioperative medical management, and complication reduction plan.     Functional Capacity: Very physical job:      Anesthesia issues: None  Difficulty mouth opening: No  Steroid use in the last 12 months:  Yes    Family anesthesia difficulty: None       Active Cardiac Conditions: None    Revised Cardiac Risk Index Predictors: None         Family Hx of Thrombosis: None    Past Medical History Pertinent Negatives:   Diagnosis Date Noted    Asthma 2020    Depression 2020    Diabetes mellitus 10/08/2012    Diabetes mellitus 2020    Diabetes mellitus, type 2 2020    Emphysema of lung 2020    GERD (gastroesophageal reflux disease) 2020    Myocardial infarction 2020    Seizures 2020    Stroke 2020       Past Surgical History:   Procedure Laterality Date    cararact       SECTION      CHOLECYSTECTOMY      Donor Nephrectomy Left     fibroidectomy      HAND SURGERY      left    TOTAL THYROIDECTOMY         Review of Systems   Constitutional: Positive for unexpected weight change (loss of 20  pounds in the last six months). Negative for chills, fatigue and fever.   HENT: Negative for dental problem, hearing loss, postnasal drip, rhinorrhea, sore throat, tinnitus and trouble swallowing.    Eyes: Negative for photophobia, pain, discharge and visual disturbance.   Respiratory: Negative for apnea, cough, chest tightness, shortness of breath and wheezing.         STOP BANG risk factors:  HTN  BMI > 35     Cardiovascular: Negative for chest pain, palpitations and leg swelling.   Gastrointestinal: Negative for abdominal pain, blood in stool, constipation, nausea and vomiting.   Endocrine: Negative for cold intolerance, heat intolerance, polydipsia, polyphagia and polyuria.   Genitourinary: Negative for decreased urine volume, difficulty urinating, dysuria, frequency, hematuria  and urgency.        Urge incontinence   Musculoskeletal: Negative for arthralgias, back pain, neck pain and neck stiffness.   Skin: Positive for wound (right ankle). Negative for rash.   Allergic/Immunologic: Negative for immunocompromised state.   Neurological: Negative for dizziness, tremors, seizures, syncope, weakness, numbness and headaches.   Hematological: Negative for adenopathy. Does not bruise/bleed easily.   Psychiatric/Behavioral: Negative for confusion, hallucinations, sleep disturbance and suicidal ideas.              VITALS    Vitals - 1 value per visit 8/17/2020   SYSTOLIC 179   DIASTOLIC 81   PULSE 55   TEMPERATURE 98   RESPIRATIONS 18   SPO2 98   Weight (lb) 239.5   Weight (kg) 108.636   HEIGHT    BODY MASS INDEX 39.85       Physical Exam  Vitals signs reviewed.   Constitutional:       General: She is not in acute distress.     Appearance: She is well-developed. She is obese.   HENT:      Head: Normocephalic.      Nose: Nose normal.      Mouth/Throat:      Pharynx: No oropharyngeal exudate.   Eyes:      General:         Right eye: No discharge.         Left eye: No discharge.      Conjunctiva/sclera: Conjunctivae normal.      Pupils: Pupils are equal, round, and reactive to light.   Neck:      Musculoskeletal: Normal range of motion.      Thyroid: No thyromegaly.      Vascular: No carotid bruit or JVD.      Trachea: No tracheal deviation.   Cardiovascular:      Rate and Rhythm: Regular rhythm. Bradycardia present.      Pulses:           Carotid pulses are 2+ on the right side and 2+ on the left side.       Dorsalis pedis pulses are 2+ on the right side and 2+ on the left side.        Posterior tibial pulses are 2+ on the right side and 2+ on the left side.      Heart sounds: Normal heart sounds. No murmur.   Pulmonary:      Effort: Pulmonary effort is normal. No respiratory distress.      Breath sounds: Normal breath sounds. No stridor. No wheezing, rhonchi or rales.   Abdominal:      General:  Bowel sounds are normal. There is no distension.      Palpations: Abdomen is soft.      Tenderness: There is no guarding.   Musculoskeletal:        Feet:    Lymphadenopathy:      Cervical: No cervical adenopathy.   Skin:     General: Skin is warm and dry.      Capillary Refill: Capillary refill takes 2 to 3 seconds.      Findings: No erythema or rash.   Neurological:      Mental Status: She is alert and oriented to person, place, and time.      Coordination: Coordination normal.          Significant Labs:  Lab Results   Component Value Date    WBC 5.15 08/14/2020    HGB 11.6 (L) 08/14/2020    HCT 38.9 08/14/2020     08/14/2020    CHOL 196 04/09/2018    TRIG 71 04/09/2018    HDL 70 04/09/2018    ALT 7 (L) 05/15/2020    AST 16 05/15/2020     08/14/2020    K 4.0 08/14/2020     08/14/2020    CREATININE 1.3 08/14/2020    BUN 23 08/14/2020    CO2 25 08/14/2020    TSH 3.650 05/15/2020    INR 1.0 02/24/2010       Diagnostic Studies: No relevant studies.    EKG:   Results for orders placed or performed during the hospital encounter of 08/14/20   EKG 12-lead    Collection Time: 08/14/20 11:17 AM    Narrative    Test Reason : C54.1,    Vent. Rate : 056 BPM     Atrial Rate : 056 BPM     P-R Int : 200 ms          QRS Dur : 084 ms      QT Int : 418 ms       P-R-T Axes : 065 018 040 degrees     QTc Int : 403 ms    Sinus bradycardia  Within normal limits    When compared with ECG of 06-JUL-2020 16:00,  No significant change was found  Confirmed by Luciano Henao MD ([id number]) on 8/14/2020 11:21:40 PM    Referred By: BREANNE CASTELAN           Confirmed By:Luciano Henao MD       2D ECHO:  TTE:  CONCLUSIONS     1 - Normal left ventricular systolic function (EF 60-65%).     2 - No wall motion abnormalities.     3 - Biatrial enlargement.     4 - Indeterminate LV diastolic function.     5 - Normal right ventricular systolic function .     6 - The estimated PA systolic pressure is 27 mmHg.     7 - Trivial mitral regurgitation.      8 - Mild tricuspid regurgitation.         This document has been electronically    SIGNED BY: Rebecca Bagley MD On: 09/28/2018 13:18       STEPHANIE:  No results found for this or any previous visit.         Revised Cardiac Risk Index   High -Risk Surgery  Intraperitoneal; Intrathoracic; suprainguinal vascular Yes- + 1 No- 0   History of Ischemic Heart Disease   (Hx of MI/positive exercise test/current chest pain due to ischemia/use of nitrate therapy/EKG with pathological Q waves) Yes- + 1 No- 0   History of CHF  (Pulmonary edema/bilateral rales or S3 gallop/PND/CXR showing pulmonary vascular redistribution) Yes- + 1 No- 0   History of CVA   (Prior stroke or TIA) Yes- + 1 No- 0   Pre-operative treatment with insulin Yes- + 1 No- 0   Pre-operative creatinine > 2mg/dl Yes- + 1 No- 0   Total: 0      Risk Status:  Estimated risk of cardiac complications after non-cardiac surgery using the Revised Cardiac Risk Index for Preoperative risk is 3.9 %      ARISCAT (Canet) risk index: Low    American Society of Anesthesiologists Physical Status classification (ASA): 3    Suresh respiratory failure index: 0.5 %    CHILO postop respiratory failure risk (For General Anesthesia): 0.7 %       No further cardiac workup needed prior to surgery.    Outpatient Subjective & Objective

## 2020-08-17 NOTE — ASSESSMENT & PLAN NOTE
Denies nausea/vomiting, hematuria, edema, or fatigue.  No changes in urine volume. Most recent GFR: 47.7.  BUN = 23 and Cr = 1.3.  Encouraged to avoid NSAIDs, reduce salt intake, and exercise.

## 2020-08-17 NOTE — ASSESSMENT & PLAN NOTE
Current BP  uncontrolled today. 179/81 at POC visit- ran out of HTN medication (Losartan 100 mg daily).  Primary Care doctor has left [facility] and needs a new provider. Will prescribe 30 days of Losartan until an appt. is made with a new provider.   No regular BP checks. Instructed patient to check BP daily  at a drug store or friend's home. She has a friend who is a nurse and will get BP check there; she will call with results on Friday 8/21.  Encouraged keeping a healthy weight and BMI  Education was provided regarding lifestyle changes to reduce systolic BP;  Exercise 30 minutes per day,  5 days per week or 150 minutes weekly;  Sodium reduction and avoidance of high salt foods such as processed meats, frozen meals, fast foods.

## 2020-08-17 NOTE — ASSESSMENT & PLAN NOTE
Patient would benefit from weight loss and has not  tried to set realistic goals to achieve success. Lifestyle changes were discussed on eating healthy, exercising at least 150 minutes weekly, and reducing sedentary behavior.   Discussed the risk factors associated with obesity: Arthritis/LAURA/Diabetes/Fatty Liver/Cardiovascular disease/GERD/HTN/HLP.

## 2020-08-17 NOTE — ASSESSMENT & PLAN NOTE
Per US May 2011.   Does not have significant coagulopathy. No recent INR. Platelet count 191.    Encouraged healthy diet: avoid sugar intake and  fatty foods; avoid alcohol consumption. Increase physical activity; exercise at least 150 minutes weekly.  Would benefit from weight loss. Not followed by Hepatology.

## 2020-08-17 NOTE — LETTER
August 17, 2020      Donte Weeks MD  [address] [facility] [address]           [facility]  [address] [facility] [address]  Phone: [phone]          Patient: Ankita Campo   MR Number: [id number]   YOB: 1950   Date of Visit: 8/17/2020       Dear Dr. Donte Weeks:    Thank you for referring Ankita Campo to me for evaluation. Attached you will find relevant portions of my assessment and plan of care.    If you have questions, please do not hesitate to call me. I look forward to following Ankita Campo along with you.    Sincerely,    Socorro Abernathy, NP    Enclosure  CC:  No Recipients    If you would like to receive this communication electronically, please contact [email or web address] or [phone] to request more information on [employer] access.    For providers and/or their staff who would like to refer a patient to [facility], please contact us through our one-stop-shop provider referral line, Nael Smith, at [phone].    If you feel you have received this communication in error or would no longer like to receive these types of communications, please e-mail [email or web address]

## 2020-08-17 NOTE — HPI
This is a 70 y.o. female  who presents today for a preoperative evaulation in preparation for GYN  surgery. Scheduled forRobotic hysterectomy and Omentectomy .  States surgery is indicated for endometrial cancer.   Patient is new to me.  Details of current problem: The duration of problem is  5-6 months ago .   Reports symptoms of postmenopausal bleeding; had D&C done with patho revealing endometrial cancer.  Denies abdominal pain, nausea or vomiting.  Reports occasional constipation. Relieving factors are Dulcolax prn.  Denies pain today.   Patient is crying after receiving upsetting news from Dr. Weeks. Initially, patient reluctant to answer questions but eventually relaxed and was able to get through interview.  The history has been obtained by speaking with the patient and reviewing the electronic medical record and/or outside health information. Significant health conditions for the perioperative period are discussed below in assessment and plan.     Patient reports current health status to be Fair.  Denies any new symptoms before surgery.

## 2020-08-18 PROBLEM — Z52.4 RENAL DONOR: Status: ACTIVE | Noted: 2020-08-18

## 2020-08-18 PROBLEM — Z52.4 RENAL DONOR: Status: RESOLVED | Noted: 2020-08-18 | Resolved: 2020-08-18

## 2020-08-21 ENCOUNTER — HOSPITAL ENCOUNTER (OUTPATIENT)
Dept: RADIOLOGY | Facility: HOSPITAL | Age: 70
Discharge: HOME | DRG: 741 | End: 2020-08-21
Attending: OBSTETRICS & GYNECOLOGY
Payer: COMMERCIAL

## 2020-08-21 ENCOUNTER — CLINICAL SUPPORT (OUTPATIENT)
Dept: URGENT CARE | Facility: CLINIC | Age: 70
DRG: 741 | End: 2020-08-21
Payer: COMMERCIAL

## 2020-08-21 ENCOUNTER — TELEPHONE (OUTPATIENT)
Dept: GYNECOLOGIC ONCOLOGY | Facility: CLINIC | Age: 70
End: 2020-08-21

## 2020-08-21 VITALS — TEMPERATURE: 97 F

## 2020-08-21 DIAGNOSIS — C54.1 ENDOMETRIAL CA: ICD-10-CM

## 2020-08-21 DIAGNOSIS — Z01.818 PRE-OP TESTING: ICD-10-CM

## 2020-08-21 DIAGNOSIS — R19.00 RETROPERITONEAL MASS: ICD-10-CM

## 2020-08-21 LAB
POCT GLUCOSE: 96 MG/DL (ref 70–110)
SARS-COV-2 RNA RESP QL NAA+PROBE: NOT DETECTED

## 2020-08-21 PROCEDURE — 78815 NM PET CT ROUTINE: ICD-10-PCS | Mod: 26,PS,, | Performed by: RADIOLOGY

## 2020-08-21 PROCEDURE — 78815 PET IMAGE W/CT SKULL-THIGH: CPT | Mod: 26,PS,, | Performed by: RADIOLOGY

## 2020-08-21 PROCEDURE — 78815 PET IMAGE W/CT SKULL-THIGH: CPT | Mod: TC

## 2020-08-21 PROCEDURE — A9552 F18 FDG: HCPCS

## 2020-08-21 PROCEDURE — U0003 INFECTIOUS AGENT DETECTION BY NUCLEIC ACID (DNA OR RNA); SEVERE ACUTE RESPIRATORY SYNDROME CORONAVIRUS 2 (SARS-COV-2) (CORONAVIRUS DISEASE [COVID-19]), AMPLIFIED PROBE TECHNIQUE, MAKING USE OF HIGH THROUGHPUT TECHNOLOGIES AS DESCRIBED BY CMS-2020-01-R: HCPCS

## 2020-08-23 ENCOUNTER — ANESTHESIA EVENT (OUTPATIENT)
Dept: SURGERY | Facility: HOSPITAL | Age: 70
DRG: 741 | End: 2020-08-23
Payer: COMMERCIAL

## 2020-08-24 ENCOUNTER — ANESTHESIA (OUTPATIENT)
Dept: SURGERY | Facility: HOSPITAL | Age: 70
DRG: 741 | End: 2020-08-24
Payer: COMMERCIAL

## 2020-08-24 ENCOUNTER — HOSPITAL ENCOUNTER (INPATIENT)
Facility: HOSPITAL | Age: 70
LOS: 1 days | Discharge: HOME | DRG: 741 | End: 2020-08-25
Attending: OBSTETRICS & GYNECOLOGY | Admitting: OBSTETRICS & GYNECOLOGY
Payer: COMMERCIAL

## 2020-08-24 DIAGNOSIS — Z90.710 S/P LAPAROSCOPIC HYSTERECTOMY: Primary | ICD-10-CM

## 2020-08-24 DIAGNOSIS — Z01.818 PREOPERATIVE TESTING: ICD-10-CM

## 2020-08-24 DIAGNOSIS — C54.1 ENDOMETRIAL CA: ICD-10-CM

## 2020-08-24 LAB
ABO + RH BLD: NORMAL
BLD GP AB SCN CELLS X3 SERPL QL: NORMAL

## 2020-08-24 PROCEDURE — 99900035 HC TECH TIME PER 15 MIN (STAT)

## 2020-08-24 PROCEDURE — 25000003 PHARM REV CODE 250: Performed by: STUDENT IN AN ORGANIZED HEALTH CARE EDUCATION/TRAINING PROGRAM

## 2020-08-24 PROCEDURE — 88342 IMHCHEM/IMCYTCHM 1ST ANTB: CPT | Performed by: PATHOLOGY

## 2020-08-24 PROCEDURE — 36000712 HC OR TIME LEV V 1ST 15 MIN: Performed by: OBSTETRICS & GYNECOLOGY

## 2020-08-24 PROCEDURE — 88342 CHG IMMUNOCYTOCHEMISTRY: ICD-10-PCS | Mod: 26,,, | Performed by: PATHOLOGY

## 2020-08-24 PROCEDURE — 88341 IMHCHEM/IMCYTCHM EA ADD ANTB: CPT | Performed by: PATHOLOGY

## 2020-08-24 PROCEDURE — 27201423 OPTIME MED/SURG SUP & DEVICES STERILE SUPPLY: Performed by: OBSTETRICS & GYNECOLOGY

## 2020-08-24 PROCEDURE — D9220A PRA ANESTHESIA: Mod: ,,, | Performed by: STUDENT IN AN ORGANIZED HEALTH CARE EDUCATION/TRAINING PROGRAM

## 2020-08-24 PROCEDURE — 88305 TISSUE EXAM BY PATHOLOGIST: CPT | Mod: 26,,, | Performed by: PATHOLOGY

## 2020-08-24 PROCEDURE — 71000016 HC POSTOP RECOV ADDL HR: Performed by: OBSTETRICS & GYNECOLOGY

## 2020-08-24 PROCEDURE — 58575 LAPS TOT HYST RESJ MAL: CPT | Mod: ,,, | Performed by: OBSTETRICS & GYNECOLOGY

## 2020-08-24 PROCEDURE — D9220A PRA ANESTHESIA: ICD-10-PCS | Mod: ,,, | Performed by: STUDENT IN AN ORGANIZED HEALTH CARE EDUCATION/TRAINING PROGRAM

## 2020-08-24 PROCEDURE — 58575 PR LAPAROSCOPY, TOTAL HYST, FOR RESECT MALIGNANCY: ICD-10-PCS | Mod: AS,,, | Performed by: NURSE PRACTITIONER

## 2020-08-24 PROCEDURE — 71000033 HC RECOVERY, INTIAL HOUR: Performed by: OBSTETRICS & GYNECOLOGY

## 2020-08-24 PROCEDURE — 36000713 HC OR TIME LEV V EA ADD 15 MIN: Performed by: OBSTETRICS & GYNECOLOGY

## 2020-08-24 PROCEDURE — 88112 CYTOPATH CELL ENHANCE TECH: CPT | Performed by: PATHOLOGY

## 2020-08-24 PROCEDURE — 94799 UNLISTED PULMONARY SVC/PX: CPT

## 2020-08-24 PROCEDURE — 94761 N-INVAS EAR/PLS OXIMETRY MLT: CPT

## 2020-08-24 PROCEDURE — 88307 TISSUE EXAM BY PATHOLOGIST: CPT | Mod: 26,,, | Performed by: PATHOLOGY

## 2020-08-24 PROCEDURE — 25000003 PHARM REV CODE 250: Performed by: OBSTETRICS & GYNECOLOGY

## 2020-08-24 PROCEDURE — 88305 TISSUE EXAM BY PATHOLOGIST: CPT | Mod: 59 | Performed by: PATHOLOGY

## 2020-08-24 PROCEDURE — 11000001 HC ACUTE MED/SURG PRIVATE ROOM

## 2020-08-24 PROCEDURE — 63600175 PHARM REV CODE 636 W HCPCS: Performed by: OBSTETRICS & GYNECOLOGY

## 2020-08-24 PROCEDURE — 88112 PR  CYTOPATH, CELL ENHANCE TECH: ICD-10-PCS | Mod: 26,,, | Performed by: PATHOLOGY

## 2020-08-24 PROCEDURE — 58575 PR LAPAROSCOPY, TOTAL HYST, FOR RESECT MALIGNANCY: ICD-10-PCS | Mod: ,,, | Performed by: OBSTETRICS & GYNECOLOGY

## 2020-08-24 PROCEDURE — 88307 TISSUE EXAM BY PATHOLOGIST: CPT | Mod: 59 | Performed by: PATHOLOGY

## 2020-08-24 PROCEDURE — 37000009 HC ANESTHESIA EA ADD 15 MINS: Performed by: OBSTETRICS & GYNECOLOGY

## 2020-08-24 PROCEDURE — 63600175 PHARM REV CODE 636 W HCPCS: Performed by: STUDENT IN AN ORGANIZED HEALTH CARE EDUCATION/TRAINING PROGRAM

## 2020-08-24 PROCEDURE — 88342 IMHCHEM/IMCYTCHM 1ST ANTB: CPT | Mod: 26,,, | Performed by: PATHOLOGY

## 2020-08-24 PROCEDURE — 88307 PR  SURG PATH,LEVEL V: ICD-10-PCS | Mod: 26,,, | Performed by: PATHOLOGY

## 2020-08-24 PROCEDURE — 88341 IMHCHEM/IMCYTCHM EA ADD ANTB: CPT | Mod: 26,,, | Performed by: PATHOLOGY

## 2020-08-24 PROCEDURE — 88305 TISSUE EXAM BY PATHOLOGIST: ICD-10-PCS | Mod: 26,,, | Performed by: PATHOLOGY

## 2020-08-24 PROCEDURE — 88341 PR IHC OR ICC EACH ADD'L SINGLE ANTIBODY  STAINPR: ICD-10-PCS | Mod: 26,,, | Performed by: PATHOLOGY

## 2020-08-24 PROCEDURE — 37000008 HC ANESTHESIA 1ST 15 MINUTES: Performed by: OBSTETRICS & GYNECOLOGY

## 2020-08-24 PROCEDURE — 88305 TISSUE EXAM BY PATHOLOGIST: CPT | Performed by: PATHOLOGY

## 2020-08-24 PROCEDURE — 86901 BLOOD TYPING SEROLOGIC RH(D): CPT

## 2020-08-24 PROCEDURE — 58575 LAPS TOT HYST RESJ MAL: CPT | Mod: AS,,, | Performed by: NURSE PRACTITIONER

## 2020-08-24 PROCEDURE — 71000015 HC POSTOP RECOV 1ST HR: Performed by: OBSTETRICS & GYNECOLOGY

## 2020-08-24 PROCEDURE — 27000221 HC OXYGEN, UP TO 24 HOURS

## 2020-08-24 PROCEDURE — 88112 CYTOPATH CELL ENHANCE TECH: CPT | Mod: 26,,, | Performed by: PATHOLOGY

## 2020-08-24 PROCEDURE — 86920 COMPATIBILITY TEST SPIN: CPT

## 2020-08-24 RX ORDER — BISACODYL 10 MG
10 SUPPOSITORY, RECTAL RECTAL DAILY PRN
Status: DISCONTINUED | OUTPATIENT
Start: 2020-08-24 | End: 2020-08-25 | Stop reason: HOSPADM

## 2020-08-24 RX ORDER — CEFAZOLIN SODIUM 1 G/3ML
2 INJECTION, POWDER, FOR SOLUTION INTRAMUSCULAR; INTRAVENOUS
Status: COMPLETED | OUTPATIENT
Start: 2020-08-24 | End: 2020-08-24

## 2020-08-24 RX ORDER — FENTANYL CITRATE 50 UG/ML
INJECTION, SOLUTION INTRAMUSCULAR; INTRAVENOUS
Status: DISCONTINUED | OUTPATIENT
Start: 2020-08-24 | End: 2020-08-24

## 2020-08-24 RX ORDER — FAMOTIDINE 20 MG/1
20 TABLET, FILM COATED ORAL 2 TIMES DAILY
Status: DISCONTINUED | OUTPATIENT
Start: 2020-08-24 | End: 2020-08-25 | Stop reason: HOSPADM

## 2020-08-24 RX ORDER — DIPHENHYDRAMINE HCL 25 MG
25 CAPSULE ORAL EVERY 4 HOURS PRN
Status: DISCONTINUED | OUTPATIENT
Start: 2020-08-24 | End: 2020-08-25 | Stop reason: HOSPADM

## 2020-08-24 RX ORDER — MIDAZOLAM HYDROCHLORIDE 1 MG/ML
INJECTION INTRAMUSCULAR; INTRAVENOUS
Status: DISCONTINUED | OUTPATIENT
Start: 2020-08-24 | End: 2020-08-24

## 2020-08-24 RX ORDER — INDOCYANINE GREEN AND WATER 25 MG
KIT INJECTION
Status: DISCONTINUED | OUTPATIENT
Start: 2020-08-24 | End: 2020-08-24 | Stop reason: HOSPADM

## 2020-08-24 RX ORDER — SODIUM CHLORIDE 0.9 % (FLUSH) 0.9 %
10 SYRINGE (ML) INJECTION
Status: DISCONTINUED | OUTPATIENT
Start: 2020-08-24 | End: 2020-08-24

## 2020-08-24 RX ORDER — MUPIROCIN 20 MG/G
OINTMENT TOPICAL
Status: DISCONTINUED | OUTPATIENT
Start: 2020-08-24 | End: 2020-08-24

## 2020-08-24 RX ORDER — HYDRALAZINE HYDROCHLORIDE 20 MG/ML
10 INJECTION INTRAMUSCULAR; INTRAVENOUS EVERY 6 HOURS PRN
Status: DISCONTINUED | OUTPATIENT
Start: 2020-08-24 | End: 2020-08-25 | Stop reason: HOSPADM

## 2020-08-24 RX ORDER — ACETAMINOPHEN 325 MG/1
650 TABLET ORAL EVERY 6 HOURS
Status: DISCONTINUED | OUTPATIENT
Start: 2020-08-24 | End: 2020-08-25 | Stop reason: HOSPADM

## 2020-08-24 RX ORDER — KETAMINE HCL IN 0.9 % NACL 50 MG/5 ML
SYRINGE (ML) INTRAVENOUS
Status: DISCONTINUED | OUTPATIENT
Start: 2020-08-24 | End: 2020-08-24

## 2020-08-24 RX ORDER — PROPOFOL 10 MG/ML
VIAL (ML) INTRAVENOUS
Status: DISCONTINUED | OUTPATIENT
Start: 2020-08-24 | End: 2020-08-24

## 2020-08-24 RX ORDER — ONDANSETRON 2 MG/ML
4 INJECTION INTRAMUSCULAR; INTRAVENOUS DAILY PRN
Status: DISCONTINUED | OUTPATIENT
Start: 2020-08-24 | End: 2020-08-24 | Stop reason: HOSPADM

## 2020-08-24 RX ORDER — OXYCODONE HYDROCHLORIDE 5 MG/1
5 TABLET ORAL EVERY 4 HOURS PRN
Status: DISCONTINUED | OUTPATIENT
Start: 2020-08-24 | End: 2020-08-25 | Stop reason: HOSPADM

## 2020-08-24 RX ORDER — MUPIROCIN 20 MG/G
OINTMENT TOPICAL 2 TIMES DAILY
Status: DISCONTINUED | OUTPATIENT
Start: 2020-08-24 | End: 2020-08-25 | Stop reason: HOSPADM

## 2020-08-24 RX ORDER — DOCUSATE SODIUM 100 MG/1
100 CAPSULE, LIQUID FILLED ORAL 2 TIMES DAILY
Status: DISCONTINUED | OUTPATIENT
Start: 2020-08-24 | End: 2020-08-25 | Stop reason: HOSPADM

## 2020-08-24 RX ORDER — FENTANYL CITRATE 50 UG/ML
25 INJECTION, SOLUTION INTRAMUSCULAR; INTRAVENOUS EVERY 5 MIN PRN
Status: DISCONTINUED | OUTPATIENT
Start: 2020-08-24 | End: 2020-08-24 | Stop reason: HOSPADM

## 2020-08-24 RX ORDER — LOSARTAN POTASSIUM 25 MG/1
100 TABLET ORAL DAILY
Status: DISCONTINUED | OUTPATIENT
Start: 2020-08-25 | End: 2020-08-25 | Stop reason: HOSPADM

## 2020-08-24 RX ORDER — SIMETHICONE 80 MG
1 TABLET,CHEWABLE ORAL 3 TIMES DAILY PRN
Status: DISCONTINUED | OUTPATIENT
Start: 2020-08-24 | End: 2020-08-25 | Stop reason: HOSPADM

## 2020-08-24 RX ORDER — CALCIUM CARBONATE 500(1250)
500 TABLET ORAL DAILY
Status: DISCONTINUED | OUTPATIENT
Start: 2020-08-25 | End: 2020-08-25 | Stop reason: HOSPADM

## 2020-08-24 RX ORDER — SODIUM CHLORIDE 0.9 % (FLUSH) 0.9 %
10 SYRINGE (ML) INJECTION
Status: DISCONTINUED | OUTPATIENT
Start: 2020-08-24 | End: 2020-08-25 | Stop reason: HOSPADM

## 2020-08-24 RX ORDER — DEXTROSE MONOHYDRATE, SODIUM CHLORIDE, AND POTASSIUM CHLORIDE 50; 1.49; 9 G/1000ML; G/1000ML; G/1000ML
INJECTION, SOLUTION INTRAVENOUS CONTINUOUS
Status: DISCONTINUED | OUTPATIENT
Start: 2020-08-24 | End: 2020-08-25 | Stop reason: HOSPADM

## 2020-08-24 RX ORDER — OXYCODONE HYDROCHLORIDE 5 MG/1
5 TABLET ORAL EVERY 4 HOURS PRN
Status: DISCONTINUED | OUTPATIENT
Start: 2020-08-24 | End: 2020-08-24

## 2020-08-24 RX ORDER — HYDROMORPHONE HYDROCHLORIDE 1 MG/ML
1 INJECTION, SOLUTION INTRAMUSCULAR; INTRAVENOUS; SUBCUTANEOUS EVERY 6 HOURS PRN
Status: DISCONTINUED | OUTPATIENT
Start: 2020-08-24 | End: 2020-08-25 | Stop reason: HOSPADM

## 2020-08-24 RX ORDER — DEXAMETHASONE SODIUM PHOSPHATE 4 MG/ML
INJECTION, SOLUTION INTRA-ARTICULAR; INTRALESIONAL; INTRAMUSCULAR; INTRAVENOUS; SOFT TISSUE
Status: DISCONTINUED | OUTPATIENT
Start: 2020-08-24 | End: 2020-08-24

## 2020-08-24 RX ORDER — ONDANSETRON 2 MG/ML
INJECTION INTRAMUSCULAR; INTRAVENOUS
Status: DISCONTINUED | OUTPATIENT
Start: 2020-08-24 | End: 2020-08-24

## 2020-08-24 RX ORDER — CHOLECALCIFEROL (VITAMIN D3) 25 MCG
1000 TABLET ORAL DAILY
Status: DISCONTINUED | OUTPATIENT
Start: 2020-08-25 | End: 2020-08-25 | Stop reason: HOSPADM

## 2020-08-24 RX ORDER — LIDOCAINE HYDROCHLORIDE 20 MG/ML
INJECTION, SOLUTION EPIDURAL; INFILTRATION; INTRACAUDAL; PERINEURAL
Status: DISCONTINUED | OUTPATIENT
Start: 2020-08-24 | End: 2020-08-24

## 2020-08-24 RX ORDER — HYDRALAZINE HYDROCHLORIDE 20 MG/ML
5 INJECTION INTRAMUSCULAR; INTRAVENOUS EVERY 4 HOURS PRN
Status: DISCONTINUED | OUTPATIENT
Start: 2020-08-24 | End: 2020-08-24

## 2020-08-24 RX ORDER — LEVOTHYROXINE SODIUM 112 UG/1
112 TABLET ORAL
Status: DISCONTINUED | OUTPATIENT
Start: 2020-08-25 | End: 2020-08-25 | Stop reason: HOSPADM

## 2020-08-24 RX ORDER — ROCURONIUM BROMIDE 10 MG/ML
INJECTION, SOLUTION INTRAVENOUS
Status: DISCONTINUED | OUTPATIENT
Start: 2020-08-24 | End: 2020-08-24

## 2020-08-24 RX ORDER — FUROSEMIDE 40 MG/1
40 TABLET ORAL DAILY
Status: DISCONTINUED | OUTPATIENT
Start: 2020-08-25 | End: 2020-08-25 | Stop reason: HOSPADM

## 2020-08-24 RX ORDER — DIPHENHYDRAMINE HYDROCHLORIDE 50 MG/ML
25 INJECTION INTRAMUSCULAR; INTRAVENOUS EVERY 4 HOURS PRN
Status: DISCONTINUED | OUTPATIENT
Start: 2020-08-24 | End: 2020-08-25 | Stop reason: HOSPADM

## 2020-08-24 RX ORDER — LIDOCAINE HYDROCHLORIDE 10 MG/ML
1 INJECTION, SOLUTION EPIDURAL; INFILTRATION; INTRACAUDAL; PERINEURAL ONCE
Status: COMPLETED | OUTPATIENT
Start: 2020-08-24 | End: 2020-08-24

## 2020-08-24 RX ORDER — ONDANSETRON 8 MG/1
8 TABLET, ORALLY DISINTEGRATING ORAL EVERY 8 HOURS PRN
Status: DISCONTINUED | OUTPATIENT
Start: 2020-08-24 | End: 2020-08-25 | Stop reason: HOSPADM

## 2020-08-24 RX ORDER — OXYCODONE HYDROCHLORIDE 10 MG/1
10 TABLET ORAL EVERY 4 HOURS PRN
Status: DISCONTINUED | OUTPATIENT
Start: 2020-08-24 | End: 2020-08-25 | Stop reason: HOSPADM

## 2020-08-24 RX ADMIN — Medication 30 MG: at 10:08

## 2020-08-24 RX ADMIN — LIDOCAINE HYDROCHLORIDE 5 ML: 20 INJECTION, SOLUTION EPIDURAL; INFILTRATION; INTRACAUDAL; PERINEURAL at 10:08

## 2020-08-24 RX ADMIN — SODIUM CHLORIDE, SODIUM GLUCONATE, SODIUM ACETATE, POTASSIUM CHLORIDE, MAGNESIUM CHLORIDE, SODIUM PHOSPHATE, DIBASIC, AND POTASSIUM PHOSPHATE: .53; .5; .37; .037; .03; .012; .00082 INJECTION, SOLUTION INTRAVENOUS at 10:08

## 2020-08-24 RX ADMIN — OXYCODONE HYDROCHLORIDE 5 MG: 5 TABLET ORAL at 04:08

## 2020-08-24 RX ADMIN — FENTANYL CITRATE 25 MCG: 50 INJECTION INTRAMUSCULAR; INTRAVENOUS at 02:08

## 2020-08-24 RX ADMIN — MIDAZOLAM HYDROCHLORIDE 2 MG: 1 INJECTION, SOLUTION INTRAMUSCULAR; INTRAVENOUS at 10:08

## 2020-08-24 RX ADMIN — ACETAMINOPHEN 650 MG: 325 TABLET ORAL at 06:08

## 2020-08-24 RX ADMIN — MUPIROCIN: 20 OINTMENT TOPICAL at 09:08

## 2020-08-24 RX ADMIN — LIDOCAINE HYDROCHLORIDE 10 MG: 10 INJECTION, SOLUTION EPIDURAL; INFILTRATION; INTRACAUDAL; PERINEURAL at 08:08

## 2020-08-24 RX ADMIN — ONDANSETRON 4 MG: 2 INJECTION INTRAMUSCULAR; INTRAVENOUS at 01:08

## 2020-08-24 RX ADMIN — FENTANYL CITRATE 50 MCG: 50 INJECTION, SOLUTION INTRAMUSCULAR; INTRAVENOUS at 11:08

## 2020-08-24 RX ADMIN — CEFAZOLIN 2 G: 330 INJECTION, POWDER, FOR SOLUTION INTRAMUSCULAR; INTRAVENOUS at 11:08

## 2020-08-24 RX ADMIN — MUPIROCIN: 20 OINTMENT TOPICAL at 08:08

## 2020-08-24 RX ADMIN — FENTANYL CITRATE 50 MCG: 50 INJECTION, SOLUTION INTRAMUSCULAR; INTRAVENOUS at 12:08

## 2020-08-24 RX ADMIN — ROCURONIUM BROMIDE 100 MG: 10 INJECTION, SOLUTION INTRAVENOUS at 10:08

## 2020-08-24 RX ADMIN — DOCUSATE SODIUM 100 MG: 100 CAPSULE, LIQUID FILLED ORAL at 09:08

## 2020-08-24 RX ADMIN — SUGAMMADEX 200 MG: 100 INJECTION, SOLUTION INTRAVENOUS at 01:08

## 2020-08-24 RX ADMIN — FENTANYL CITRATE 25 MCG: 50 INJECTION INTRAMUSCULAR; INTRAVENOUS at 03:08

## 2020-08-24 RX ADMIN — FAMOTIDINE 20 MG: 20 TABLET ORAL at 09:08

## 2020-08-24 RX ADMIN — FENTANYL CITRATE 50 MCG: 50 INJECTION, SOLUTION INTRAMUSCULAR; INTRAVENOUS at 01:08

## 2020-08-24 RX ADMIN — DEXAMETHASONE SODIUM PHOSPHATE 4 MG: 4 INJECTION, SOLUTION INTRAMUSCULAR; INTRAVENOUS at 10:08

## 2020-08-24 RX ADMIN — Medication 20 MG: at 11:08

## 2020-08-24 RX ADMIN — HYDRALAZINE HYDROCHLORIDE 10 MG: 20 INJECTION INTRAMUSCULAR; INTRAVENOUS at 03:08

## 2020-08-24 RX ADMIN — PROPOFOL 150 MG: 10 INJECTION, EMULSION INTRAVENOUS at 10:08

## 2020-08-24 RX ADMIN — POTASSIUM CHLORIDE, DEXTROSE MONOHYDRATE AND SODIUM CHLORIDE: 150; 5; 900 INJECTION, SOLUTION INTRAVENOUS at 02:08

## 2020-08-24 RX ADMIN — ROCURONIUM BROMIDE 30 MG: 10 INJECTION, SOLUTION INTRAVENOUS at 12:08

## 2020-08-24 RX ADMIN — FENTANYL CITRATE 100 MCG: 50 INJECTION, SOLUTION INTRAMUSCULAR; INTRAVENOUS at 10:08

## 2020-08-24 RX ADMIN — OXYCODONE HYDROCHLORIDE 5 MG: 5 TABLET ORAL at 09:08

## 2020-08-24 NOTE — ANESTHESIA POSTPROCEDURE EVALUATION
Anesthesia Post Evaluation    Patient: Ankita Campo    Procedure(s) Performed: Procedure(s) (LRB):  XI ROBOTIC SALPINGO-OOPHORECTOMY (Bilateral)  XI ROBOTIC HYSTERECTOMY (N/A)  MAPPING, LYMPH NODE, SENTINEL (Bilateral)  XI ROBOTIC OMENTECTOMY (N/A)  XI ROBOTIC LYSIS, ADHESIONS (N/A)    Final Anesthesia Type: general    Patient location during evaluation: PACU  Patient participation: Yes- Able to Participate  Level of consciousness: awake and alert, awake and oriented  Post-procedure vital signs: reviewed and stable  Pain management: adequate  Airway patency: patent    PONV status at discharge: No PONV  Anesthetic complications: no      Cardiovascular status: blood pressure returned to baseline, hemodynamically stable and stable  Respiratory status: unassisted, spontaneous ventilation and room air  Hydration status: euvolemic  Follow-up not needed.          Vitals Value Taken Time   /76 08/24/20 1601   Temp 36.3 °C (97.3 °F) 08/24/20 1400   Pulse 67 08/24/20 1606   Resp 17 08/24/20 1606   SpO2 98 % 08/24/20 1606   Vitals shown include unvalidated device data.      No case tracking events are documented in the log.      Pain/Ellen Score: Pain Rating Prior to Med Admin: 10 (8/24/2020  3:05 PM)  Ellen Score: 10 (8/24/2020  2:34 PM)

## 2020-08-24 NOTE — PLAN OF CARE
All DaVinci instruments inspected after case by ST [facility].  All Instruments appear intact. Lives checked and good.

## 2020-08-24 NOTE — BRIEF OP NOTE
[facility]  Brief Operative Note    SUMMARY     Surgery Date: 8/24/2020     Surgeon(s) and Role:     * Donte Weeks MD - Primary    Assisting Surgeon: None    Pre-op Diagnosis:  Endometrial ca [C54.1]    Post-op Diagnosis:  Post-Op Diagnosis Codes:     * Endometrial ca [C54.1]    Procedure(s) (LRB):  XI ROBOTIC SALPINGO-OOPHORECTOMY (Bilateral)  XI ROBOTIC HYSTERECTOMY (N/A)  MAPPING, LYMPH NODE, SENTINEL (Bilateral)  XI ROBOTIC OMENTECTOMY (N/A)  XI ROBOTIC LYSIS, ADHESIONS (N/A)    Anesthesia: General    Description of Procedure:  Removal of uterus, cervix, bilateral tubes and ovaries, bilateral sentinel pelvic lymph node biopsy, partial omentectomy and lysis of small bowel and sigmoid adhesion. Over sew of small bowel serosal injury.     Description of the findings of the procedure: small bowel adhesed to anterior abdominal wall below the umbilicus.     Estimated Blood Loss: 50 mL  Total Fluids:2000 ml  Urine Output:700 ml       Estimated Blood Loss has been documented.         Specimens:   Specimen (12h ago, onward)    None          [id number]

## 2020-08-24 NOTE — PROGRESS NOTES
Pt received from the OR. No response from verbal or tactile stimuli; Sedation noted. Oral airway in place. Neck repositioned to assist with audible obstruction. Report received from OR circulator and Dr. Winter, anesthesiology. Vital signs completed. Initial BP elevated; MD at bedside and aware. Verbalized that BP was elevated pre-op and he would write for antihypertensive to assist. O2 on @ 6L via face mask. Abdomen with steristrips x5 sites: clean, dry & intact. Michell pad noted to vaginal area: no drainage visible. Muñoz catheter intact draining clear yellow urine to urimeter, secured to thigh with stat lock. #20g IV noted to RH saline locked. #22g IV noted to LH saline locked. Teds on polina. SCDs applied.   1415: Reported off to SANAZ York, PACU Rn to assume care.

## 2020-08-24 NOTE — PLAN OF CARE
Robot time out completed with pre-incision time out.  All DaVinci instruments inspected before case by ST [facility].  All instruments appear intact.

## 2020-08-24 NOTE — PROGRESS NOTES
Progress Note  Gynecology Oncology    Admit Date: 2020  LOS: 0    Reason for Admission:  S/P laparoscopic hysterectomy    SUBJECTIVE:     Ankita Campo is a 70 y.o.  who is POD#0 s/p RA-TLH/BSO/SLND/OMX/FUAD for the treatment of high grade serous endometrial carcinoma.  Pain is overall well controlled. She has not eaten anything yet, but tolerating sips of water. Voiding without difficulty via tellez.She is not yet ambulating or passing flatus.  OBJECTIVE:     Vital Signs   Temp:  [97.3 °F (36.3 °C)-97.5 °F (36.4 °C)] 97.3 °F (36.3 °C)  Pulse:  [55-64] 64  Resp:  [] 14  SpO2:  [87 %-100 %] 98 %  BP: (172-197)/() 173/79      Intake/Output Summary (Last 24 hours) at 2020 1613  Last data filed at 2020 1320  Gross per 24 hour   Intake --   Output 300 ml   Net -300 ml       Physical Exam:  Gen: A&Ox3, NAD  CV: Regular rate  Pulm: normal respiratory effort  Abd: soft bowel sounds, soft, non-distended, non-tender to palpation without rebound or guarding  Inc: 5 port site incisions c/d/i with steri strips  Ext: PPP, no peripheral edema, TEDs/SCDs in place  : Tellez in place draining clear yellow urine     Laboratory:  Recent Results (from the past 24 hour(s))   Prepare RBC 1 Unit    Collection Time: 20  7:51 AM   Result Value Ref Range    UNIT NUMBER [id number]     Product Code [id number]     DISPENSE STATUS CROSSMATCHED     CODING SYSTEM ADQU186     Unit Blood Type Code 5100     Unit Blood Type O POS     Unit Expiration [id number]    Type & Screen    Collection Time: 20  7:53 AM   Result Value Ref Range    Group & Rh B POS     Indirect Annette NEG          ASSESSMENT/PLAN:     Active Hospital Problems    Diagnosis  POA    *S/P RA-TLH/BSO/SLND/OMX [Z90.710]  No    Preoperative testing [Z01.818]  Not Applicable    Endometrial ca [C54.1]  Yes      Resolved Hospital Problems   No resolved problems to display.       Assessment: 70 y.o.  POD#0 s/p  RA-TLH/BSO/SLND/OMX/FUAD    Plan:   1. POD#0  - Routine post-op advances  - Continue PRN pain medications  - D/C tellez and perform spontaneous voiding trial in AM  - Encourage ambulation   - Encourage IS  - CBC in AM  - UOP adequate   - Continue IVF until tolerating regular diet.  - Antiemetics prn nausea/vomiting.    2. Hypertension  - Continue home lasix 40mg and losartan 100mg  - Hydralazine IV 10mg PRN for systolic >180    3. Hypothyroidism s/p thyroidectomy  - [facility] home synthroid 112mcg    Dispo: As patient meets appropriate post-op milestones, plan for discharge to home POD#1-2.    Tabitha Muller MD/MPH  OB/GYN PGY1

## 2020-08-24 NOTE — INTERVAL H&P NOTE
The patient has been examined and the H&P has been reviewed:    I concur with the findings and no changes have occurred since H&P was written.    Surgery risks, benefits and alternative options discussed and understood by patient/family.    Proceed to OR for Davinci assisted laparoscopic hysterectomy, BSO, staging and omentectomy.    Loly Lacy, FNP-C, AOCNP  Gyn Oncology          Active Hospital Problems    Diagnosis  POA    Endometrial ca [C54.1]  Yes      Resolved Hospital Problems   No resolved problems to display.

## 2020-08-24 NOTE — PROGRESS NOTES
Progress Note  Gynecology    Admit Date: 2020  LOS: 1    Reason for Admission:  S/P laparoscopic hysterectomy    SUBJECTIVE:     Ankita Campo is a 70 y.o.  who is POD#1 s/p RA-TLH/BSO/SLND/OMX/FUAD for the treatment of high grade serous endometrial carcinoma.  Patient is doing well this morning. Pain is overall well controlled. She is tolerating diet without N/V.  Voiding without difficulty via tellez. Has not yet ambulated. She is passing flatus.   OBJECTIVE:     Vital Signs   Temp:  [96.5 °F (35.8 °C)-97.7 °F (36.5 °C)] 96.6 °F (35.9 °C)  Pulse:  [55-80] 67  Resp:  [] 18  SpO2:  [87 %-100 %] 96 %  BP: (143-197)/() 155/79      Intake/Output Summary (Last 24 hours) at 2020 0536  Last data filed at 2020 1900  Gross per 24 hour   Intake 126.25 ml   Output 1400 ml   Net -1273.75 ml       Physical Exam:  Gen: A&Ox3, NAD  CV: Regular rate  Pulm: normal respiratory effort  Abd: soft bowel sounds, soft, non-distended, non-tender to palpation without rebound or guarding  Inc: 5 port site incisions c/d/i with steri strips  Ext: PPP, no peripheral edema, TEDs/SCDs in place  : Tellez in place draining clear yellow urine     Laboratory:  Recent Results (from the past 24 hour(s))   Prepare RBC 1 Unit    Collection Time: 20  7:51 AM   Result Value Ref Range    UNIT NUMBER [id number]     Product Code [id number]     DISPENSE STATUS CROSSMATCHED     CODING SYSTEM CAEF631     Unit Blood Type Code 5100     Unit Blood Type O POS     Unit Expiration [id number]    Type & Screen    Collection Time: 20  7:53 AM   Result Value Ref Range    Group & Rh B POS     Indirect Annette NEG          ASSESSMENT/PLAN:     Active Hospital Problems    Diagnosis  POA    *S/P RA-TLH/BSO/SLND/OMX [Z90.710]  No    Preoperative testing [Z01.818]  Not Applicable    Endometrial ca [C54.1]  Yes      Resolved Hospital Problems   No resolved problems to display.       Assessment: 70 y.o.  POD#1 s/p  RA-TLH/BSO/SLND/OMX/FUAD    Plan:   1. Post-op   - Routine post-op advances  - Continue PRN pain medications- received 2 oxy IR 5 overnight.   - D/C tellez and perform spontaneous voiding trial  - Encourage ambulation   - Encourage IS  - CBC pending.   - UOP adequate   - Continue IVF until tolerating regular diet.  - Antiemetics prn nausea/vomiting.    2. Hypertension  - Continue home lasix 40mg and losartan 100mg  - Hydralazine IV 10mg PRN for systolic >180    3. Hypothyroidism s/p thyroidectomy  - [facility] home synthroid 112mcg    Dispo: As patient meets appropriate post-op milestones, plan for discharge to home POD1-2.    Maureen Quiñonez M.D.   OB/GYN  PGY-2

## 2020-08-24 NOTE — OPERATIVE NOTE ADDENDUM
Certification of Assistant at Surgery       Surgery Date: 8/24/2020     Participating Surgeons:  Surgeon(s) and Role:     * Donte Weeks MD - Primary    Procedures:  Procedure(s) (LRB):  XI ROBOTIC SALPINGO-OOPHORECTOMY (Bilateral)  XI ROBOTIC HYSTERECTOMY (N/A)  MAPPING, LYMPH NODE, SENTINEL (Bilateral)  XI ROBOTIC OMENTECTOMY (N/A)  XI ROBOTIC LYSIS, ADHESIONS (N/A)    Assistant Surgeon's Certification of Necessity:  I understand that section 1842 (b) (6) (d) of the Social Security Act generally prohibits Medicare Part B reasonable charge payment for the services of assistants at surgery in teaching hospitals when qualified residents are available to furnish such services. I certify that the services for which payment is claimed were medically necessary, and that no qualified resident was available to perform the services. I further understand that these services are subject to post-payment review by the Medicare carrier.      Loly Lacy NP    08/24/2020  2:18 PM

## 2020-08-24 NOTE — NURSING TRANSFER
Nursing Transfer Note      8/24/2020     Transfer To: [id number]    Transfer via stretcher    Transfer with 2LNC    Transported by pct    Medicines sent: none    Chart send with patient: Yes    Notified: sister via text messaging system     Patient reassessed at: 8/24/2020  1700

## 2020-08-24 NOTE — ANESTHESIA PROCEDURE NOTES
Intubation  Performed by: Blake Winter MD  Authorized by: Blake Winter MD     Intubation:     Induction:  Intravenous    Intubated:  Postinduction    Mask Ventilation:  Easy mask    Attempts:  1    Attempted By:  Other (see comments)    Method of Intubation:  Direct    Blade:  Chante 3    Laryngeal View Grade: Grade I - full view of chords      Difficult Airway Encountered?: No      Complications:  None    Airway Device:  Oral endotracheal tube    Airway Device Size:  7.5    Tube secured:  22    Secured at:  The lips    Placement Verified By:  Capnometry    Complicating Factors:  None    Findings Post-Intubation:  BS equal bilateral  Notes:      EMS student, 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

## 2020-08-24 NOTE — OP NOTE
[facility]  Surgery Department  Operative Note    SUMMARY     Patient: Ankita Campo    Medical Record: [id number]    Date of Procedure: 8/24/2020     Surgeon: Surgeon(s) and Role:     * Donte Weeks MD - Primary        First Assistant: CHIDI Mendoza (no qualified resident for her part)        Second Assistant: Maureen Quiñonez MD      Pre-Operative Diagnosis: Endometrial ca [C54.1]    Post-Operative Diagnosis: Post-Op Diagnosis Codes:     * Endometrial ca [C54.1]   Small bowel and sigmoid colon adhesions.     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 Negative PET scan     Operative Findings: A loop of small bowel was adhesed to anterior abdominal wall below the umbilicus. Small bowel and sigmoid colon adhesions to posterior cul-de-sac and bilateral adnexa. Normal appearing omentum. No visible intraperitoneal disease.     Procedure in Detail: The patient was brought to the Operating Room after induction of general anesthesia.  The arms were secured to the patient's side. A chest strap was placed.       The legs were placed in Avery stirrups.  The vulva and vagina were then prepped with Betadine scrub and solution.  The abdomen was prepped with ChloraPrep and the patient was sterilely draped.     After timeout identifying patient and procedure, attention was then directed to the peritoneum.  The cervix was exposed with two right angle retractors.  Cervix was grasped with single-tooth tenaculum.  Uterus and cervix sounded 7 cm. The cervix was easily dilated to a 12 Hanks dilator.    Two cervical injections of 1.25 milligrams/mL ICG were performed both superficial and deep at the 3 and 9:00  position.  A total of 4 cc was used.     We then placed a 0 Vicryl stitch at 6 o'clock and 12 o'clock.  A large VCare uterine manipulator was inserted into the endometrial cavity.  Cervical cup was advanced over cervical sutures.  Vaginal occluder was advanced and the entire system was locked in place.     We changed gloves and attention was now directed to the abdomen.     Pneumoperitoneum was obtained with first pass of the Veress needle.  Opening  pressure was -6.  The abdomen was insufflated to 15 mmHg.  An incision was made approximately 22 cm above the pubic symphysis.  The Xi trocar was introduced followed by the camera.  We had entered into the peritoneal cavity without injury.     We then placed 2 robotic arm trocar(s) on the left.  One approximately 10 cm lateral and slightly below the camera port.  The other just above the iliac crest.  A 3rd robotic arm was then placed on the right side 12 cm lateral and slightly below the camera port.  An 8 mm AirSeal trocar was placed in the right upper quadrant.  These four trocars were placed under direct visualization.     The patient was then placed in steep Trendelenburg and the robot was docked.    At placement of the initial camera trocar we countered a loop of small bowel that was adhesed to the anterior abdominal wall that was below the umbilicus where the Veress needle had been placed.  This small-bowel adhesion was taken down with the Endo David.  There was a small serosal injury that was repaired with two 3-0 interrupted silk sutures.     I began on the right hand side.  The right round ligament was transected with monopolar scissors.  The anterior leaf of the broad ligament was opened.  The infundibulopelvic ligament was identified.  The retroperitoneal spaces were opened and I could identify a right pelvic node.  However, I was having difficulty identifying the ureter.    Because of the use of ICG dye I then went to the left side so that I could identify  the left node.  The patient had a prior left nephrectomy.  There were loops of sigmoid colon adherent to the anterior leaf of the broad ligament and these were taken down with the Endo David.  The round ligament was transected with the monopolar scissors.  The retroperitoneal space was opened and a left external iliac node was identified and removed.    Attention was now directed back to the left.  There were additional adhesions of sigmoid colon along the right pelvic sidewall and these were taken down.  This then allowed me to see the right ureter transperitoneally.  I then opened the peritoneum and identified the ureter.  The ICG positive node was removed.  The infundibulopelvic ligament was then cauterized x3 and cut.       The anterior cul-de-sac peritoneum was opened.  The bladder was dissected downward off of the cervix.  The uterine vessels were cauterized and cut.        Attention was now directed back to the left hand side.  The infundibulopelvic ligament was identified.  The left ureter was not identified as the patient had a prior nephrectomy.   The infundibulopelvic ligament was cauterized x3 and cut.  The uterine vessels were skeletonized bilaterally.  They were cauterized and cut.      Attention was now directed back anteriorly.  The bladder had been dissected downward off of the cervix.  A colpotomy was made at the cervical cup and followed circumferentially around the cervix.  Uterus, cervix, and bilateral tubes and ovaries were removed through the vagina.     The vaginal cuff was dry.     Attention was now directed to the omentum.  I was able to grasp it by utilizing the ProGrasp and I pulled it down into the pelvis.  I identified the transverse colon and began a distal omentectomy with the use of the fenestrated bipolar and monopolar scissors.  Care was taken to be cognizant of the location of small and large bowel.  The omentum was removed through the vagina.    At this point, the vagina was  then closed with interrupted 0 Vicryl sutures.  A Akash stitch was placed on each side of each angle of the vagina and the intervening vagina was closed with interrupted 0 Vicryl suture.     The pelvis was irrigated.  There was no evidence of any bleeding.  The abdomen was desufflated and reinsufflated with no evidence for bleeding.     The robotic instruments were removed.  The robot was undocked.  The skin incisions were then closed with a running 4-0 Monocryl suture in a subcuticular closure.     The patient was then awakened and taken to Recovery Room in stable condition.    Lap, needle, sponge, and instrument count was correct.

## 2020-08-24 NOTE — ANESTHESIA PREPROCEDURE EVALUATION
08/24/2020  Ankita Campo is a 70 y.o., female.  Pre-operative evaluation for Procedure(s) (LRB):  XI ROBOTIC SALPINGO-OOPHORECTOMY (N/A)  XI ROBOTIC HYSTERECTOMY (N/A)  MAPPING, LYMPH NODE, SENTINEL (N/A)  OMENTECTOMY (N/A)    Ankita Campo is a 70 y.o. female     Patient Active Problem List   Diagnosis    Back pain    Essential hypertension    DJD (degenerative joint disease)    Rotator cuff tear    Acquired solitary kidney    Hepatomegaly    Varicose veins of lower extremities with inflammation    Multinodular non-toxic goiter    Family history of gastric cancer    Unintentional weight loss    Morbid obesity with BMI of 45.0-49.9, adult    Vitamin D deficiency    Urge urinary incontinence    Callus of foot    Primary localized osteoarthrosis of right hand    Dysphagia    Post-surgical hypothyroidism    CKD (chronic kidney disease) stage 3, GFR 30-59 ml/min    Chronic gout due to renal impairment of multiple sites without tophus    Left atrial enlargement    Hypothyroidism, postsurgical    Bullous dermatitis    Endometrial ca    Retroperitoneal mass    Class 2 obesity in adult    Normocytic anemia       Review of patient's allergies indicates:   Allergen Reactions    Neosporin [benzalkonium chloride] Rash    Flanax [naproxen sodium]      Suspected bullous fixed drug eruption right ankle    Doxycycline Rash     Skin peels       No current facility-administered medications on file prior to encounter.      Current Outpatient Medications on File Prior to Encounter   Medication Sig Dispense Refill    betamethasone dipropionate (DIPROLENE) 0.05 % ointment Apply topically daily as needed. Do not apply to damaged skin 45 g 0    calcium carbonate (OS-JONNY) 600 mg calcium (1,500 mg) Tab Take 1 tablet (600 mg total) by mouth once daily. (Patient not taking: Reported on 8/17/2020)  0     cholecalciferol, vitamin D3, 1,000 unit capsule Take 1 capsule (1,000 Units total) by mouth once daily.  0    furosemide (LASIX) 40 MG tablet Take 1 tablet (40 mg total) by mouth once daily. 90 tablet 0    levothyroxine (SYNTHROID) 112 MCG tablet Take one tab 6 days a week, 1.5 tabs one day a week 96 tablet 0    multivitamin capsule Take 1 capsule by mouth once daily.      omega-3 fatty acids-vitamin E (FISH OIL) 1,000 mg Cap Take by mouth. Kal red      potassium chloride (MICRO-K) 8 mEq CpSR Take 1 capsule (8 mEq total) by mouth once daily. (Patient not taking: Reported on 2020) 90 capsule 0    triamcinolone acetonide 0.1% (KENALOG) 0.1 % cream Apply topically 2 (two) times daily. (Patient not taking: Reported on 2020) 80 g 0       Past Surgical History:   Procedure Laterality Date    cararact       SECTION      CHOLECYSTECTOMY      Donor Nephrectomy Left     fibroidectomy      HAND SURGERY      left    TOTAL THYROIDECTOMY         Social History     Socioeconomic History    Marital status: Single     Spouse name: Not on file    Number of children: Not on file    Years of education: Not on file    Highest education level: Not on file   Occupational History    Occupation:      Employer: OTHER   Social Needs    Financial resource strain: Not on file    Food insecurity     Worry: Not on file     Inability: Not on file    Transportation needs     Medical: Not on file     Non-medical: Not on file   Tobacco Use    Smoking status: Never Smoker    Smokeless tobacco: Never Used   Substance and Sexual Activity    Alcohol use: Yes     Alcohol/week: 2.0 standard drinks     Types: 2 Shots of liquor per week     Comment: Rare use of alcohol    Drug use: No    Sexual activity: Not Currently   Lifestyle    Physical activity     Days per week: Not on file     Minutes per session: Not on file    Stress: Not on file   Relationships    Social connections     Talks on phone:  Not on file     Gets together: Not on file     Attends Mandaeism service: Not on file     Active member of club or organization: Not on file     Attends meetings of clubs or organizations: Not on file     Relationship status: Not on file   Other Topics Concern    Are you pregnant or think you may be? No    Breast-feeding No   Social History Narrative    Not on file         CBC: No results for input(s): WBC, RBC, HGB, HCT, PLT, MCV, MCH, MCHC in the last 72 hours.    CMP: No results for input(s): NA, K, CL, CO2, BUN, CREATININE, GLU, MG, PHOS, CALCIUM, ALBUMIN, PROT, ALKPHOS, ALT, AST, BILITOT in the last 72 hours.    INR  No results for input(s): PT, INR, PROTIME, APTT in the last 72 hours.        Diagnostic Studies:      EKD Echo:  Results for orders placed or performed during the hospital encounter of 18   2D Echo w/ Color Flow Doppler   Result Value Ref Range    QEF 60 55 - 65    Mitral Valve Regurgitation TRIVIAL     Est. PA Systolic Pressure 27.01     Tricuspid Valve Regurgitation MILD          Anesthesia Evaluation    I have reviewed the Patient Summary Reports.    I have reviewed the Nursing Notes. I have reviewed the NPO Status.      Review of Systems  Anesthesia Hx:  Denies Family Hx of Anesthesia complications.    Cardiovascular:   Hypertension    Renal/:   Chronic Renal Disease    Musculoskeletal:   Arthritis     Endocrine:   Hypothyroidism        Physical Exam  General:  Well nourished    Airway/Jaw/Neck:  Airway Findings: Mouth Opening: Normal Tongue: Normal  General Airway Assessment: Adult  Mallampati: II  TM Distance: Normal, at least 6 cm            Mental Status:  Mental Status Findings:  Cooperative, Alert and Oriented         Anesthesia Plan  Type of Anesthesia, risks & benefits discussed:  Anesthesia Type:  general  Patient's Preference:   Intra-op Monitoring Plan: standard ASA monitors  Intra-op Monitoring Plan Comments:   Post Op Pain Control Plan: multimodal  analgesia  Post Op Pain Control Plan Comments:   Induction:   IV  Beta Blocker:  Patient is not currently on a Beta-Blocker (No further documentation required).       Informed Consent: Patient understands risks and agrees with Anesthesia plan.  Questions answered. Anesthesia consent signed with patient.  ASA Score: 3     Day of Surgery Review of History & Physical: I have interviewed and examined the patient. I have reviewed the patient's H&P dated:            Ready For Surgery From Anesthesia Perspective.

## 2020-08-25 VITALS
WEIGHT: 233 LBS | RESPIRATION RATE: 16 BRPM | SYSTOLIC BLOOD PRESSURE: 134 MMHG | TEMPERATURE: 97 F | DIASTOLIC BLOOD PRESSURE: 70 MMHG | HEART RATE: 64 BPM | BODY MASS INDEX: 38.82 KG/M2 | OXYGEN SATURATION: 100 % | HEIGHT: 65 IN

## 2020-08-25 PROBLEM — Z01.818 PREOPERATIVE TESTING: Status: RESOLVED | Noted: 2020-08-24 | Resolved: 2020-08-25

## 2020-08-25 LAB
BASOPHILS # BLD AUTO: 0.02 K/UL (ref 0–0.2)
BASOPHILS NFR BLD: 0.3 % (ref 0–1.9)
DIFFERENTIAL METHOD: ABNORMAL
EOSINOPHIL # BLD AUTO: 0 K/UL (ref 0–0.5)
EOSINOPHIL NFR BLD: 0.4 % (ref 0–8)
ERYTHROCYTE [DISTWIDTH] IN BLOOD BY AUTOMATED COUNT: 15.1 % (ref 11.5–14.5)
FINAL PATHOLOGIC DIAGNOSIS: NORMAL
HCT VFR BLD AUTO: 36.1 % (ref 37–48.5)
HGB BLD-MCNC: 11.1 G/DL (ref 12–16)
IMM GRANULOCYTES # BLD AUTO: 0.02 K/UL (ref 0–0.04)
IMM GRANULOCYTES NFR BLD AUTO: 0.3 % (ref 0–0.5)
LYMPHOCYTES # BLD AUTO: 0.7 K/UL (ref 1–4.8)
LYMPHOCYTES NFR BLD: 9.6 % (ref 18–48)
MCH RBC QN AUTO: 28 PG (ref 27–31)
MCHC RBC AUTO-ENTMCNC: 30.7 G/DL (ref 32–36)
MCV RBC AUTO: 91 FL (ref 82–98)
MONOCYTES # BLD AUTO: 0.6 K/UL (ref 0.3–1)
MONOCYTES NFR BLD: 7.8 % (ref 4–15)
NEUTROPHILS # BLD AUTO: 6.2 K/UL (ref 1.8–7.7)
NEUTROPHILS NFR BLD: 81.6 % (ref 38–73)
NRBC BLD-RTO: 0 /100 WBC
PLATELET # BLD AUTO: 177 K/UL (ref 150–350)
PMV BLD AUTO: 12.8 FL (ref 9.2–12.9)
RBC # BLD AUTO: 3.96 M/UL (ref 4–5.4)
WBC # BLD AUTO: 7.6 K/UL (ref 3.9–12.7)

## 2020-08-25 PROCEDURE — 25000003 PHARM REV CODE 250: Performed by: STUDENT IN AN ORGANIZED HEALTH CARE EDUCATION/TRAINING PROGRAM

## 2020-08-25 PROCEDURE — 85025 COMPLETE CBC W/AUTO DIFF WBC: CPT

## 2020-08-25 PROCEDURE — 36415 COLL VENOUS BLD VENIPUNCTURE: CPT

## 2020-08-25 PROCEDURE — 99024 PR POST-OP FOLLOW-UP VISIT: ICD-10-PCS | Mod: ,,, | Performed by: OBSTETRICS & GYNECOLOGY

## 2020-08-25 PROCEDURE — 99024 POSTOP FOLLOW-UP VISIT: CPT | Mod: ,,, | Performed by: OBSTETRICS & GYNECOLOGY

## 2020-08-25 RX ADMIN — ACETAMINOPHEN 650 MG: 325 TABLET ORAL at 05:08

## 2020-08-25 RX ADMIN — FUROSEMIDE 40 MG: 40 TABLET ORAL at 08:08

## 2020-08-25 RX ADMIN — DOCUSATE SODIUM 100 MG: 100 CAPSULE, LIQUID FILLED ORAL at 08:08

## 2020-08-25 RX ADMIN — CALCIUM 500 MG: 500 TABLET ORAL at 08:08

## 2020-08-25 RX ADMIN — ACETAMINOPHEN 650 MG: 325 TABLET ORAL at 12:08

## 2020-08-25 RX ADMIN — LOSARTAN POTASSIUM 100 MG: 25 TABLET, FILM COATED ORAL at 08:08

## 2020-08-25 RX ADMIN — MUPIROCIN: 20 OINTMENT TOPICAL at 08:08

## 2020-08-25 RX ADMIN — LEVOTHYROXINE SODIUM 112 MCG: 112 TABLET ORAL at 05:08

## 2020-08-25 RX ADMIN — POTASSIUM CHLORIDE, DEXTROSE MONOHYDRATE AND SODIUM CHLORIDE 75 ML/HR: 150; 5; 900 INJECTION, SOLUTION INTRAVENOUS at 01:08

## 2020-08-25 RX ADMIN — CHOLECALCIFEROL (VITAMIN D3) 25 MCG (1,000 UNIT) TABLET 1000 UNITS: at 08:08

## 2020-08-25 RX ADMIN — FAMOTIDINE 20 MG: 20 TABLET ORAL at 08:08

## 2020-08-25 NOTE — PLAN OF CARE
Future Appointments   Date Time Provider Department Center   9/22/2020 10:30 AM Donte Weeks MD [facility] GYN ONC Justin Garg       Patient discharged home to care of self on 8/25/20.   08/25/20 1353   Final Note   Assessment Type Final Discharge Note   Anticipated Discharge Disposition Home   Hospital Follow Up  Appt(s) scheduled? Yes   Discharge plans and expectations educations in teach back method with documentation complete? Yes   Right Care Referral Info   Post Acute Recommendation No Care

## 2020-08-25 NOTE — PHYSICIAN QUERY
PT Name: Ankita Campo  MR #: [id number]    Relationship to Procedure Clarification     CDS/: CALLUM Trivedi,RNC-MNN         Contact information:[email or web address]     This form is a permanent document in the medical record.     Query Date: August 25, 2020    Dear Provider,  By submitting this query, we are merely seeking further clarification of documentation. Please utilize your independent clinical judgment when addressing the question(s) below.    The medical record contains the following:  Supporting Clinical Findings Location in Medical Record   At placement of the initial camera trocar we countered a loop of small bowel that was adhesed to the anterior abdominal wall that was below the umbilicus where the Veress needle had been placed. This small-bowel adhesion was taken down with the Endo David.There was a small serosal injury that was repaired with two 3-0 interrupted silk sutures    Procedure: Procedure(s) (LRB):  XI ROBOTIC SALPINGO-OOPHORECTOMY (Bilateral)  XI ROBOTIC HYSTERECTOMY (N/A)  MAPPING, LYMPH NODE, SENTINEL (Bilateral)  XI ROBOTIC OMENTECTOMY (N/A)  XI ROBOTIC LYSIS, ADHESIONS (N/A)    Operative Findings: A loop of small bowel was adhesed to anterior abdominal wall below the umbilicus. Small bowel and sigmoid colon adhesions to posterior cul-de-sac and bilateral adnexa. Normal appearing omentum. No visible intraperitoneal disease.     Op note 8/24       Please clarify if small serosal injury (as it relates to XI ROBOTIC SALPINGO-OOPHORECTOMY (Bilateral)  XI ROBOTIC HYSTERECTOMY (N/A)  MAPPING, LYMPH NODE, SENTINEL (Bilateral)  XI ROBOTIC OMENTECTOMY (N/A)  XI ROBOTIC LYSIS, ADHESIONS (N/A)) is:      [x  ] Inherent/Integral to the procedure   [  ] Complication of the procedure   [  ] Present, but not a complication of the procedure   [  ] Incidental finding, not clinically significant   [  ] Intended/required to complete procedure   [  ] Other (please specify):  __________________   [  ] Clinically Undetermined       Please document in your progress notes daily for the duration of treatment until resolved and include in your discharge summary.

## 2020-08-25 NOTE — NURSING
This nurse had hat in toilet for patient to void in, patient called nurse to room and stated that just did not work for her but she did urinate a lot and she flushed the toilet, this nurse did not see any urine, patient stated she was ready for discharge.

## 2020-08-25 NOTE — DISCHARGE SUMMARY
[facility]  Obstetrics & Gynecology  Discharge Summary    Patient Name: Ankita Campo  MRN: [id number]  Admission Date: 8/24/2020  Hospital Length of Stay: 1 days  Discharge Date and Time:  08/25/2020 5:44 AM  Attending Physician: Donte Weeks MD   Discharging Provider: Maureen Quiñonez MD  Primary Care Provider: Michael Elias MD (Inactive)    HPI:   69 y/o referred by Dr. 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 Patient complained of PMB onset 5 months ago. Subsequently had a hysteroscopy w/ Myosure and D&C 7/7/2020 with final pathology revealing high grade serous endometrial carcinoma. Surgical history includes cholecystectomy and CS x 1. Family history significant for maternal aunt with breast cancer. Co-morbidities include HTN and obesity.     TVUS 3/2020  Uterus 7.72 x 4.27 cm with mass 2.7 x 2.8 cm. No evidence of L or R ovary.     P2. Vaginal delivery x 1. Csecton x 1.     Hospital Course: Patient presented for scheduled procedure. Patient was passed back to OR for planned RA- TLH/BSO/OMX/SLND/FUAD. Please see OP note for further details. Tolerated procedure well and patient was taken to recovery in a stable condition. Prior to discharge patient was able to void, ambulate, tolerate PO and pain was well controlled with PO meds. Patient was given routine post-op instructions for which patient voiced understanding. Patient was subsequently discharged home.      Procedure(s) (LRB):  XI ROBOTIC SALPINGO-OOPHORECTOMY (Bilateral)  XI ROBOTIC HYSTERECTOMY (N/A)  MAPPING, LYMPH NODE, SENTINEL (Bilateral)  XI ROBOTIC OMENTECTOMY (N/A)  XI ROBOTIC LYSIS, ADHESIONS (N/A)     Recent Labs   Lab 08/25/20  0528   WBC 7.60   HGB 11.1*   HCT 36.1*   MCV 91               Pending Diagnostic Studies:     Procedure Component Value Units Date/Time    Cytology, Fluid/Wash/Brush [[id number]] Collected: 08/24/20 1127    Order Status: Sent Lab Status: In  process Updated: 08/24/20 1145    Specimen to Pathology, Surgery Gynecology and Obstetrics [[id number]] Collected: 08/24/20 1310    Order Status: Sent Lab Status: In process Updated: 08/24/20 1437        Final Active Diagnoses:    Diagnosis Date Noted POA    PRINCIPAL PROBLEM:  S/P RA-TLH/BSO/SLND/OMX [Z90.710] 08/24/2020 No    Endometrial ca [C54.1] 07/28/2020 Yes      Problems Resolved During this Admission:    Diagnosis Date Noted Date Resolved POA    Preoperative testing [Z01.818] 08/24/2020 08/25/2020 Not Applicable        Discharged Condition: good    Disposition:     Follow Up:  Follow-up Information     Schedule an appointment as soon as possible for a visit with Donte Weeks MD.    Specialty: Gynecologic Oncology  Why: For routine post-op  Contact information:  [address]  [facility] [address]  [phone]                 Patient Instructions:      Diet Adult Regular     Other restrictions (specify):   Order Comments: Pelvic rest- nothing in the vagina for 6 weeks (no sex, douching, tampons, etc).   No driving until not taking narcotics, or until you feel as though you can safely slam on the brakes without pain  No baths for 6 weeks, only showers     Notify your health care provider if you experience any of the following:  temperature >100.4     Notify your health care provider if you experience any of the following:  persistent nausea and vomiting or diarrhea     Notify your health care provider if you experience any of the following:  severe uncontrolled pain     Notify your health care provider if you experience any of the following:  redness, tenderness, or signs of infection (pain, swelling, redness, odor or green/yellow discharge around incision site)     Notify your health care provider if you experience any of the following:  difficulty breathing or increased cough     Notify your health care provider if you experience any of the following:  severe persistent headache     Notify your  health care provider if you experience any of the following:  worsening rash     Notify your health care provider if you experience any of the following:  persistent dizziness, light-headedness, or visual disturbances     Notify your health care provider if you experience any of the following:  increased confusion or weakness     Notify your health care provider if you experience any of the following:   Order Comments: Heavy vaginal bleeding saturating more than 1 pad per hour for at least 2 hours.     Type & Screen   Standing Status: Future Number of Occurrences: 1 Standing Exp. Date: 10/05/21     Activity as tolerated     Medications:  Reconciled Home Medications:      Medication List      CONTINUE taking these medications    betamethasone dipropionate 0.05 % ointment  Commonly known as: DIPROLENE  Apply topically daily as needed. Do not apply to damaged skin     calcium carbonate 600 mg calcium (1,500 mg) Tab  Commonly known as: OS-JONNY  Take 1 tablet (600 mg total) by mouth once daily.     cholecalciferol (vitamin D3) 25 mcg (1,000 unit) capsule  Commonly known as: VITAMIN D3  Take 1 capsule (1,000 Units total) by mouth once daily.     FISH OIL 1,000 mg Cap  Generic drug: omega-3 fatty acids-vitamin E  Take by mouth. Kal red     furosemide 40 MG tablet  Commonly known as: LASIX  Take 1 tablet (40 mg total) by mouth once daily.     levothyroxine 112 MCG tablet  Commonly known as: SYNTHROID  Take one tab 6 days a week, 1.5 tabs one day a week     losartan 100 MG tablet  Commonly known as: COZAAR  Take 1 tablet (100 mg total) by mouth once daily. Further refills require visit with new PCP     multivitamin capsule  Take 1 capsule by mouth once daily.     potassium chloride 8 mEq Cpsr  Commonly known as: MICRO-K  Take 1 capsule (8 mEq total) by mouth once daily.     triamcinolone acetonide 0.1% 0.1 % cream  Commonly known as: KENALOG  Apply topically 2 (two) times daily.            Maureen Quiñonez MD  Obstetrics &  Gynecology  [facility]

## 2020-08-25 NOTE — PLAN OF CARE
Patient to d/c home to care of self   08/25/20 1349   Discharge Assessment   Assessment Type Discharge Planning Assessment   Confirmed/corrected address and phone number on facesheet? Yes   Assessment information obtained from? Patient   Expected Length of Stay (days) 1   Communicated expected length of stay with patient/caregiver yes   Prior to hospitilization cognitive status: Alert/Oriented   Prior to hospitalization functional status: Independent   Current cognitive status: Alert/Oriented   Current Functional Status: Independent   Facility Arrived From: Home   Lives With alone   Able to Return to Prior Arrangements yes   Is patient able to care for self after discharge? Yes   Who are your caregiver(s) and their phone number(s)? Azeb Campo (Mother) [phone]   Patient's perception of discharge disposition home or selfcare   Readmission Within the Last 30 Days no previous admission in last 30 days   Patient currently being followed by outpatient case management? No   Patient currently receives any other outside agency services? No   Equipment Currently Used at Home none   Do you have any problems affording any of your prescribed medications? No   Is the patient taking medications as prescribed? yes   Does the patient have transportation home? Yes   Transportation Anticipated family or friend will provide   Does the patient receive services at the [facility]madin Clinic? No   Discharge Plan A Home   Discharge Plan B Home   DME Needed Upon Discharge  none   Patient/Family in Agreement with Plan yes

## 2020-08-26 NOTE — PLAN OF CARE
Future Appointments   Date Time Provider Department Center   9/22/2020 10:30 AM Donte Weeks MD [facility] GYN ONC Justin Garg       Patient discharged home to care of self on 8/25/20.   08/26/20 1523   Final Note   Assessment Type Final Discharge Note   Anticipated Discharge Disposition Home   What phone number can be called within the next 1-3 days to see how you are doing after discharge?   ([phone])   Hospital Follow Up  Appt(s) scheduled? Yes   Discharge plans and expectations educations in teach back method with documentation complete? Yes

## 2020-08-28 LAB
BLD PROD TYP BPU: NORMAL
BLOOD UNIT EXPIRATION DATE: NORMAL
BLOOD UNIT TYPE CODE: 5100
BLOOD UNIT TYPE: NORMAL
CODING SYSTEM: NORMAL
DISPENSE STATUS: NORMAL
FINAL PATHOLOGIC DIAGNOSIS: NORMAL
GROSS: NORMAL
NUM UNITS TRANS PACKED RBC: NORMAL
SUPPLEMENTAL DIAGNOSIS: NORMAL

## 2020-08-28 NOTE — PROGRESS NOTES
Admit Assessment    Patient Identification  Ankita Campo   :  1950  Admit Date:  2020  Attending Provider:  No att. providers found              Referral:   Pt was admitted to  with a diagnosis of S/P laparoscopic hysterectomy, and was admitted this hospital stay due to Endometrial ca [C54.1]  Endometrial ca [C54.1]  Preoperative testing [Z01.818].   is involved was referred to the Social Work Department via routine referral.  Patient presents as a 70 y.o. year old single  female.    Persons interviewed: patient     Living Situation:  Pt. States that she lives alone. She states that she has been fairly independent with all adl's prior to admission. She states that she is currently employed and is working full time. She is on leave from her job.    Resides at [facility] [address]  [facility] [address] [facility] [address], phone: [phone] (home).           Current or Past Agencies and Description of Services/Supplies    DME  Agency Name: none  Agency Phone Number: n/a  Equipment Currently Used at Home: none    Home Health  Agency Name: none  Agency Phone Number: n/a  Services: n/a    IV Infusion  Agency Name: none  Agency Phone Number: n/a    Nutrition: oral    Outpatient Pharmacy:     [facility] - [place], [place] - [address]  [address] [facility] [address]  Phone: [phone] Fax: [phone]      Patient Preference of agencies include: none noted    Patient/Caregiver informed of right to choose providers or agencies.  Patient provides permission to release any necessary information to [facility] and to Non[facility] agencies as needed to facilitate patient care, treatment planning, and patient discharge planning.  Written and verbal resources provided.      Coping: doing OK. Pt. Very reserved during visit and not very talkative. She states that she has good support.           Adjustment to Diagnosis and Treatment: appropriate      Emotional/Behavioral/Cognitive  Issues: none noted            History/Current Symptoms of Anxiety/Depression: No:   History/Current Substance Use:   Social History     Tobacco Use    Smoking status: Never Smoker    Smokeless tobacco: Never Used   Substance and Sexual Activity    Alcohol use: Yes     Alcohol/week: 2.0 standard drinks     Types: 2 Shots of liquor per week     Comment: Rare use of alcohol    Drug use: No    Sexual activity: Not Currently       Indications of Abuse/Neglect: No:   Abuse Screen (yes response referral indicated)  Feels Unsafe at Home or Work/School: no  Feels Threatened by Someone: no  Does Anyone Try to Keep You From Having Contact with Others or Doing Things Outside Your Home?: no  Physical Signs of Abuse Present: no    Financial:  Payor/Plan Subscr  Sex Relation Sub. Ins. ID Effective Group Num   1. BLUE CROSS BL* DONA ALBA 1950 Female  [id number]                                   [address] [address]                            Other identified concerns/needs: none noted    Plan: to return home with help from her family (sister)    Interventions/Referrals: TBD  Patient/caregiver engaged in treatment planning process.     providing psychosocial and supportive counseling, resources, education, assistance and discharge planning as appropriate.  Patient/caregiver state understanding of  available resources,  following, remains available. Provided pt. With sw'er phone # and encouraged her to call if needed. Will follow.

## 2020-08-31 ENCOUNTER — TELEPHONE (OUTPATIENT)
Dept: GYNECOLOGIC ONCOLOGY | Facility: CLINIC | Age: 70
End: 2020-08-31

## 2020-08-31 NOTE — TELEPHONE ENCOUNTER
----- Message from Efren José MA sent at 8/31/2020  2:12 PM CDT -----  Contact: [phone]  Hello Please give patient a call back if need be. She is requesting a note stating that she had surgery and is out of work to be Fax [phone] att: Rajan Owen. Thanks

## 2020-09-09 ENCOUNTER — TELEPHONE (OUTPATIENT)
Dept: GYNECOLOGIC ONCOLOGY | Facility: CLINIC | Age: 70
End: 2020-09-09

## 2020-09-09 ENCOUNTER — LAB VISIT (OUTPATIENT)
Dept: LAB | Facility: HOSPITAL | Age: 70
End: 2020-09-09
Payer: COMMERCIAL

## 2020-09-09 ENCOUNTER — CLINICAL SUPPORT (OUTPATIENT)
Dept: GYNECOLOGIC ONCOLOGY | Facility: CLINIC | Age: 70
End: 2020-09-09
Payer: COMMERCIAL

## 2020-09-09 DIAGNOSIS — C54.1 ENDOMETRIAL CA: ICD-10-CM

## 2020-09-09 DIAGNOSIS — C54.1 ENDOMETRIAL CA: Primary | ICD-10-CM

## 2020-09-09 LAB
ANION GAP SERPL CALC-SCNC: 6 MMOL/L (ref 8–16)
BUN SERPL-MCNC: 21 MG/DL (ref 8–23)
CALCIUM SERPL-MCNC: 8.7 MG/DL (ref 8.7–10.5)
CHLORIDE SERPL-SCNC: 108 MMOL/L (ref 95–110)
CO2 SERPL-SCNC: 27 MMOL/L (ref 23–29)
CREAT SERPL-MCNC: 1.3 MG/DL (ref 0.5–1.4)
ERYTHROCYTE [DISTWIDTH] IN BLOOD BY AUTOMATED COUNT: 15 % (ref 11.5–14.5)
EST. GFR  (AFRICAN AMERICAN): 47.7 ML/MIN/1.73 M^2
EST. GFR  (NON AFRICAN AMERICAN): 41.4 ML/MIN/1.73 M^2
GLUCOSE SERPL-MCNC: 67 MG/DL (ref 70–110)
HCT VFR BLD AUTO: 37.1 % (ref 37–48.5)
HGB BLD-MCNC: 11.2 G/DL (ref 12–16)
IMM GRANULOCYTES # BLD AUTO: 0.02 K/UL (ref 0–0.04)
MCH RBC QN AUTO: 27.7 PG (ref 27–31)
MCHC RBC AUTO-ENTMCNC: 30.2 G/DL (ref 32–36)
MCV RBC AUTO: 92 FL (ref 82–98)
NEUTROPHILS # BLD AUTO: 3.1 K/UL (ref 1.8–7.7)
PLATELET # BLD AUTO: 185 K/UL (ref 150–350)
PMV BLD AUTO: 12.8 FL (ref 9.2–12.9)
POTASSIUM SERPL-SCNC: 4 MMOL/L (ref 3.5–5.1)
RBC # BLD AUTO: 4.05 M/UL (ref 4–5.4)
SODIUM SERPL-SCNC: 141 MMOL/L (ref 136–145)
WBC # BLD AUTO: 5.95 K/UL (ref 3.9–12.7)

## 2020-09-09 PROCEDURE — 80048 BASIC METABOLIC PNL TOTAL CA: CPT

## 2020-09-09 PROCEDURE — 36415 COLL VENOUS BLD VENIPUNCTURE: CPT

## 2020-09-09 PROCEDURE — 85027 COMPLETE CBC AUTOMATED: CPT

## 2020-09-10 NOTE — PROGRESS NOTES
Pt arrived for chemotherapy class accompanied by her cousin. Pt provided provider/ cards (Tsering Gutierrez), and [facility] sheets on ordered treatment regimen. Common side effects, management of side effects, when to go to ER/speak to provider, dietary restrictions/recommendations, infection prevention and additional information reviewed. Pt educated on common side effects of specific treatment. Pt and cousin allowed to ask questions specific to regimen after class. All questions answered and all concerns addressed. Pt expressed verbal understanding for upcoming appts and printed copy of C1 and C2 lab/provider/infusion appt. given to pt.

## 2020-09-15 ENCOUNTER — OFFICE VISIT (OUTPATIENT)
Dept: FAMILY MEDICINE | Facility: CLINIC | Age: 70
End: 2020-09-15
Payer: COMMERCIAL

## 2020-09-15 VITALS
BODY MASS INDEX: 39.37 KG/M2 | HEART RATE: 62 BPM | SYSTOLIC BLOOD PRESSURE: 148 MMHG | TEMPERATURE: 97 F | DIASTOLIC BLOOD PRESSURE: 82 MMHG | WEIGHT: 236.31 LBS | HEIGHT: 65 IN | OXYGEN SATURATION: 97 %

## 2020-09-15 DIAGNOSIS — E89.0 POSTOPERATIVE HYPOTHYROIDISM: ICD-10-CM

## 2020-09-15 DIAGNOSIS — Z23 NEED FOR INFLUENZA VACCINATION: ICD-10-CM

## 2020-09-15 DIAGNOSIS — Z12.31 ENCOUNTER FOR SCREENING MAMMOGRAM FOR MALIGNANT NEOPLASM OF BREAST: ICD-10-CM

## 2020-09-15 DIAGNOSIS — N18.30 CKD (CHRONIC KIDNEY DISEASE) STAGE 3, GFR 30-59 ML/MIN: ICD-10-CM

## 2020-09-15 DIAGNOSIS — Z23 NEED FOR VACCINATION AGAINST STREPTOCOCCUS PNEUMONIAE: ICD-10-CM

## 2020-09-15 DIAGNOSIS — Z76.89 ENCOUNTER TO ESTABLISH CARE WITH NEW DOCTOR: ICD-10-CM

## 2020-09-15 DIAGNOSIS — D64.9 NORMOCYTIC ANEMIA: ICD-10-CM

## 2020-09-15 DIAGNOSIS — R79.89 LOW VITAMIN D LEVEL: ICD-10-CM

## 2020-09-15 DIAGNOSIS — Z90.710 S/P LAPAROSCOPIC HYSTERECTOMY: ICD-10-CM

## 2020-09-15 DIAGNOSIS — D63.8 ANEMIA OF CHRONIC DISEASE: ICD-10-CM

## 2020-09-15 DIAGNOSIS — Z01.419 WELL WOMAN EXAM: Primary | ICD-10-CM

## 2020-09-15 DIAGNOSIS — C54.1 ENDOMETRIAL CA: ICD-10-CM

## 2020-09-15 DIAGNOSIS — I10 ESSENTIAL HYPERTENSION: ICD-10-CM

## 2020-09-15 DIAGNOSIS — Z90.5 ACQUIRED SOLITARY KIDNEY: ICD-10-CM

## 2020-09-15 PROBLEM — E66.812 CLASS 2 OBESITY IN ADULT: Status: RESOLVED | Noted: 2020-08-17 | Resolved: 2020-09-15

## 2020-09-15 PROBLEM — R19.00 RETROPERITONEAL MASS: Status: RESOLVED | Noted: 2020-08-17 | Resolved: 2020-09-15

## 2020-09-15 PROBLEM — E66.9 CLASS 2 OBESITY IN ADULT: Status: RESOLVED | Noted: 2020-08-17 | Resolved: 2020-09-15

## 2020-09-15 PROCEDURE — 3077F SYST BP >= 140 MM HG: CPT | Mod: CPTII,S$GLB,, | Performed by: FAMILY MEDICINE

## 2020-09-15 PROCEDURE — 90732 PPSV23 VACC 2 YRS+ SUBQ/IM: CPT | Mod: S$GLB,,, | Performed by: FAMILY MEDICINE

## 2020-09-15 PROCEDURE — 3079F PR MOST RECENT DIASTOLIC BLOOD PRESSURE 80-89 MM HG: ICD-10-PCS | Mod: CPTII,S$GLB,, | Performed by: FAMILY MEDICINE

## 2020-09-15 PROCEDURE — 90472 PNEUMOCOCCAL POLYSACCHARIDE VACCINE 23-VALENT =>2YO SQ IM: ICD-10-PCS | Mod: S$GLB,,, | Performed by: FAMILY MEDICINE

## 2020-09-15 PROCEDURE — 99397 PER PM REEVAL EST PAT 65+ YR: CPT | Mod: 25,S$GLB,, | Performed by: FAMILY MEDICINE

## 2020-09-15 PROCEDURE — 90472 IMMUNIZATION ADMIN EACH ADD: CPT | Mod: S$GLB,,, | Performed by: FAMILY MEDICINE

## 2020-09-15 PROCEDURE — 90732 PNEUMOCOCCAL POLYSACCHARIDE VACCINE 23-VALENT =>2YO SQ IM: ICD-10-PCS | Mod: S$GLB,,, | Performed by: FAMILY MEDICINE

## 2020-09-15 PROCEDURE — 90471 IMMUNIZATION ADMIN: CPT | Mod: S$GLB,,, | Performed by: FAMILY MEDICINE

## 2020-09-15 PROCEDURE — 99999 PR PBB SHADOW E&M-EST. PATIENT-LVL IV: ICD-10-PCS | Mod: PBBFAC,,, | Performed by: FAMILY MEDICINE

## 2020-09-15 PROCEDURE — 3079F DIAST BP 80-89 MM HG: CPT | Mod: CPTII,S$GLB,, | Performed by: FAMILY MEDICINE

## 2020-09-15 PROCEDURE — 99397 PR PREVENTIVE VISIT,EST,65 & OVER: ICD-10-PCS | Mod: 25,S$GLB,, | Performed by: FAMILY MEDICINE

## 2020-09-15 PROCEDURE — 90694 VACC AIIV4 NO PRSRV 0.5ML IM: CPT | Mod: S$GLB,,, | Performed by: FAMILY MEDICINE

## 2020-09-15 PROCEDURE — 99999 PR PBB SHADOW E&M-EST. PATIENT-LVL IV: CPT | Mod: PBBFAC,,, | Performed by: FAMILY MEDICINE

## 2020-09-15 PROCEDURE — 3077F PR MOST RECENT SYSTOLIC BLOOD PRESSURE >= 140 MM HG: ICD-10-PCS | Mod: CPTII,S$GLB,, | Performed by: FAMILY MEDICINE

## 2020-09-15 PROCEDURE — 90694 FLU VACCINE - QUADRIVALENT - ADJUVANTED: ICD-10-PCS | Mod: S$GLB,,, | Performed by: FAMILY MEDICINE

## 2020-09-15 PROCEDURE — 90471 FLU VACCINE - QUADRIVALENT - ADJUVANTED: ICD-10-PCS | Mod: S$GLB,,, | Performed by: FAMILY MEDICINE

## 2020-09-15 RX ORDER — VALSARTAN AND HYDROCHLOROTHIAZIDE 320; 12.5 MG/1; MG/1
1 TABLET, FILM COATED ORAL DAILY
Qty: 90 TABLET | Refills: 0 | Status: SHIPPED | OUTPATIENT
Start: 2020-09-15 | End: 2020-11-02 | Stop reason: ALTCHOICE

## 2020-09-15 RX ORDER — LEVOTHYROXINE SODIUM 112 UG/1
TABLET ORAL
Qty: 96 TABLET | Refills: 1 | Status: SHIPPED | OUTPATIENT
Start: 2020-09-15 | End: 2021-04-22 | Stop reason: SDUPTHER

## 2020-09-15 NOTE — PATIENT INSTRUCTIONS
Stop lasix  Stop potassium  Potassium was due to lasix Rx  Leg swelling due to job? Keep legs elevated. Wear compression socks  Stop losartan    Start valsartan/hctz  Check BP at home. Contact me if >140/80  F/u in 3 months  Earlier if BP Higher    Weight loss  Low salt diet    Increase activity    F/u in 3 months, labs prior.

## 2020-09-15 NOTE — PROGRESS NOTES
"Subjective:       Patient ID: Ankita Campo is a 70 y.o. female.    Chief Complaint: Establish Care      Ankita Campo is a 70 y.o. female who presents today to establish care.     Diet: she tries to cook her own meals. She cooks vegetables. Eats fruit. She drinks soda, rarely. Sweet tea daily with dinner. Coffee every morning with cream and sugar.   Exercise: she does yard work.     Flu: ordered  PNA: ordered dose 2 based on age, will need repeat in 5 years based on cancer history.   Labs: reviewed and ordered    C-scope: ordered    Mammogram: ordered    Recently diagnosed with endometrium cancer. Is s/p surgery. 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  I have recommended 6 cycles of Taxol and carboplatin. Will also plan the addition of vaginal brachytherapy" This is per Dr. Weeks.     Skin Lesion, saw derm. They stated, "If/when bulla start again, she should keep a diary of all ingested pills and foods for the 24 hours prior"       PMHx: reviewed in EMR and updated  Meds: reviewed in EMR and updated  Shx: reviewed in EMR and updated  FMHx: reviewed in EMR and updated  Social: she was working. 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 She lives alone. She has one son, he lives nearby. 2 dogs at home. One grandchild, in [place].       Review of Systems   Constitutional: Negative for chills and fever.   Respiratory: Negative for cough and shortness of breath.    Cardiovascular: Negative for chest pain.   Gastrointestinal: Negative for diarrhea, nausea and vomiting.   Genitourinary: Negative for difficulty urinating.   Skin: Positive for rash.   Neurological: Negative for dizziness, numbness and headaches.         Health Maintenance Due   Topic Date Due    Shingles Vaccine (1 of 2) 07/16/1999    Pneumococcal Vaccine (65+ High/Highest Risk) (2 of 2 - PPSV23) 03/02/2016    DEXA SCAN  01/28/2018    Mammogram  09/28/2020     Immunization History   Administered Date(s) " Administered    Pneumococcal Conjugate - 13 Valent 01/06/2016    Tdap 01/25/2011         Objective:     Vitals:    09/15/20 1345   BP: (!) 148/82   Temp: 97.4 °F (36.3 °C)        Physical Exam  Constitutional:       Appearance: She is obese.   HENT:      Head: Normocephalic and atraumatic.   Cardiovascular:      Rate and Rhythm: Normal rate and regular rhythm.   Pulmonary:      Effort: Pulmonary effort is normal.      Breath sounds: Normal breath sounds.   Abdominal:      Palpations: Abdomen is soft.      Tenderness: There is no abdominal tenderness.   Musculoskeletal:         General: No swelling or tenderness.   Skin:     Comments: Healing lesions noted on the leg   Neurological:      General: No focal deficit present.      Mental Status: She is oriented to person, place, and time.   Psychiatric:         Mood and Affect: Mood normal.         Behavior: Behavior normal.         Thought Content: Thought content normal.         Judgment: Judgment normal.         Assessment:       1. Well woman exam    2. Encounter to establish care with new doctor    3. Low vitamin D level    4. Postoperative hypothyroidism    5. Normocytic anemia    6. Anemia of chronic disease    7. Endometrial ca    8. S/P RA-TLH/BSO/SLND/OMX    9. CKD (chronic kidney disease) stage 3, GFR 30-59 ml/min    10. Acquired solitary kidney    11. Essential hypertension    12. BMI 39.0-39.9,adult    13. Encounter for screening mammogram for malignant neoplasm of breast    14. Need for vaccination against Streptococcus pneumoniae    15. Need for influenza vaccination        Plan:       Stop lasix  Stop potassium  Potassium was due to lasix Rx  Leg swelling due to job? Keep legs elevated. Wear compression socks  Stop losartan    Start valsartan/hctz  Check BP at home. Contact me if >140/80  F/u in 3 months  Earlier if BP Higher    ECHO showed biatrial enlargement. Consider BB if BP still high?    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  6  cycles of Taxol and carboplatin. Also having vaginal brachytherapy. Seeing Dr. Weeks.     Kidney function is a little low. No NSAIDS such as ibuprofen or naproxen. Avoid Red meats. Drink a lot of water. I will continue monitoring kidney function q3-6 months.      Well woman exam  -     CBC auto differential; Future; Expected date: 09/15/2020  -     Comprehensive metabolic panel; Future; Expected date: 09/15/2020  -     Hemoglobin A1C; Future; Expected date: 09/15/2020  -     Lipid Panel; Future; Expected date: 09/15/2020  -     TSH; Future; Expected date: 09/15/2020  -     Vitamin D; Future; Expected date: 09/15/2020  -     TSH; Future; Expected date: 09/15/2020  -     T4, free; Future; Expected date: 09/15/2020  -     Vitamin B12; Future; Expected date: 09/15/2020    Encounter to establish care with new doctor    Low vitamin D level  -     Vitamin D; Future; Expected date: 09/15/2020    Postoperative hypothyroidism  -     levothyroxine (SYNTHROID) 112 MCG tablet; Take one tab 6 days a week, 1.5 tabs one day a week  Dispense: 96 tablet; Refill: 1  -     TSH; Future; Expected date: 09/15/2020  -     T4, free; Future; Expected date: 09/15/2020    Normocytic anemia  -     CBC auto differential; Future; Expected date: 09/15/2020  -     Comprehensive metabolic panel; Future; Expected date: 09/15/2020    Anemia of chronic disease  -     CBC auto differential; Future; Expected date: 09/15/2020  -     Comprehensive metabolic panel; Future; Expected date: 09/15/2020    Endometrial ca    S/P RA-TLH/BSO/SLND/OMX    CKD (chronic kidney disease) stage 3, GFR 30-59 ml/min    Acquired solitary kidney    Essential hypertension  -     valsartan-hydrochlorothiazide (DIOVAN-HCT) 320-12.5 mg per tablet; Take 1 tablet by mouth once daily.  Dispense: 90 tablet; Refill: 0    BMI 39.0-39.9,adult    Encounter for screening mammogram for malignant neoplasm of breast  -     Mammo Digital Screening Bilat; Standing    Need for vaccination  against Streptococcus pneumoniae  -     (In Office Administered) Pneumococcal Polysaccharide Vaccine (23 Valent) (SQ/IM)    Need for influenza vaccination  -     Influenza (FLUAD) - Quadrivalent (Adjuvanted) *Preferred* (65+) (PF)

## 2020-09-16 ENCOUNTER — TELEPHONE (OUTPATIENT)
Dept: FAMILY MEDICINE | Facility: CLINIC | Age: 70
End: 2020-09-16

## 2020-09-16 ENCOUNTER — HOSPITAL ENCOUNTER (OUTPATIENT)
Dept: RADIOLOGY | Facility: HOSPITAL | Age: 70
Discharge: HOME | End: 2020-09-16
Attending: FAMILY MEDICINE
Payer: COMMERCIAL

## 2020-09-16 DIAGNOSIS — Z12.31 ENCOUNTER FOR SCREENING MAMMOGRAM FOR MALIGNANT NEOPLASM OF BREAST: ICD-10-CM

## 2020-09-16 PROCEDURE — 77067 SCR MAMMO BI INCL CAD: CPT | Mod: TC,PO

## 2020-09-16 NOTE — TELEPHONE ENCOUNTER
I spoke with patient and advised her of normal mammogram. Patient informed to repeat in 1 year. Patient voiced understanding.

## 2020-09-22 ENCOUNTER — OFFICE VISIT (OUTPATIENT)
Dept: GYNECOLOGIC ONCOLOGY | Facility: CLINIC | Age: 70
End: 2020-09-22
Payer: COMMERCIAL

## 2020-09-22 ENCOUNTER — LAB VISIT (OUTPATIENT)
Dept: LAB | Facility: HOSPITAL | Age: 70
End: 2020-09-22
Attending: OBSTETRICS & GYNECOLOGY
Payer: COMMERCIAL

## 2020-09-22 VITALS
DIASTOLIC BLOOD PRESSURE: 82 MMHG | HEART RATE: 62 BPM | SYSTOLIC BLOOD PRESSURE: 165 MMHG | WEIGHT: 233 LBS | BODY MASS INDEX: 38.77 KG/M2

## 2020-09-22 DIAGNOSIS — T45.1X5A CHEMOTHERAPY-INDUCED NAUSEA: ICD-10-CM

## 2020-09-22 DIAGNOSIS — C54.1 ENDOMETRIAL CA: Primary | ICD-10-CM

## 2020-09-22 DIAGNOSIS — C54.1 ENDOMETRIAL CA: ICD-10-CM

## 2020-09-22 DIAGNOSIS — R11.0 CHEMOTHERAPY-INDUCED NAUSEA: ICD-10-CM

## 2020-09-22 PROCEDURE — 99999 PR PBB SHADOW E&M-EST. PATIENT-LVL III: CPT | Mod: PBBFAC,,, | Performed by: OBSTETRICS & GYNECOLOGY

## 2020-09-22 PROCEDURE — 99024 POSTOP FOLLOW-UP VISIT: CPT | Mod: S$GLB,,, | Performed by: OBSTETRICS & GYNECOLOGY

## 2020-09-22 PROCEDURE — 99999 PR PBB SHADOW E&M-EST. PATIENT-LVL III: ICD-10-PCS | Mod: PBBFAC,,, | Performed by: OBSTETRICS & GYNECOLOGY

## 2020-09-22 PROCEDURE — 99024 PR POST-OP FOLLOW-UP VISIT: ICD-10-PCS | Mod: S$GLB,,, | Performed by: OBSTETRICS & GYNECOLOGY

## 2020-09-22 RX ORDER — HEPARIN 100 UNIT/ML
500 SYRINGE INTRAVENOUS
Status: CANCELLED | OUTPATIENT
Start: 2020-09-24

## 2020-09-22 RX ORDER — SODIUM CHLORIDE 0.9 % (FLUSH) 0.9 %
10 SYRINGE (ML) INJECTION
Status: CANCELLED | OUTPATIENT
Start: 2020-09-24

## 2020-09-22 RX ORDER — ONDANSETRON 8 MG/1
8 TABLET, ORALLY DISINTEGRATING ORAL EVERY 12 HOURS PRN
Qty: 20 TABLET | Refills: 1 | Status: SHIPPED | OUTPATIENT
Start: 2020-09-22 | End: 2021-04-19

## 2020-09-22 RX ORDER — EPINEPHRINE 0.3 MG/.3ML
0.3 INJECTION SUBCUTANEOUS ONCE AS NEEDED
Status: CANCELLED | OUTPATIENT
Start: 2020-09-24

## 2020-09-22 RX ORDER — DIPHENHYDRAMINE HYDROCHLORIDE 50 MG/ML
50 INJECTION INTRAMUSCULAR; INTRAVENOUS ONCE AS NEEDED
Status: CANCELLED | OUTPATIENT
Start: 2020-09-24

## 2020-09-22 RX ORDER — FAMOTIDINE 10 MG/ML
20 INJECTION INTRAVENOUS
Status: CANCELLED | OUTPATIENT
Start: 2020-09-24

## 2020-09-22 NOTE — PROGRESS NOTES
Subjective:       Patient ID: Ankita Campo is a 70 y.o. female.    Chief Complaint: Post-op Evaluation    HPI   Patient comes in today for postoperative visit after robotic assisted laparoscopic hysterectomy BSO and bilateral sentinel node biopsies and omentectomy for serous ca of the uterus on 8/24/2020. 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 No lymph nodes noted on SLN bx. (+) LVSI. Negative cytology.       Her oncologic history is:    71 y/o referred by Dr. 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 Patient complained of PMB onset 5 months ago. Subsequently had a D&C 7/7/2020 with final pathology revealing high grade serous endometrial carcinoma. Surgical history includes cholecystectomy and CS x 1. Family history significant for maternal aunt with breast cancer. Co-morbidities include HTN and obesity.    TVUS 3/2020  Uterus 7.72 x 4.27 cm with mass 2.7 x 2.8 cm. No evidence of L or R ovary.    Au 2020: robotic assisted laparoscopic hysterectomy BSO and bilateral sentinel node biopsies and omentectomy for serous ca of the uterus on 8/24/2020. 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 No lymph nodes noted on SLN bx. (+) LVSI. Preop : 5.     Review of Systems   Constitutional: Negative for chills, fatigue and fever.   Gastrointestinal: Negative for abdominal pain.   Genitourinary: Negative for vaginal bleeding.   Neurological: Negative for weakness.       Objective:   BP (!) 165/82   Pulse 62   Wt 105.7 kg (233 lb)   BMI 38.77 kg/m²      Physical Exam  Constitutional:       Appearance: She is well-developed.   HENT:      Head: Normocephalic and atraumatic.   Eyes:      General: No scleral icterus.  Abdominal:      General: There is no distension.      Palpations: Abdomen is soft. There is no mass.      Tenderness: There is no abdominal tenderness.  There is no guarding or rebound.      Comments: Healing trocar sites    Genitourinary:     Comments: Bimanual exam:  Vulva: no lesions. Normal appearance  Urethra: Normal size and location. No lesions  Bladder: No masses or tenderness.  Vagina: normal mucosa. No lesion. Cuff intact.   Cervix: absent.   Uterus: absent.  Adnexa: no masses.  Rectovaginal: No posterior cul de sac thickening or nodularity.  Rectal: no masses. Nontender. Normal tone.     Musculoskeletal:         General: No tenderness.   Skin:     General: Skin is warm and dry.   Neurological:      Mental Status: She is alert and oriented to person, place, and time.   Psychiatric:         Behavior: Behavior normal.         Thought Content: Thought content normal.         Judgment: Judgment normal.         Assessment:       1. Endometrial ca    2. Chemotherapy-induced nausea        Plan:   Endometrial ca  -     Basic metabolic panel; Standing  -     CBC auto differential; Standing    Chemotherapy-induced nausea  -     ondansetron (ZOFRAN-ODT) 8 MG TbDL; Take 1 tablet (8 mg total) by mouth every 12 (twelve) hours as needed (nausea and vomiting.).  Dispense: 20 tablet; Refill: 1        Plan:  At least FIGO stage IIIA with serosal involvement.   Will plan for 6 cycles taxol and carboplatin then reimage with PET(PET negative mesenteric LN) and then WPRT if PET negative. .  Consent signed.   RTC in 3 weeks for cycle 2.

## 2020-09-24 ENCOUNTER — INFUSION (OUTPATIENT)
Dept: INFUSION THERAPY | Facility: HOSPITAL | Age: 70
End: 2020-09-24
Payer: COMMERCIAL

## 2020-09-24 VITALS
WEIGHT: 233 LBS | SYSTOLIC BLOOD PRESSURE: 180 MMHG | RESPIRATION RATE: 18 BRPM | HEIGHT: 65 IN | DIASTOLIC BLOOD PRESSURE: 90 MMHG | BODY MASS INDEX: 38.82 KG/M2 | HEART RATE: 70 BPM | OXYGEN SATURATION: 99 % | TEMPERATURE: 98 F

## 2020-09-24 DIAGNOSIS — C54.1 ENDOMETRIAL CA: Primary | ICD-10-CM

## 2020-09-24 DIAGNOSIS — I87.8 POOR VENOUS ACCESS: ICD-10-CM

## 2020-09-24 PROCEDURE — 25000003 PHARM REV CODE 250: Performed by: OBSTETRICS & GYNECOLOGY

## 2020-09-24 PROCEDURE — S0028 INJECTION, FAMOTIDINE, 20 MG: HCPCS | Performed by: OBSTETRICS & GYNECOLOGY

## 2020-09-24 PROCEDURE — 96375 TX/PRO/DX INJ NEW DRUG ADDON: CPT

## 2020-09-24 PROCEDURE — 96413 CHEMO IV INFUSION 1 HR: CPT

## 2020-09-24 PROCEDURE — 96367 TX/PROPH/DG ADDL SEQ IV INF: CPT

## 2020-09-24 PROCEDURE — 96417 CHEMO IV INFUS EACH ADDL SEQ: CPT

## 2020-09-24 PROCEDURE — 63600175 PHARM REV CODE 636 W HCPCS: Performed by: OBSTETRICS & GYNECOLOGY

## 2020-09-24 PROCEDURE — 96360 HYDRATION IV INFUSION INIT: CPT | Mod: 59

## 2020-09-24 RX ORDER — FAMOTIDINE 10 MG/ML
20 INJECTION INTRAVENOUS
Status: COMPLETED | OUTPATIENT
Start: 2020-09-24 | End: 2020-09-24

## 2020-09-24 RX ORDER — HEPARIN 100 UNIT/ML
500 SYRINGE INTRAVENOUS
Status: DISCONTINUED | OUTPATIENT
Start: 2020-09-24 | End: 2020-09-24 | Stop reason: HOSPADM

## 2020-09-24 RX ORDER — EPINEPHRINE 0.3 MG/.3ML
0.3 INJECTION SUBCUTANEOUS ONCE AS NEEDED
Status: DISCONTINUED | OUTPATIENT
Start: 2020-09-24 | End: 2020-09-24 | Stop reason: HOSPADM

## 2020-09-24 RX ORDER — SODIUM CHLORIDE 0.9 % (FLUSH) 0.9 %
10 SYRINGE (ML) INJECTION
Status: DISCONTINUED | OUTPATIENT
Start: 2020-09-24 | End: 2020-09-24 | Stop reason: HOSPADM

## 2020-09-24 RX ORDER — DIPHENHYDRAMINE HYDROCHLORIDE 50 MG/ML
50 INJECTION INTRAMUSCULAR; INTRAVENOUS ONCE AS NEEDED
Status: DISCONTINUED | OUTPATIENT
Start: 2020-09-24 | End: 2020-09-24 | Stop reason: HOSPADM

## 2020-09-24 RX ADMIN — CARBOPLATIN 580 MG: 10 INJECTION INTRAVENOUS at 03:09

## 2020-09-24 RX ADMIN — FAMOTIDINE 20 MG: 10 INJECTION INTRAVENOUS at 11:09

## 2020-09-24 RX ADMIN — DEXAMETHASONE SODIUM PHOSPHATE: 4 INJECTION, SOLUTION INTRA-ARTICULAR; INTRALESIONAL; INTRAMUSCULAR; INTRAVENOUS; SOFT TISSUE at 11:09

## 2020-09-24 RX ADMIN — PACLITAXEL 384 MG: 6 INJECTION, SOLUTION INTRAVENOUS at 11:09

## 2020-09-24 RX ADMIN — DIPHENHYDRAMINE HYDROCHLORIDE 50 MG: 50 INJECTION, SOLUTION INTRAMUSCULAR; INTRAVENOUS at 11:09

## 2020-09-24 RX ADMIN — SODIUM CHLORIDE 500 ML: 9 INJECTION, SOLUTION INTRAVENOUS at 04:09

## 2020-09-24 NOTE — PLAN OF CARE
Pt admitted for C1 Taxol/Carboplatin/IVF. Labs reviewed and side effects and self care tips discussed while on chemo. Taxol and Carboplatin handouts given to Pt in chemo class. Education continued throughout treatment. It took 4 attempts to start and IV finally using vein finder. Dr. Harrell informed and he will consult for port placement, Pt in agreement. Pt tolerated treatment well. AVS given to Pt and Pt discharged @ 16:05

## 2020-09-30 DIAGNOSIS — I87.8 POOR VENOUS ACCESS: Primary | ICD-10-CM

## 2020-10-05 ENCOUNTER — TELEPHONE (OUTPATIENT)
Dept: GYNECOLOGIC ONCOLOGY | Facility: CLINIC | Age: 70
End: 2020-10-05

## 2020-10-05 NOTE — TELEPHONE ENCOUNTER
----- Message from Paty Salas sent at 10/5/2020 10:11 AM CDT -----  Patient and her caregiver are unclear about port placement says it's scheduled on the 9th. But was told it has to be done on the 6th.

## 2020-10-06 ENCOUNTER — TELEPHONE (OUTPATIENT)
Dept: INTERVENTIONAL RADIOLOGY/VASCULAR | Facility: HOSPITAL | Age: 70
End: 2020-10-06

## 2020-10-06 ENCOUNTER — TELEPHONE (OUTPATIENT)
Dept: ADMINISTRATIVE | Facility: OTHER | Age: 70
End: 2020-10-06

## 2020-10-07 DIAGNOSIS — I87.8 POOR VENOUS ACCESS: Primary | ICD-10-CM

## 2020-10-08 ENCOUNTER — HOSPITAL ENCOUNTER (OUTPATIENT)
Dept: INTERVENTIONAL RADIOLOGY/VASCULAR | Facility: HOSPITAL | Age: 70
Discharge: HOME | End: 2020-10-08
Attending: OBSTETRICS & GYNECOLOGY
Payer: COMMERCIAL

## 2020-10-08 VITALS
HEART RATE: 68 BPM | HEIGHT: 65 IN | OXYGEN SATURATION: 96 % | WEIGHT: 230 LBS | RESPIRATION RATE: 17 BRPM | DIASTOLIC BLOOD PRESSURE: 70 MMHG | TEMPERATURE: 98 F | SYSTOLIC BLOOD PRESSURE: 150 MMHG | BODY MASS INDEX: 38.32 KG/M2

## 2020-10-08 DIAGNOSIS — I87.8 POOR VENOUS ACCESS: ICD-10-CM

## 2020-10-08 PROCEDURE — 63600175 PHARM REV CODE 636 W HCPCS: Performed by: STUDENT IN AN ORGANIZED HEALTH CARE EDUCATION/TRAINING PROGRAM

## 2020-10-08 PROCEDURE — 99152 MOD SED SAME PHYS/QHP 5/>YRS: CPT | Mod: ,,, | Performed by: STUDENT IN AN ORGANIZED HEALTH CARE EDUCATION/TRAINING PROGRAM

## 2020-10-08 PROCEDURE — 77001 CHG FLUOROGUIDE CNTRL VEN ACCESS,PLACE,REPLACE,REMOVE: ICD-10-PCS | Mod: 26,,, | Performed by: STUDENT IN AN ORGANIZED HEALTH CARE EDUCATION/TRAINING PROGRAM

## 2020-10-08 PROCEDURE — C1788 PORT, INDWELLING, IMP: HCPCS

## 2020-10-08 PROCEDURE — 77001 FLUOROGUIDE FOR VEIN DEVICE: CPT | Mod: 26,,, | Performed by: STUDENT IN AN ORGANIZED HEALTH CARE EDUCATION/TRAINING PROGRAM

## 2020-10-08 PROCEDURE — 76937 PR  US GUIDE, VASCULAR ACCESS: ICD-10-PCS | Mod: 26,,, | Performed by: STUDENT IN AN ORGANIZED HEALTH CARE EDUCATION/TRAINING PROGRAM

## 2020-10-08 PROCEDURE — 25000003 PHARM REV CODE 250: Performed by: STUDENT IN AN ORGANIZED HEALTH CARE EDUCATION/TRAINING PROGRAM

## 2020-10-08 PROCEDURE — 36561 INSERT TUNNELED CV CATH: CPT | Mod: ,,, | Performed by: STUDENT IN AN ORGANIZED HEALTH CARE EDUCATION/TRAINING PROGRAM

## 2020-10-08 PROCEDURE — 36561 IR TUNNELED CATH PLACEMENT WITH PORT: ICD-10-PCS | Mod: ,,, | Performed by: STUDENT IN AN ORGANIZED HEALTH CARE EDUCATION/TRAINING PROGRAM

## 2020-10-08 PROCEDURE — 36561 INSERT TUNNELED CV CATH: CPT

## 2020-10-08 PROCEDURE — 99152 MOD SED SAME PHYS/QHP 5/>YRS: CPT

## 2020-10-08 PROCEDURE — 99152 PR MOD CONSCIOUS SEDATION, SAME PHYS, 5+ YRS, FIRST 15 MIN: ICD-10-PCS | Mod: ,,, | Performed by: STUDENT IN AN ORGANIZED HEALTH CARE EDUCATION/TRAINING PROGRAM

## 2020-10-08 PROCEDURE — 25000003 PHARM REV CODE 250: Performed by: OBSTETRICS & GYNECOLOGY

## 2020-10-08 PROCEDURE — 76937 US GUIDE VASCULAR ACCESS: CPT | Mod: TC

## 2020-10-08 PROCEDURE — A4215 STERILE NEEDLE: HCPCS

## 2020-10-08 PROCEDURE — 76937 US GUIDE VASCULAR ACCESS: CPT | Mod: 26,,, | Performed by: STUDENT IN AN ORGANIZED HEALTH CARE EDUCATION/TRAINING PROGRAM

## 2020-10-08 RX ORDER — HEPARIN SODIUM 200 [USP'U]/100ML
500 INJECTION, SOLUTION INTRAVENOUS CONTINUOUS
Status: DISCONTINUED | OUTPATIENT
Start: 2020-10-08 | End: 2020-10-09 | Stop reason: HOSPADM

## 2020-10-08 RX ORDER — CEFAZOLIN SODIUM 1 G/3ML
1 INJECTION, POWDER, FOR SOLUTION INTRAMUSCULAR; INTRAVENOUS
Status: COMPLETED | OUTPATIENT
Start: 2020-10-08 | End: 2020-10-08

## 2020-10-08 RX ORDER — LIDOCAINE HYDROCHLORIDE 20 MG/ML
INJECTION, SOLUTION INFILTRATION; PERINEURAL CODE/TRAUMA/SEDATION MEDICATION
Status: COMPLETED | OUTPATIENT
Start: 2020-10-08 | End: 2020-10-08

## 2020-10-08 RX ORDER — MIDAZOLAM HYDROCHLORIDE 1 MG/ML
2 INJECTION INTRAMUSCULAR; INTRAVENOUS ONCE
Status: DISCONTINUED | OUTPATIENT
Start: 2020-10-08 | End: 2020-10-09 | Stop reason: HOSPADM

## 2020-10-08 RX ORDER — FENTANYL CITRATE 50 UG/ML
100 INJECTION, SOLUTION INTRAMUSCULAR; INTRAVENOUS ONCE
Status: DISCONTINUED | OUTPATIENT
Start: 2020-10-08 | End: 2020-10-09 | Stop reason: HOSPADM

## 2020-10-08 RX ORDER — SODIUM CHLORIDE 9 MG/ML
INJECTION, SOLUTION INTRAVENOUS CONTINUOUS
Status: DISCONTINUED | OUTPATIENT
Start: 2020-10-08 | End: 2020-10-09 | Stop reason: HOSPADM

## 2020-10-08 RX ORDER — FENTANYL CITRATE 50 UG/ML
INJECTION, SOLUTION INTRAMUSCULAR; INTRAVENOUS CODE/TRAUMA/SEDATION MEDICATION
Status: COMPLETED | OUTPATIENT
Start: 2020-10-08 | End: 2020-10-08

## 2020-10-08 RX ORDER — MIDAZOLAM HYDROCHLORIDE 1 MG/ML
INJECTION INTRAMUSCULAR; INTRAVENOUS CODE/TRAUMA/SEDATION MEDICATION
Status: COMPLETED | OUTPATIENT
Start: 2020-10-08 | End: 2020-10-08

## 2020-10-08 RX ORDER — HEPARIN SODIUM 200 [USP'U]/100ML
INJECTION, SOLUTION INTRAVENOUS
Status: COMPLETED | OUTPATIENT
Start: 2020-10-08 | End: 2020-10-08

## 2020-10-08 RX ADMIN — CEFAZOLIN 1 G: 1 INJECTION, POWDER, FOR SOLUTION INTRAMUSCULAR; INTRAVENOUS at 01:10

## 2020-10-08 RX ADMIN — FENTANYL CITRATE 100 MCG: 50 INJECTION, SOLUTION INTRAMUSCULAR; INTRAVENOUS at 02:10

## 2020-10-08 RX ADMIN — HEPARIN SODIUM IN SODIUM CHLORIDE 500 ML: 200 INJECTION INTRAVENOUS at 02:10

## 2020-10-08 RX ADMIN — SODIUM CHLORIDE: 0.9 INJECTION, SOLUTION INTRAVENOUS at 01:10

## 2020-10-08 RX ADMIN — MIDAZOLAM HYDROCHLORIDE 1 MG: 1 INJECTION, SOLUTION INTRAMUSCULAR; INTRAVENOUS at 02:10

## 2020-10-08 RX ADMIN — LIDOCAINE HYDROCHLORIDE 6 ML: 20 INJECTION, SOLUTION INFILTRATION; PERINEURAL at 02:10

## 2020-10-08 NOTE — PROCEDURES
"  Pre Op Diagnosis: poor venous access  Post Op Diagnosis: Same    Procedure: Port    Procedure performed by: Carlie    Written Informed Consent Obtained: Yes  Specimen Removed: NO  Estimated Blood Loss: Minimal    Findings:   Successful placement of right sided port    Patient tolerated procedure well.    Donte Sexton MD ([facility])  Interventional Radiology  [phone]        "

## 2020-10-08 NOTE — DISCHARGE SUMMARY
"Radiology Discharge Summary      Hospital Course: No complications    Admit Date: 10/8/2020  Discharge Date: 10/8/2020     Instructions Given to Patient: Yes  Diet: Resume prior diet  Activity: activity as tolerated and no driving for today    Description of Condition on Discharge: Stable  Vital Signs (Most Recent): Temp: 98 °F (36.7 °C) (10/08/20 1500)  Pulse: 68 (10/08/20 1600)  Resp: 17 (10/08/20 1600)  BP: (!) 150/70 (10/08/20 1600)  SpO2: 96 % (10/08/20 1600)    Discharge Disposition: Home    Discharge Diagnosis: poor venous access     Follow-up: as guanako Sexton MD ([facility])  Interventional Radiology  [phone]      "

## 2020-10-08 NOTE — PLAN OF CARE
Patient has completed recovery. Patient has no distress or pain at this time. Discharge instructions explained to patient. Patient verbalized understanding. Patient taken out by RN in wheelchair to be brought home by her sister.

## 2020-10-08 NOTE — PROGRESS NOTES
Called IR for Sx orders. Stated someone will put in.   1330- Pt difficult stick. Isabel at bedside.   1344- Called IR to notify PT ready. Stated I may give cefazolin now. Pt aware of 2 hr delay.

## 2020-10-08 NOTE — PLAN OF CARE
Patient brought to [facility]. Patient calm, incision site clean dry and without bleeding. Patient to recover an hour and then be picked up by her sister.

## 2020-10-08 NOTE — H&P
Radiology History & Physical      SUBJECTIVE:     Chief Complaint: poor venous access    History of Present Illness:  Ankita Campo is a 70 y.o. female who presents for tunneled chest port placement.      Past Medical History:   Diagnosis Date    Anemia     Arthritis     Chemotherapy-induced nausea 2020    Disorder of kidney and ureter     has only right kidney. 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     Endometrial ca 2020    Family history of gastric cancer 2016    Goiter     Gout 2016    Hypertension     Hypothyroidism, postsurgical     Multiple thyroid nodules     Retroperitoneal mass 2020     Past Surgical History:   Procedure Laterality Date    cararact       SECTION      CHOLECYSTECTOMY      Donor Nephrectomy Left     fibroidectomy      HAND SURGERY      left    HYSTERECTOMY      [place] REMOVAL OF OVARY/TUBE(S)      ROBOT-ASSISTED LAPAROSCOPIC ABDOMINAL HYSTERECTOMY USING DA MARGARITO XI N/A 2020    Procedure: XI ROBOTIC HYSTERECTOMY;  Surgeon: Donte Weeks MD;  Location: [facility] OR [address] [place] [address];  Service: OB/GYN;  Laterality: N/A;    ROBOT-ASSISTED LAPAROSCOPIC LYSIS OF ADHESIONS USING DA MARGARITO XI N/A 2020    Procedure: XI ROBOTIC LYSIS, ADHESIONS;  Surgeon: Donte Weeks MD;  Location: [facility] OR [address] [place] [address];  Service: OB/GYN;  Laterality: N/A;  small bowel and sigmoid colon    ROBOT-ASSISTED LAPAROSCOPIC OMENTECTOMY USING DA MARGARITO XI N/A 2020    Procedure: XI ROBOTIC OMENTECTOMY;  Surgeon: Donte Weeks MD;  Location: [facility] OR [address] [place] [address];  Service: OB/GYN;  Laterality: N/A;    ROBOT-ASSISTED LAPAROSCOPIC SALPINGO-OOPHORECTOMY USING DA MARGARITO XI Bilateral 2020    Procedure: XI ROBOTIC SALPINGO-OOPHORECTOMY;  Surgeon: Donte Weeks MD;  Location: [facility] OR [address] [place] [address];  Service: OB/GYN;  Laterality: Bilateral;    TOTAL THYROIDECTOMY         Home Meds:   Prior to Admission medications    Medication Sig Start Date End Date Taking? Authorizing Provider   levothyroxine  (SYNTHROID) 112 MCG tablet Take one tab 6 days a week, 1.5 tabs one day a week 9/15/20  Yes Chidi Mccurdy,    multivitamin capsule Take 1 capsule by mouth once daily.   Yes Historical Provider   ondansetron (ZOFRAN-ODT) 8 MG TbDL Take 1 tablet (8 mg total) by mouth every 12 (twelve) hours as needed (nausea and vomiting.). 9/22/20  Yes Donte Weeks MD   valsartan-hydrochlorothiazide (DIOVAN-HCT) 320-12.5 mg per tablet Take 1 tablet by mouth once daily. 9/15/20 9/15/21 Yes Chidi Mccurdy,    betamethasone dipropionate (DIPROLENE) 0.05 % ointment Apply topically daily as needed. Do not apply to damaged skin 4/12/18   Michael Elias MD   calcium carbonate (OS-JONNY) 600 mg calcium (1,500 mg) Tab Take 1 tablet (600 mg total) by mouth once daily. 4/17/19   Michael Elias MD   cholecalciferol, vitamin D3, 1,000 unit capsule Take 1 capsule (1,000 Units total) by mouth once daily. 3/16/17   Chester Baltazar MD   omega-3 fatty acids-vitamin E (FISH OIL) 1,000 mg Cap Take by mouth. Kal red    Historical Provider   triamcinolone acetonide 0.1% (KENALOG) 0.1 % cream Apply topically 2 (two) times daily. 5/5/20   Heather Phillips PA-C     Anticoagulants/Antiplatelets: no anticoagulation    Allergies:   Review of patient's allergies indicates:   Allergen Reactions    Neosporin [benzalkonium chloride] Rash    Flanax [naproxen sodium]      Suspected bullous fixed drug eruption right ankle    Doxycycline Rash     Skin peels     Sedation History:  have not been any systemic reactions    Review of Systems:   Hematological: no known coagulopathies  Respiratory: no shortness of breath  Cardiovascular: no chest pain  Gastrointestinal: no abdominal pain  Genito-Urinary: no dysuria  Musculoskeletal: negative  Neurological: no TIA or stroke symptoms         OBJECTIVE:     Vital Signs (Most Recent)  Temp: 97.9 °F (36.6 °C) (10/08/20 1303)  Pulse: (!) 58 (10/08/20 1303)  Resp: 18 (10/08/20 1303)  BP: (!) 177/85  (10/08/20 1303)  SpO2: 100 % (10/08/20 1303)    Physical Exam:  ASA: 3  Mallampati: 3    General: no acute distress  Mental Status: alert and oriented to person, place and time  HEENT: normocephalic, atraumatic  Chest: unlabored breathing  Heart: regular heart rate  Abdomen: nondistended  Extremity: moves all extremities    Laboratory  Lab Results   Component Value Date    INR 1.0 10/08/2020       Lab Results   Component Value Date    WBC 4.93 09/22/2020    HGB 11.5 (L) 09/22/2020    HCT 38.1 09/22/2020    MCV 93 09/22/2020     09/22/2020      Lab Results   Component Value Date    GLU 81 09/22/2020     09/22/2020    K 4.0 09/22/2020     09/22/2020    CO2 25 09/22/2020    BUN 21 09/22/2020    CREATININE 1.2 09/22/2020    CALCIUM 8.8 09/22/2020    MG 2.1 04/27/2011    ALT 7 (L) 05/15/2020    AST 16 05/15/2020    ALBUMIN 3.7 05/15/2020    BILITOT 0.8 05/15/2020       ASSESSMENT/PLAN:     Sedation Plan: up to moderate  Patient will undergo tunneled chest port placement.    Chapincito Cortez MD  PGY-II, Radiology

## 2020-10-08 NOTE — DISCHARGE INSTRUCTIONS
Please call with any questions or concerns.      Monday thru Friday 8:00 am - 4:30 pm    Interventional Radiology   [phone]    After Hours    Ask for the Radiology Intern on call  [phone]

## 2020-10-13 ENCOUNTER — LAB VISIT (OUTPATIENT)
Dept: LAB | Facility: HOSPITAL | Age: 70
End: 2020-10-13
Attending: OBSTETRICS & GYNECOLOGY
Payer: COMMERCIAL

## 2020-10-13 DIAGNOSIS — C54.1 ENDOMETRIAL CA: ICD-10-CM

## 2020-10-13 LAB
ANION GAP SERPL CALC-SCNC: 8 MMOL/L (ref 8–16)
BUN SERPL-MCNC: 24 MG/DL (ref 7–17)
CALCIUM SERPL-MCNC: 8.8 MG/DL (ref 8.7–10.5)
CHLORIDE SERPL-SCNC: 108 MMOL/L (ref 95–110)
CO2 SERPL-SCNC: 28 MMOL/L (ref 23–29)
CREAT SERPL-MCNC: 1.36 MG/DL (ref 0.5–1.4)
ERYTHROCYTE [DISTWIDTH] IN BLOOD BY AUTOMATED COUNT: 15.6 % (ref 11.5–14.5)
EST. GFR  (AFRICAN AMERICAN): 45.2 ML/MIN/1.73 M^2
EST. GFR  (NON AFRICAN AMERICAN): 39.2 ML/MIN/1.73 M^2
GLUCOSE SERPL-MCNC: 89 MG/DL (ref 70–110)
HCT VFR BLD AUTO: 32.6 % (ref 37–48.5)
HGB BLD-MCNC: 9.8 G/DL (ref 12–16)
IMM GRANULOCYTES # BLD AUTO: 0.02 K/UL (ref 0–0.04)
MCH RBC QN AUTO: 27.3 PG (ref 27–31)
MCHC RBC AUTO-ENTMCNC: 30.1 G/DL (ref 32–36)
MCV RBC AUTO: 91 FL (ref 82–98)
NEUTROPHILS # BLD AUTO: 1.9 K/UL (ref 1.8–7.7)
PLATELET # BLD AUTO: 115 K/UL (ref 150–350)
PMV BLD AUTO: 12.2 FL (ref 9.2–12.9)
POTASSIUM SERPL-SCNC: 4.4 MMOL/L (ref 3.5–5.1)
RBC # BLD AUTO: 3.59 M/UL (ref 4–5.4)
SODIUM SERPL-SCNC: 144 MMOL/L (ref 136–145)
WBC # BLD AUTO: 5.45 K/UL (ref 3.9–12.7)

## 2020-10-13 PROCEDURE — 80048 BASIC METABOLIC PNL TOTAL CA: CPT | Mod: PO

## 2020-10-13 PROCEDURE — 36415 COLL VENOUS BLD VENIPUNCTURE: CPT | Mod: PO

## 2020-10-13 PROCEDURE — 85027 COMPLETE CBC AUTOMATED: CPT | Mod: PO

## 2020-10-15 ENCOUNTER — INFUSION (OUTPATIENT)
Dept: INFUSION THERAPY | Facility: HOSPITAL | Age: 70
End: 2020-10-15
Payer: COMMERCIAL

## 2020-10-15 ENCOUNTER — OFFICE VISIT (OUTPATIENT)
Dept: GYNECOLOGIC ONCOLOGY | Facility: CLINIC | Age: 70
End: 2020-10-15
Payer: COMMERCIAL

## 2020-10-15 VITALS
SYSTOLIC BLOOD PRESSURE: 188 MMHG | WEIGHT: 235 LBS | DIASTOLIC BLOOD PRESSURE: 92 MMHG | BODY MASS INDEX: 39.11 KG/M2 | HEART RATE: 65 BPM

## 2020-10-15 VITALS
BODY MASS INDEX: 39.12 KG/M2 | RESPIRATION RATE: 18 BRPM | HEIGHT: 65 IN | WEIGHT: 234.81 LBS | SYSTOLIC BLOOD PRESSURE: 160 MMHG | DIASTOLIC BLOOD PRESSURE: 72 MMHG | TEMPERATURE: 98 F | HEART RATE: 68 BPM

## 2020-10-15 DIAGNOSIS — Z51.11 CHEMOTHERAPY MANAGEMENT, ENCOUNTER FOR: ICD-10-CM

## 2020-10-15 DIAGNOSIS — C54.1 ENDOMETRIAL CA: Primary | ICD-10-CM

## 2020-10-15 PROCEDURE — A4216 STERILE WATER/SALINE, 10 ML: HCPCS | Performed by: OBSTETRICS & GYNECOLOGY

## 2020-10-15 PROCEDURE — 96415 CHEMO IV INFUSION ADDL HR: CPT

## 2020-10-15 PROCEDURE — 99214 OFFICE O/P EST MOD 30 MIN: CPT | Mod: S$GLB,,, | Performed by: OBSTETRICS & GYNECOLOGY

## 2020-10-15 PROCEDURE — 3080F DIAST BP >= 90 MM HG: CPT | Mod: CPTII,S$GLB,, | Performed by: OBSTETRICS & GYNECOLOGY

## 2020-10-15 PROCEDURE — 3008F PR BODY MASS INDEX (BMI) DOCUMENTED: ICD-10-PCS | Mod: CPTII,S$GLB,, | Performed by: OBSTETRICS & GYNECOLOGY

## 2020-10-15 PROCEDURE — 99214 PR OFFICE/OUTPT VISIT, EST, LEVL IV, 30-39 MIN: ICD-10-PCS | Mod: S$GLB,,, | Performed by: OBSTETRICS & GYNECOLOGY

## 2020-10-15 PROCEDURE — 96361 HYDRATE IV INFUSION ADD-ON: CPT

## 2020-10-15 PROCEDURE — 96413 CHEMO IV INFUSION 1 HR: CPT

## 2020-10-15 PROCEDURE — 99999 PR PBB SHADOW E&M-EST. PATIENT-LVL III: ICD-10-PCS | Mod: PBBFAC,,, | Performed by: OBSTETRICS & GYNECOLOGY

## 2020-10-15 PROCEDURE — 1126F PR PAIN SEVERITY QUANTIFIED, NO PAIN PRESENT: ICD-10-PCS | Mod: S$GLB,,, | Performed by: OBSTETRICS & GYNECOLOGY

## 2020-10-15 PROCEDURE — 1159F PR MEDICATION LIST DOCUMENTED IN MEDICAL RECORD: ICD-10-PCS | Mod: S$GLB,,, | Performed by: OBSTETRICS & GYNECOLOGY

## 2020-10-15 PROCEDURE — 25000003 PHARM REV CODE 250: Performed by: OBSTETRICS & GYNECOLOGY

## 2020-10-15 PROCEDURE — 96417 CHEMO IV INFUS EACH ADDL SEQ: CPT

## 2020-10-15 PROCEDURE — 1126F AMNT PAIN NOTED NONE PRSNT: CPT | Mod: S$GLB,,, | Performed by: OBSTETRICS & GYNECOLOGY

## 2020-10-15 PROCEDURE — 96376 TX/PRO/DX INJ SAME DRUG ADON: CPT

## 2020-10-15 PROCEDURE — 99999 PR PBB SHADOW E&M-EST. PATIENT-LVL III: CPT | Mod: PBBFAC,,, | Performed by: OBSTETRICS & GYNECOLOGY

## 2020-10-15 PROCEDURE — 1101F PT FALLS ASSESS-DOCD LE1/YR: CPT | Mod: CPTII,S$GLB,, | Performed by: OBSTETRICS & GYNECOLOGY

## 2020-10-15 PROCEDURE — 63600175 PHARM REV CODE 636 W HCPCS: Performed by: OBSTETRICS & GYNECOLOGY

## 2020-10-15 PROCEDURE — 96367 TX/PROPH/DG ADDL SEQ IV INF: CPT

## 2020-10-15 PROCEDURE — 1101F PR PT FALLS ASSESS DOC 0-1 FALLS W/OUT INJ PAST YR: ICD-10-PCS | Mod: CPTII,S$GLB,, | Performed by: OBSTETRICS & GYNECOLOGY

## 2020-10-15 PROCEDURE — 3080F PR MOST RECENT DIASTOLIC BLOOD PRESSURE >= 90 MM HG: ICD-10-PCS | Mod: CPTII,S$GLB,, | Performed by: OBSTETRICS & GYNECOLOGY

## 2020-10-15 PROCEDURE — 3008F BODY MASS INDEX DOCD: CPT | Mod: CPTII,S$GLB,, | Performed by: OBSTETRICS & GYNECOLOGY

## 2020-10-15 PROCEDURE — 1159F MED LIST DOCD IN RCRD: CPT | Mod: S$GLB,,, | Performed by: OBSTETRICS & GYNECOLOGY

## 2020-10-15 PROCEDURE — 3077F SYST BP >= 140 MM HG: CPT | Mod: CPTII,S$GLB,, | Performed by: OBSTETRICS & GYNECOLOGY

## 2020-10-15 PROCEDURE — 3077F PR MOST RECENT SYSTOLIC BLOOD PRESSURE >= 140 MM HG: ICD-10-PCS | Mod: CPTII,S$GLB,, | Performed by: OBSTETRICS & GYNECOLOGY

## 2020-10-15 RX ORDER — DIPHENHYDRAMINE HYDROCHLORIDE 50 MG/ML
50 INJECTION INTRAMUSCULAR; INTRAVENOUS ONCE AS NEEDED
Status: DISCONTINUED | OUTPATIENT
Start: 2020-10-15 | End: 2020-10-15 | Stop reason: HOSPADM

## 2020-10-15 RX ORDER — DIPHENHYDRAMINE HYDROCHLORIDE 50 MG/ML
50 INJECTION INTRAMUSCULAR; INTRAVENOUS ONCE AS NEEDED
Status: CANCELLED | OUTPATIENT
Start: 2020-10-15

## 2020-10-15 RX ORDER — EPINEPHRINE 0.3 MG/.3ML
0.3 INJECTION SUBCUTANEOUS ONCE AS NEEDED
Status: CANCELLED | OUTPATIENT
Start: 2020-10-15

## 2020-10-15 RX ORDER — EPINEPHRINE 0.3 MG/.3ML
0.3 INJECTION SUBCUTANEOUS ONCE AS NEEDED
Status: DISCONTINUED | OUTPATIENT
Start: 2020-10-15 | End: 2020-10-15 | Stop reason: HOSPADM

## 2020-10-15 RX ORDER — FAMOTIDINE 10 MG/ML
20 INJECTION INTRAVENOUS
Status: CANCELLED | OUTPATIENT
Start: 2020-10-15

## 2020-10-15 RX ORDER — HEPARIN 100 UNIT/ML
500 SYRINGE INTRAVENOUS
Status: DISCONTINUED | OUTPATIENT
Start: 2020-10-15 | End: 2020-10-15 | Stop reason: HOSPADM

## 2020-10-15 RX ORDER — SODIUM CHLORIDE 0.9 % (FLUSH) 0.9 %
10 SYRINGE (ML) INJECTION
Status: CANCELLED | OUTPATIENT
Start: 2020-10-15

## 2020-10-15 RX ORDER — FAMOTIDINE 10 MG/ML
20 INJECTION INTRAVENOUS
Status: COMPLETED | OUTPATIENT
Start: 2020-10-15 | End: 2020-10-15

## 2020-10-15 RX ORDER — SODIUM CHLORIDE 0.9 % (FLUSH) 0.9 %
10 SYRINGE (ML) INJECTION
Status: DISCONTINUED | OUTPATIENT
Start: 2020-10-15 | End: 2020-10-15 | Stop reason: HOSPADM

## 2020-10-15 RX ORDER — HEPARIN 100 UNIT/ML
500 SYRINGE INTRAVENOUS
Status: CANCELLED | OUTPATIENT
Start: 2020-10-15

## 2020-10-15 RX ADMIN — FAMOTIDINE 20 MG: 10 INJECTION INTRAVENOUS at 09:10

## 2020-10-15 RX ADMIN — HEPARIN 500 UNITS: 100 SYRINGE at 03:10

## 2020-10-15 RX ADMIN — CARBOPLATIN 440 MG: 10 INJECTION, SOLUTION INTRAVENOUS at 01:10

## 2020-10-15 RX ADMIN — SODIUM CHLORIDE 500 ML: 0.9 INJECTION, SOLUTION INTRAVENOUS at 02:10

## 2020-10-15 RX ADMIN — DEXAMETHASONE SODIUM PHOSPHATE: 4 INJECTION, SOLUTION INTRA-ARTICULAR; INTRALESIONAL; INTRAMUSCULAR; INTRAVENOUS; SOFT TISSUE at 09:10

## 2020-10-15 RX ADMIN — DIPHENHYDRAMINE HYDROCHLORIDE 50 MG: 50 INJECTION, SOLUTION INTRAMUSCULAR; INTRAVENOUS at 09:10

## 2020-10-15 RX ADMIN — PACLITAXEL 384 MG: 6 INJECTION, SOLUTION INTRAVENOUS at 09:10

## 2020-10-15 RX ADMIN — Medication 10 ML: at 03:10

## 2020-10-15 NOTE — PROGRESS NOTES
Subjective:       Patient ID: Ankita Campo is a 70 y.o. female.    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    Chemotherapy labs have been reviewed and are appropriate for treatment.       Her oncologic history is:     71 y/o referred by Dr. 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 Patient complained of PMB onset 5 months ago. Subsequently had a D&C 7/7/2020 with final pathology revealing high grade serous endometrial carcinoma. Surgical history includes cholecystectomy and CS x 1. Family history significant for maternal aunt with breast cancer. Co-morbidities include HTN and obesity.     TVUS 3/2020  Uterus 7.72 x 4.27 cm with mass 2.7 x 2.8 cm. No evidence of L or R ovary.     Aug 2020: Robotic assisted laparoscopic hysterectomy BSO and bilateral sentinel node biopsies and omentectomy for serous ca of the uterus on 8/24/2020. 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 No lymph nodes noted on SLN bx. (+) LVSI. Preop : 5.     Sep 2020: Started taxol and carboplatin. Plan is for 6 cycles taxol and carboplatin then reimage with PET(PET negative mesenteric LN) and then WPRT if PET negative     Review of Systems   Constitutional: Negative for chills, fatigue and fever.   Respiratory: Negative for cough, shortness of breath and wheezing.    Cardiovascular: Negative for chest pain, palpitations and leg swelling.   Gastrointestinal: Negative for abdominal pain, constipation, diarrhea, nausea and vomiting.   Genitourinary: Negative for difficulty urinating, dysuria, frequency, genital sores, hematuria, urgency, vaginal bleeding, vaginal discharge and vaginal pain.   Musculoskeletal: Negative for gait problem.   Neurological: Negative for weakness and numbness.    Hematological: Negative for adenopathy. Does not bruise/bleed easily.   Psychiatric/Behavioral: The patient is not nervous/anxious.        Objective:   BP (!) 188/92   Pulse 65   Wt 106.6 kg (235 lb)   BMI 39.11 kg/m²      Physical Exam  Constitutional:       Appearance: She is well-developed.   HENT:      Head: Normocephalic and atraumatic.   Eyes:      General: No scleral icterus.  Neck:      Thyroid: No thyromegaly.      Trachea: No tracheal deviation.   Cardiovascular:      Rate and Rhythm: Normal rate and regular rhythm.   Pulmonary:      Effort: Pulmonary effort is normal.      Breath sounds: Normal breath sounds.   Abdominal:      General: There is no distension.      Palpations: Abdomen is soft. There is no mass.      Tenderness: There is no abdominal tenderness. There is no guarding or rebound.      Hernia: No hernia is present.   Genitourinary:     Comments: Not performed.   Musculoskeletal:         General: No tenderness.   Lymphadenopathy:      Cervical: No cervical adenopathy.   Skin:     General: Skin is warm and dry.   Neurological:      Mental Status: She is alert and oriented to person, place, and time.   Psychiatric:         Behavior: Behavior normal.         Thought Content: Thought content normal.         Judgment: Judgment normal.         Assessment:       1. Endometrial ca    2. Chemotherapy management, encounter for        Plan:   Endometrial ca  Proceed with C2.   RTC in 21 days    Chemotherapy management, encounter for

## 2020-10-15 NOTE — PLAN OF CARE
Tolerated Taxol/carbo well.  No reactions noted.  No questions or concerns at this time.  Instructed pt to increase oral fluids, verbalized understanding.  Ambulated off unit in NAD.

## 2020-10-15 NOTE — NURSING
Loly Lacy NP/Dr. Saunders aware of bp of 189/102 (8:58a) and bp of 190/102 (10:15a).  Per Dr. Saunders proceed with chemo and have pt follow up with her PCP for her bp, no other orders received.  Pt notified and verbalized understanding.

## 2020-10-16 ENCOUNTER — TELEPHONE (OUTPATIENT)
Dept: FAMILY MEDICINE | Facility: CLINIC | Age: 70
End: 2020-10-16

## 2020-10-16 DIAGNOSIS — I10 ESSENTIAL HYPERTENSION: Primary | ICD-10-CM

## 2020-10-16 RX ORDER — METOPROLOL SUCCINATE 50 MG/1
50 TABLET, EXTENDED RELEASE ORAL DAILY
Qty: 30 TABLET | Refills: 0 | Status: SHIPPED | OUTPATIENT
Start: 2020-10-16 | End: 2020-11-02

## 2020-10-16 NOTE — TELEPHONE ENCOUNTER
I spoke with patient and advised her that  metoprolol was added. I scheduled patient a follow up appointment for blood pressure. Patient voiced understanding.

## 2020-10-16 NOTE — TELEPHONE ENCOUNTER
I spoke with patient and she state that her bp is elevated,200/90 and 188/94. I advised patient that a message would be sent to  and I would give her a return phone call. Patient voiced understanding.

## 2020-10-16 NOTE — TELEPHONE ENCOUNTER
----- Message from Yocasta Redd sent at 10/16/2020 11:33 AM CDT -----  Contact: ANKITA ALBA [[id number]]  Type:  Patient Requesting Call    Who Called: Ankita Guerrero Call Back Number: [phone]  Additional Information:  regarding medication not working as intended, pt is still having issues

## 2020-11-02 ENCOUNTER — OFFICE VISIT (OUTPATIENT)
Dept: FAMILY MEDICINE | Facility: CLINIC | Age: 70
End: 2020-11-02
Payer: COMMERCIAL

## 2020-11-02 VITALS
BODY MASS INDEX: 38.98 KG/M2 | WEIGHT: 233.94 LBS | DIASTOLIC BLOOD PRESSURE: 85 MMHG | HEIGHT: 65 IN | HEART RATE: 63 BPM | OXYGEN SATURATION: 95 % | SYSTOLIC BLOOD PRESSURE: 158 MMHG | TEMPERATURE: 98 F

## 2020-11-02 DIAGNOSIS — I10 ESSENTIAL HYPERTENSION: Primary | ICD-10-CM

## 2020-11-02 DIAGNOSIS — C54.1 ENDOMETRIAL CA: ICD-10-CM

## 2020-11-02 DIAGNOSIS — Z90.5 ACQUIRED SOLITARY KIDNEY: ICD-10-CM

## 2020-11-02 DIAGNOSIS — N18.31 STAGE 3A CHRONIC KIDNEY DISEASE: ICD-10-CM

## 2020-11-02 PROCEDURE — 1159F MED LIST DOCD IN RCRD: CPT | Mod: S$GLB,,, | Performed by: FAMILY MEDICINE

## 2020-11-02 PROCEDURE — 3008F PR BODY MASS INDEX (BMI) DOCUMENTED: ICD-10-PCS | Mod: CPTII,S$GLB,, | Performed by: FAMILY MEDICINE

## 2020-11-02 PROCEDURE — 3077F SYST BP >= 140 MM HG: CPT | Mod: CPTII,S$GLB,, | Performed by: FAMILY MEDICINE

## 2020-11-02 PROCEDURE — 1101F PT FALLS ASSESS-DOCD LE1/YR: CPT | Mod: CPTII,S$GLB,, | Performed by: FAMILY MEDICINE

## 2020-11-02 PROCEDURE — 3079F PR MOST RECENT DIASTOLIC BLOOD PRESSURE 80-89 MM HG: ICD-10-PCS | Mod: CPTII,S$GLB,, | Performed by: FAMILY MEDICINE

## 2020-11-02 PROCEDURE — 1159F PR MEDICATION LIST DOCUMENTED IN MEDICAL RECORD: ICD-10-PCS | Mod: S$GLB,,, | Performed by: FAMILY MEDICINE

## 2020-11-02 PROCEDURE — 3079F DIAST BP 80-89 MM HG: CPT | Mod: CPTII,S$GLB,, | Performed by: FAMILY MEDICINE

## 2020-11-02 PROCEDURE — 99999 PR PBB SHADOW E&M-EST. PATIENT-LVL III: CPT | Mod: PBBFAC,,, | Performed by: FAMILY MEDICINE

## 2020-11-02 PROCEDURE — 99999 PR PBB SHADOW E&M-EST. PATIENT-LVL III: ICD-10-PCS | Mod: PBBFAC,,, | Performed by: FAMILY MEDICINE

## 2020-11-02 PROCEDURE — 99214 PR OFFICE/OUTPT VISIT, EST, LEVL IV, 30-39 MIN: ICD-10-PCS | Mod: S$GLB,,, | Performed by: FAMILY MEDICINE

## 2020-11-02 PROCEDURE — 99214 OFFICE O/P EST MOD 30 MIN: CPT | Mod: S$GLB,,, | Performed by: FAMILY MEDICINE

## 2020-11-02 PROCEDURE — 1101F PR PT FALLS ASSESS DOC 0-1 FALLS W/OUT INJ PAST YR: ICD-10-PCS | Mod: CPTII,S$GLB,, | Performed by: FAMILY MEDICINE

## 2020-11-02 PROCEDURE — 3008F BODY MASS INDEX DOCD: CPT | Mod: CPTII,S$GLB,, | Performed by: FAMILY MEDICINE

## 2020-11-02 PROCEDURE — 3077F PR MOST RECENT SYSTOLIC BLOOD PRESSURE >= 140 MM HG: ICD-10-PCS | Mod: CPTII,S$GLB,, | Performed by: FAMILY MEDICINE

## 2020-11-02 RX ORDER — METOPROLOL SUCCINATE 100 MG/1
100 TABLET, EXTENDED RELEASE ORAL DAILY
Qty: 30 TABLET | Refills: 0
Start: 2020-11-02 | End: 2020-11-12 | Stop reason: SDUPTHER

## 2020-11-02 RX ORDER — VALSARTAN AND HYDROCHLOROTHIAZIDE 320; 25 MG/1; MG/1
1 TABLET, FILM COATED ORAL DAILY
Qty: 90 TABLET | Refills: 0 | Status: SHIPPED | OUTPATIENT
Start: 2020-11-02 | End: 2020-11-12 | Stop reason: SDUPTHER

## 2020-11-02 NOTE — PROGRESS NOTES
Subjective:       Patient ID: Ankita Campo is a 70 y.o. female.    Chief Complaint: Blood Pressure Check    Ankita is a 70 y.o. female who presents today for BP Check    Was on lasix and potassium. This was stopped    Was on losartan. Started valsartan/HCTZ in place. Last BP was high    BP of combo was still high. Added metoprolol. BP still high.     Has headaches occasionally. Improved with metoprolol.     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  6 cycles of Taxol and carboplatin. Also having vaginal brachytherapy. Seeing Dr. Weeks.     Review of Systems   Constitutional: Negative for chills and fever.   Respiratory: Negative for shortness of breath.    Cardiovascular: Negative for chest pain.   Gastrointestinal: Negative for diarrhea, nausea and vomiting.   Neurological: Positive for headaches. Negative for dizziness, syncope and numbness.               Objective:     Vitals:    11/02/20 1309   BP: (!) 158/85   Pulse: 63   Temp: 98 °F (36.7 °C)        Physical Exam  Constitutional:       Appearance: She is obese.   HENT:      Head: Normocephalic and atraumatic.   Eyes:      Conjunctiva/sclera: Conjunctivae normal.   Cardiovascular:      Rate and Rhythm: Normal rate and regular rhythm.   Pulmonary:      Effort: Pulmonary effort is normal.      Breath sounds: Normal breath sounds.   Musculoskeletal:         General: No swelling.   Skin:     General: Skin is dry.   Neurological:      General: No focal deficit present.   Psychiatric:         Mood and Affect: Mood normal.         Behavior: Behavior normal.         Thought Content: Thought content normal.         Judgment: Judgment normal.         Assessment:       1. Essential hypertension    2. Stage 3a chronic kidney disease    3. Acquired solitary kidney    4. Endometrial ca        Plan:       Stop valsartan 320/12.5  Start 320/25  Check BP at home or manually at treatment sessions  If BP >140/80, increase the metoprolol to 100 mg (2 pills). Currently  taking 50 mg XR (1 pill)    Monitor CKD, if lower, consider renal referral given history of 1 kidney due to kidney donor status    F/u December as scheduled. Labs prior as scheduled    Update me with BP in 1 month    Essential hypertension  -     valsartan-hydrochlorothiazide (DIOVAN-HCT) 320-25 mg per tablet; Take 1 tablet by mouth once daily.  Dispense: 90 tablet; Refill: 0  -     metoprolol succinate (TOPROL-XL) 100 MG 24 hr tablet; Take 1 tablet (100 mg total) by mouth once daily.  Dispense: 30 tablet; Refill: 0    Stage 3a chronic kidney disease    Acquired solitary kidney    Endometrial ca            Warning signs discussed, patient to call with any further issues or worsening of symptoms.

## 2020-11-02 NOTE — PATIENT INSTRUCTIONS
Stop valsartan 320/12.5  Start 320/25  Check BP at home or manually at treatment sessions  If BP >140/80, increase the metoprolol to 100 mg (2 pills). Currently taking 50 mg XR (1 pill)    Monitor CKD, if lower, consider renal referral given history of 1 kidney due to kidney donor status    F/u December as scheduled. Labs prior as scheduled    Update me with BP in 1 month

## 2020-11-04 ENCOUNTER — OFFICE VISIT (OUTPATIENT)
Dept: GYNECOLOGIC ONCOLOGY | Facility: CLINIC | Age: 70
End: 2020-11-04
Payer: COMMERCIAL

## 2020-11-04 ENCOUNTER — LAB VISIT (OUTPATIENT)
Dept: LAB | Facility: HOSPITAL | Age: 70
End: 2020-11-04
Attending: OBSTETRICS & GYNECOLOGY
Payer: COMMERCIAL

## 2020-11-04 VITALS
HEART RATE: 56 BPM | BODY MASS INDEX: 38.11 KG/M2 | WEIGHT: 229 LBS | SYSTOLIC BLOOD PRESSURE: 181 MMHG | DIASTOLIC BLOOD PRESSURE: 83 MMHG

## 2020-11-04 DIAGNOSIS — Z51.11 CHEMOTHERAPY MANAGEMENT, ENCOUNTER FOR: ICD-10-CM

## 2020-11-04 DIAGNOSIS — C54.1 ENDOMETRIAL CA: Primary | ICD-10-CM

## 2020-11-04 DIAGNOSIS — C54.1 ENDOMETRIAL CA: ICD-10-CM

## 2020-11-04 LAB
ANION GAP SERPL CALC-SCNC: 9 MMOL/L (ref 8–16)
BUN SERPL-MCNC: 27 MG/DL (ref 8–23)
CALCIUM SERPL-MCNC: 9 MG/DL (ref 8.7–10.5)
CHLORIDE SERPL-SCNC: 106 MMOL/L (ref 95–110)
CO2 SERPL-SCNC: 28 MMOL/L (ref 23–29)
CREAT SERPL-MCNC: 1.4 MG/DL (ref 0.5–1.4)
ERYTHROCYTE [DISTWIDTH] IN BLOOD BY AUTOMATED COUNT: 16.7 % (ref 11.5–14.5)
EST. GFR  (AFRICAN AMERICAN): 43.6 ML/MIN/1.73 M^2
EST. GFR  (NON AFRICAN AMERICAN): 37.8 ML/MIN/1.73 M^2
GLUCOSE SERPL-MCNC: 92 MG/DL (ref 70–110)
HCT VFR BLD AUTO: 32.5 % (ref 37–48.5)
HGB BLD-MCNC: 10.1 G/DL (ref 12–16)
IMM GRANULOCYTES # BLD AUTO: 0.01 K/UL (ref 0–0.04)
MCH RBC QN AUTO: 29 PG (ref 27–31)
MCHC RBC AUTO-ENTMCNC: 31.1 G/DL (ref 32–36)
MCV RBC AUTO: 93 FL (ref 82–98)
NEUTROPHILS # BLD AUTO: 2.1 K/UL (ref 1.8–7.7)
PLATELET # BLD AUTO: 172 K/UL (ref 150–350)
PMV BLD AUTO: 11.3 FL (ref 9.2–12.9)
POTASSIUM SERPL-SCNC: 4 MMOL/L (ref 3.5–5.1)
RBC # BLD AUTO: 3.48 M/UL (ref 4–5.4)
SODIUM SERPL-SCNC: 143 MMOL/L (ref 136–145)
WBC # BLD AUTO: 4.94 K/UL (ref 3.9–12.7)

## 2020-11-04 PROCEDURE — 3079F DIAST BP 80-89 MM HG: CPT | Mod: CPTII,S$GLB,, | Performed by: OBSTETRICS & GYNECOLOGY

## 2020-11-04 PROCEDURE — 1101F PR PT FALLS ASSESS DOC 0-1 FALLS W/OUT INJ PAST YR: ICD-10-PCS | Mod: CPTII,S$GLB,, | Performed by: OBSTETRICS & GYNECOLOGY

## 2020-11-04 PROCEDURE — 99999 PR PBB SHADOW E&M-EST. PATIENT-LVL III: ICD-10-PCS | Mod: PBBFAC,,, | Performed by: OBSTETRICS & GYNECOLOGY

## 2020-11-04 PROCEDURE — 1101F PT FALLS ASSESS-DOCD LE1/YR: CPT | Mod: CPTII,S$GLB,, | Performed by: OBSTETRICS & GYNECOLOGY

## 2020-11-04 PROCEDURE — 3077F PR MOST RECENT SYSTOLIC BLOOD PRESSURE >= 140 MM HG: ICD-10-PCS | Mod: CPTII,S$GLB,, | Performed by: OBSTETRICS & GYNECOLOGY

## 2020-11-04 PROCEDURE — 3008F PR BODY MASS INDEX (BMI) DOCUMENTED: ICD-10-PCS | Mod: CPTII,S$GLB,, | Performed by: OBSTETRICS & GYNECOLOGY

## 2020-11-04 PROCEDURE — 3079F PR MOST RECENT DIASTOLIC BLOOD PRESSURE 80-89 MM HG: ICD-10-PCS | Mod: CPTII,S$GLB,, | Performed by: OBSTETRICS & GYNECOLOGY

## 2020-11-04 PROCEDURE — 3077F SYST BP >= 140 MM HG: CPT | Mod: CPTII,S$GLB,, | Performed by: OBSTETRICS & GYNECOLOGY

## 2020-11-04 PROCEDURE — 99214 PR OFFICE/OUTPT VISIT, EST, LEVL IV, 30-39 MIN: ICD-10-PCS | Mod: S$GLB,,, | Performed by: OBSTETRICS & GYNECOLOGY

## 2020-11-04 PROCEDURE — 1159F MED LIST DOCD IN RCRD: CPT | Mod: S$GLB,,, | Performed by: OBSTETRICS & GYNECOLOGY

## 2020-11-04 PROCEDURE — 80048 BASIC METABOLIC PNL TOTAL CA: CPT

## 2020-11-04 PROCEDURE — 1159F PR MEDICATION LIST DOCUMENTED IN MEDICAL RECORD: ICD-10-PCS | Mod: S$GLB,,, | Performed by: OBSTETRICS & GYNECOLOGY

## 2020-11-04 PROCEDURE — 3008F BODY MASS INDEX DOCD: CPT | Mod: CPTII,S$GLB,, | Performed by: OBSTETRICS & GYNECOLOGY

## 2020-11-04 PROCEDURE — 1126F AMNT PAIN NOTED NONE PRSNT: CPT | Mod: S$GLB,,, | Performed by: OBSTETRICS & GYNECOLOGY

## 2020-11-04 PROCEDURE — 1126F PR PAIN SEVERITY QUANTIFIED, NO PAIN PRESENT: ICD-10-PCS | Mod: S$GLB,,, | Performed by: OBSTETRICS & GYNECOLOGY

## 2020-11-04 PROCEDURE — 85027 COMPLETE CBC AUTOMATED: CPT

## 2020-11-04 PROCEDURE — 99999 PR PBB SHADOW E&M-EST. PATIENT-LVL III: CPT | Mod: PBBFAC,,, | Performed by: OBSTETRICS & GYNECOLOGY

## 2020-11-04 PROCEDURE — 36415 COLL VENOUS BLD VENIPUNCTURE: CPT

## 2020-11-04 PROCEDURE — 99214 OFFICE O/P EST MOD 30 MIN: CPT | Mod: S$GLB,,, | Performed by: OBSTETRICS & GYNECOLOGY

## 2020-11-04 RX ORDER — EPINEPHRINE 0.3 MG/.3ML
0.3 INJECTION SUBCUTANEOUS ONCE AS NEEDED
Status: CANCELLED | OUTPATIENT
Start: 2020-11-05

## 2020-11-04 RX ORDER — FAMOTIDINE 10 MG/ML
20 INJECTION INTRAVENOUS
Status: CANCELLED | OUTPATIENT
Start: 2020-11-05

## 2020-11-04 RX ORDER — HEPARIN 100 UNIT/ML
500 SYRINGE INTRAVENOUS
Status: CANCELLED | OUTPATIENT
Start: 2020-11-05

## 2020-11-04 RX ORDER — DIPHENHYDRAMINE HYDROCHLORIDE 50 MG/ML
50 INJECTION INTRAMUSCULAR; INTRAVENOUS ONCE AS NEEDED
Status: CANCELLED | OUTPATIENT
Start: 2020-11-05

## 2020-11-04 RX ORDER — SODIUM CHLORIDE 0.9 % (FLUSH) 0.9 %
10 SYRINGE (ML) INJECTION
Status: CANCELLED | OUTPATIENT
Start: 2020-11-05

## 2020-11-04 NOTE — PROGRESS NOTES
Subjective:       Patient ID: Ankita Campo is a 70 y.o. female.    Chief Complaint: No chief complaint on file.    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    Her oncologic history is:     71 y/o referred by Dr. 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 Patient complained of PMB onset 5 months ago. Subsequently had a D&C 7/7/2020 with final pathology revealing high grade serous endometrial carcinoma. Surgical history includes cholecystectomy and CS x 1. Family history significant for maternal aunt with breast cancer. Co-morbidities include HTN and obesity.     TVUS 3/2020  Uterus 7.72 x 4.27 cm with mass 2.7 x 2.8 cm. No evidence of L or R ovary.     Aug 2020: Robotic assisted laparoscopic hysterectomy BSO and bilateral sentinel node biopsies and omentectomy for serous ca of the uterus on 8/24/2020. 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 No lymph nodes noted on SLN bx. (+) LVSI. Preop : 5.     Sep 2020: Started taxol and carboplatin. Plan is for 6 cycles taxol and carboplatin then reimage with PET(PET negative mesenteric LN) and then WPRT if PET negative       Review of Systems   Constitutional: Positive for fatigue. Negative for chills and fever.   Respiratory: Negative for cough, shortness of breath and wheezing.    Cardiovascular: Negative for chest pain, palpitations and leg swelling.   Gastrointestinal: Positive for constipation (occasional). Negative for abdominal pain, diarrhea, nausea and vomiting.   Genitourinary: Negative for difficulty urinating, dysuria, frequency, genital sores, hematuria, urgency, vaginal bleeding, vaginal discharge and vaginal pain.   Musculoskeletal: Negative for gait problem.   Neurological: Negative for weakness and numbness.   Hematological: Negative for adenopathy. Does  not bruise/bleed easily.   Psychiatric/Behavioral: The patient is not nervous/anxious.        Objective:   BP (!) 181/83   Pulse (!) 56   Wt 103.9 kg (229 lb)   BMI 38.11 kg/m²      Physical Exam  Constitutional:       Appearance: She is well-developed.   HENT:      Head: Normocephalic and atraumatic.   Eyes:      General: No scleral icterus.  Neck:      Thyroid: No thyromegaly.      Trachea: No tracheal deviation.   Cardiovascular:      Rate and Rhythm: Normal rate and regular rhythm.   Pulmonary:      Effort: Pulmonary effort is normal.      Breath sounds: Normal breath sounds.   Abdominal:      General: There is no distension.      Palpations: Abdomen is soft. There is no mass.      Tenderness: There is no abdominal tenderness. There is no guarding or rebound.      Hernia: No hernia is present.   Genitourinary:     Comments: Not performed.   Musculoskeletal:         General: No tenderness.   Lymphadenopathy:      Cervical: No cervical adenopathy.   Skin:     General: Skin is warm and dry.   Neurological:      Mental Status: She is alert and oriented to person, place, and time.   Psychiatric:         Behavior: Behavior normal.         Thought Content: Thought content normal.         Judgment: Judgment normal.         Assessment:       1. Endometrial ca    2. Chemotherapy management, encounter for        Plan:   Endometrial ca    Chemotherapy management, encounter for        Proceed with C4.   RTC in 21 days.

## 2020-11-05 ENCOUNTER — INFUSION (OUTPATIENT)
Dept: INFUSION THERAPY | Facility: HOSPITAL | Age: 70
End: 2020-11-05
Payer: COMMERCIAL

## 2020-11-05 VITALS
TEMPERATURE: 98 F | SYSTOLIC BLOOD PRESSURE: 141 MMHG | RESPIRATION RATE: 18 BRPM | BODY MASS INDEX: 38.11 KG/M2 | HEART RATE: 62 BPM | DIASTOLIC BLOOD PRESSURE: 91 MMHG | HEIGHT: 65 IN

## 2020-11-05 DIAGNOSIS — C54.1 ENDOMETRIAL CA: Primary | ICD-10-CM

## 2020-11-05 PROCEDURE — 96361 HYDRATE IV INFUSION ADD-ON: CPT

## 2020-11-05 PROCEDURE — 25000003 PHARM REV CODE 250: Performed by: OBSTETRICS & GYNECOLOGY

## 2020-11-05 PROCEDURE — 96417 CHEMO IV INFUS EACH ADDL SEQ: CPT

## 2020-11-05 PROCEDURE — 96415 CHEMO IV INFUSION ADDL HR: CPT

## 2020-11-05 PROCEDURE — 96375 TX/PRO/DX INJ NEW DRUG ADDON: CPT

## 2020-11-05 PROCEDURE — 63600175 PHARM REV CODE 636 W HCPCS: Performed by: OBSTETRICS & GYNECOLOGY

## 2020-11-05 PROCEDURE — 96367 TX/PROPH/DG ADDL SEQ IV INF: CPT

## 2020-11-05 PROCEDURE — 96413 CHEMO IV INFUSION 1 HR: CPT

## 2020-11-05 RX ORDER — DIPHENHYDRAMINE HYDROCHLORIDE 50 MG/ML
50 INJECTION INTRAMUSCULAR; INTRAVENOUS ONCE AS NEEDED
Status: DISCONTINUED | OUTPATIENT
Start: 2020-11-05 | End: 2020-11-05 | Stop reason: HOSPADM

## 2020-11-05 RX ORDER — EPINEPHRINE 0.3 MG/.3ML
0.3 INJECTION SUBCUTANEOUS ONCE AS NEEDED
Status: DISCONTINUED | OUTPATIENT
Start: 2020-11-05 | End: 2020-11-05 | Stop reason: HOSPADM

## 2020-11-05 RX ORDER — FAMOTIDINE 10 MG/ML
20 INJECTION INTRAVENOUS
Status: COMPLETED | OUTPATIENT
Start: 2020-11-05 | End: 2020-11-05

## 2020-11-05 RX ORDER — HEPARIN 100 UNIT/ML
500 SYRINGE INTRAVENOUS
Status: DISCONTINUED | OUTPATIENT
Start: 2020-11-05 | End: 2020-11-05 | Stop reason: HOSPADM

## 2020-11-05 RX ORDER — SODIUM CHLORIDE 0.9 % (FLUSH) 0.9 %
10 SYRINGE (ML) INJECTION
Status: DISCONTINUED | OUTPATIENT
Start: 2020-11-05 | End: 2020-11-05 | Stop reason: HOSPADM

## 2020-11-05 RX ORDER — CLONIDINE HYDROCHLORIDE 0.1 MG/1
0.1 TABLET ORAL ONCE
Status: COMPLETED | OUTPATIENT
Start: 2020-11-05 | End: 2020-11-05

## 2020-11-05 RX ADMIN — PACLITAXEL 384 MG: 6 INJECTION, SOLUTION INTRAVENOUS at 11:11

## 2020-11-05 RX ADMIN — FAMOTIDINE 20 MG: 10 INJECTION INTRAVENOUS at 10:11

## 2020-11-05 RX ADMIN — CLONIDINE HYDROCHLORIDE 0.1 MG: 0.1 TABLET ORAL at 11:11

## 2020-11-05 RX ADMIN — HEPARIN 500 UNITS: 100 SYRINGE at 04:11

## 2020-11-05 RX ADMIN — CARBOPLATIN 435 MG: 10 INJECTION INTRAVENOUS at 02:11

## 2020-11-05 RX ADMIN — DEXAMETHASONE SODIUM PHOSPHATE: 4 INJECTION, SOLUTION INTRA-ARTICULAR; INTRALESIONAL; INTRAMUSCULAR; INTRAVENOUS; SOFT TISSUE at 10:11

## 2020-11-05 RX ADMIN — DIPHENHYDRAMINE HYDROCHLORIDE 50 MG: 50 INJECTION, SOLUTION INTRAMUSCULAR; INTRAVENOUS at 10:11

## 2020-11-05 RX ADMIN — SODIUM CHLORIDE 500 ML: 0.9 INJECTION, SOLUTION INTRAVENOUS at 03:11

## 2020-11-12 ENCOUNTER — TELEPHONE (OUTPATIENT)
Dept: FAMILY MEDICINE | Facility: CLINIC | Age: 70
End: 2020-11-12

## 2020-11-12 DIAGNOSIS — I10 ESSENTIAL HYPERTENSION: ICD-10-CM

## 2020-11-12 RX ORDER — AMLODIPINE BESYLATE 2.5 MG/1
2.5 TABLET ORAL DAILY
Qty: 30 TABLET | Refills: 0 | Status: SHIPPED | OUTPATIENT
Start: 2020-11-12 | End: 2020-12-15 | Stop reason: SDUPTHER

## 2020-11-12 RX ORDER — VALSARTAN AND HYDROCHLOROTHIAZIDE 320; 25 MG/1; MG/1
1 TABLET, FILM COATED ORAL DAILY
Qty: 90 TABLET | Refills: 0 | Status: SHIPPED | OUTPATIENT
Start: 2020-11-12 | End: 2020-12-15 | Stop reason: SDUPTHER

## 2020-11-12 RX ORDER — METOPROLOL SUCCINATE 50 MG/1
50 TABLET, EXTENDED RELEASE ORAL DAILY
Qty: 30 TABLET | Refills: 0 | Status: SHIPPED | OUTPATIENT
Start: 2020-11-12 | End: 2020-12-15 | Stop reason: SDUPTHER

## 2020-11-12 NOTE — TELEPHONE ENCOUNTER
Decrease metoprolol back to 50 mg  Add amlodipine 2.5 mg  Higher doses caused leg swelling in the past    Called pt. Discussed changes

## 2020-11-12 NOTE — TELEPHONE ENCOUNTER
----- Message from Keeley Márquez sent at 11/12/2020 12:36 PM CST -----  Contact: Sister Isabella  [phone]  Type: Requesting to speak with nurse    Who Called: Pt's sister   Regarding: discuss medication not working    Would the patient rather a call back or a response via [facility]? Call back  Best Call Back Number: [phone]  Additional Information:

## 2020-11-12 NOTE — TELEPHONE ENCOUNTER
"I spoke with patient sister Abhishek who state that patient do not feel good. Patient sister report that patient blood pressure readings are 155/64,150/95 and 152/94. Patient state that she feel 'lifeless" after taking metoprolol. I advised that a message would be sent to . Patient voiced understanding.  "

## 2020-11-23 NOTE — PROGRESS NOTES
Subjective:       Patient ID: Ankita Campo is a 70 y.o. female.    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    Doing well.     Chemotherapy labs have been reviewed and are appropriate for treatment.       Her oncologic history is:     71 y/o referred by Dr. 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 Patient complained of PMB onset 5 months ago. Subsequently had a D&C 7/7/2020 with final pathology revealing high grade serous endometrial carcinoma. Surgical history includes cholecystectomy and CS x 1. Family history significant for maternal aunt with breast cancer. Co-morbidities include HTN and obesity.     TVUS 3/2020  Uterus 7.72 x 4.27 cm with mass 2.7 x 2.8 cm. No evidence of L or R ovary.     Aug 2020: Robotic assisted laparoscopic hysterectomy BSO and bilateral sentinel node biopsies and omentectomy for serous ca of the uterus on 8/24/2020. 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 No lymph nodes noted on SLN bx. (+) LVSI. Preop : 5.     Sep 2020: Started taxol and carboplatin. Plan is for 6 cycles taxol and carboplatin then reimage with PET(PET negative mesenteric LN) and then WPRT if PET negative    Review of Systems   Constitutional: Negative for chills, fatigue and fever.   Respiratory: Negative for cough, shortness of breath and wheezing.    Cardiovascular: Negative for chest pain, palpitations and leg swelling.   Gastrointestinal: Negative for abdominal pain, constipation, diarrhea, nausea and vomiting.   Genitourinary: Negative for difficulty urinating, dysuria, frequency, genital sores, hematuria, urgency, vaginal bleeding, vaginal discharge and vaginal pain.   Musculoskeletal: Negative for gait problem.   Neurological: Positive for numbness ( fingertips. comes and  goes). Negative for weakness.   Hematological: Negative for adenopathy. Does not bruise/bleed easily.   Psychiatric/Behavioral: The patient is not nervous/anxious.        Objective:   BP (!) 160/77   Pulse (!) 59   Wt 103.9 kg (229 lb 0.9 oz)   BMI 38.12 kg/m²      Physical Exam  Constitutional:       Appearance: She is well-developed.   HENT:      Head: Normocephalic and atraumatic.   Eyes:      General: No scleral icterus.  Neck:      Thyroid: No thyromegaly.      Trachea: No tracheal deviation.   Cardiovascular:      Rate and Rhythm: Normal rate and regular rhythm.   Pulmonary:      Effort: Pulmonary effort is normal.      Breath sounds: Normal breath sounds.   Abdominal:      General: There is no distension.      Palpations: Abdomen is soft. There is no mass.      Tenderness: There is no abdominal tenderness. There is no guarding or rebound.      Hernia: No hernia is present.   Genitourinary:     Comments: Not performed.   Musculoskeletal:         General: No tenderness.   Lymphadenopathy:      Cervical: No cervical adenopathy.   Skin:     General: Skin is warm and dry.   Neurological:      Mental Status: She is alert and oriented to person, place, and time.   Psychiatric:         Behavior: Behavior normal.         Thought Content: Thought content normal.         Judgment: Judgment normal.         Assessment:       1. Endometrial ca    2. Chemotherapy management, encounter for        Plan:   Endometrial ca  Proceed with C4.   RTC in 21 days.   -     lidocaine-prilocaine (EMLA) cream; Apply topically as needed.  Dispense: 25 g; Refill: 1    Chemotherapy management, encounter for  -     lidocaine-prilocaine (EMLA) cream; Apply topically as needed.  Dispense: 25 g; Refill: 1

## 2020-11-25 ENCOUNTER — OFFICE VISIT (OUTPATIENT)
Dept: GYNECOLOGIC ONCOLOGY | Facility: CLINIC | Age: 70
End: 2020-11-25
Payer: COMMERCIAL

## 2020-11-25 VITALS
DIASTOLIC BLOOD PRESSURE: 77 MMHG | SYSTOLIC BLOOD PRESSURE: 160 MMHG | BODY MASS INDEX: 38.12 KG/M2 | HEART RATE: 59 BPM | WEIGHT: 229.06 LBS

## 2020-11-25 DIAGNOSIS — C54.1 ENDOMETRIAL CA: Primary | ICD-10-CM

## 2020-11-25 DIAGNOSIS — Z51.11 CHEMOTHERAPY MANAGEMENT, ENCOUNTER FOR: ICD-10-CM

## 2020-11-25 PROCEDURE — 3288F PR FALLS RISK ASSESSMENT DOCUMENTED: ICD-10-PCS | Mod: CPTII,S$GLB,, | Performed by: OBSTETRICS & GYNECOLOGY

## 2020-11-25 PROCEDURE — 3077F SYST BP >= 140 MM HG: CPT | Mod: CPTII,S$GLB,, | Performed by: OBSTETRICS & GYNECOLOGY

## 2020-11-25 PROCEDURE — 1101F PT FALLS ASSESS-DOCD LE1/YR: CPT | Mod: CPTII,S$GLB,, | Performed by: OBSTETRICS & GYNECOLOGY

## 2020-11-25 PROCEDURE — 99999 PR PBB SHADOW E&M-EST. PATIENT-LVL III: CPT | Mod: PBBFAC,,, | Performed by: OBSTETRICS & GYNECOLOGY

## 2020-11-25 PROCEDURE — 3078F DIAST BP <80 MM HG: CPT | Mod: CPTII,S$GLB,, | Performed by: OBSTETRICS & GYNECOLOGY

## 2020-11-25 PROCEDURE — 3008F PR BODY MASS INDEX (BMI) DOCUMENTED: ICD-10-PCS | Mod: CPTII,S$GLB,, | Performed by: OBSTETRICS & GYNECOLOGY

## 2020-11-25 PROCEDURE — 3008F BODY MASS INDEX DOCD: CPT | Mod: CPTII,S$GLB,, | Performed by: OBSTETRICS & GYNECOLOGY

## 2020-11-25 PROCEDURE — 1159F MED LIST DOCD IN RCRD: CPT | Mod: S$GLB,,, | Performed by: OBSTETRICS & GYNECOLOGY

## 2020-11-25 PROCEDURE — 1126F AMNT PAIN NOTED NONE PRSNT: CPT | Mod: S$GLB,,, | Performed by: OBSTETRICS & GYNECOLOGY

## 2020-11-25 PROCEDURE — 3288F FALL RISK ASSESSMENT DOCD: CPT | Mod: CPTII,S$GLB,, | Performed by: OBSTETRICS & GYNECOLOGY

## 2020-11-25 PROCEDURE — 3078F PR MOST RECENT DIASTOLIC BLOOD PRESSURE < 80 MM HG: ICD-10-PCS | Mod: CPTII,S$GLB,, | Performed by: OBSTETRICS & GYNECOLOGY

## 2020-11-25 PROCEDURE — 1159F PR MEDICATION LIST DOCUMENTED IN MEDICAL RECORD: ICD-10-PCS | Mod: S$GLB,,, | Performed by: OBSTETRICS & GYNECOLOGY

## 2020-11-25 PROCEDURE — 3077F PR MOST RECENT SYSTOLIC BLOOD PRESSURE >= 140 MM HG: ICD-10-PCS | Mod: CPTII,S$GLB,, | Performed by: OBSTETRICS & GYNECOLOGY

## 2020-11-25 PROCEDURE — 99214 PR OFFICE/OUTPT VISIT, EST, LEVL IV, 30-39 MIN: ICD-10-PCS | Mod: S$GLB,,, | Performed by: OBSTETRICS & GYNECOLOGY

## 2020-11-25 PROCEDURE — 99214 OFFICE O/P EST MOD 30 MIN: CPT | Mod: S$GLB,,, | Performed by: OBSTETRICS & GYNECOLOGY

## 2020-11-25 PROCEDURE — 1126F PR PAIN SEVERITY QUANTIFIED, NO PAIN PRESENT: ICD-10-PCS | Mod: S$GLB,,, | Performed by: OBSTETRICS & GYNECOLOGY

## 2020-11-25 PROCEDURE — 1101F PR PT FALLS ASSESS DOC 0-1 FALLS W/OUT INJ PAST YR: ICD-10-PCS | Mod: CPTII,S$GLB,, | Performed by: OBSTETRICS & GYNECOLOGY

## 2020-11-25 PROCEDURE — 99999 PR PBB SHADOW E&M-EST. PATIENT-LVL III: ICD-10-PCS | Mod: PBBFAC,,, | Performed by: OBSTETRICS & GYNECOLOGY

## 2020-11-25 RX ORDER — SODIUM CHLORIDE 0.9 % (FLUSH) 0.9 %
10 SYRINGE (ML) INJECTION
Status: CANCELLED | OUTPATIENT
Start: 2020-11-27

## 2020-11-25 RX ORDER — DIPHENHYDRAMINE HYDROCHLORIDE 50 MG/ML
50 INJECTION INTRAMUSCULAR; INTRAVENOUS ONCE AS NEEDED
Status: CANCELLED | OUTPATIENT
Start: 2020-11-27

## 2020-11-25 RX ORDER — EPINEPHRINE 0.3 MG/.3ML
0.3 INJECTION SUBCUTANEOUS ONCE AS NEEDED
Status: CANCELLED | OUTPATIENT
Start: 2020-11-27

## 2020-11-25 RX ORDER — LIDOCAINE AND PRILOCAINE 25; 25 MG/G; MG/G
CREAM TOPICAL
Qty: 25 G | Refills: 1 | Status: SHIPPED | OUTPATIENT
Start: 2020-11-25 | End: 2021-04-19

## 2020-11-25 RX ORDER — HEPARIN 100 UNIT/ML
500 SYRINGE INTRAVENOUS
Status: CANCELLED | OUTPATIENT
Start: 2020-11-27

## 2020-11-25 RX ORDER — FAMOTIDINE 10 MG/ML
20 INJECTION INTRAVENOUS
Status: CANCELLED | OUTPATIENT
Start: 2020-11-27

## 2020-11-27 ENCOUNTER — INFUSION (OUTPATIENT)
Dept: INFUSION THERAPY | Facility: HOSPITAL | Age: 70
End: 2020-11-27
Payer: COMMERCIAL

## 2020-11-27 VITALS
SYSTOLIC BLOOD PRESSURE: 173 MMHG | BODY MASS INDEX: 39.27 KG/M2 | DIASTOLIC BLOOD PRESSURE: 92 MMHG | WEIGHT: 235.69 LBS | HEIGHT: 65 IN | HEART RATE: 59 BPM | RESPIRATION RATE: 18 BRPM

## 2020-11-27 DIAGNOSIS — C54.1 ENDOMETRIAL CA: Primary | ICD-10-CM

## 2020-11-27 PROCEDURE — 25000003 PHARM REV CODE 250: Performed by: OBSTETRICS & GYNECOLOGY

## 2020-11-27 PROCEDURE — 96367 TX/PROPH/DG ADDL SEQ IV INF: CPT

## 2020-11-27 PROCEDURE — 96415 CHEMO IV INFUSION ADDL HR: CPT

## 2020-11-27 PROCEDURE — 63600175 PHARM REV CODE 636 W HCPCS: Performed by: OBSTETRICS & GYNECOLOGY

## 2020-11-27 PROCEDURE — 96361 HYDRATE IV INFUSION ADD-ON: CPT

## 2020-11-27 PROCEDURE — 96413 CHEMO IV INFUSION 1 HR: CPT

## 2020-11-27 PROCEDURE — 96417 CHEMO IV INFUS EACH ADDL SEQ: CPT

## 2020-11-27 PROCEDURE — 96375 TX/PRO/DX INJ NEW DRUG ADDON: CPT

## 2020-11-27 RX ORDER — DIPHENHYDRAMINE HYDROCHLORIDE 50 MG/ML
50 INJECTION INTRAMUSCULAR; INTRAVENOUS ONCE AS NEEDED
Status: COMPLETED | OUTPATIENT
Start: 2020-11-27 | End: 2020-11-27

## 2020-11-27 RX ORDER — FAMOTIDINE 10 MG/ML
20 INJECTION INTRAVENOUS
Status: COMPLETED | OUTPATIENT
Start: 2020-11-27 | End: 2020-11-27

## 2020-11-27 RX ORDER — HEPARIN 100 UNIT/ML
500 SYRINGE INTRAVENOUS
Status: DISCONTINUED | OUTPATIENT
Start: 2020-11-27 | End: 2020-11-27 | Stop reason: HOSPADM

## 2020-11-27 RX ORDER — SODIUM CHLORIDE 0.9 % (FLUSH) 0.9 %
10 SYRINGE (ML) INJECTION
Status: DISCONTINUED | OUTPATIENT
Start: 2020-11-27 | End: 2020-11-27 | Stop reason: HOSPADM

## 2020-11-27 RX ORDER — EPINEPHRINE 0.3 MG/.3ML
0.3 INJECTION SUBCUTANEOUS ONCE AS NEEDED
Status: DISCONTINUED | OUTPATIENT
Start: 2020-11-27 | End: 2020-11-27 | Stop reason: HOSPADM

## 2020-11-27 RX ADMIN — FAMOTIDINE 20 MG: 10 INJECTION INTRAVENOUS at 09:11

## 2020-11-27 RX ADMIN — PALONOSETRON HYDROCHLORIDE: 0.25 INJECTION INTRAVENOUS at 10:11

## 2020-11-27 RX ADMIN — DIPHENHYDRAMINE HYDROCHLORIDE 50 MG: 50 INJECTION INTRAMUSCULAR; INTRAVENOUS at 09:11

## 2020-11-27 RX ADMIN — SODIUM CHLORIDE 500 ML: 0.9 INJECTION, SOLUTION INTRAVENOUS at 01:11

## 2020-11-27 RX ADMIN — CARBOPLATIN 435 MG: 10 INJECTION INTRAVENOUS at 01:11

## 2020-11-27 RX ADMIN — PACLITAXEL 384 MG: 6 INJECTION, SOLUTION, CONCENTRATE INTRAVENOUS at 10:11

## 2020-11-27 RX ADMIN — HEPARIN 500 UNITS: 100 SYRINGE at 03:11

## 2020-11-27 NOTE — PLAN OF CARE
Pt ambulatory to clinic alone for Carbo/Taxol infusion. Pt denies any sig complaints. Port accessed without difficulty. Good blood return. Pt tolerated infusions well. Port deaccessed after flushing with NS and heparin. Keenan needle intact. Bandaid in place. Pt ambulatory from clinic in [facility].

## 2020-12-01 ENCOUNTER — OFFICE VISIT (OUTPATIENT)
Dept: GYNECOLOGIC ONCOLOGY | Facility: CLINIC | Age: 70
End: 2020-12-01
Payer: COMMERCIAL

## 2020-12-01 ENCOUNTER — HOSPITAL ENCOUNTER (OUTPATIENT)
Facility: HOSPITAL | Age: 70
Discharge: HOME | End: 2020-12-02
Attending: OBSTETRICS & GYNECOLOGY | Admitting: OBSTETRICS & GYNECOLOGY
Payer: COMMERCIAL

## 2020-12-01 VITALS
DIASTOLIC BLOOD PRESSURE: 94 MMHG | WEIGHT: 216.94 LBS | BODY MASS INDEX: 36.1 KG/M2 | HEART RATE: 72 BPM | SYSTOLIC BLOOD PRESSURE: 157 MMHG

## 2020-12-01 DIAGNOSIS — K62.5 RECTAL BLEEDING: ICD-10-CM

## 2020-12-01 DIAGNOSIS — Z51.11 CHEMOTHERAPY MANAGEMENT, ENCOUNTER FOR: ICD-10-CM

## 2020-12-01 DIAGNOSIS — T45.1X5A CHEMOTHERAPY-INDUCED THROMBOCYTOPENIA: ICD-10-CM

## 2020-12-01 DIAGNOSIS — C54.1 ENDOMETRIAL CA: Primary | ICD-10-CM

## 2020-12-01 DIAGNOSIS — K55.9 ISCHEMIC COLITIS: ICD-10-CM

## 2020-12-01 DIAGNOSIS — D69.59 CHEMOTHERAPY-INDUCED THROMBOCYTOPENIA: ICD-10-CM

## 2020-12-01 LAB
BASOPHILS # BLD AUTO: 0 K/UL (ref 0–0.2)
BASOPHILS NFR BLD: 0 % (ref 0–1.9)
DIFFERENTIAL METHOD: ABNORMAL
EOSINOPHIL # BLD AUTO: 0.1 K/UL (ref 0–0.5)
EOSINOPHIL NFR BLD: 3 % (ref 0–8)
ERYTHROCYTE [DISTWIDTH] IN BLOOD BY AUTOMATED COUNT: 17.5 % (ref 11.5–14.5)
HCT VFR BLD AUTO: 32.5 % (ref 37–48.5)
HGB BLD-MCNC: 9.9 G/DL (ref 12–16)
IMM GRANULOCYTES # BLD AUTO: 0 K/UL (ref 0–0.04)
IMM GRANULOCYTES NFR BLD AUTO: 0 % (ref 0–0.5)
LYMPHOCYTES # BLD AUTO: 1.1 K/UL (ref 1–4.8)
LYMPHOCYTES NFR BLD: 37 % (ref 18–48)
MCH RBC QN AUTO: 28.4 PG (ref 27–31)
MCHC RBC AUTO-ENTMCNC: 30.5 G/DL (ref 32–36)
MCV RBC AUTO: 93 FL (ref 82–98)
MONOCYTES # BLD AUTO: 0.1 K/UL (ref 0.3–1)
MONOCYTES NFR BLD: 3.7 % (ref 4–15)
NEUTROPHILS # BLD AUTO: 1.7 K/UL (ref 1.8–7.7)
NEUTROPHILS NFR BLD: 56.3 % (ref 38–73)
NRBC BLD-RTO: 0 /100 WBC
PLATELET # BLD AUTO: 82 K/UL (ref 150–350)
PMV BLD AUTO: 11.6 FL (ref 9.2–12.9)
RBC # BLD AUTO: 3.49 M/UL (ref 4–5.4)
SARS-COV-2 RDRP RESP QL NAA+PROBE: NEGATIVE
WBC # BLD AUTO: 3 K/UL (ref 3.9–12.7)

## 2020-12-01 PROCEDURE — 99999 PR PBB SHADOW E&M-EST. PATIENT-LVL III: CPT | Mod: PBBFAC,,, | Performed by: OBSTETRICS & GYNECOLOGY

## 2020-12-01 PROCEDURE — 1159F PR MEDICATION LIST DOCUMENTED IN MEDICAL RECORD: ICD-10-PCS | Mod: S$GLB,,, | Performed by: OBSTETRICS & GYNECOLOGY

## 2020-12-01 PROCEDURE — G0379 DIRECT REFER HOSPITAL OBSERV: HCPCS

## 2020-12-01 PROCEDURE — 63600175 PHARM REV CODE 636 W HCPCS: Performed by: STUDENT IN AN ORGANIZED HEALTH CARE EDUCATION/TRAINING PROGRAM

## 2020-12-01 PROCEDURE — 3077F PR MOST RECENT SYSTOLIC BLOOD PRESSURE >= 140 MM HG: ICD-10-PCS | Mod: CPTII,S$GLB,, | Performed by: OBSTETRICS & GYNECOLOGY

## 2020-12-01 PROCEDURE — 1101F PT FALLS ASSESS-DOCD LE1/YR: CPT | Mod: CPTII,S$GLB,, | Performed by: OBSTETRICS & GYNECOLOGY

## 2020-12-01 PROCEDURE — 3288F PR FALLS RISK ASSESSMENT DOCUMENTED: ICD-10-PCS | Mod: CPTII,S$GLB,, | Performed by: OBSTETRICS & GYNECOLOGY

## 2020-12-01 PROCEDURE — 1101F PR PT FALLS ASSESS DOC 0-1 FALLS W/OUT INJ PAST YR: ICD-10-PCS | Mod: CPTII,S$GLB,, | Performed by: OBSTETRICS & GYNECOLOGY

## 2020-12-01 PROCEDURE — 99214 PR OFFICE/OUTPT VISIT, EST, LEVL IV, 30-39 MIN: ICD-10-PCS | Mod: S$GLB,,, | Performed by: OBSTETRICS & GYNECOLOGY

## 2020-12-01 PROCEDURE — 3078F PR MOST RECENT DIASTOLIC BLOOD PRESSURE < 80 MM HG: ICD-10-PCS | Mod: CPTII,S$GLB,, | Performed by: OBSTETRICS & GYNECOLOGY

## 2020-12-01 PROCEDURE — 3077F SYST BP >= 140 MM HG: CPT | Mod: CPTII,S$GLB,, | Performed by: OBSTETRICS & GYNECOLOGY

## 2020-12-01 PROCEDURE — 1159F MED LIST DOCD IN RCRD: CPT | Mod: S$GLB,,, | Performed by: OBSTETRICS & GYNECOLOGY

## 2020-12-01 PROCEDURE — U0002 COVID-19 LAB TEST NON-CDC: HCPCS

## 2020-12-01 PROCEDURE — 20600001 HC STEP DOWN PRIVATE ROOM

## 2020-12-01 PROCEDURE — 3008F PR BODY MASS INDEX (BMI) DOCUMENTED: ICD-10-PCS | Mod: CPTII,S$GLB,, | Performed by: OBSTETRICS & GYNECOLOGY

## 2020-12-01 PROCEDURE — 3288F FALL RISK ASSESSMENT DOCD: CPT | Mod: CPTII,S$GLB,, | Performed by: OBSTETRICS & GYNECOLOGY

## 2020-12-01 PROCEDURE — 25000003 PHARM REV CODE 250: Performed by: STUDENT IN AN ORGANIZED HEALTH CARE EDUCATION/TRAINING PROGRAM

## 2020-12-01 PROCEDURE — 1126F PR PAIN SEVERITY QUANTIFIED, NO PAIN PRESENT: ICD-10-PCS | Mod: S$GLB,,, | Performed by: OBSTETRICS & GYNECOLOGY

## 2020-12-01 PROCEDURE — 99999 PR PBB SHADOW E&M-EST. PATIENT-LVL III: ICD-10-PCS | Mod: PBBFAC,,, | Performed by: OBSTETRICS & GYNECOLOGY

## 2020-12-01 PROCEDURE — 3078F DIAST BP <80 MM HG: CPT | Mod: CPTII,S$GLB,, | Performed by: OBSTETRICS & GYNECOLOGY

## 2020-12-01 PROCEDURE — G0378 HOSPITAL OBSERVATION PER HR: HCPCS

## 2020-12-01 PROCEDURE — 85025 COMPLETE CBC W/AUTO DIFF WBC: CPT | Mod: 91

## 2020-12-01 PROCEDURE — 99214 OFFICE O/P EST MOD 30 MIN: CPT | Mod: S$GLB,,, | Performed by: OBSTETRICS & GYNECOLOGY

## 2020-12-01 PROCEDURE — 3008F BODY MASS INDEX DOCD: CPT | Mod: CPTII,S$GLB,, | Performed by: OBSTETRICS & GYNECOLOGY

## 2020-12-01 PROCEDURE — 1126F AMNT PAIN NOTED NONE PRSNT: CPT | Mod: S$GLB,,, | Performed by: OBSTETRICS & GYNECOLOGY

## 2020-12-01 RX ORDER — HYDROCODONE BITARTRATE AND ACETAMINOPHEN 5; 325 MG/1; MG/1
1 TABLET ORAL EVERY 4 HOURS PRN
Status: DISCONTINUED | OUTPATIENT
Start: 2020-12-01 | End: 2020-12-02 | Stop reason: HOSPADM

## 2020-12-01 RX ORDER — LEVOTHYROXINE SODIUM 112 UG/1
112 TABLET ORAL
Status: DISCONTINUED | OUTPATIENT
Start: 2020-12-02 | End: 2020-12-02 | Stop reason: HOSPADM

## 2020-12-01 RX ORDER — SODIUM CHLORIDE 0.9 % (FLUSH) 0.9 %
10 SYRINGE (ML) INJECTION
Status: DISCONTINUED | OUTPATIENT
Start: 2020-12-01 | End: 2020-12-02 | Stop reason: HOSPADM

## 2020-12-01 RX ORDER — VALSARTAN AND HYDROCHLOROTHIAZIDE 320; 25 MG/1; MG/1
1 TABLET, FILM COATED ORAL DAILY
Status: DISCONTINUED | OUTPATIENT
Start: 2020-12-01 | End: 2020-12-01

## 2020-12-01 RX ORDER — ONDANSETRON 8 MG/1
8 TABLET, ORALLY DISINTEGRATING ORAL EVERY 8 HOURS PRN
Status: DISCONTINUED | OUTPATIENT
Start: 2020-12-01 | End: 2020-12-02 | Stop reason: HOSPADM

## 2020-12-01 RX ORDER — HYDROCODONE BITARTRATE AND ACETAMINOPHEN 10; 325 MG/1; MG/1
1 TABLET ORAL EVERY 4 HOURS PRN
Status: DISCONTINUED | OUTPATIENT
Start: 2020-12-01 | End: 2020-12-02 | Stop reason: HOSPADM

## 2020-12-01 RX ORDER — SODIUM CHLORIDE, SODIUM LACTATE, POTASSIUM CHLORIDE, CALCIUM CHLORIDE 600; 310; 30; 20 MG/100ML; MG/100ML; MG/100ML; MG/100ML
INJECTION, SOLUTION INTRAVENOUS CONTINUOUS
Status: DISCONTINUED | OUTPATIENT
Start: 2020-12-01 | End: 2020-12-02 | Stop reason: HOSPADM

## 2020-12-01 RX ORDER — VALSARTAN 160 MG/1
320 TABLET ORAL DAILY
Status: DISCONTINUED | OUTPATIENT
Start: 2020-12-01 | End: 2020-12-02 | Stop reason: HOSPADM

## 2020-12-01 RX ORDER — METOPROLOL SUCCINATE 50 MG/1
50 TABLET, EXTENDED RELEASE ORAL DAILY
Status: DISCONTINUED | OUTPATIENT
Start: 2020-12-01 | End: 2020-12-02 | Stop reason: HOSPADM

## 2020-12-01 RX ORDER — AMLODIPINE BESYLATE 2.5 MG/1
2.5 TABLET ORAL DAILY
Status: DISCONTINUED | OUTPATIENT
Start: 2020-12-01 | End: 2020-12-02 | Stop reason: HOSPADM

## 2020-12-01 RX ORDER — HYDROCHLOROTHIAZIDE 25 MG/1
25 TABLET ORAL DAILY
Status: DISCONTINUED | OUTPATIENT
Start: 2020-12-01 | End: 2020-12-02 | Stop reason: HOSPADM

## 2020-12-01 RX ADMIN — HYDROCHLOROTHIAZIDE 25 MG: 25 TABLET ORAL at 09:12

## 2020-12-01 RX ADMIN — SODIUM CHLORIDE, SODIUM LACTATE, POTASSIUM CHLORIDE, AND CALCIUM CHLORIDE: .6; .31; .03; .02 INJECTION, SOLUTION INTRAVENOUS at 06:12

## 2020-12-01 RX ADMIN — VALSARTAN 320 MG: 160 TABLET, FILM COATED ORAL at 09:12

## 2020-12-01 NOTE — PROGRESS NOTES
"Subjective:       Patient ID: Ankita Campo is a 70 y.o. female.    Chief Complaint: Follow-up    HPI   Patient comes in today with rectal bleeding which occurred last night x 3. No straining. No constipation. History of hemorrhoids in her 20's.  She is C4D5 of taxol and carboplatin.     Feeling tired. Complains of "griping" abdominal pain. Did not eat out at a restaurant over the last several days. Not on any blood thinners. Does not take asa or ibuprofen. Episode of nausea last night.       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        Chemotherapy labs have been reviewed and are appropriate for treatment.         Her oncologic history is:     69 y/o referred by Dr. 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 Patient complained of PMB onset 5 months ago. Subsequently had a D&C 7/7/2020 with final pathology revealing high grade serous endometrial carcinoma. Surgical history includes cholecystectomy and CS x 1. Family history significant for maternal aunt with breast cancer. Co-morbidities include HTN and obesity.     TVUS 3/2020  Uterus 7.72 x 4.27 cm with mass 2.7 x 2.8 cm. No evidence of L or R ovary.     Aug 2020: Robotic assisted laparoscopic hysterectomy BSO and bilateral sentinel node biopsies and omentectomy for serous ca of the uterus on 8/24/2020. 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 No lymph nodes noted on SLN bx. (+) LVSI. Preop : 5.      Sep 2020: Started taxol and carboplatin. Plan is for 6 cycles taxol and carboplatin then reimage with PET(PET negative mesenteric LN) and then WPRT if PET negative     Past Medical History:   Diagnosis Date    Anemia     Arthritis     Chemotherapy-induced nausea 9/22/2020    Disorder of kidney and ureter     has only right kidney. 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     Endometrial ca " 2020    Family history of gastric cancer 2016    Goiter     Gout 2016    Hypertension     Hypothyroidism, postsurgical     Multiple thyroid nodules     Retroperitoneal mass 2020     Past Surgical History:   Procedure Laterality Date    cararact       SECTION      CHOLECYSTECTOMY      Donor Nephrectomy Left     fibroidectomy      HAND SURGERY      left    HYSTERECTOMY      AR REMOVAL OF OVARY/TUBE(S)      ROBOT-ASSISTED LAPAROSCOPIC ABDOMINAL HYSTERECTOMY USING DA MARGARITO XI N/A 2020    Procedure: XI ROBOTIC HYSTERECTOMY;  Surgeon: Donte Weeks MD;  Location: [facility] OR [address] [place] [address];  Service: OB/GYN;  Laterality: N/A;    ROBOT-ASSISTED LAPAROSCOPIC LYSIS OF ADHESIONS USING DA MARGARITO XI N/A 2020    Procedure: XI ROBOTIC LYSIS, ADHESIONS;  Surgeon: Donte Weeks MD;  Location: [facility] OR [address] [place] [address];  Service: OB/GYN;  Laterality: N/A;  small bowel and sigmoid colon    ROBOT-ASSISTED LAPAROSCOPIC OMENTECTOMY USING DA MARGARITO XI N/A 2020    Procedure: XI ROBOTIC OMENTECTOMY;  Surgeon: Donte Weeks MD;  Location: [facility] OR [address] [place] [address];  Service: OB/GYN;  Laterality: N/A;    ROBOT-ASSISTED LAPAROSCOPIC SALPINGO-OOPHORECTOMY USING DA MARGARITO XI Bilateral 2020    Procedure: XI ROBOTIC SALPINGO-OOPHORECTOMY;  Surgeon: Donte Weeks MD;  Location: [facility] OR [address] [place] [address];  Service: OB/GYN;  Laterality: Bilateral;    TOTAL THYROIDECTOMY       Family History   Problem Relation Age of Onset    Cancer Father         stomach    Goiter Mother     Heart disease Mother     Breast cancer Paternal Aunt     Cancer Paternal Aunt     Cancer Brother         stomach    Kidney disease Brother     Diabetes Sister     Seizures Sister     Hypertension Sister     Uterine cancer Sister         cancer in a fibroid    Aneurysm Brother     Eczema Neg Hx     Lupus Neg Hx     Psoriasis Neg Hx     Melanoma Neg Hx     Ovarian cancer Neg Hx      Social History     Tobacco Use    Smoking  status: Never Smoker    Smokeless tobacco: Never Used   Substance Use Topics    Alcohol use: Yes     Alcohol/week: 2.0 standard drinks     Types: 2 Shots of liquor per week     Comment: Rare use of alcohol    Drug use: No     Review of patient's allergies indicates:   Allergen Reactions    Neosporin [benzalkonium chloride] Rash    Flanax [naproxen sodium]      Suspected bullous fixed drug eruption right ankle    Doxycycline Rash     Skin peels       Current Outpatient Medications:     amLODIPine (NORVASC) 2.5 MG tablet, Take 1 tablet (2.5 mg total) by mouth once daily., Disp: 30 tablet, Rfl: 0    betamethasone dipropionate (DIPROLENE) 0.05 % ointment, Apply topically daily as needed. Do not apply to damaged skin, Disp: 45 g, Rfl: 0    levothyroxine (SYNTHROID) 112 MCG tablet, Take one tab 6 days a week, 1.5 tabs one day a week, Disp: 96 tablet, Rfl: 1    lidocaine-prilocaine (EMLA) cream, Apply topically as needed., Disp: 25 g, Rfl: 1    metoprolol succinate (TOPROL-XL) 50 MG 24 hr tablet, Take 1 tablet (50 mg total) by mouth once daily., Disp: 30 tablet, Rfl: 0    multivitamin capsule, Take 1 capsule by mouth once daily., Disp: , Rfl:     ondansetron (ZOFRAN-ODT) 8 MG TbDL, Take 1 tablet (8 mg total) by mouth every 12 (twelve) hours as needed (nausea and vomiting.)., Disp: 20 tablet, Rfl: 1    triamcinolone acetonide 0.1% (KENALOG) 0.1 % cream, Apply topically 2 (two) times daily., Disp: 80 g, Rfl: 0    valsartan-hydrochlorothiazide (DIOVAN-HCT) 320-25 mg per tablet, Take 1 tablet by mouth once daily., Disp: 90 tablet, Rfl: 0    calcium carbonate (OS-JONNY) 600 mg calcium (1,500 mg) Tab, Take 1 tablet (600 mg total) by mouth once daily. (Patient not taking: Reported on 11/25/2020), Disp: , Rfl: 0    cholecalciferol, vitamin D3, 1,000 unit capsule, Take 1 capsule (1,000 Units total) by mouth once daily. (Patient not taking: Reported on 11/25/2020), Disp: , Rfl: 0    Review of Systems   Constitutional:  Positive for chills and fatigue. Negative for fever.   Respiratory: Negative for cough, shortness of breath and wheezing.    Cardiovascular: Negative for chest pain, palpitations and leg swelling.   Gastrointestinal: Positive for abdominal pain, blood in stool and nausea. Negative for constipation, diarrhea and vomiting.   Genitourinary: Negative for difficulty urinating, dysuria, frequency, genital sores, hematuria, urgency, vaginal bleeding, vaginal discharge and vaginal pain.   Neurological: Positive for weakness, light-headedness and numbness (feet and hands. ).   Hematological: Negative for adenopathy. Does not bruise/bleed easily.   Psychiatric/Behavioral: The patient is not nervous/anxious.        Objective:   BP (!) 157/94   Pulse 72   Wt 98.4 kg (216 lb 14.9 oz)   BMI 36.10 kg/m²      Physical Exam  Constitutional:       Appearance: Normal appearance.      Comments: Tired appearing.    HENT:      Head: Normocephalic.   Eyes:      Conjunctiva/sclera: Conjunctivae normal.   Cardiovascular:      Rate and Rhythm: Normal rate and regular rhythm.   Pulmonary:      Effort: Pulmonary effort is normal.      Breath sounds: Normal breath sounds.   Abdominal:      General: Bowel sounds are normal. There is no distension.      Palpations: There is no mass.      Tenderness: There is no abdominal tenderness. There is no guarding or rebound.      Hernia: No hernia is present.   Genitourinary:     Comments: Anus: no hemorrhoids.   JAVIER: dark blood on examining finger. No stool.   Neurological:      Mental Status: She is alert and oriented to person, place, and time.   Psychiatric:         Mood and Affect: Mood normal.         Behavior: Behavior normal.         Thought Content: Thought content normal.         Judgment: Judgment normal.         Assessment:       1. Endometrial ca    2. Chemotherapy management, encounter for    3. Rectal bleeding    4. Chemotherapy-induced thrombocytopenia        Plan:   Endometrial  ca    Chemotherapy management, encounter for    Rectal bleeding    Chemotherapy-induced thrombocytopenia      Platelet count of 88,000. Was 129,000 at time of C4.   H&H stable from prechemo labs on 11/25/2020.  12/1/2020:  ANC: 2200. WBC: 3430 due to chemotherapy.     Will admit for fluids and observation and GI consult.     Will need to trend CBC and platelets.   Consider plt transfusion if platelets continue to drop and has continued blood in stool.

## 2020-12-02 ENCOUNTER — ANESTHESIA (OUTPATIENT)
Dept: ENDOSCOPY | Facility: HOSPITAL | Age: 70
End: 2020-12-02
Payer: COMMERCIAL

## 2020-12-02 ENCOUNTER — ANESTHESIA EVENT (OUTPATIENT)
Dept: ENDOSCOPY | Facility: HOSPITAL | Age: 70
End: 2020-12-02
Payer: COMMERCIAL

## 2020-12-02 VITALS
DIASTOLIC BLOOD PRESSURE: 87 MMHG | TEMPERATURE: 98 F | HEART RATE: 70 BPM | OXYGEN SATURATION: 96 % | HEIGHT: 65 IN | SYSTOLIC BLOOD PRESSURE: 144 MMHG | RESPIRATION RATE: 17 BRPM | BODY MASS INDEX: 35.74 KG/M2 | WEIGHT: 214.5 LBS

## 2020-12-02 PROBLEM — T45.1X5A CHEMOTHERAPY-INDUCED THROMBOCYTOPENIA: Status: ACTIVE | Noted: 2020-12-02

## 2020-12-02 PROBLEM — K55.9 ISCHEMIC COLITIS: Status: ACTIVE | Noted: 2020-12-01

## 2020-12-02 PROBLEM — D69.59 CHEMOTHERAPY-INDUCED THROMBOCYTOPENIA: Status: ACTIVE | Noted: 2020-12-02

## 2020-12-02 LAB
BASOPHILS # BLD AUTO: 0 K/UL (ref 0–0.2)
BASOPHILS NFR BLD: 0 % (ref 0–1.9)
DIFFERENTIAL METHOD: ABNORMAL
EOSINOPHIL # BLD AUTO: 0.1 K/UL (ref 0–0.5)
EOSINOPHIL NFR BLD: 2.6 % (ref 0–8)
ERYTHROCYTE [DISTWIDTH] IN BLOOD BY AUTOMATED COUNT: 17.2 % (ref 11.5–14.5)
HCT VFR BLD AUTO: 30.6 % (ref 37–48.5)
HGB BLD-MCNC: 9.4 G/DL (ref 12–16)
IMM GRANULOCYTES # BLD AUTO: 0 K/UL (ref 0–0.04)
IMM GRANULOCYTES NFR BLD AUTO: 0 % (ref 0–0.5)
LYMPHOCYTES # BLD AUTO: 1.1 K/UL (ref 1–4.8)
LYMPHOCYTES NFR BLD: 36.6 % (ref 18–48)
MCH RBC QN AUTO: 28.3 PG (ref 27–31)
MCHC RBC AUTO-ENTMCNC: 30.7 G/DL (ref 32–36)
MCV RBC AUTO: 92 FL (ref 82–98)
MONOCYTES # BLD AUTO: 0.1 K/UL (ref 0.3–1)
MONOCYTES NFR BLD: 3 % (ref 4–15)
NEUTROPHILS # BLD AUTO: 1.8 K/UL (ref 1.8–7.7)
NEUTROPHILS NFR BLD: 57.8 % (ref 38–73)
NRBC BLD-RTO: 0 /100 WBC
PLATELET # BLD AUTO: 80 K/UL (ref 150–350)
PMV BLD AUTO: 11.8 FL (ref 9.2–12.9)
RBC # BLD AUTO: 3.32 M/UL (ref 4–5.4)
WBC # BLD AUTO: 3.03 K/UL (ref 3.9–12.7)

## 2020-12-02 PROCEDURE — 45331 SIGMOIDOSCOPY AND BIOPSY: CPT | Mod: ,,, | Performed by: INTERNAL MEDICINE

## 2020-12-02 PROCEDURE — G0378 HOSPITAL OBSERVATION PER HR: HCPCS

## 2020-12-02 PROCEDURE — 96374 THER/PROPH/DIAG INJ IV PUSH: CPT | Mod: 59

## 2020-12-02 PROCEDURE — 99204 OFFICE O/P NEW MOD 45 MIN: CPT | Mod: 25,,, | Performed by: INTERNAL MEDICINE

## 2020-12-02 PROCEDURE — 25000003 PHARM REV CODE 250: Performed by: NURSE ANESTHETIST, CERTIFIED REGISTERED

## 2020-12-02 PROCEDURE — 88305 TISSUE EXAM BY PATHOLOGIST: ICD-10-PCS | Mod: 26,,, | Performed by: PATHOLOGY

## 2020-12-02 PROCEDURE — 45331 SIGMOIDOSCOPY AND BIOPSY: CPT | Performed by: INTERNAL MEDICINE

## 2020-12-02 PROCEDURE — 25000003 PHARM REV CODE 250: Performed by: INTERNAL MEDICINE

## 2020-12-02 PROCEDURE — 45331 PR SIGMOIDOSCOPY,BIOPSY: ICD-10-PCS | Mod: ,,, | Performed by: INTERNAL MEDICINE

## 2020-12-02 PROCEDURE — D9220A PRA ANESTHESIA: ICD-10-PCS | Mod: CRNA,,, | Performed by: NURSE ANESTHETIST, CERTIFIED REGISTERED

## 2020-12-02 PROCEDURE — 27201012 HC FORCEPS, HOT/COLD, DISP: Performed by: INTERNAL MEDICINE

## 2020-12-02 PROCEDURE — 88342 CHG IMMUNOCYTOCHEMISTRY: ICD-10-PCS | Mod: 26,,, | Performed by: PATHOLOGY

## 2020-12-02 PROCEDURE — 25000003 PHARM REV CODE 250: Performed by: STUDENT IN AN ORGANIZED HEALTH CARE EDUCATION/TRAINING PROGRAM

## 2020-12-02 PROCEDURE — 37000009 HC ANESTHESIA EA ADD 15 MINS: Performed by: INTERNAL MEDICINE

## 2020-12-02 PROCEDURE — D9220A PRA ANESTHESIA: Mod: ANES,,, | Performed by: ANESTHESIOLOGY

## 2020-12-02 PROCEDURE — 94761 N-INVAS EAR/PLS OXIMETRY MLT: CPT

## 2020-12-02 PROCEDURE — 88305 TISSUE EXAM BY PATHOLOGIST: CPT | Mod: 26,,, | Performed by: PATHOLOGY

## 2020-12-02 PROCEDURE — 99224 PR SUBSEQUENT OBSERVATION CARE,LEVEL I: ICD-10-PCS | Mod: ,,, | Performed by: OBSTETRICS & GYNECOLOGY

## 2020-12-02 PROCEDURE — 88342 IMHCHEM/IMCYTCHM 1ST ANTB: CPT | Mod: 26,,, | Performed by: PATHOLOGY

## 2020-12-02 PROCEDURE — 85025 COMPLETE CBC W/AUTO DIFF WBC: CPT

## 2020-12-02 PROCEDURE — 63600175 PHARM REV CODE 636 W HCPCS: Performed by: STUDENT IN AN ORGANIZED HEALTH CARE EDUCATION/TRAINING PROGRAM

## 2020-12-02 PROCEDURE — 88305 TISSUE EXAM BY PATHOLOGIST: CPT | Performed by: PATHOLOGY

## 2020-12-02 PROCEDURE — 88342 IMHCHEM/IMCYTCHM 1ST ANTB: CPT | Performed by: PATHOLOGY

## 2020-12-02 PROCEDURE — 99204 PR OFFICE/OUTPT VISIT, NEW, LEVL IV, 45-59 MIN: ICD-10-PCS | Mod: 25,,, | Performed by: INTERNAL MEDICINE

## 2020-12-02 PROCEDURE — 99224 PR SUBSEQUENT OBSERVATION CARE,LEVEL I: CPT | Mod: ,,, | Performed by: OBSTETRICS & GYNECOLOGY

## 2020-12-02 PROCEDURE — 63600175 PHARM REV CODE 636 W HCPCS: Performed by: NURSE ANESTHETIST, CERTIFIED REGISTERED

## 2020-12-02 PROCEDURE — D9220A PRA ANESTHESIA: Mod: CRNA,,, | Performed by: NURSE ANESTHETIST, CERTIFIED REGISTERED

## 2020-12-02 PROCEDURE — D9220A PRA ANESTHESIA: ICD-10-PCS | Mod: ANES,,, | Performed by: ANESTHESIOLOGY

## 2020-12-02 PROCEDURE — 37000008 HC ANESTHESIA 1ST 15 MINUTES: Performed by: INTERNAL MEDICINE

## 2020-12-02 RX ORDER — METRONIDAZOLE 500 MG/1
500 TABLET ORAL 2 TIMES DAILY
Qty: 14 TABLET | Refills: 0 | Status: SHIPPED | OUTPATIENT
Start: 2020-12-02 | End: 2020-12-09

## 2020-12-02 RX ORDER — PROPOFOL 10 MG/ML
VIAL (ML) INTRAVENOUS
Status: DISCONTINUED | OUTPATIENT
Start: 2020-12-02 | End: 2020-12-02

## 2020-12-02 RX ORDER — PROPOFOL 10 MG/ML
VIAL (ML) INTRAVENOUS CONTINUOUS PRN
Status: DISCONTINUED | OUTPATIENT
Start: 2020-12-02 | End: 2020-12-02

## 2020-12-02 RX ORDER — HYDROMORPHONE HYDROCHLORIDE 1 MG/ML
0.2 INJECTION, SOLUTION INTRAMUSCULAR; INTRAVENOUS; SUBCUTANEOUS EVERY 5 MIN PRN
Status: DISCONTINUED | OUTPATIENT
Start: 2020-12-02 | End: 2020-12-02 | Stop reason: HOSPADM

## 2020-12-02 RX ORDER — SODIUM CHLORIDE 0.9 % (FLUSH) 0.9 %
3 SYRINGE (ML) INJECTION
Status: DISCONTINUED | OUTPATIENT
Start: 2020-12-02 | End: 2020-12-02 | Stop reason: HOSPADM

## 2020-12-02 RX ORDER — HEPARIN 100 UNIT/ML
300 SYRINGE INTRAVENOUS
Status: COMPLETED | OUTPATIENT
Start: 2020-12-02 | End: 2020-12-02

## 2020-12-02 RX ORDER — LIDOCAINE HYDROCHLORIDE 20 MG/ML
INJECTION INTRAVENOUS
Status: DISCONTINUED | OUTPATIENT
Start: 2020-12-02 | End: 2020-12-02

## 2020-12-02 RX ORDER — SODIUM CHLORIDE 9 MG/ML
INJECTION, SOLUTION INTRAVENOUS CONTINUOUS PRN
Status: DISCONTINUED | OUTPATIENT
Start: 2020-12-02 | End: 2020-12-02

## 2020-12-02 RX ADMIN — PROPOFOL 50 MG: 10 INJECTION, EMULSION INTRAVENOUS at 02:12

## 2020-12-02 RX ADMIN — SODIUM PHOSPHATE, DIBASIC AND SODIUM PHOSPHATE, MONOBASIC 1 ENEMA: 7; 19 ENEMA RECTAL at 11:12

## 2020-12-02 RX ADMIN — AMLODIPINE BESYLATE 2.5 MG: 2.5 TABLET ORAL at 09:12

## 2020-12-02 RX ADMIN — PROPOFOL 125 MCG/KG/MIN: 10 INJECTION, EMULSION INTRAVENOUS at 02:12

## 2020-12-02 RX ADMIN — PROPOFOL 20 MG: 10 INJECTION, EMULSION INTRAVENOUS at 02:12

## 2020-12-02 RX ADMIN — LIDOCAINE HYDROCHLORIDE 20 MG: 20 INJECTION, SOLUTION INTRAVENOUS at 02:12

## 2020-12-02 RX ADMIN — SODIUM CHLORIDE: 9 INJECTION, SOLUTION INTRAVENOUS at 02:12

## 2020-12-02 RX ADMIN — SODIUM CHLORIDE, SODIUM LACTATE, POTASSIUM CHLORIDE, AND CALCIUM CHLORIDE: .6; .31; .03; .02 INJECTION, SOLUTION INTRAVENOUS at 04:12

## 2020-12-02 RX ADMIN — METOPROLOL SUCCINATE 50 MG: 50 TABLET, EXTENDED RELEASE ORAL at 09:12

## 2020-12-02 RX ADMIN — HYDROCHLOROTHIAZIDE 25 MG: 25 TABLET ORAL at 09:12

## 2020-12-02 RX ADMIN — HEPARIN 300 UNITS: 100 SYRINGE at 06:12

## 2020-12-02 RX ADMIN — VALSARTAN 320 MG: 160 TABLET, FILM COATED ORAL at 09:12

## 2020-12-02 NOTE — HOSPITAL COURSE
12/02/2020 HD#1, patient admitted overnight from clinic with fatigue and rectal bleeding. H/H stable. Flex sigmoidoscopy performed by GI. Bleeding likely secondary to focal patches of ischemic colitis; possibly chemo-induced. No more episodes of bleeding today. Stable for discharge. Tolerating PO, voiding, ambulating. No pain.     1w course flagyl upon discharge  CBC on 12/4

## 2020-12-02 NOTE — NURSING
Discharge instructions and prescriptions given and explained to pt.  Pt. verbalized understanding with no further questions.  Peripheral IV D/C'd with catheter tip intact.  VS WDL.  Patient is awaiting transport      ROAD TEST  O=SpO2 96% on RA  A=Ambulating around room   D=IV D/C'd  T=Tolerating regular diet  E=Voids independently  S=Performs self care independently  T=Teaching on medication complete

## 2020-12-02 NOTE — H&P
"[facility]  Gynecologic Oncology  H&P    Patient Name: Ankita Campo  MRN: [id number]  Admission Date: 12/1/2020  Primary Care Provider: Chidi Mccurdy DO   Principal Problem: Rectal bleeding    Subjective:     Chief Complaint/Reason for Admission: rectal bleeding    History of Present Illness:  Patient with known Stage IIIA serous carcinoma of the endometrium, HTN, and hypothyroidism presents as a direct admit from clinic with the complaints of fatigue, mild abdominal pain, and new onset rectal bleeding.  She is C4D5 of taxol and carboplatin. She complains of feeling tired. Complains of "griping" abdominal pain. Did not eat out at a restaurant over the last several days. Not on any blood thinners. Does not take asa or ibuprofen. Episode of nausea last night but is tolerating po. Rectal bleeding last night after BM, denies straining. She then noticed 3 more episodes of bright red blood per rectum. Denies hemorrhoids. Denies bleeding with BM before. Denies dark stools or blood in stool. States she has had a colonoscopy before but cannot remember year. Denies FH of colon cancer.     Her oncologic history is:     69 y/o referred by Dr. 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 Patient complained of PMB onset 5 months ago. Subsequently had a D&C 7/7/2020 with final pathology revealing high grade serous endometrial carcinoma. Surgical history includes cholecystectomy and CS x 1. Family history significant for maternal aunt with breast cancer. Co-morbidities include HTN and obesity.     TVUS 3/2020  Uterus 7.72 x 4.27 cm with mass 2.7 x 2.8 cm. No evidence of L or R ovary.     Aug 2020: Robotic assisted laparoscopic hysterectomy BSO and bilateral sentinel node biopsies and omentectomy for serous ca of the uterus on 8/24/2020. 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 No lymph nodes noted on SLN bx. (+) " LVSI. Preop : 5.      Sep 2020: Started taxol and carboplatin. Plan is for 6 cycles taxol and carboplatin then reimage with PET(PET negative mesenteric LN) and then WPRT if PET negative    2020: C4D5 of carboplatin and taxol. Labs have been appropriate        Hospital Course:  No notes on file    Oncology Treatment Plan:   OP GYN PACLITAXEL CARBOPLATIN (AUC 6) Q3W      Past Medical History:   Diagnosis Date    Anemia     Arthritis     Chemotherapy-induced nausea 2020    Disorder of kidney and ureter     has only right kidney. 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     Endometrial ca 2020    Family history of gastric cancer 2016    Goiter     Gout 2016    Hypertension     Hypothyroidism, postsurgical     Multiple thyroid nodules     Retroperitoneal mass 2020     Past Surgical History:   Procedure Laterality Date    cararact       SECTION      CHOLECYSTECTOMY      Donor Nephrectomy Left     fibroidectomy      HAND SURGERY      left    HYSTERECTOMY      IL REMOVAL OF OVARY/TUBE(S)      ROBOT-ASSISTED LAPAROSCOPIC ABDOMINAL HYSTERECTOMY USING DA MARGARITO XI N/A 2020    Procedure: XI ROBOTIC HYSTERECTOMY;  Surgeon: Donte Weeks MD;  Location: [facility] OR [address] [place] [address];  Service: OB/GYN;  Laterality: N/A;    ROBOT-ASSISTED LAPAROSCOPIC LYSIS OF ADHESIONS USING DA MARGARITO XI N/A 2020    Procedure: XI ROBOTIC LYSIS, ADHESIONS;  Surgeon: Donte Weeks MD;  Location: [facility] OR [address] [place] [address];  Service: OB/GYN;  Laterality: N/A;  small bowel and sigmoid colon    ROBOT-ASSISTED LAPAROSCOPIC OMENTECTOMY USING DA MARGARITO XI N/A 2020    Procedure: XI ROBOTIC OMENTECTOMY;  Surgeon: Donte Weeks MD;  [facility];  Service: OB/GYN;  Laterality: N/A;    ROBOT-ASSISTED LAPAROSCOPIC SALPINGO-OOPHORECTOMY USING DA MARGARITO XI Bilateral 2020    Procedure: XI ROBOTIC SALPINGO-OOPHORECTOMY;  Surgeon: Donte Weeks MD;  Location: [facility] OR [address] [place] [address];  Service: OB/GYN;   Laterality: Bilateral;    TOTAL THYROIDECTOMY       Family History     Problem Relation (Age of Onset)    Aneurysm Brother    Breast cancer Paternal Aunt    Cancer Father, Paternal Aunt, Brother    Diabetes Sister    Goiter Mother    Heart disease Mother    Hypertension Sister    Kidney disease Brother    Seizures Sister    Uterine cancer Sister        Tobacco Use    Smoking status: Never Smoker    Smokeless tobacco: Never Used   Substance and Sexual Activity    Alcohol use: Yes     Alcohol/week: 2.0 standard drinks     Types: 2 Shots of liquor per week     Comment: Rare use of alcohol    Drug use: No    Sexual activity: Not Currently       PTA Medications   Medication Sig    amLODIPine (NORVASC) 2.5 MG tablet Take 1 tablet (2.5 mg total) by mouth once daily.    betamethasone dipropionate (DIPROLENE) 0.05 % ointment Apply topically daily as needed. Do not apply to damaged skin    calcium carbonate (OS-JONNY) 600 mg calcium (1,500 mg) Tab Take 1 tablet (600 mg total) by mouth once daily. (Patient not taking: Reported on 11/25/2020)    cholecalciferol, vitamin D3, 1,000 unit capsule Take 1 capsule (1,000 Units total) by mouth once daily. (Patient not taking: Reported on 11/25/2020)    levothyroxine (SYNTHROID) 112 MCG tablet Take one tab 6 days a week, 1.5 tabs one day a week    lidocaine-prilocaine (EMLA) cream Apply topically as needed.    metoprolol succinate (TOPROL-XL) 50 MG 24 hr tablet Take 1 tablet (50 mg total) by mouth once daily.    multivitamin capsule Take 1 capsule by mouth once daily.    ondansetron (ZOFRAN-ODT) 8 MG TbDL Take 1 tablet (8 mg total) by mouth every 12 (twelve) hours as needed (nausea and vomiting.).    triamcinolone acetonide 0.1% (KENALOG) 0.1 % cream Apply topically 2 (two) times daily.    valsartan-hydrochlorothiazide (DIOVAN-HCT) 320-25 mg per tablet Take 1 tablet by mouth once daily.       Review of patient's allergies indicates:   Allergen Reactions    Neosporin  [benzalkonium chloride] Rash    Flanax [naproxen sodium]      Suspected bullous fixed drug eruption right ankle    Doxycycline Rash     Skin peels       Review of Systems   Constitutional: Positive for activity change and fatigue. Negative for chills and fever.   Eyes: Negative for visual disturbance.   Respiratory: Negative for shortness of breath.    Cardiovascular: Negative for chest pain and palpitations.   Gastrointestinal: Positive for abdominal pain and blood in stool. Negative for constipation, diarrhea, nausea and vomiting.   Genitourinary: Negative for vaginal bleeding and vaginal discharge.   Musculoskeletal: Negative for back pain.   Integumentary:  Negative for rash.   Neurological: Negative for seizures, syncope and headaches.   Hematological: Does not bruise/bleed easily.   Psychiatric/Behavioral: Negative for depression. The patient is not nervous/anxious.       Objective:     Vital Signs (Most Recent):  Temp: 98.4 °F (36.9 °C) (12/01/20 1731)  Pulse: 72 (12/01/20 1731)  Resp: 16 (12/01/20 1731)  BP: (!) 149/82 (12/01/20 1829)  SpO2: 99 % (12/01/20 1731) Vital Signs (24h Range):  Temp:  [98.4 °F (36.9 °C)] 98.4 °F (36.9 °C)  Pulse:  [72] 72  Resp:  [16] 16  SpO2:  [99 %] 99 %  BP: (149-187)/(82-97) 149/82     Weight: 97.3 kg (214 lb 8.1 oz)  Body mass index is 35.7 kg/m².    Physical Exam:   Constitutional: She is oriented to person, place, and time. She appears well-developed and well-nourished. No distress.    HENT:   Head: Normocephalic and atraumatic.   Nose: No epistaxis.    Eyes: Conjunctivae and EOM are normal.    Neck: Normal range of motion. Neck supple. No thyromegaly present.    Cardiovascular: Normal rate and regular rhythm.    Port noted on patient's right. Site c/d/i.     Pulmonary/Chest: Effort normal. No respiratory distress.        Abdominal: Soft. She exhibits no distension. There is no abdominal tenderness.   Abdomen soft, non tender non distended. Scar c/d/i.      Genitourinary:     Genitourinary Comments: JAVIER deferred. Per Dr. Weeks's exam today, dark blood noted on glove.              Musculoskeletal: Normal range of motion and moves all extremeties. No tenderness or edema.       Neurological: She is alert and oriented to person, place, and time.    Skin: Skin is warm and dry. No rash noted.    Psychiatric: She has a normal mood and affect. Her behavior is normal.       Laboratory:  BMP:   Recent Labs   Lab 12/01/20  1341         K 4.2      CO2 27   BUN 18   CREATININE 1.1   CALCIUM 8.7    and CBC:   Recent Labs   Lab 12/01/20  1341 12/01/20  1821   WBC 3.43* 3.00*   HGB 10.5* 9.9*   HCT 34.4* 32.5*   PLT 88* 82*           Assessment/Plan:     * Rectal bleeding  - noted on exam today in clinic  - per staff recommendations, will consult GI  - per patient has had colonoscopy before, denies abnormal colonoscopy   - CBC stable at 9/32  - VSS  - patient NPO, IVF overnight   - continue to monitor  - serial CBCs    Endometrial ca  - currently undergoing chemotherapy   - lab work has been appropriate  - see above onc hx   - thrombocytopenia stable at 82    Essential hypertension  - BP stable on admit  - home BP meds restarted                 Ramya Canales MD  Gynecologic Oncology  [facility]

## 2020-12-02 NOTE — DISCHARGE SUMMARY
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  She is C4D5 of taxol and carboplatin. She complains of feeling tired. Complains of "griping" abdominal pain. Did not eat out at a restaurant over the last several days. Not on any blood thinners. Does not take asa or ibuprofen. Episode of nausea last night but is tolerating po. Rectal bleeding last night after BM, denies straining. She then noticed 3 more episodes of bright red blood per rectum. Denies hemorrhoids. Denies bleeding with BM before. Denies dark stools or blood in stool. States she has had a colonoscopy before but cannot remember year. Denies FH of colon cancer.     Her oncologic history is:     71 y/o referred by Dr. 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 Patient complained of PMB onset 5 months ago. Subsequently had a D&C 7/7/2020 with final pathology revealing high grade serous endometrial carcinoma. Surgical history includes cholecystectomy and CS x 1. Family history significant for maternal aunt with breast cancer. Co-morbidities include HTN and obesity.     TVUS 3/2020  Uterus 7.72 x 4.27 cm with mass 2.7 x 2.8 cm. No evidence of L or R ovary.     Aug 2020: Robotic assisted laparoscopic hysterectomy BSO and bilateral sentinel node biopsies and omentectomy for serous ca of the uterus on 8/24/2020. Final pathology c/w  FIGO stage IIIA serous carcinoma of the endometrium with serosal " involvement. No lymph nodes noted on SLN bx. (+) LVSI. Preop : 5.      Sep 2020: Started taxol and carboplatin. Plan is for 6 cycles taxol and carboplatin then reimage with PET(PET negative mesenteric LN) and then WPRT if PET negative    11/2020: C4D5 of carboplatin and taxol. Labs have been appropriate        Hospital Course:  12/02/2020 HD#1, patient admitted overnight from clinic with fatigue and rectal bleeding. H/H stable. Flex sigmoidoscopy performed by GI. Bleeding likely secondary to focal patches of ischemic colitis; possibly chemo-induced. No more episodes of bleeding today. Stable for discharge. Tolerating PO, voiding, ambulating. No pain.     1w course flagyl upon discharge  CBC on 12/4    Procedure(s) (LRB):  SIGMOIDOSCOPY, FLEXIBLE (N/A)     Consults (From admission, onward)        Status Ordering Provider     Inpatient consult to Gastroenterology  Once     Provider:  (Not yet assigned)    Completed DEVORA STAPLES          Significant Diagnostic Studies:   Labs:   CBC   Recent Labs   Lab 12/01/20  1341 12/01/20  1821 12/02/20  0403   WBC 3.43* 3.00* 3.03*   HGB 10.5* 9.9* 9.4*   HCT 34.4* 32.5* 30.6*   PLT 88* 82* 80*    and All labs within the past 24 hours have been reviewed  Endoscopy: flex sig  Specimen (12h ago, onward)    None          Pending Diagnostic Studies:     Procedure Component Value Units Date/Time    Specimen to Pathology, Surgery Gastrointestinal tract [[id number]] Collected: 12/02/20 1431    Order Status: Sent Lab Status: In process Updated: 12/02/20 1432        Final Active Diagnoses:    Diagnosis Date Noted POA    PRINCIPAL PROBLEM:  Ischemic colitis [K55.9] 12/01/2020 Yes    Chemotherapy-induced thrombocytopenia [D69.59, T45.1X5A] 12/02/2020 Yes    Endometrial ca [C54.1] 07/28/2020 Yes    Essential hypertension [I10] 10/04/2012 Yes      Problems Resolved During this Admission:        Does this patient meet criteria for extended DVT prophylaxis? No, because not  indicated    Discharged Condition: fair    Disposition: Home or Self Care    Follow Up:  Follow-up Information     Outpatient lab. Go on 12/4/2020.    Why: Blood draw           Donte Weeks MD On 12/17/2020.    Specialty: Gynecologic Oncology  Why: Follow up from hospital admission  Contact information:  [address]  [place] [address]  [phone]                 Patient Instructions:      CBC ONCOLOGY   Standing Status: Future Standing Exp. Date: 01/31/22     Diet Adult Regular     Notify your health care provider if you experience any of the following:  temperature >100.4     Notify your health care provider if you experience any of the following:  persistent nausea and vomiting or diarrhea     Notify your health care provider if you experience any of the following:  severe uncontrolled pain     Notify your health care provider if you experience any of the following:  increased confusion or weakness     Activity as tolerated   Order Comments: Notify of worsening or persistent rectal bleeding (>48 hr)     Medications:  Reconciled Home Medications:      Medication List      START taking these medications    metroNIDAZOLE 500 MG tablet  Commonly known as: FlagyL  Take 1 tablet (500 mg total) by mouth 2 (two) times daily. for 7 days        CONTINUE taking these medications    amLODIPine 2.5 MG tablet  Commonly known as: NORVASC  Take 1 tablet (2.5 mg total) by mouth once daily.     betamethasone dipropionate 0.05 % ointment  Commonly known as: DIPROLENE  Apply topically daily as needed. Do not apply to damaged skin     calcium carbonate 600 mg calcium (1,500 mg) Tab  Commonly known as: OS-JONNY  Take 1 tablet (600 mg total) by mouth once daily.     cholecalciferol (vitamin D3) 25 mcg (1,000 unit) capsule  Commonly known as: VITAMIN D3  Take 1 capsule (1,000 Units total) by mouth once daily.     levothyroxine 112 MCG tablet  Commonly known as: SYNTHROID  Take one tab 6 days a week, 1.5 tabs one day a  week     lidocaine-prilocaine cream  Commonly known as: EMLA  Apply topically as needed.     metoprolol succinate 50 MG 24 hr tablet  Commonly known as: TOPROL-XL  Take 1 tablet (50 mg total) by mouth once daily.     multivitamin capsule  Take 1 capsule by mouth once daily.     ondansetron 8 MG Tbdl  Commonly known as: ZOFRAN-ODT  Take 1 tablet (8 mg total) by mouth every 12 (twelve) hours as needed (nausea and vomiting.).     triamcinolone acetonide 0.1% 0.1 % cream  Commonly known as: KENALOG  Apply topically 2 (two) times daily.     valsartan-hydrochlorothiazide 320-25 mg per tablet  Commonly known as: DIOVAN-HCT  Take 1 tablet by mouth once daily.          WHITNEY Galloway MD  OBGYN PGY-2   Gyn Onc

## 2020-12-02 NOTE — HPI
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  She is C4D5 of taxol and carboplatin. She complains of feeling tired. Complains of "griping" abdominal pain. Did not eat out at a restaurant over the last several days. Not on any blood thinners. Does not take asa or ibuprofen. Episode of nausea last night but is tolerating po. Rectal bleeding last night after BM, denies straining. She then noticed 3 more episodes of bright red blood per rectum. Denies hemorrhoids. Denies bleeding with BM before. Denies dark stools or blood in stool. States she has had a colonoscopy before but cannot remember year. Denies FH of colon cancer.     Her oncologic history is:     69 y/o referred by Dr. 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 Patient complained of PMB onset 5 months ago. Subsequently had a D&C 7/7/2020 with final pathology revealing high grade serous endometrial carcinoma. Surgical history includes cholecystectomy and CS x 1. Family history significant for maternal aunt with breast cancer. Co-morbidities include HTN and obesity.     TVUS 3/2020  Uterus 7.72 x 4.27 cm with mass 2.7 x 2.8 cm. No evidence of L or R ovary.     Aug 2020: Robotic assisted laparoscopic hysterectomy BSO and bilateral sentinel node biopsies and omentectomy for serous ca of the uterus on 8/24/2020. 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 No lymph nodes noted on SLN bx. (+) LVSI. Preop : 5.      Sep 2020: Started taxol and carboplatin. Plan is for 6 cycles taxol and carboplatin then reimage with PET(PET negative mesenteric LN) and then WPRT if PET negative    11/2020: C4D5 of carboplatin and taxol. Labs have been appropriate      "

## 2020-12-02 NOTE — HPI
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 Patient reports 1 episode of bright red blood without passing stool 2 days ago, and then 3 episodes yesterday after passing stool. She also complains of associated lower abd cramping and nausea, which has now resolved. She reports weakness that is normal after her chemo tx. She denies pain with BM, black stool, vomiting, palpitations, light-headedness, weight loss, anorexia, fever, or any further symptoms at this time. She states she has been constipated lately, with straining, has not taken anything for constipation, last BM last night. She reports an episode of hemorrhoids in her 20s 2/2 straining, resolved with laxatives. Denies use of NSAIDs or anticoagulation/antiplatelet. She has fam hx of father and brother with gastric ca, both successfully treated surgically. Denies etoh use. Last colonoscopy was in 2011, normal, told to repeat in 10 years. No history of EGD.    In terms of onc history, patient is s/p lap hysterectomy and BSO, currently on cycle 4/6 of taxol and carboplatin.

## 2020-12-02 NOTE — CONSULTS
[facility]  Gastroenterology  Consult Note    Patient Name: Ankita Campo  MRN: [id number]  Admission Date: 12/1/2020  Hospital Length of Stay: 1 days  Code Status: Full Code   Attending Provider: Donte Weeks MD   Consulting Provider: Ni Smith MD  Primary Care Physician: Chidi Mccurdy DO  Principal Problem:Rectal bleeding    Inpatient consult to Gastroenterology  Consult performed by: Ni Smith MD  Consult ordered by: Ramya Canales MD  Reason for consult: rectal bleed  Assessment/Recommendations:   Ddx: most likely anorectal etiology, but cannot exclude diverticular bleed, polyp/tumor    Recommendations:   - will schedule for flexible sigmoidoscopy this afternoon   - keep NPO          Subjective:     HPI:  69 y/o F with PMHx 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 Patient reports 1 episode of bright red blood without passing stool 2 days ago, and then 3 episodes yesterday after passing stool. She also complains of associated lower abd cramping and nausea, which has now resolved. She reports weakness that is normal after her chemo tx. She denies pain with BM, black stool, vomiting, palpitations, light-headedness, weight loss, anorexia, fever, or any further symptoms at this time. She states she has been constipated lately, with straining, has not taken anything for constipation, last BM last night. She reports an episode of hemorrhoids in her 20s 2/2 straining, resolved with laxatives. Denies use of NSAIDs or anticoagulation/antiplatelet. She has fam hx of father and brother with gastric ca, both successfully treated surgically. Denies etoh use. Last colonoscopy was in 2011, normal, told to repeat in 10 years. No history of EGD.    In terms of onc history, patient is s/p lap hysterectomy and BSO, currently on cycle 4/6 of taxol and carboplatin.    Past Medical History:   Diagnosis  Date    Anemia     Arthritis     Chemotherapy-induced nausea 2020    Disorder of kidney and ureter     has only right kidney. 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     Endometrial ca 2020    Family history of gastric cancer 2016    Goiter     Gout 2016    Hypertension     Hypothyroidism, postsurgical     Multiple thyroid nodules     Retroperitoneal mass 2020       Past Surgical History:   Procedure Laterality Date    cararact       SECTION      CHOLECYSTECTOMY      Donor Nephrectomy Left     fibroidectomy      HAND SURGERY      left    HYSTERECTOMY      OK REMOVAL OF OVARY/TUBE(S)      ROBOT-ASSISTED LAPAROSCOPIC ABDOMINAL HYSTERECTOMY USING DA MARGARITO XI N/A 2020    Procedure: XI ROBOTIC HYSTERECTOMY;  Surgeon: Donte Weeks MD;  Location: [address];  Service: OB/GYN;  Laterality: N/A;    ROBOT-ASSISTED LAPAROSCOPIC LYSIS OF ADHESIONS USING DA MARGARITO XI N/A 2020    Procedure: XI ROBOTIC LYSIS, ADHESIONS;  Surgeon: Donte Weeks MD;  Location: [facility] OR [address] [place] [address];  Service: OB/GYN;  Laterality: N/A;  small bowel and sigmoid colon    ROBOT-ASSISTED LAPAROSCOPIC OMENTECTOMY USING DA MARGARITO XI N/A 2020    Procedure: XI ROBOTIC OMENTECTOMY;  Surgeon: Donte Weeks MD;  Location: [facility] OR [address] [place] [address];  Service: OB/GYN;  Laterality: N/A;    ROBOT-ASSISTED LAPAROSCOPIC SALPINGO-OOPHORECTOMY USING DA MARGARITO XI Bilateral 2020    Procedure: XI ROBOTIC SALPINGO-OOPHORECTOMY;  Surgeon: Donte Weeks MD;  Location: [address];  Service: OB/GYN;  Laterality: Bilateral;    TOTAL THYROIDECTOMY         Review of patient's allergies indicates:   Allergen Reactions    Neosporin [benzalkonium chloride] Rash    Flanax [naproxen sodium]      Suspected bullous fixed drug eruption right ankle    Doxycycline Rash     Skin peels     Family History     Problem Relation (Age of Onset)    Aneurysm Brother    Breast cancer Paternal Aunt    Cancer Father, Paternal Aunt,  Brother    Diabetes Sister    Goiter Mother    Heart disease Mother    Hypertension Sister    Kidney disease Brother    Seizures Sister    Uterine cancer Sister        Tobacco Use    Smoking status: Never Smoker    Smokeless tobacco: Never Used   Substance and Sexual Activity    Alcohol use: Yes     Alcohol/week: 2.0 standard drinks     Types: 2 Shots of liquor per week     Comment: Rare use of alcohol    Drug use: No    Sexual activity: Not Currently     Review of Systems   Constitutional: Positive for fatigue. Negative for activity change, appetite change (improved), fever and unexpected weight change.   HENT: Negative for drooling, sore throat and trouble swallowing.    Respiratory: Negative for cough, choking and chest tightness.    Cardiovascular: Negative for chest pain.   Gastrointestinal: Positive for abdominal pain (resolved), blood in stool, constipation and nausea (resolved). Negative for abdominal distention, diarrhea and vomiting.   Genitourinary: Negative for dysuria and urgency.   Musculoskeletal: Negative for myalgias.   Skin: Negative for pallor.   Neurological: Positive for weakness (generalized). Negative for dizziness and light-headedness.   All other systems reviewed and are negative.    Objective:     Vital Signs (Most Recent):  Temp: 98.8 °F (37.1 °C) (12/02/20 0733)  Pulse: 78 (12/02/20 0733)  Resp: 18 (12/02/20 0733)  BP: (!) 147/77 (12/02/20 0733)  SpO2: 96 % (12/02/20 0733) Vital Signs (24h Range):  Temp:  [98 °F (36.7 °C)-98.9 °F (37.2 °C)] 98.8 °F (37.1 °C)  Pulse:  [71-78] 78  Resp:  [16-18] 18  SpO2:  [96 %-100 %] 96 %  BP: (136-187)/(77-97) 147/77     Weight: 97.3 kg (214 lb 8.1 oz) (12/01/20 1822)  Body mass index is 35.7 kg/m².    No intake or output data in the 24 hours ending 12/02/20 0759    Lines/Drains/Airways     Central Venous Catheter Line            Port A Cath Single Lumen 10/08/20 1442 right internal jugular 54 days                Physical Exam  Vitals signs and  nursing note reviewed. Exam conducted with a chaperone present.   Constitutional:       Appearance: She is ill-appearing.   HENT:      Mouth/Throat:      Mouth: Mucous membranes are moist.      Pharynx: Oropharynx is clear.   Eyes:      General: No scleral icterus.     Conjunctiva/sclera: Conjunctivae normal.   Cardiovascular:      Rate and Rhythm: Normal rate and regular rhythm.      Pulses: Normal pulses.   Pulmonary:      Effort: Pulmonary effort is normal.      Breath sounds: Normal breath sounds.   Abdominal:      General: Abdomen is flat. Bowel sounds are normal. There is no distension.      Palpations: Abdomen is soft. There is no hepatomegaly, splenomegaly or mass.      Tenderness: There is abdominal tenderness (mild upper abd). There is no guarding or rebound.      Hernia: No hernia is present.   Genitourinary:     Comments: Rectal exam: one skin tag observed, no visible hemorrhoids or lesions. Nontender.   Skin:     Coloration: Skin is not jaundiced or pale.      Findings: No rash.   Neurological:      General: No focal deficit present.      Mental Status: She is alert and oriented to person, place, and time.   Psychiatric:         Mood and Affect: Mood normal.         Behavior: Behavior normal.         Significant Labs:  CBC:   Recent Labs   Lab 12/01/20  1341 12/01/20  1821 12/02/20  0403   WBC 3.43* 3.00* 3.03*   HGB 10.5* 9.9* 9.4*   HCT 34.4* 32.5* 30.6*   PLT 88* 82* 80*     CMP:   Recent Labs   Lab 12/01/20  1341      CALCIUM 8.7      K 4.2   CO2 27      BUN 18   CREATININE 1.1     Coagulation:   Recent Labs   Lab 12/01/20  1341   INR 1.0     All pertinent lab results from the last 24 hours have been reviewed.      Assessment/Plan:     * Rectal bleeding  69 y/o F currently undergoing chemotx with taxol and carboplatin for stage 3 endometrial cancer was admitted from gyn-onc clinic for new onset rectal bleeding. She reports 4 episodes of bright red blood with clots both after BM  and without BM, with associated abd pain and nausea that has resolved. Reports straining. She is hemodynamically stable with upper abd tenderness and negative rectal exam. Hb 9/32>9/30, baseline Hb seems to be around 11. Last colonoscopy 2011, normal.    Ddx: most likely anorectal etiology, but cannot exclude diverticular bleed, polyp/tumor    Recommendations:   - will schedule for flexible sigmoidoscopy this afternoon   - keep NPO          Thank you for your consult. I will follow-up with patient. Please contact us if you have any additional questions.    Ni Smith MD   PGY1  Gastroenterology  [facility]

## 2020-12-02 NOTE — TREATMENT PLAN
Brief GI treatment plan    Flex sig performed today.  Findings erythematous somewhat ulcerated colonic mucosa consistent with ischemic colitis.  Please see full endoscopy report for details.    Recommendations:    -follow-up biopsies  -okay for discharge from GI standpoint  -would discharge with seven days of Cipro/Flagyl  -if chemotherapy is planned, would allow for approximately two weeks before next chemotherapy dosing to allow for colonic mucosa to heal      GI will sign off at this time.  Please call questions.    Johnathon Malin MD  GI Fellow

## 2020-12-02 NOTE — PLAN OF CARE
POC reviewed w patient and sister at beginning of shift and PRN.HTN resolved w new BP meds.  NPO except sips H2O w meds. Free from falls and injury this shift. Bed in low, locked position with call light in reach. Encouraged to call for assistance when getting out of bed. Patient verbalized understanding. Reports 1 bloody stool overnight as a dark red clot x1. All belongings within reach. Afebrile.  IVFs maintained. will continue to monitor.

## 2020-12-02 NOTE — PLAN OF CARE
Pt arrived from admit office. PAC accessed, IVF started. Pt here for rectal bleeding, pt reports no BM since last night. CBC sent. Family at bedside. Instructed patient to call for assistance, bed low and locked, call bell within reach, nonskid socks on, patient verbalized understanding.

## 2020-12-02 NOTE — TRANSFER OF CARE
"Anesthesia Transfer of Care Note    Patient: Ankita Campo    Procedure(s) Performed: Procedure(s) (LRB):  SIGMOIDOSCOPY, FLEXIBLE (N/A)    Patient location: PACU    Anesthesia Type: general    Transport from OR: Transported from OR on room air with adequate spontaneous ventilation    Post pain: adequate analgesia    Post assessment: no apparent anesthetic complications and tolerated procedure well    Post vital signs: stable    Level of consciousness: awake, alert and oriented    Nausea/Vomiting: no nausea/vomiting    Complications: none    Transfer of care protocol was followed      Last vitals:   Visit Vitals  BP (!) 153/98 (BP Location: Left arm, Patient Position: Lying)   Pulse 69   Temp 37.2 °C (98.9 °F) (Temporal)   Resp 14   Ht 5' 5" (1.651 m)   Wt 97.3 kg (214 lb 8.1 oz)   SpO2 96%   Breastfeeding No   BMI 35.70 kg/m²     "

## 2020-12-02 NOTE — ASSESSMENT & PLAN NOTE
- noted on exam today in clinic  - per staff recommendations, will consult GI  - per patient has had colonoscopy before, denies abnormal colonoscopy   - CBC stable at 9/32  - VSS  - patient NPO, IVF overnight   - continue to monitor  - serial CBCs

## 2020-12-02 NOTE — NURSING TRANSFER
Nursing Transfer Note      12/2/2020     Transfer To: 824    Transfer via stretcher    Transfer with     Transported by pct    Medicines sent: no    Chart send with patient: Yes    Notified: family

## 2020-12-02 NOTE — ANESTHESIA PREPROCEDURE EVALUATION
12/02/2020  Ankita Campo is a 70 y.o., female.    Anesthesia Evaluation    I have reviewed the Patient Summary Reports.         Review of Systems  Anesthesia Hx:  No problems with previous Anesthesia    Social:  Non-Smoker    Hematology/Oncology:  Hematology Normal       -- Cancer in past history (Endometrial cancer):    EENT/Dental:EENT/Dental Normal   Cardiovascular:   Hypertension    Pulmonary:  Pulmonary Normal    Renal/:   Chronic Renal Disease, CRI    Hepatic/GI:  Hepatic/GI Normal    Musculoskeletal:  Musculoskeletal Normal    Neurological:  Neurology Normal    Endocrine:   Hypothyroidism    Dermatological:  Skin Normal    Psych:  Psychiatric Normal           Physical Exam  General:  Well nourished    Airway/Jaw/Neck:  Airway Findings: Mouth Opening: Normal Tongue: Normal  General Airway Assessment: Adult  Mallampati: II  Improves to II with phonation.  TM Distance: Normal, at least 6 cm  Jaw/Neck Findings:  Neck ROM: Normal ROM      Dental:  Dental Findings: In tact   Chest/Lungs:  Chest/Lungs Findings: Clear to auscultation, Normal Respiratory Rate     Heart/Vascular:  Heart Findings: Rate: Normal  Rhythm: Regular Rhythm  Sounds: Normal             Anesthesia Plan  Type of Anesthesia, risks & benefits discussed:  Anesthesia Type:  general  Patient's Preference: General  Intra-op Monitoring Plan:   Intra-op Monitoring Plan Comments:   Post Op Pain Control Plan:   Post Op Pain Control Plan Comments:   Induction:   IV  Beta Blocker:  Patient is not currently on a Beta-Blocker (No further documentation required).       Informed Consent: Patient understands risks and agrees with Anesthesia plan.  Questions answered. Anesthesia consent signed with patient.  ASA Score: 3     Day of Surgery Review of History & Physical: I have interviewed and examined the patient. I have reviewed the patient's H&P dated:   There are no significant changes.          Ready For Surgery From Anesthesia Perspective.

## 2020-12-02 NOTE — SUBJECTIVE & OBJECTIVE
Oncology Treatment Plan:   OP GYN PACLITAXEL CARBOPLATIN (AUC 6) Q3W      Past Medical History:   Diagnosis Date    Anemia     Arthritis     Chemotherapy-induced nausea 2020    Disorder of kidney and ureter     has only right kidney. 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     Endometrial ca 2020    Family history of gastric cancer 2016    Goiter     Gout 2016    Hypertension     Hypothyroidism, postsurgical     Multiple thyroid nodules     Retroperitoneal mass 2020     Past Surgical History:   Procedure Laterality Date    cararact       SECTION      CHOLECYSTECTOMY      Donor Nephrectomy Left     fibroidectomy      HAND SURGERY      left    HYSTERECTOMY      NC REMOVAL OF OVARY/TUBE(S)      ROBOT-ASSISTED LAPAROSCOPIC ABDOMINAL HYSTERECTOMY USING DA MARGARITO XI N/A 2020    Procedure: XI ROBOTIC HYSTERECTOMY;  Surgeon: Donte Weeks MD;  [facility];  Service: OB/GYN;  Laterality: N/A;    ROBOT-ASSISTED LAPAROSCOPIC LYSIS OF ADHESIONS USING DA MARGARITO XI N/A 2020    Procedure: XI ROBOTIC LYSIS, ADHESIONS;  Surgeon: Donte Weeks MD;  [facility];  Service: OB/GYN;  Laterality: N/A;  small bowel and sigmoid colon    ROBOT-ASSISTED LAPAROSCOPIC OMENTECTOMY USING DA MARGARITO XI N/A 2020    Procedure: XI ROBOTIC OMENTECTOMY;  Surgeon: Donte Weeks MD;  [facility];  Service: OB/GYN;  Laterality: N/A;    ROBOT-ASSISTED LAPAROSCOPIC SALPINGO-OOPHORECTOMY USING DA MARGARITO XI Bilateral 2020    Procedure: XI ROBOTIC SALPINGO-OOPHORECTOMY;  Surgeon: Donte Weeks MD;  [facility];  Service: OB/GYN;  Laterality: Bilateral;    TOTAL THYROIDECTOMY       Family History     Problem Relation (Age of Onset)    Aneurysm Brother    Breast cancer Paternal Aunt    Cancer Father, Paternal Aunt, Brother    Diabetes Sister    Goiter Mother    Heart disease Mother    Hypertension Sister    Kidney disease Brother    Seizures  Sister    Uterine cancer Sister        Tobacco Use    Smoking status: Never Smoker    Smokeless tobacco: Never Used   Substance and Sexual Activity    Alcohol use: Yes     Alcohol/week: 2.0 standard drinks     Types: 2 Shots of liquor per week     Comment: Rare use of alcohol    Drug use: No    Sexual activity: Not Currently       PTA Medications   Medication Sig    amLODIPine (NORVASC) 2.5 MG tablet Take 1 tablet (2.5 mg total) by mouth once daily.    betamethasone dipropionate (DIPROLENE) 0.05 % ointment Apply topically daily as needed. Do not apply to damaged skin    calcium carbonate (OS-JONNY) 600 mg calcium (1,500 mg) Tab Take 1 tablet (600 mg total) by mouth once daily. (Patient not taking: Reported on 11/25/2020)    cholecalciferol, vitamin D3, 1,000 unit capsule Take 1 capsule (1,000 Units total) by mouth once daily. (Patient not taking: Reported on 11/25/2020)    levothyroxine (SYNTHROID) 112 MCG tablet Take one tab 6 days a week, 1.5 tabs one day a week    lidocaine-prilocaine (EMLA) cream Apply topically as needed.    metoprolol succinate (TOPROL-XL) 50 MG 24 hr tablet Take 1 tablet (50 mg total) by mouth once daily.    multivitamin capsule Take 1 capsule by mouth once daily.    ondansetron (ZOFRAN-ODT) 8 MG TbDL Take 1 tablet (8 mg total) by mouth every 12 (twelve) hours as needed (nausea and vomiting.).    triamcinolone acetonide 0.1% (KENALOG) 0.1 % cream Apply topically 2 (two) times daily.    valsartan-hydrochlorothiazide (DIOVAN-HCT) 320-25 mg per tablet Take 1 tablet by mouth once daily.       Review of patient's allergies indicates:   Allergen Reactions    Neosporin [benzalkonium chloride] Rash    Flanax [naproxen sodium]      Suspected bullous fixed drug eruption right ankle    Doxycycline Rash     Skin peels       Review of Systems   Constitutional: Positive for activity change and fatigue. Negative for chills and fever.   Eyes: Negative for visual disturbance.   Respiratory:  Negative for shortness of breath.    Cardiovascular: Negative for chest pain and palpitations.   Gastrointestinal: Positive for abdominal pain and blood in stool. Negative for constipation, diarrhea, nausea and vomiting.   Genitourinary: Negative for vaginal bleeding and vaginal discharge.   Musculoskeletal: Negative for back pain.   Integumentary:  Negative for rash.   Neurological: Negative for seizures, syncope and headaches.   Hematological: Does not bruise/bleed easily.   Psychiatric/Behavioral: Negative for depression. The patient is not nervous/anxious.       Objective:     Vital Signs (Most Recent):  Temp: 98.4 °F (36.9 °C) (12/01/20 1731)  Pulse: 72 (12/01/20 1731)  Resp: 16 (12/01/20 1731)  BP: (!) 149/82 (12/01/20 1829)  SpO2: 99 % (12/01/20 1731) Vital Signs (24h Range):  Temp:  [98.4 °F (36.9 °C)] 98.4 °F (36.9 °C)  Pulse:  [72] 72  Resp:  [16] 16  SpO2:  [99 %] 99 %  BP: (149-187)/(82-97) 149/82     Weight: 97.3 kg (214 lb 8.1 oz)  Body mass index is 35.7 kg/m².    Physical Exam:   Constitutional: She is oriented to person, place, and time. She appears well-developed and well-nourished. No distress.    HENT:   Head: Normocephalic and atraumatic.   Nose: No epistaxis.    Eyes: Conjunctivae and EOM are normal.    Neck: Normal range of motion. Neck supple. No thyromegaly present.    Cardiovascular: Normal rate and regular rhythm.    Port noted on patient's right. Site c/d/i.     Pulmonary/Chest: Effort normal. No respiratory distress.        Abdominal: Soft. She exhibits no distension. There is no abdominal tenderness.   Abdomen soft, non tender non distended. Scar c/d/i.      Genitourinary:    Genitourinary Comments: JAVIER deferred. Per Dr. Weeks's exam today, dark blood noted on glove.              Musculoskeletal: Normal range of motion and moves all extremeties. No tenderness or edema.       Neurological: She is alert and oriented to person, place, and time.    Skin: Skin is warm and dry. No rash noted.     Psychiatric: She has a normal mood and affect. Her behavior is normal.       Laboratory:  BMP:   Recent Labs   Lab 12/01/20  1341         K 4.2      CO2 27   BUN 18   CREATININE 1.1   CALCIUM 8.7    and CBC:   Recent Labs   Lab 12/01/20  1341 12/01/20  1821   WBC 3.43* 3.00*   HGB 10.5* 9.9*   HCT 34.4* 32.5*   PLT 88* 82*

## 2020-12-02 NOTE — SUBJECTIVE & OBJECTIVE
"Interval History: NAEON. Doing well. Denies n/v, f/c. Does state she passed a maroon blood clot "about the size of a quarter" after a BM. Denies straining.     Scheduled Meds:   amLODIPine  2.5 mg Oral Daily    valsartan  320 mg Oral Daily    And    hydroCHLOROthiazide  25 mg Oral Daily    levothyroxine  112 mcg Oral Before breakfast    metoprolol succinate  50 mg Oral Daily     Continuous Infusions:   lactated ringers 100 mL/hr at 12/02/20 0413     PRN Meds:HYDROcodone-acetaminophen, HYDROcodone-acetaminophen, ondansetron, promethazine (PHENERGAN) IVPB, sodium chloride 0.9%    Review of patient's allergies indicates:   Allergen Reactions    Neosporin [benzalkonium chloride] Rash    Flanax [naproxen sodium]      Suspected bullous fixed drug eruption right ankle    Doxycycline Rash     Skin peels       Objective:     Vital Signs (Most Recent):  Temp: 98 °F (36.7 °C) (12/02/20 0401)  Pulse: 75 (12/02/20 0401)  Resp: 16 (12/02/20 0401)  BP: 136/82 (12/02/20 0401)  SpO2: 100 % (12/02/20 0401) Vital Signs (24h Range):  Temp:  [98 °F (36.7 °C)-98.9 °F (37.2 °C)] 98 °F (36.7 °C)  Pulse:  [71-75] 75  Resp:  [16-17] 16  SpO2:  [99 %-100 %] 100 %  BP: (136-187)/(82-97) 136/82     Weight: 97.3 kg (214 lb 8.1 oz)  Body mass index is 35.7 kg/m².    Intake/Output - Last 3 Shifts       11/30 0700 - 12/01 0659 12/01 0700 - 12/02 0659          Urine Occurrence  4 x    Stool Occurrence  3 x             Physical Exam:   Constitutional: She is oriented to person, place, and time. She appears well-developed and well-nourished. No distress.    HENT:   Head: Normocephalic and atraumatic.    Eyes: Conjunctivae and EOM are normal.    Neck: Normal range of motion. Neck supple. No thyromegaly present.    Cardiovascular: Normal rate.     Pulmonary/Chest: Effort normal. No respiratory distress.        Abdominal: Soft. She exhibits no distension. There is no abdominal tenderness.     Genitourinary:    Genitourinary Comments: deferred    "          Musculoskeletal: Normal range of motion and moves all extremeties. No tenderness or edema.       Neurological: She is alert and oriented to person, place, and time.    Skin: Skin is warm and dry. No rash noted.    Psychiatric: She has a normal mood and affect. Her behavior is normal.       Lines/Drains/Airways     Central Venous Catheter Line            Port A Cath Single Lumen 10/08/20 1442 right internal jugular 54 days                Laboratory:  CBC:   Recent Labs   Lab 12/01/20  1341 12/01/20  1821 12/02/20  0403   WBC 3.43* 3.00* 3.03*   HGB 10.5* 9.9* 9.4*   HCT 34.4* 32.5* 30.6*   PLT 88* 82* 80*    and CMP:   Recent Labs   Lab 12/01/20  1341      K 4.2      CO2 27      BUN 18   CREATININE 1.1   CALCIUM 8.7   ANIONGAP 9   EGFRNONAA 50.6*

## 2020-12-02 NOTE — PROGRESS NOTES
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  She is C4D5 of taxol and carboplatin. She complains of feeling tired. Complains of "griping" abdominal pain. Did not eat out at a restaurant over the last several days. Not on any blood thinners. Does not take asa or ibuprofen. Episode of nausea last night but is tolerating po. Rectal bleeding last night after BM, denies straining. She then noticed 3 more episodes of bright red blood per rectum. Denies hemorrhoids. Denies bleeding with BM before. Denies dark stools or blood in stool. States she has had a colonoscopy before but cannot remember year. Denies FH of colon cancer.     Her oncologic history is:     71 y/o referred by Dr. 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 Patient complained of PMB onset 5 months ago. Subsequently had a D&C 7/7/2020 with final pathology revealing high grade serous endometrial carcinoma. Surgical history includes cholecystectomy and CS x 1. Family history significant for maternal aunt with breast cancer. Co-morbidities include HTN and obesity.     TVUS 3/2020  Uterus 7.72 x 4.27 cm with mass 2.7 x 2.8 cm. No evidence of L or R ovary.     Aug 2020: Robotic assisted laparoscopic hysterectomy BSO and bilateral sentinel node biopsies and omentectomy for serous ca of the uterus on 8/24/2020. Final pathology c/w  FIGO stage IIIA serous " "carcinoma of the endometrium with serosal involvement. No lymph nodes noted on SLN bx. (+) LVSI. Preop : 5.      Sep 2020: Started taxol and carboplatin. Plan is for 6 cycles taxol and carboplatin then reimage with PET(PET negative mesenteric LN) and then WPRT if PET negative    11/2020: C4D5 of carboplatin and taxol. Labs have been appropriate        Hospital Course:  12/02/2020 HD#1, patient admitted overnight from clinic with fatigue and rectal bleeding. Continue to trend labs and will consult GI this AM.     Interval History: NAEON. Doing well. Denies n/v, f/c. Does state she passed a maroon blood clot "about the size of a quarter" after a BM. Denies straining.     Scheduled Meds:   amLODIPine  2.5 mg Oral Daily    valsartan  320 mg Oral Daily    And    hydroCHLOROthiazide  25 mg Oral Daily    levothyroxine  112 mcg Oral Before breakfast    metoprolol succinate  50 mg Oral Daily     Continuous Infusions:   lactated ringers 100 mL/hr at 12/02/20 0413     PRN Meds:HYDROcodone-acetaminophen, HYDROcodone-acetaminophen, ondansetron, promethazine (PHENERGAN) IVPB, sodium chloride 0.9%    Review of patient's allergies indicates:   Allergen Reactions    Neosporin [benzalkonium chloride] Rash    Flanax [naproxen sodium]      Suspected bullous fixed drug eruption right ankle    Doxycycline Rash     Skin peels       Objective:     Vital Signs (Most Recent):  Temp: 98 °F (36.7 °C) (12/02/20 0401)  Pulse: 75 (12/02/20 0401)  Resp: 16 (12/02/20 0401)  BP: 136/82 (12/02/20 0401)  SpO2: 100 % (12/02/20 0401) Vital Signs (24h Range):  Temp:  [98 °F (36.7 °C)-98.9 °F (37.2 °C)] 98 °F (36.7 °C)  Pulse:  [71-75] 75  Resp:  [16-17] 16  SpO2:  [99 %-100 %] 100 %  BP: (136-187)/(82-97) 136/82     Weight: 97.3 kg (214 lb 8.1 oz)  Body mass index is 35.7 kg/m².    Intake/Output - Last 3 Shifts       11/30 0700 - 12/01 0659 12/01 0700 - 12/02 0659          Urine Occurrence  4 x    Stool Occurrence  3 x             Physical " Exam:   Constitutional: She is oriented to person, place, and time. She appears well-developed and well-nourished. No distress.    HENT:   Head: Normocephalic and atraumatic.    Eyes: Conjunctivae and EOM are normal.    Neck: Normal range of motion. Neck supple. No thyromegaly present.    Cardiovascular: Normal rate.     Pulmonary/Chest: Effort normal. No respiratory distress.        Abdominal: Soft. She exhibits no distension. There is no abdominal tenderness.     Genitourinary:    Genitourinary Comments: deferred             Musculoskeletal: Normal range of motion and moves all extremeties. No tenderness or edema.       Neurological: She is alert and oriented to person, place, and time.    Skin: Skin is warm and dry. No rash noted.    Psychiatric: She has a normal mood and affect. Her behavior is normal.       Lines/Drains/Airways     Central Venous Catheter Line            Port A Cath Single Lumen 10/08/20 1442 right internal jugular 54 days                Laboratory:  CBC:   Recent Labs   Lab 12/01/20  1341 12/01/20  1821 12/02/20  0403   WBC 3.43* 3.00* 3.03*   HGB 10.5* 9.9* 9.4*   HCT 34.4* 32.5* 30.6*   PLT 88* 82* 80*    and CMP:   Recent Labs   Lab 12/01/20  1341      K 4.2      CO2 27      BUN 18   CREATININE 1.1   CALCIUM 8.7   ANIONGAP 9   EGFRNONAA 50.6*           Assessment/Plan:     * Rectal bleeding  - noted on exam in clinic 12/1  - per staff recommendations, will consult GI  - per patient has had colonoscopy before, denies abnormal colonoscopy   - CBC stable at 9/32>9/30  - VSS  - patient NPO, IVF   - continue to monitor  - serial CBCs  - d/c pending GI recommendations     Endometrial ca  - currently undergoing chemotherapy   - lab work has been appropriate  - see above onc hx   - thrombocytopenia stable at 82    Essential hypertension  - BP stable on admit  - home BP meds restarted   - amlodipine, toprolol, valsartan restarted       VTE Risk Mitigation (From admission, onward)          Ordered     IP VTE HIGH RISK PATIENT  Once      12/01/20 1504     Place sequential compression device  Until discontinued      12/01/20 1504                  Ramya Canales MD  Gynecologic Oncology  [facility]

## 2020-12-02 NOTE — ASSESSMENT & PLAN NOTE
- currently undergoing chemotherapy   - lab work has been appropriate  - see above onc hx   - thrombocytopenia stable at 82

## 2020-12-02 NOTE — ASSESSMENT & PLAN NOTE
69 y/o F currently undergoing chemotx with taxol and carboplatin for stage 3 endometrial cancer was admitted from gyn-onc clinic for new onset rectal bleeding. She reports 4 episodes of bright red blood with clots both after BM and without BM, with associated abd pain and nausea that has resolved. Reports straining. She is hemodynamically stable with upper abd tenderness and negative rectal exam. Hb 9/32>9/30, baseline Hb seems to be around 11. Last colonoscopy 2011, normal.    Ddx: most likely anorectal etiology, but cannot exclude diverticular bleed, polyp/tumor    Recommendations:   - will schedule for flexible sigmoidoscopy this afternoon   - keep NPO

## 2020-12-02 NOTE — ASSESSMENT & PLAN NOTE
- noted on exam in clinic 12/1  - per staff recommendations, will consult GI  - per patient has had colonoscopy before, denies abnormal colonoscopy   - CBC stable at 9/32>9/30  - VSS  - patient NPO, IVF   - continue to monitor  - serial CBCs  - d/c pending GI recommendations

## 2020-12-02 NOTE — SUBJECTIVE & OBJECTIVE
Past Medical History:   Diagnosis Date    Anemia     Arthritis     Chemotherapy-induced nausea 2020    Disorder of kidney and ureter     has only right kidney. 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     Endometrial ca 2020    Family history of gastric cancer 2016    Goiter     Gout 2016    Hypertension     Hypothyroidism, postsurgical     Multiple thyroid nodules     Retroperitoneal mass 2020       Past Surgical History:   Procedure Laterality Date    cararact       SECTION      CHOLECYSTECTOMY      Donor Nephrectomy Left     fibroidectomy      HAND SURGERY      left    HYSTERECTOMY      MI REMOVAL OF OVARY/TUBE(S)      ROBOT-ASSISTED LAPAROSCOPIC ABDOMINAL HYSTERECTOMY USING DA MARGARITO XI N/A 2020    Procedure: XI ROBOTIC HYSTERECTOMY;  Surgeon: Donte Weeks MD;  Location: [address];  Service: OB/GYN;  Laterality: N/A;    ROBOT-ASSISTED LAPAROSCOPIC LYSIS OF ADHESIONS USING DA MARGARITO XI N/A 2020    Procedure: XI ROBOTIC LYSIS, ADHESIONS;  Surgeon: Donte Weeks MD;  Location: [address];  Service: OB/GYN;  Laterality: N/A;  small bowel and sigmoid colon    ROBOT-ASSISTED LAPAROSCOPIC OMENTECTOMY USING DA MARGARITO XI N/A 2020    Procedure: XI ROBOTIC OMENTECTOMY;  Surgeon: Donte Weeks MD;  Location: [address];  Service: OB/GYN;  Laterality: N/A;    ROBOT-ASSISTED LAPAROSCOPIC SALPINGO-OOPHORECTOMY USING DA MARGARITO XI Bilateral 2020    Procedure: XI ROBOTIC SALPINGO-OOPHORECTOMY;  Surgeon: Donte Weeks MD;  Location: [address];  Service: OB/GYN;  Laterality: Bilateral;    TOTAL THYROIDECTOMY         Review of patient's allergies indicates:   Allergen Reactions    Neosporin [benzalkonium chloride] Rash    Flanax [naproxen sodium]      Suspected bullous fixed drug eruption right ankle    Doxycycline Rash     Skin peels     Family History     Problem Relation (Age of Onset)    Aneurysm Brother    Breast cancer Paternal Aunt     Cancer Father, Paternal Aunt, Brother    Diabetes Sister    Goiter Mother    Heart disease Mother    Hypertension Sister    Kidney disease Brother    Seizures Sister    Uterine cancer Sister        Tobacco Use    Smoking status: Never Smoker    Smokeless tobacco: Never Used   Substance and Sexual Activity    Alcohol use: Yes     Alcohol/week: 2.0 standard drinks     Types: 2 Shots of liquor per week     Comment: Rare use of alcohol    Drug use: No    Sexual activity: Not Currently     Review of Systems   Constitutional: Positive for fatigue. Negative for activity change, appetite change (improved), fever and unexpected weight change.   HENT: Negative for drooling, sore throat and trouble swallowing.    Respiratory: Negative for cough, choking and chest tightness.    Cardiovascular: Negative for chest pain.   Gastrointestinal: Positive for abdominal pain (resolved), blood in stool, constipation and nausea (resolved). Negative for abdominal distention, diarrhea and vomiting.   Genitourinary: Negative for dysuria and urgency.   Musculoskeletal: Negative for myalgias.   Skin: Negative for pallor.   Neurological: Positive for weakness (generalized). Negative for dizziness and light-headedness.   All other systems reviewed and are negative.    Objective:     Vital Signs (Most Recent):  Temp: 98.8 °F (37.1 °C) (12/02/20 0733)  Pulse: 78 (12/02/20 0733)  Resp: 18 (12/02/20 0733)  BP: (!) 147/77 (12/02/20 0733)  SpO2: 96 % (12/02/20 0733) Vital Signs (24h Range):  Temp:  [98 °F (36.7 °C)-98.9 °F (37.2 °C)] 98.8 °F (37.1 °C)  Pulse:  [71-78] 78  Resp:  [16-18] 18  SpO2:  [96 %-100 %] 96 %  BP: (136-187)/(77-97) 147/77     Weight: 97.3 kg (214 lb 8.1 oz) (12/01/20 1822)  Body mass index is 35.7 kg/m².    No intake or output data in the 24 hours ending 12/02/20 0759    Lines/Drains/Airways     Central Venous Catheter Line            Port A Cath Single Lumen 10/08/20 1442 right internal jugular 54 days                 Physical Exam  Vitals signs and nursing note reviewed. Exam conducted with a chaperone present.   Constitutional:       Appearance: She is ill-appearing.   HENT:      Mouth/Throat:      Mouth: Mucous membranes are moist.      Pharynx: Oropharynx is clear.   Eyes:      General: No scleral icterus.     Conjunctiva/sclera: Conjunctivae normal.   Cardiovascular:      Rate and Rhythm: Normal rate and regular rhythm.      Pulses: Normal pulses.   Pulmonary:      Effort: Pulmonary effort is normal.      Breath sounds: Normal breath sounds.   Abdominal:      General: Abdomen is flat. Bowel sounds are normal. There is no distension.      Palpations: Abdomen is soft. There is no hepatomegaly, splenomegaly or mass.      Tenderness: There is abdominal tenderness (mild upper abd). There is no guarding or rebound.      Hernia: No hernia is present.   Genitourinary:     Comments: Rectal exam: one skin tag observed, no visible hemorrhoids or lesions. Nontender.   Skin:     Coloration: Skin is not jaundiced or pale.      Findings: No rash.   Neurological:      General: No focal deficit present.      Mental Status: She is alert and oriented to person, place, and time.   Psychiatric:         Mood and Affect: Mood normal.         Behavior: Behavior normal.         Significant Labs:  CBC:   Recent Labs   Lab 12/01/20  1341 12/01/20  1821 12/02/20  0403   WBC 3.43* 3.00* 3.03*   HGB 10.5* 9.9* 9.4*   HCT 34.4* 32.5* 30.6*   PLT 88* 82* 80*     CMP:   Recent Labs   Lab 12/01/20  1341      CALCIUM 8.7      K 4.2   CO2 27      BUN 18   CREATININE 1.1     Coagulation:   Recent Labs   Lab 12/01/20  1341   INR 1.0     All pertinent lab results from the last 24 hours have been reviewed.

## 2020-12-03 ENCOUNTER — TELEPHONE (OUTPATIENT)
Dept: ADMINISTRATIVE | Facility: OTHER | Age: 70
End: 2020-12-03

## 2020-12-03 NOTE — TELEPHONE ENCOUNTER
----- Message from Donte Weeks MD sent at 12/2/2020  4:27 PM CST -----  Needs a CBC this Friday 12/4. Thank you.

## 2020-12-03 NOTE — ANESTHESIA POSTPROCEDURE EVALUATION
Anesthesia Post Evaluation    Patient: Ankita Campo    Procedure(s) Performed: Procedure(s) (LRB):  SIGMOIDOSCOPY, FLEXIBLE (N/A)    Final Anesthesia Type: general    Patient location during evaluation: PACU  Patient participation: Yes- Able to Participate  Level of consciousness: awake and alert  Post-procedure vital signs: reviewed and stable  Pain management: adequate  Airway patency: patent    PONV status at discharge: No PONV  Anesthetic complications: no      Cardiovascular status: blood pressure returned to baseline and stable  Respiratory status: unassisted  Hydration status: euvolemic  Follow-up not needed.          Vitals Value Taken Time   /87 12/02/20 1713   Temp 36.4 °C (97.5 °F) 12/02/20 1713   Pulse 70 12/02/20 1713   Resp 17 12/02/20 1713   SpO2 96 % 12/02/20 1713         Event Time   Out of Recovery 15:55:00         Pain/Ellen Score: Pain Rating Prior to Med Admin: 0 (12/2/2020  3:34 PM)  Pain Rating Post Med Admin: 0 (12/2/2020  3:34 PM)  Ellen Score: 10 (12/2/2020  3:34 PM)

## 2020-12-04 ENCOUNTER — TELEPHONE (OUTPATIENT)
Dept: GYNECOLOGIC ONCOLOGY | Facility: CLINIC | Age: 70
End: 2020-12-04

## 2020-12-04 ENCOUNTER — LAB VISIT (OUTPATIENT)
Dept: LAB | Facility: HOSPITAL | Age: 70
End: 2020-12-04
Attending: STUDENT IN AN ORGANIZED HEALTH CARE EDUCATION/TRAINING PROGRAM
Payer: COMMERCIAL

## 2020-12-04 DIAGNOSIS — K62.5 RECTAL BLEEDING: ICD-10-CM

## 2020-12-04 DIAGNOSIS — K55.9 ISCHEMIC COLITIS: Primary | ICD-10-CM

## 2020-12-04 LAB
ERYTHROCYTE [DISTWIDTH] IN BLOOD BY AUTOMATED COUNT: 17.2 % (ref 11.5–14.5)
HCT VFR BLD AUTO: 32.3 % (ref 37–48.5)
HGB BLD-MCNC: 10 G/DL (ref 12–16)
IMM GRANULOCYTES # BLD AUTO: 0.01 K/UL (ref 0–0.04)
MCH RBC QN AUTO: 28.6 PG (ref 27–31)
MCHC RBC AUTO-ENTMCNC: 31 G/DL (ref 32–36)
MCV RBC AUTO: 92 FL (ref 82–98)
NEUTROPHILS # BLD AUTO: 0.9 K/UL (ref 1.8–7.7)
PLATELET # BLD AUTO: 76 K/UL (ref 150–350)
PMV BLD AUTO: 11.6 FL (ref 9.2–12.9)
RBC # BLD AUTO: 3.5 M/UL (ref 4–5.4)
WBC # BLD AUTO: 2.47 K/UL (ref 3.9–12.7)

## 2020-12-04 PROCEDURE — 85027 COMPLETE CBC AUTOMATED: CPT | Mod: PO

## 2020-12-04 PROCEDURE — 36415 COLL VENOUS BLD VENIPUNCTURE: CPT | Mod: PO

## 2020-12-04 RX ORDER — CIPROFLOXACIN 500 MG/1
500 TABLET ORAL 2 TIMES DAILY
Qty: 14 TABLET | Refills: 0 | Status: SHIPPED | OUTPATIENT
Start: 2020-12-04 | End: 2020-12-11

## 2020-12-04 NOTE — TELEPHONE ENCOUNTER
Both numbers called. Message left on mobile number to call me.     Additional rx for cipro sent to her pharmacy. I just received the GI note today and saw that she was to be on cipro and flagy. She has a rx for flagyl already.

## 2020-12-04 NOTE — TELEPHONE ENCOUNTER
Patient was informed of her low white blood cell count and platelet count.  She was given neutropenic precautions and instructed to go to the emergency room if she has a fever.      Her ANC is 900 and her platelet count is 92388.  She is not having any rectal bleeding.    I plan to dose reduce her next 2 rounds of chemotherapy and add Neulasta on body injector.

## 2020-12-04 NOTE — [employer] PATIENT INSTRUCTIONS
Discharge Summary/Instructions after an Endoscopic Procedure  Patient Name: Ankita Pierce MRN: [id number]  Patient YOB: 1950 Wednesday, December 2, 2020  Sukhi Lieberman MD  RESTRICTIONS:  During your procedure today, you received medications for sedation.  These   medications may affect your judgment, balance and coordination.  Therefore,   for 24 hours, you have the following restrictions:   - DO NOT drive a car, operate machinery, make legal/financial decisions,   sign important papers or drink alcohol.    ACTIVITY:  Today: no heavy lifting, straining or running due to procedural   sedation/anesthesia.  The following day: return to full activity including work.  DIET:  Eat and drink normally unless instructed otherwise.     TREATMENT FOR COMMON SIDE EFFECTS:  - Mild abdominal pain, nausea, belching, bloating or excessive gas:  rest,   eat lightly and use a heating pad.  - Sore Throat: treat with throat lozenges and/or gargle with warm salt   water.  - Because air was used during the procedure, expelling large amounts of air   from your rectum or belching is normal.  - If a bowel prep was taken, you may not have a bowel movement for 1-3 days.    This is normal.  SYMPTOMS TO WATCH FOR AND REPORT TO YOUR PHYSICIAN:  1. Abdominal pain or bloating, other than gas cramps.  2. Chest pain.  3. Back pain.  4. Signs of infection such as: chills or fever occurring within 24 hours   after the procedure.  5. Rectal bleeding, which would show as bright red, maroon, or black stools.   (A tablespoon of blood from the rectum is not serious, especially if   hemorrhoids are present.)  6. Vomiting.  7. Weakness or dizziness.  GO DIRECTLY TO THE NEAREST EMERGENCY ROOM IF YOU HAVE ANY OF THE FOLLOWING:      Difficulty breathing              Chills and/or fever over 101 F   Persistent vomiting and/or vomiting blood   Severe abdominal pain   Severe chest pain   Black, tarry stools   Bleeding- more than one  tablespoon   Any other symptom or condition that you feel may need urgent attention  Your doctor recommends these additional instructions:  If any biopsies were taken, your doctors clinic will contact you in 1 to 2   weeks with any results.  - [facility] patient to [facility] for [facility] care.   - Resume previous diet today.   - Continue present medications.   - Cipro (ciprofloxacin) 500 mg PO BID for 7 weeks.   - Flagyl (metronidazole) 500 mg PO TID for 7 days.   - Await pathology results.   - Return to referring physician as previously scheduled.   - Await pathology results.   - Findings are suspicious for ischemic colitis. This is likely the source of   bleeding. Await biopsy results.  For questions, problems or results please call your physician - Sukhi Lieberman MD at Work:  [phone].  [facility], EMERGENCY ROOM PHONE NUMBER: [phone]  IF A COMPLICATION OR EMERGENCY SITUATION ARISES AND YOU ARE UNABLE TO REACH   YOUR PHYSICIAN - GO DIRECTLY TO THE EMERGENCY ROOM.  Sukhi Lieberman MD  12/3/2020 6:08:51 PM  This report has been verified and signed electronically.  [employer]

## 2020-12-10 ENCOUNTER — TELEPHONE (OUTPATIENT)
Dept: GASTROENTEROLOGY | Facility: CLINIC | Age: 70
End: 2020-12-10

## 2020-12-10 LAB
FINAL PATHOLOGIC DIAGNOSIS: NORMAL
GROSS: NORMAL
Lab: NORMAL

## 2020-12-10 NOTE — TELEPHONE ENCOUNTER
----- Message from Sukhi Lieberman MD sent at 12/10/2020 12:47 PM CST -----  Let her know that the bxs from her flex sig were consistent with ischemic colitis (inadequate blood flow to the colon). As long as her symptoms resolve she does not require any follow up for this. Thanks.

## 2020-12-14 ENCOUNTER — TELEPHONE (OUTPATIENT)
Dept: GASTROENTEROLOGY | Facility: CLINIC | Age: 70
End: 2020-12-14

## 2020-12-15 ENCOUNTER — OFFICE VISIT (OUTPATIENT)
Dept: FAMILY MEDICINE | Facility: CLINIC | Age: 70
End: 2020-12-15
Payer: COMMERCIAL

## 2020-12-15 VITALS
WEIGHT: 231.5 LBS | TEMPERATURE: 98 F | HEIGHT: 65 IN | HEART RATE: 64 BPM | SYSTOLIC BLOOD PRESSURE: 138 MMHG | BODY MASS INDEX: 38.57 KG/M2 | OXYGEN SATURATION: 95 % | DIASTOLIC BLOOD PRESSURE: 82 MMHG

## 2020-12-15 DIAGNOSIS — I10 ESSENTIAL HYPERTENSION: Primary | ICD-10-CM

## 2020-12-15 PROCEDURE — 3079F DIAST BP 80-89 MM HG: CPT | Mod: CPTII,S$GLB,, | Performed by: FAMILY MEDICINE

## 2020-12-15 PROCEDURE — 3075F PR MOST RECENT SYSTOLIC BLOOD PRESS GE 130-139MM HG: ICD-10-PCS | Mod: CPTII,S$GLB,, | Performed by: FAMILY MEDICINE

## 2020-12-15 PROCEDURE — 1101F PT FALLS ASSESS-DOCD LE1/YR: CPT | Mod: CPTII,S$GLB,, | Performed by: FAMILY MEDICINE

## 2020-12-15 PROCEDURE — 99213 OFFICE O/P EST LOW 20 MIN: CPT | Mod: S$GLB,,, | Performed by: FAMILY MEDICINE

## 2020-12-15 PROCEDURE — 1159F PR MEDICATION LIST DOCUMENTED IN MEDICAL RECORD: ICD-10-PCS | Mod: S$GLB,,, | Performed by: FAMILY MEDICINE

## 2020-12-15 PROCEDURE — 99213 PR OFFICE/OUTPT VISIT, EST, LEVL III, 20-29 MIN: ICD-10-PCS | Mod: S$GLB,,, | Performed by: FAMILY MEDICINE

## 2020-12-15 PROCEDURE — 1126F AMNT PAIN NOTED NONE PRSNT: CPT | Mod: S$GLB,,, | Performed by: FAMILY MEDICINE

## 2020-12-15 PROCEDURE — 3288F PR FALLS RISK ASSESSMENT DOCUMENTED: ICD-10-PCS | Mod: CPTII,S$GLB,, | Performed by: FAMILY MEDICINE

## 2020-12-15 PROCEDURE — 1159F MED LIST DOCD IN RCRD: CPT | Mod: S$GLB,,, | Performed by: FAMILY MEDICINE

## 2020-12-15 PROCEDURE — 3008F PR BODY MASS INDEX (BMI) DOCUMENTED: ICD-10-PCS | Mod: CPTII,S$GLB,, | Performed by: FAMILY MEDICINE

## 2020-12-15 PROCEDURE — 1126F PR PAIN SEVERITY QUANTIFIED, NO PAIN PRESENT: ICD-10-PCS | Mod: S$GLB,,, | Performed by: FAMILY MEDICINE

## 2020-12-15 PROCEDURE — 1101F PR PT FALLS ASSESS DOC 0-1 FALLS W/OUT INJ PAST YR: ICD-10-PCS | Mod: CPTII,S$GLB,, | Performed by: FAMILY MEDICINE

## 2020-12-15 PROCEDURE — 3079F PR MOST RECENT DIASTOLIC BLOOD PRESSURE 80-89 MM HG: ICD-10-PCS | Mod: CPTII,S$GLB,, | Performed by: FAMILY MEDICINE

## 2020-12-15 PROCEDURE — 3288F FALL RISK ASSESSMENT DOCD: CPT | Mod: CPTII,S$GLB,, | Performed by: FAMILY MEDICINE

## 2020-12-15 PROCEDURE — 99999 PR PBB SHADOW E&M-EST. PATIENT-LVL IV: ICD-10-PCS | Mod: PBBFAC,,, | Performed by: FAMILY MEDICINE

## 2020-12-15 PROCEDURE — 3075F SYST BP GE 130 - 139MM HG: CPT | Mod: CPTII,S$GLB,, | Performed by: FAMILY MEDICINE

## 2020-12-15 PROCEDURE — 99999 PR PBB SHADOW E&M-EST. PATIENT-LVL IV: CPT | Mod: PBBFAC,,, | Performed by: FAMILY MEDICINE

## 2020-12-15 PROCEDURE — 3008F BODY MASS INDEX DOCD: CPT | Mod: CPTII,S$GLB,, | Performed by: FAMILY MEDICINE

## 2020-12-15 RX ORDER — VALSARTAN AND HYDROCHLOROTHIAZIDE 320; 25 MG/1; MG/1
1 TABLET, FILM COATED ORAL DAILY
Qty: 90 TABLET | Refills: 1 | Status: SHIPPED | OUTPATIENT
Start: 2020-12-15 | End: 2021-07-02 | Stop reason: SDUPTHER

## 2020-12-15 RX ORDER — METOPROLOL SUCCINATE 50 MG/1
50 TABLET, EXTENDED RELEASE ORAL DAILY
Qty: 90 TABLET | Refills: 1 | Status: SHIPPED | OUTPATIENT
Start: 2020-12-15 | End: 2021-07-02

## 2020-12-15 RX ORDER — AMLODIPINE BESYLATE 5 MG/1
5 TABLET ORAL DAILY
Qty: 90 TABLET | Refills: 1 | Status: SHIPPED | OUTPATIENT
Start: 2020-12-15 | End: 2021-07-02 | Stop reason: SDUPTHER

## 2020-12-15 NOTE — PATIENT INSTRUCTIONS
Initial: 158/92  Repeat; 138/82 (left) and 142/84 (right)  Increase amlodipine to 5 mg from 2.5  Continue valsartan/hctz 320/25  Continue metoprolol 50 mg XR    F/u in 6 months. Labs at that time.       Kidney function is a little low. No NSAIDS such as ibuprofen or naproxen. Avoid Red meats. Drink a lot of water. I will continue monitoring kidney function q3-6 months.

## 2020-12-15 NOTE — PROGRESS NOTES
Subjective:       Patient ID: Ankita Campo is a 70 y.o. female.    Chief Complaint: Hypertension    Ankita is a 70 y.o. female who presents today for f/u on her HTN.   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    Increase valsartan/hctz from 320/12.5 to 320/25 at the last visit.   Also added metoprolol. on amlodipine.     BP better today. Headaches resolved. Abdominal pain has resolved.     Review of Systems   Constitutional: Negative for chills and fever.   Respiratory: Negative for shortness of breath.    Cardiovascular: Negative for chest pain.   Gastrointestinal: Negative for abdominal pain, diarrhea, nausea and vomiting.   Neurological: Negative for syncope, light-headedness and headaches.           Objective:     Vitals:    12/15/20 0939   BP: 138/82   Pulse: 64   Temp: 98 °F (36.7 °C)        Physical Exam  Vitals signs and nursing note reviewed.   Constitutional:       General: She is not in acute distress.     Appearance: She is not ill-appearing or toxic-appearing.   Cardiovascular:      Rate and Rhythm: Normal rate and regular rhythm.   Pulmonary:      Effort: Pulmonary effort is normal.      Breath sounds: Normal breath sounds.   Neurological:      Mental Status: She is alert.   Psychiatric:         Mood and Affect: Mood normal.         Behavior: Behavior normal.         Thought Content: Thought content normal.         Judgment: Judgment normal.         Assessment:       1. Essential hypertension        Plan:       Initial: 158/92  Repeat: 138/82 (left) and 142/84 (right)  Increase amlodipine to 5 mg from 2.5  Continue valsartan/hctz 320/25  Continue metoprolol 50 mg XR    Chemo increasing BP? Can reassess in 6 months.     F/u in 6 months. Labs at that time.     Essential hypertension  -     valsartan-hydrochlorothiazide (DIOVAN-HCT) 320-25 mg per tablet; Take 1 tablet by mouth once daily.  Dispense: 90 tablet; Refill: 1  -      metoprolol succinate (TOPROL-XL) 50 MG 24 hr tablet; Take 1 tablet (50 mg total) by mouth once daily.  Dispense: 90 tablet; Refill: 1  -     amLODIPine (NORVASC) 5 MG tablet; Take 1 tablet (5 mg total) by mouth once daily.  Dispense: 90 tablet; Refill: 1        Warning signs discussed, patient to call with any further issues or worsening of symptoms.

## 2020-12-29 ENCOUNTER — OFFICE VISIT (OUTPATIENT)
Dept: GYNECOLOGIC ONCOLOGY | Facility: CLINIC | Age: 70
End: 2020-12-29
Payer: COMMERCIAL

## 2020-12-29 ENCOUNTER — LAB VISIT (OUTPATIENT)
Dept: LAB | Facility: HOSPITAL | Age: 70
End: 2020-12-29
Attending: FAMILY MEDICINE
Payer: COMMERCIAL

## 2020-12-29 VITALS
HEART RATE: 59 BPM | WEIGHT: 220 LBS | BODY MASS INDEX: 36.61 KG/M2 | SYSTOLIC BLOOD PRESSURE: 141 MMHG | DIASTOLIC BLOOD PRESSURE: 65 MMHG

## 2020-12-29 DIAGNOSIS — Z51.11 CHEMOTHERAPY MANAGEMENT, ENCOUNTER FOR: ICD-10-CM

## 2020-12-29 DIAGNOSIS — C54.1 ENDOMETRIAL CA: Primary | ICD-10-CM

## 2020-12-29 DIAGNOSIS — C54.1 ENDOMETRIAL CA: ICD-10-CM

## 2020-12-29 LAB
ANION GAP SERPL CALC-SCNC: 9 MMOL/L (ref 8–16)
BASOPHILS # BLD AUTO: 0.02 K/UL (ref 0–0.2)
BASOPHILS NFR BLD: 0.5 % (ref 0–1.9)
BUN SERPL-MCNC: 22 MG/DL (ref 8–23)
CALCIUM SERPL-MCNC: 8.9 MG/DL (ref 8.7–10.5)
CHLORIDE SERPL-SCNC: 106 MMOL/L (ref 95–110)
CO2 SERPL-SCNC: 27 MMOL/L (ref 23–29)
CREAT SERPL-MCNC: 1.4 MG/DL (ref 0.5–1.4)
DIFFERENTIAL METHOD: ABNORMAL
EOSINOPHIL # BLD AUTO: 0.1 K/UL (ref 0–0.5)
EOSINOPHIL NFR BLD: 3.4 % (ref 0–8)
ERYTHROCYTE [DISTWIDTH] IN BLOOD BY AUTOMATED COUNT: 19.6 % (ref 11.5–14.5)
EST. GFR  (AFRICAN AMERICAN): 43.6 ML/MIN/1.73 M^2
EST. GFR  (NON AFRICAN AMERICAN): 37.8 ML/MIN/1.73 M^2
GLUCOSE SERPL-MCNC: 85 MG/DL (ref 70–110)
HCT VFR BLD AUTO: 30.6 % (ref 37–48.5)
HGB BLD-MCNC: 9.2 G/DL (ref 12–16)
IMM GRANULOCYTES # BLD AUTO: 0.01 K/UL (ref 0–0.04)
IMM GRANULOCYTES NFR BLD AUTO: 0.2 % (ref 0–0.5)
LYMPHOCYTES # BLD AUTO: 1.5 K/UL (ref 1–4.8)
LYMPHOCYTES NFR BLD: 37.1 % (ref 18–48)
MCH RBC QN AUTO: 29.1 PG (ref 27–31)
MCHC RBC AUTO-ENTMCNC: 30.1 G/DL (ref 32–36)
MCV RBC AUTO: 97 FL (ref 82–98)
MONOCYTES # BLD AUTO: 0.5 K/UL (ref 0.3–1)
MONOCYTES NFR BLD: 11.8 % (ref 4–15)
NEUTROPHILS # BLD AUTO: 1.9 K/UL (ref 1.8–7.7)
NEUTROPHILS NFR BLD: 47 % (ref 38–73)
NRBC BLD-RTO: 0 /100 WBC
PLATELET # BLD AUTO: 211 K/UL (ref 150–350)
PMV BLD AUTO: 10.4 FL (ref 9.2–12.9)
POTASSIUM SERPL-SCNC: 4.4 MMOL/L (ref 3.5–5.1)
RBC # BLD AUTO: 3.16 M/UL (ref 4–5.4)
SODIUM SERPL-SCNC: 142 MMOL/L (ref 136–145)
WBC # BLD AUTO: 4.07 K/UL (ref 3.9–12.7)

## 2020-12-29 PROCEDURE — 1126F PR PAIN SEVERITY QUANTIFIED, NO PAIN PRESENT: ICD-10-PCS | Mod: S$GLB,,, | Performed by: OBSTETRICS & GYNECOLOGY

## 2020-12-29 PROCEDURE — 99999 PR PBB SHADOW E&M-EST. PATIENT-LVL III: CPT | Mod: PBBFAC,,, | Performed by: OBSTETRICS & GYNECOLOGY

## 2020-12-29 PROCEDURE — 1159F PR MEDICATION LIST DOCUMENTED IN MEDICAL RECORD: ICD-10-PCS | Mod: S$GLB,,, | Performed by: OBSTETRICS & GYNECOLOGY

## 2020-12-29 PROCEDURE — 99214 PR OFFICE/OUTPT VISIT, EST, LEVL IV, 30-39 MIN: ICD-10-PCS | Mod: S$GLB,,, | Performed by: OBSTETRICS & GYNECOLOGY

## 2020-12-29 PROCEDURE — 3077F PR MOST RECENT SYSTOLIC BLOOD PRESSURE >= 140 MM HG: ICD-10-PCS | Mod: CPTII,S$GLB,, | Performed by: OBSTETRICS & GYNECOLOGY

## 2020-12-29 PROCEDURE — 1101F PT FALLS ASSESS-DOCD LE1/YR: CPT | Mod: CPTII,S$GLB,, | Performed by: OBSTETRICS & GYNECOLOGY

## 2020-12-29 PROCEDURE — 3078F DIAST BP <80 MM HG: CPT | Mod: CPTII,S$GLB,, | Performed by: OBSTETRICS & GYNECOLOGY

## 2020-12-29 PROCEDURE — 3008F BODY MASS INDEX DOCD: CPT | Mod: CPTII,S$GLB,, | Performed by: OBSTETRICS & GYNECOLOGY

## 2020-12-29 PROCEDURE — 80048 BASIC METABOLIC PNL TOTAL CA: CPT

## 2020-12-29 PROCEDURE — 3078F PR MOST RECENT DIASTOLIC BLOOD PRESSURE < 80 MM HG: ICD-10-PCS | Mod: CPTII,S$GLB,, | Performed by: OBSTETRICS & GYNECOLOGY

## 2020-12-29 PROCEDURE — 3077F SYST BP >= 140 MM HG: CPT | Mod: CPTII,S$GLB,, | Performed by: OBSTETRICS & GYNECOLOGY

## 2020-12-29 PROCEDURE — 99999 PR PBB SHADOW E&M-EST. PATIENT-LVL III: ICD-10-PCS | Mod: PBBFAC,,, | Performed by: OBSTETRICS & GYNECOLOGY

## 2020-12-29 PROCEDURE — 1126F AMNT PAIN NOTED NONE PRSNT: CPT | Mod: S$GLB,,, | Performed by: OBSTETRICS & GYNECOLOGY

## 2020-12-29 PROCEDURE — 1159F MED LIST DOCD IN RCRD: CPT | Mod: S$GLB,,, | Performed by: OBSTETRICS & GYNECOLOGY

## 2020-12-29 PROCEDURE — 3288F PR FALLS RISK ASSESSMENT DOCUMENTED: ICD-10-PCS | Mod: CPTII,S$GLB,, | Performed by: OBSTETRICS & GYNECOLOGY

## 2020-12-29 PROCEDURE — 99214 OFFICE O/P EST MOD 30 MIN: CPT | Mod: S$GLB,,, | Performed by: OBSTETRICS & GYNECOLOGY

## 2020-12-29 PROCEDURE — 36415 COLL VENOUS BLD VENIPUNCTURE: CPT

## 2020-12-29 PROCEDURE — 1101F PR PT FALLS ASSESS DOC 0-1 FALLS W/OUT INJ PAST YR: ICD-10-PCS | Mod: CPTII,S$GLB,, | Performed by: OBSTETRICS & GYNECOLOGY

## 2020-12-29 PROCEDURE — 3008F PR BODY MASS INDEX (BMI) DOCUMENTED: ICD-10-PCS | Mod: CPTII,S$GLB,, | Performed by: OBSTETRICS & GYNECOLOGY

## 2020-12-29 PROCEDURE — 85025 COMPLETE CBC W/AUTO DIFF WBC: CPT

## 2020-12-29 PROCEDURE — 3288F FALL RISK ASSESSMENT DOCD: CPT | Mod: CPTII,S$GLB,, | Performed by: OBSTETRICS & GYNECOLOGY

## 2020-12-29 RX ORDER — SODIUM CHLORIDE 0.9 % (FLUSH) 0.9 %
10 SYRINGE (ML) INJECTION
Status: CANCELLED | OUTPATIENT
Start: 2020-12-30

## 2020-12-29 RX ORDER — HEPARIN 100 UNIT/ML
500 SYRINGE INTRAVENOUS
Status: CANCELLED | OUTPATIENT
Start: 2020-12-30

## 2020-12-29 RX ORDER — EPINEPHRINE 0.3 MG/.3ML
0.3 INJECTION SUBCUTANEOUS ONCE AS NEEDED
Status: CANCELLED | OUTPATIENT
Start: 2020-12-30

## 2020-12-29 RX ORDER — FAMOTIDINE 10 MG/ML
20 INJECTION INTRAVENOUS
Status: CANCELLED | OUTPATIENT
Start: 2020-12-30

## 2020-12-29 RX ORDER — DIPHENHYDRAMINE HYDROCHLORIDE 50 MG/ML
50 INJECTION INTRAMUSCULAR; INTRAVENOUS ONCE AS NEEDED
Status: CANCELLED | OUTPATIENT
Start: 2020-12-30

## 2020-12-29 NOTE — PROGRESS NOTES
Subjective:       Patient ID: Ankita Campo is a 70 y.o. female.    Chief Complaint: Endometrial Cancer and Chemotherapy    HPI     Patient comes in today for C5 of taxol and carboplatin for FIGO stage IIIA serous carcinoma of the endometrium with serosal involvement.    Admitted 12/1/2020 with rectal bleeding. Ischemic colitis. Given flagyl.   No further blood in stool. No abdominal pain at this time. Also thrombocytopenia with plt radhika of 76,000.     Her oncologic history is:     69 y/o referred by Dr. 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 Patient complained of PMB onset 5 months ago. Subsequently had a D&C 7/7/2020 with final pathology revealing high grade serous endometrial carcinoma. Surgical history includes cholecystectomy and CS x 1. Family history significant for maternal aunt with breast cancer. Co-morbidities include HTN and obesity.     TVUS 3/2020  Uterus 7.72 x 4.27 cm with mass 2.7 x 2.8 cm. No evidence of L or R ovary.     Aug 2020: Robotic assisted laparoscopic hysterectomy BSO and bilateral sentinel node biopsies and omentectomy for serous ca of the uterus on 8/24/2020. 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 No lymph nodes noted on SLN bx. (+) LVSI. Preop : 5.     Sep 2020: Started taxol and carboplatin. Plan is for 6 cycles taxol and carboplatin then reimage with PET(PET negative mesenteric LN) and then WPRT if PET negative      Review of Systems   Constitutional: Negative for chills, fatigue and fever.   Respiratory: Positive for shortness of breath (oconnor). Negative for cough and wheezing.    Cardiovascular: Negative for chest pain, palpitations and leg swelling.   Gastrointestinal: Negative for abdominal pain, constipation, diarrhea, nausea and vomiting.   Genitourinary: Negative for difficulty urinating, dysuria, frequency, genital sores, hematuria, urgency, vaginal bleeding,  vaginal discharge and vaginal pain.   Musculoskeletal: Negative for gait problem.   Neurological: Negative for weakness and numbness.   Hematological: Negative for adenopathy. Does not bruise/bleed easily.   Psychiatric/Behavioral: The patient is not nervous/anxious.        Objective:   BP (!) 141/65   Pulse (!) 59   Wt 99.8 kg (220 lb)   BMI 36.61 kg/m²      Physical Exam  Constitutional:       Appearance: She is well-developed.   HENT:      Head: Normocephalic and atraumatic.   Eyes:      General: No scleral icterus.  Neck:      Thyroid: No thyromegaly.      Trachea: No tracheal deviation.   Cardiovascular:      Rate and Rhythm: Normal rate and regular rhythm.   Pulmonary:      Effort: Pulmonary effort is normal.      Breath sounds: Normal breath sounds.   Abdominal:      General: There is no distension.      Palpations: Abdomen is soft. There is no mass.      Tenderness: There is no abdominal tenderness. There is no guarding or rebound.      Hernia: No hernia is present.   Genitourinary:     Comments: Not performed.   Musculoskeletal:         General: No tenderness.   Lymphadenopathy:      Cervical: No cervical adenopathy.   Skin:     General: Skin is warm and dry.   Neurological:      Mental Status: She is alert and oriented to person, place, and time.   Psychiatric:         Behavior: Behavior normal.         Thought Content: Thought content normal.         Judgment: Judgment normal.         Assessment:       1. Endometrial ca    2. Chemotherapy management, encounter for        Plan:   Endometrial ca  Dose reduction of taxol to 135 mg/m2 and carboplatin to AUC of 4 due to ischemic colitis and thrombocytopenia.   Chemotherapy management, encounter for

## 2020-12-30 ENCOUNTER — INFUSION (OUTPATIENT)
Dept: INFUSION THERAPY | Facility: HOSPITAL | Age: 70
End: 2020-12-30
Payer: COMMERCIAL

## 2020-12-30 VITALS
RESPIRATION RATE: 18 BRPM | BODY MASS INDEX: 36.62 KG/M2 | SYSTOLIC BLOOD PRESSURE: 154 MMHG | DIASTOLIC BLOOD PRESSURE: 74 MMHG | HEART RATE: 58 BPM | TEMPERATURE: 98 F | HEIGHT: 65 IN | WEIGHT: 219.81 LBS

## 2020-12-30 DIAGNOSIS — I10 ESSENTIAL HYPERTENSION: ICD-10-CM

## 2020-12-30 DIAGNOSIS — C54.1 ENDOMETRIAL CA: ICD-10-CM

## 2020-12-30 DIAGNOSIS — K55.9 ISCHEMIC COLITIS: Primary | ICD-10-CM

## 2020-12-30 PROCEDURE — 96360 HYDRATION IV INFUSION INIT: CPT

## 2020-12-30 PROCEDURE — 96377 APPLICATON ON-BODY INJECTOR: CPT | Mod: 59

## 2020-12-30 PROCEDURE — 96361 HYDRATE IV INFUSION ADD-ON: CPT

## 2020-12-30 PROCEDURE — 96413 CHEMO IV INFUSION 1 HR: CPT

## 2020-12-30 PROCEDURE — 96415 CHEMO IV INFUSION ADDL HR: CPT

## 2020-12-30 PROCEDURE — 25000003 PHARM REV CODE 250: Performed by: OBSTETRICS & GYNECOLOGY

## 2020-12-30 PROCEDURE — 63600175 PHARM REV CODE 636 W HCPCS: Performed by: OBSTETRICS & GYNECOLOGY

## 2020-12-30 PROCEDURE — 96375 TX/PRO/DX INJ NEW DRUG ADDON: CPT

## 2020-12-30 PROCEDURE — 96367 TX/PROPH/DG ADDL SEQ IV INF: CPT

## 2020-12-30 PROCEDURE — A4216 STERILE WATER/SALINE, 10 ML: HCPCS | Performed by: OBSTETRICS & GYNECOLOGY

## 2020-12-30 PROCEDURE — 96417 CHEMO IV INFUS EACH ADDL SEQ: CPT

## 2020-12-30 RX ORDER — EPINEPHRINE 0.3 MG/.3ML
0.3 INJECTION SUBCUTANEOUS ONCE AS NEEDED
Status: DISCONTINUED | OUTPATIENT
Start: 2020-12-30 | End: 2020-12-30 | Stop reason: HOSPADM

## 2020-12-30 RX ORDER — HEPARIN 100 UNIT/ML
500 SYRINGE INTRAVENOUS
Status: DISCONTINUED | OUTPATIENT
Start: 2020-12-30 | End: 2020-12-30 | Stop reason: HOSPADM

## 2020-12-30 RX ORDER — SODIUM CHLORIDE 0.9 % (FLUSH) 0.9 %
10 SYRINGE (ML) INJECTION
Status: DISCONTINUED | OUTPATIENT
Start: 2020-12-30 | End: 2020-12-30 | Stop reason: HOSPADM

## 2020-12-30 RX ORDER — CLONIDINE HYDROCHLORIDE 0.1 MG/1
0.1 TABLET ORAL
Status: COMPLETED | OUTPATIENT
Start: 2020-12-30 | End: 2020-12-30

## 2020-12-30 RX ORDER — DIPHENHYDRAMINE HYDROCHLORIDE 50 MG/ML
50 INJECTION INTRAMUSCULAR; INTRAVENOUS ONCE AS NEEDED
Status: DISCONTINUED | OUTPATIENT
Start: 2020-12-30 | End: 2020-12-30 | Stop reason: HOSPADM

## 2020-12-30 RX ORDER — FAMOTIDINE 10 MG/ML
20 INJECTION INTRAVENOUS
Status: COMPLETED | OUTPATIENT
Start: 2020-12-30 | End: 2020-12-30

## 2020-12-30 RX ADMIN — DEXAMETHASONE SODIUM PHOSPHATE 0.25 MG: 4 INJECTION, SOLUTION INTRA-ARTICULAR; INTRALESIONAL; INTRAMUSCULAR; INTRAVENOUS; SOFT TISSUE at 10:12

## 2020-12-30 RX ADMIN — FAMOTIDINE 20 MG: 10 INJECTION INTRAVENOUS at 10:12

## 2020-12-30 RX ADMIN — DIPHENHYDRAMINE HYDROCHLORIDE 50 MG: 50 INJECTION INTRAMUSCULAR; INTRAVENOUS at 10:12

## 2020-12-30 RX ADMIN — HEPARIN 500 UNITS: 100 SYRINGE at 04:12

## 2020-12-30 RX ADMIN — Medication 10 ML: at 04:12

## 2020-12-30 RX ADMIN — CARBOPLATIN 345 MG: 10 INJECTION INTRAVENOUS at 03:12

## 2020-12-30 RX ADMIN — SODIUM CHLORIDE: 0.9 INJECTION, SOLUTION INTRAVENOUS at 10:12

## 2020-12-30 RX ADMIN — PEGFILGRASTIM 6 MG: KIT SUBCUTANEOUS at 03:12

## 2020-12-30 RX ADMIN — Medication 10 ML: at 10:12

## 2020-12-30 RX ADMIN — SODIUM CHLORIDE 500 ML: 0.9 INJECTION, SOLUTION INTRAVENOUS at 03:12

## 2020-12-30 RX ADMIN — PACLITAXEL 300 MG: 6 INJECTION, SOLUTION, CONCENTRATE INTRAVENOUS at 12:12

## 2020-12-30 RX ADMIN — CLONIDINE HYDROCHLORIDE 0.1 MG: 0.1 TABLET ORAL at 11:12

## 2020-12-30 NOTE — PLAN OF CARE
Taxol carbo IVF infusion complete and tolerated well, neulasta obi applied education given verbally along with handouts s/e explained and tablet veiwed. pt told to remove device at 830 pm on 12/31/20. Contact clinic with concerns phone numbers provided. PAC flushed with NS positive blood rtn present hep locked and de accessed. D/c home ambulated away from unit independently avs in hand, vitals stable, NAD.

## 2021-01-19 ENCOUNTER — LAB VISIT (OUTPATIENT)
Dept: LAB | Facility: HOSPITAL | Age: 71
End: 2021-01-19
Attending: OBSTETRICS & GYNECOLOGY
Payer: COMMERCIAL

## 2021-01-19 DIAGNOSIS — C54.1 ENDOMETRIAL CA: ICD-10-CM

## 2021-01-19 LAB
ANION GAP SERPL CALC-SCNC: 7 MMOL/L (ref 8–16)
CALCIUM SERPL-MCNC: 9 MG/DL (ref 8.7–10.5)
CHLORIDE SERPL-SCNC: 107 MMOL/L (ref 95–110)
CO2 SERPL-SCNC: 29 MMOL/L (ref 23–29)
CREAT SERPL-MCNC: 1.01 MG/DL (ref 0.5–1.4)
ERYTHROCYTE [DISTWIDTH] IN BLOOD BY AUTOMATED COUNT: 18.8 % (ref 11.5–14.5)
EST. GFR  (AFRICAN AMERICAN): >60 ML/MIN/1.73 M^2
EST. GFR  (NON AFRICAN AMERICAN): 56.1 ML/MIN/1.73 M^2
GLUCOSE SERPL-MCNC: 94 MG/DL (ref 70–110)
HCT VFR BLD AUTO: 29.6 % (ref 37–48.5)
HGB BLD-MCNC: 9 G/DL (ref 12–16)
IMM GRANULOCYTES # BLD AUTO: 0.02 K/UL (ref 0–0.04)
MCH RBC QN AUTO: 30 PG (ref 27–31)
MCHC RBC AUTO-ENTMCNC: 30.4 G/DL (ref 32–36)
MCV RBC AUTO: 99 FL (ref 82–98)
NEUTROPHILS # BLD AUTO: 2.2 K/UL (ref 1.8–7.7)
PLATELET # BLD AUTO: 172 K/UL (ref 150–350)
PMV BLD AUTO: 11.4 FL (ref 9.2–12.9)
POTASSIUM SERPL-SCNC: 4.4 MMOL/L (ref 3.5–5.1)
RBC # BLD AUTO: 3 M/UL (ref 4–5.4)
SODIUM SERPL-SCNC: 143 MMOL/L (ref 136–145)
UUN UR-MCNC: 20 MG/DL (ref 7–17)
WBC # BLD AUTO: 4.56 K/UL (ref 3.9–12.7)

## 2021-01-19 PROCEDURE — 80048 BASIC METABOLIC PNL TOTAL CA: CPT | Mod: PO

## 2021-01-19 PROCEDURE — 85027 COMPLETE CBC AUTOMATED: CPT | Mod: PO

## 2021-01-19 PROCEDURE — 36415 COLL VENOUS BLD VENIPUNCTURE: CPT | Mod: PO

## 2021-01-20 ENCOUNTER — INFUSION (OUTPATIENT)
Dept: INFUSION THERAPY | Facility: HOSPITAL | Age: 71
End: 2021-01-20
Attending: OBSTETRICS & GYNECOLOGY
Payer: COMMERCIAL

## 2021-01-20 ENCOUNTER — OFFICE VISIT (OUTPATIENT)
Dept: GYNECOLOGIC ONCOLOGY | Facility: CLINIC | Age: 71
End: 2021-01-20
Payer: COMMERCIAL

## 2021-01-20 VITALS
HEIGHT: 65 IN | DIASTOLIC BLOOD PRESSURE: 76 MMHG | RESPIRATION RATE: 18 BRPM | TEMPERATURE: 98 F | SYSTOLIC BLOOD PRESSURE: 144 MMHG | WEIGHT: 220.44 LBS | BODY MASS INDEX: 36.73 KG/M2 | HEART RATE: 59 BPM

## 2021-01-20 VITALS
SYSTOLIC BLOOD PRESSURE: 142 MMHG | HEART RATE: 58 BPM | BODY MASS INDEX: 36.69 KG/M2 | DIASTOLIC BLOOD PRESSURE: 65 MMHG | WEIGHT: 220.44 LBS

## 2021-01-20 DIAGNOSIS — C54.1 ENDOMETRIAL CA: ICD-10-CM

## 2021-01-20 DIAGNOSIS — K55.9 ISCHEMIC COLITIS: Primary | ICD-10-CM

## 2021-01-20 DIAGNOSIS — Z51.11 CHEMOTHERAPY MANAGEMENT, ENCOUNTER FOR: ICD-10-CM

## 2021-01-20 DIAGNOSIS — K55.9 ISCHEMIC COLITIS: ICD-10-CM

## 2021-01-20 DIAGNOSIS — C54.1 ENDOMETRIAL CA: Primary | ICD-10-CM

## 2021-01-20 PROCEDURE — 63600175 PHARM REV CODE 636 W HCPCS: Mod: JG | Performed by: OBSTETRICS & GYNECOLOGY

## 2021-01-20 PROCEDURE — 99214 OFFICE O/P EST MOD 30 MIN: CPT | Mod: S$GLB,,, | Performed by: OBSTETRICS & GYNECOLOGY

## 2021-01-20 PROCEDURE — 96375 TX/PRO/DX INJ NEW DRUG ADDON: CPT

## 2021-01-20 PROCEDURE — 96360 HYDRATION IV INFUSION INIT: CPT

## 2021-01-20 PROCEDURE — 3078F PR MOST RECENT DIASTOLIC BLOOD PRESSURE < 80 MM HG: ICD-10-PCS | Mod: CPTII,S$GLB,, | Performed by: OBSTETRICS & GYNECOLOGY

## 2021-01-20 PROCEDURE — 96377 APPLICATON ON-BODY INJECTOR: CPT

## 2021-01-20 PROCEDURE — 96413 CHEMO IV INFUSION 1 HR: CPT

## 2021-01-20 PROCEDURE — 3008F PR BODY MASS INDEX (BMI) DOCUMENTED: ICD-10-PCS | Mod: CPTII,S$GLB,, | Performed by: OBSTETRICS & GYNECOLOGY

## 2021-01-20 PROCEDURE — 1101F PT FALLS ASSESS-DOCD LE1/YR: CPT | Mod: CPTII,S$GLB,, | Performed by: OBSTETRICS & GYNECOLOGY

## 2021-01-20 PROCEDURE — 99999 PR PBB SHADOW E&M-EST. PATIENT-LVL III: ICD-10-PCS | Mod: PBBFAC,,, | Performed by: OBSTETRICS & GYNECOLOGY

## 2021-01-20 PROCEDURE — 3288F FALL RISK ASSESSMENT DOCD: CPT | Mod: CPTII,S$GLB,, | Performed by: OBSTETRICS & GYNECOLOGY

## 2021-01-20 PROCEDURE — 1126F PR PAIN SEVERITY QUANTIFIED, NO PAIN PRESENT: ICD-10-PCS | Mod: S$GLB,,, | Performed by: OBSTETRICS & GYNECOLOGY

## 2021-01-20 PROCEDURE — 3077F PR MOST RECENT SYSTOLIC BLOOD PRESSURE >= 140 MM HG: ICD-10-PCS | Mod: CPTII,S$GLB,, | Performed by: OBSTETRICS & GYNECOLOGY

## 2021-01-20 PROCEDURE — 99214 PR OFFICE/OUTPT VISIT, EST, LEVL IV, 30-39 MIN: ICD-10-PCS | Mod: S$GLB,,, | Performed by: OBSTETRICS & GYNECOLOGY

## 2021-01-20 PROCEDURE — 99999 PR PBB SHADOW E&M-EST. PATIENT-LVL III: CPT | Mod: PBBFAC,,, | Performed by: OBSTETRICS & GYNECOLOGY

## 2021-01-20 PROCEDURE — 1159F PR MEDICATION LIST DOCUMENTED IN MEDICAL RECORD: ICD-10-PCS | Mod: S$GLB,,, | Performed by: OBSTETRICS & GYNECOLOGY

## 2021-01-20 PROCEDURE — 3078F DIAST BP <80 MM HG: CPT | Mod: CPTII,S$GLB,, | Performed by: OBSTETRICS & GYNECOLOGY

## 2021-01-20 PROCEDURE — 1101F PR PT FALLS ASSESS DOC 0-1 FALLS W/OUT INJ PAST YR: ICD-10-PCS | Mod: CPTII,S$GLB,, | Performed by: OBSTETRICS & GYNECOLOGY

## 2021-01-20 PROCEDURE — 96417 CHEMO IV INFUS EACH ADDL SEQ: CPT

## 2021-01-20 PROCEDURE — 96415 CHEMO IV INFUSION ADDL HR: CPT

## 2021-01-20 PROCEDURE — 1126F AMNT PAIN NOTED NONE PRSNT: CPT | Mod: S$GLB,,, | Performed by: OBSTETRICS & GYNECOLOGY

## 2021-01-20 PROCEDURE — 3077F SYST BP >= 140 MM HG: CPT | Mod: CPTII,S$GLB,, | Performed by: OBSTETRICS & GYNECOLOGY

## 2021-01-20 PROCEDURE — 3008F BODY MASS INDEX DOCD: CPT | Mod: CPTII,S$GLB,, | Performed by: OBSTETRICS & GYNECOLOGY

## 2021-01-20 PROCEDURE — 3288F PR FALLS RISK ASSESSMENT DOCUMENTED: ICD-10-PCS | Mod: CPTII,S$GLB,, | Performed by: OBSTETRICS & GYNECOLOGY

## 2021-01-20 PROCEDURE — 25000003 PHARM REV CODE 250: Performed by: OBSTETRICS & GYNECOLOGY

## 2021-01-20 PROCEDURE — 1159F MED LIST DOCD IN RCRD: CPT | Mod: S$GLB,,, | Performed by: OBSTETRICS & GYNECOLOGY

## 2021-01-20 PROCEDURE — A4216 STERILE WATER/SALINE, 10 ML: HCPCS | Performed by: OBSTETRICS & GYNECOLOGY

## 2021-01-20 PROCEDURE — 96367 TX/PROPH/DG ADDL SEQ IV INF: CPT

## 2021-01-20 RX ORDER — SODIUM CHLORIDE 0.9 % (FLUSH) 0.9 %
10 SYRINGE (ML) INJECTION
Status: DISCONTINUED | OUTPATIENT
Start: 2021-01-20 | End: 2021-01-20 | Stop reason: HOSPADM

## 2021-01-20 RX ORDER — FAMOTIDINE 10 MG/ML
20 INJECTION INTRAVENOUS
Status: CANCELLED | OUTPATIENT
Start: 2021-01-20

## 2021-01-20 RX ORDER — SODIUM CHLORIDE 0.9 % (FLUSH) 0.9 %
10 SYRINGE (ML) INJECTION
Status: CANCELLED | OUTPATIENT
Start: 2021-01-20

## 2021-01-20 RX ORDER — EPINEPHRINE 0.3 MG/.3ML
0.3 INJECTION SUBCUTANEOUS ONCE AS NEEDED
Status: CANCELLED | OUTPATIENT
Start: 2021-01-20

## 2021-01-20 RX ORDER — EPINEPHRINE 0.3 MG/.3ML
0.3 INJECTION SUBCUTANEOUS ONCE AS NEEDED
Status: DISCONTINUED | OUTPATIENT
Start: 2021-01-20 | End: 2021-01-20 | Stop reason: HOSPADM

## 2021-01-20 RX ORDER — FAMOTIDINE 10 MG/ML
20 INJECTION INTRAVENOUS
Status: COMPLETED | OUTPATIENT
Start: 2021-01-20 | End: 2021-01-20

## 2021-01-20 RX ORDER — DIPHENHYDRAMINE HYDROCHLORIDE 50 MG/ML
50 INJECTION INTRAMUSCULAR; INTRAVENOUS ONCE AS NEEDED
Status: DISCONTINUED | OUTPATIENT
Start: 2021-01-20 | End: 2021-01-20 | Stop reason: HOSPADM

## 2021-01-20 RX ORDER — HEPARIN 100 UNIT/ML
500 SYRINGE INTRAVENOUS
Status: DISCONTINUED | OUTPATIENT
Start: 2021-01-20 | End: 2021-01-20 | Stop reason: HOSPADM

## 2021-01-20 RX ORDER — HEPARIN 100 UNIT/ML
500 SYRINGE INTRAVENOUS
Status: CANCELLED | OUTPATIENT
Start: 2021-01-20

## 2021-01-20 RX ORDER — DIPHENHYDRAMINE HYDROCHLORIDE 50 MG/ML
50 INJECTION INTRAMUSCULAR; INTRAVENOUS ONCE AS NEEDED
Status: CANCELLED | OUTPATIENT
Start: 2021-01-20

## 2021-01-20 RX ADMIN — DEXAMETHASONE SODIUM PHOSPHATE 0.25 MG: 4 INJECTION, SOLUTION INTRA-ARTICULAR; INTRALESIONAL; INTRAMUSCULAR; INTRAVENOUS; SOFT TISSUE at 11:01

## 2021-01-20 RX ADMIN — HEPARIN 500 UNITS: 100 SYRINGE at 04:01

## 2021-01-20 RX ADMIN — DIPHENHYDRAMINE HYDROCHLORIDE 50 MG: 50 INJECTION, SOLUTION INTRAMUSCULAR; INTRAVENOUS at 11:01

## 2021-01-20 RX ADMIN — FAMOTIDINE 20 MG: 10 INJECTION INTRAVENOUS at 11:01

## 2021-01-20 RX ADMIN — CARBOPLATIN 440 MG: 10 INJECTION INTRAVENOUS at 02:01

## 2021-01-20 RX ADMIN — Medication 10 ML: at 04:01

## 2021-01-20 RX ADMIN — PEGFILGRASTIM 6 MG: KIT SUBCUTANEOUS at 03:01

## 2021-01-20 RX ADMIN — Medication 10 ML: at 11:01

## 2021-01-20 RX ADMIN — SODIUM CHLORIDE 500 ML: 0.9 INJECTION, SOLUTION INTRAVENOUS at 11:01

## 2021-01-20 RX ADMIN — PACLITAXEL 300 MG: 6 INJECTION, SOLUTION, CONCENTRATE INTRAVENOUS at 11:01

## 2021-01-22 ENCOUNTER — TELEPHONE (OUTPATIENT)
Dept: GYNECOLOGIC ONCOLOGY | Facility: CLINIC | Age: 71
End: 2021-01-22

## 2021-02-10 ENCOUNTER — TELEPHONE (OUTPATIENT)
Dept: GYNECOLOGIC ONCOLOGY | Facility: CLINIC | Age: 71
End: 2021-02-10

## 2021-02-11 ENCOUNTER — OFFICE VISIT (OUTPATIENT)
Dept: GYNECOLOGIC ONCOLOGY | Facility: CLINIC | Age: 71
End: 2021-02-11
Payer: COMMERCIAL

## 2021-02-11 ENCOUNTER — OFFICE VISIT (OUTPATIENT)
Dept: RADIATION ONCOLOGY | Facility: CLINIC | Age: 71
End: 2021-02-11
Payer: COMMERCIAL

## 2021-02-11 VITALS
HEIGHT: 65 IN | TEMPERATURE: 98 F | RESPIRATION RATE: 19 BRPM | HEART RATE: 61 BPM | WEIGHT: 220 LBS | BODY MASS INDEX: 36.65 KG/M2 | OXYGEN SATURATION: 95 % | HEART RATE: 61 BPM | SYSTOLIC BLOOD PRESSURE: 178 MMHG | SYSTOLIC BLOOD PRESSURE: 178 MMHG | DIASTOLIC BLOOD PRESSURE: 84 MMHG | BODY MASS INDEX: 36.61 KG/M2 | DIASTOLIC BLOOD PRESSURE: 84 MMHG | WEIGHT: 220 LBS

## 2021-02-11 DIAGNOSIS — C54.1 ENDOMETRIAL CA: Primary | ICD-10-CM

## 2021-02-11 DIAGNOSIS — K55.9 ISCHEMIC COLITIS: ICD-10-CM

## 2021-02-11 DIAGNOSIS — Z51.11 CHEMOTHERAPY MANAGEMENT, ENCOUNTER FOR: ICD-10-CM

## 2021-02-11 PROCEDURE — 99999 PR PBB SHADOW E&M-EST. PATIENT-LVL IV: CPT | Mod: PBBFAC,,, | Performed by: RADIOLOGY

## 2021-02-11 PROCEDURE — 1101F PR PT FALLS ASSESS DOC 0-1 FALLS W/OUT INJ PAST YR: ICD-10-PCS | Mod: CPTII,S$GLB,, | Performed by: RADIOLOGY

## 2021-02-11 PROCEDURE — 99999 PR PBB SHADOW E&M-EST. PATIENT-LVL III: ICD-10-PCS | Mod: PBBFAC,,, | Performed by: OBSTETRICS & GYNECOLOGY

## 2021-02-11 PROCEDURE — 1126F AMNT PAIN NOTED NONE PRSNT: CPT | Mod: S$GLB,,, | Performed by: RADIOLOGY

## 2021-02-11 PROCEDURE — 1159F MED LIST DOCD IN RCRD: CPT | Mod: S$GLB,,, | Performed by: OBSTETRICS & GYNECOLOGY

## 2021-02-11 PROCEDURE — 99999 PR PBB SHADOW E&M-EST. PATIENT-LVL IV: ICD-10-PCS | Mod: PBBFAC,,, | Performed by: RADIOLOGY

## 2021-02-11 PROCEDURE — 3008F BODY MASS INDEX DOCD: CPT | Mod: CPTII,S$GLB,, | Performed by: RADIOLOGY

## 2021-02-11 PROCEDURE — 1101F PT FALLS ASSESS-DOCD LE1/YR: CPT | Mod: CPTII,S$GLB,, | Performed by: OBSTETRICS & GYNECOLOGY

## 2021-02-11 PROCEDURE — 3288F PR FALLS RISK ASSESSMENT DOCUMENTED: ICD-10-PCS | Mod: CPTII,S$GLB,, | Performed by: OBSTETRICS & GYNECOLOGY

## 2021-02-11 PROCEDURE — 3079F PR MOST RECENT DIASTOLIC BLOOD PRESSURE 80-89 MM HG: ICD-10-PCS | Mod: CPTII,S$GLB,, | Performed by: RADIOLOGY

## 2021-02-11 PROCEDURE — 3079F PR MOST RECENT DIASTOLIC BLOOD PRESSURE 80-89 MM HG: ICD-10-PCS | Mod: CPTII,S$GLB,, | Performed by: OBSTETRICS & GYNECOLOGY

## 2021-02-11 PROCEDURE — 99205 PR OFFICE/OUTPT VISIT, NEW, LEVL V, 60-74 MIN: ICD-10-PCS | Mod: S$GLB,,, | Performed by: RADIOLOGY

## 2021-02-11 PROCEDURE — 3077F PR MOST RECENT SYSTOLIC BLOOD PRESSURE >= 140 MM HG: ICD-10-PCS | Mod: CPTII,S$GLB,, | Performed by: OBSTETRICS & GYNECOLOGY

## 2021-02-11 PROCEDURE — 3077F PR MOST RECENT SYSTOLIC BLOOD PRESSURE >= 140 MM HG: ICD-10-PCS | Mod: CPTII,S$GLB,, | Performed by: RADIOLOGY

## 2021-02-11 PROCEDURE — 1159F MED LIST DOCD IN RCRD: CPT | Mod: S$GLB,,, | Performed by: RADIOLOGY

## 2021-02-11 PROCEDURE — 3008F BODY MASS INDEX DOCD: CPT | Mod: CPTII,S$GLB,, | Performed by: OBSTETRICS & GYNECOLOGY

## 2021-02-11 PROCEDURE — 3288F PR FALLS RISK ASSESSMENT DOCUMENTED: ICD-10-PCS | Mod: CPTII,S$GLB,, | Performed by: RADIOLOGY

## 2021-02-11 PROCEDURE — 3008F PR BODY MASS INDEX (BMI) DOCUMENTED: ICD-10-PCS | Mod: CPTII,S$GLB,, | Performed by: OBSTETRICS & GYNECOLOGY

## 2021-02-11 PROCEDURE — 3077F SYST BP >= 140 MM HG: CPT | Mod: CPTII,S$GLB,, | Performed by: RADIOLOGY

## 2021-02-11 PROCEDURE — 1126F PR PAIN SEVERITY QUANTIFIED, NO PAIN PRESENT: ICD-10-PCS | Mod: S$GLB,,, | Performed by: RADIOLOGY

## 2021-02-11 PROCEDURE — 3079F DIAST BP 80-89 MM HG: CPT | Mod: CPTII,S$GLB,, | Performed by: OBSTETRICS & GYNECOLOGY

## 2021-02-11 PROCEDURE — 99205 OFFICE O/P NEW HI 60 MIN: CPT | Mod: S$GLB,,, | Performed by: RADIOLOGY

## 2021-02-11 PROCEDURE — 3079F DIAST BP 80-89 MM HG: CPT | Mod: CPTII,S$GLB,, | Performed by: RADIOLOGY

## 2021-02-11 PROCEDURE — 99214 OFFICE O/P EST MOD 30 MIN: CPT | Mod: S$GLB,,, | Performed by: OBSTETRICS & GYNECOLOGY

## 2021-02-11 PROCEDURE — 1159F PR MEDICATION LIST DOCUMENTED IN MEDICAL RECORD: ICD-10-PCS | Mod: S$GLB,,, | Performed by: RADIOLOGY

## 2021-02-11 PROCEDURE — 3077F SYST BP >= 140 MM HG: CPT | Mod: CPTII,S$GLB,, | Performed by: OBSTETRICS & GYNECOLOGY

## 2021-02-11 PROCEDURE — 3288F FALL RISK ASSESSMENT DOCD: CPT | Mod: CPTII,S$GLB,, | Performed by: RADIOLOGY

## 2021-02-11 PROCEDURE — 1101F PT FALLS ASSESS-DOCD LE1/YR: CPT | Mod: CPTII,S$GLB,, | Performed by: RADIOLOGY

## 2021-02-11 PROCEDURE — 1126F AMNT PAIN NOTED NONE PRSNT: CPT | Mod: S$GLB,,, | Performed by: OBSTETRICS & GYNECOLOGY

## 2021-02-11 PROCEDURE — 1101F PR PT FALLS ASSESS DOC 0-1 FALLS W/OUT INJ PAST YR: ICD-10-PCS | Mod: CPTII,S$GLB,, | Performed by: OBSTETRICS & GYNECOLOGY

## 2021-02-11 PROCEDURE — 99214 PR OFFICE/OUTPT VISIT, EST, LEVL IV, 30-39 MIN: ICD-10-PCS | Mod: S$GLB,,, | Performed by: OBSTETRICS & GYNECOLOGY

## 2021-02-11 PROCEDURE — 1126F PR PAIN SEVERITY QUANTIFIED, NO PAIN PRESENT: ICD-10-PCS | Mod: S$GLB,,, | Performed by: OBSTETRICS & GYNECOLOGY

## 2021-02-11 PROCEDURE — 3008F PR BODY MASS INDEX (BMI) DOCUMENTED: ICD-10-PCS | Mod: CPTII,S$GLB,, | Performed by: RADIOLOGY

## 2021-02-11 PROCEDURE — 99999 PR PBB SHADOW E&M-EST. PATIENT-LVL III: CPT | Mod: PBBFAC,,, | Performed by: OBSTETRICS & GYNECOLOGY

## 2021-02-11 PROCEDURE — 1159F PR MEDICATION LIST DOCUMENTED IN MEDICAL RECORD: ICD-10-PCS | Mod: S$GLB,,, | Performed by: OBSTETRICS & GYNECOLOGY

## 2021-02-11 PROCEDURE — 3288F FALL RISK ASSESSMENT DOCD: CPT | Mod: CPTII,S$GLB,, | Performed by: OBSTETRICS & GYNECOLOGY

## 2021-02-15 ENCOUNTER — TELEPHONE (OUTPATIENT)
Dept: GYNECOLOGIC ONCOLOGY | Facility: CLINIC | Age: 71
End: 2021-02-15

## 2021-02-18 ENCOUNTER — HOSPITAL ENCOUNTER (OUTPATIENT)
Dept: RADIATION THERAPY | Facility: HOSPITAL | Age: 71
Discharge: HOME | End: 2021-02-18
Attending: RADIOLOGY
Payer: COMMERCIAL

## 2021-02-18 PROCEDURE — 77334 PR  RADN TREATMENT AID(S) COMPLX: ICD-10-PCS | Mod: 26,,, | Performed by: RADIOLOGY

## 2021-02-18 PROCEDURE — 77334 RADIATION TREATMENT AID(S): CPT | Mod: TC | Performed by: RADIOLOGY

## 2021-02-18 PROCEDURE — 77263 THER RADIOLOGY TX PLNG CPLX: CPT | Mod: ,,, | Performed by: RADIOLOGY

## 2021-02-18 PROCEDURE — 77334 RADIATION TREATMENT AID(S): CPT | Mod: 26,,, | Performed by: RADIOLOGY

## 2021-02-18 PROCEDURE — 77263 PR  RADIATION THERAPY PLAN COMPLEX: ICD-10-PCS | Mod: ,,, | Performed by: RADIOLOGY

## 2021-02-18 PROCEDURE — 77290 THER RAD SIMULAJ FIELD CPLX: CPT | Mod: TC | Performed by: RADIOLOGY

## 2021-02-18 PROCEDURE — 77014 HC CT GUIDANCE RADIATION THERAPY FLDS PLACEMENT: CPT | Mod: TC | Performed by: RADIOLOGY

## 2021-02-19 ENCOUNTER — TELEPHONE (OUTPATIENT)
Dept: GYNECOLOGIC ONCOLOGY | Facility: CLINIC | Age: 71
End: 2021-02-19

## 2021-02-19 ENCOUNTER — HOSPITAL ENCOUNTER (OUTPATIENT)
Dept: RADIOLOGY | Facility: HOSPITAL | Age: 71
Discharge: HOME | End: 2021-02-19
Attending: OBSTETRICS & GYNECOLOGY
Payer: COMMERCIAL

## 2021-02-19 DIAGNOSIS — C54.1 ENDOMETRIAL CA: ICD-10-CM

## 2021-02-19 LAB — POCT GLUCOSE: 105 MG/DL (ref 70–110)

## 2021-02-19 PROCEDURE — 78815 NM PET CT ROUTINE: ICD-10-PCS | Mod: 26,PS,, | Performed by: RADIOLOGY

## 2021-02-19 PROCEDURE — A9552 F18 FDG: HCPCS

## 2021-02-19 PROCEDURE — 78815 PET IMAGE W/CT SKULL-THIGH: CPT | Mod: 26,PS,, | Performed by: RADIOLOGY

## 2021-02-19 PROCEDURE — 78815 PET IMAGE W/CT SKULL-THIGH: CPT | Mod: TC

## 2021-02-22 ENCOUNTER — TELEPHONE (OUTPATIENT)
Dept: GYNECOLOGIC ONCOLOGY | Facility: CLINIC | Age: 71
End: 2021-02-22

## 2021-02-25 PROCEDURE — 77301 RADIOTHERAPY DOSE PLAN IMRT: CPT | Mod: 26,,, | Performed by: RADIOLOGY

## 2021-02-25 PROCEDURE — 77301 RADIOTHERAPY DOSE PLAN IMRT: CPT | Mod: TC | Performed by: RADIOLOGY

## 2021-02-25 PROCEDURE — 77301 PR  INTEN MOD RADIOTHER PLAN W/DOSE VOL HIST: ICD-10-PCS | Mod: 26,,, | Performed by: RADIOLOGY

## 2021-02-26 PROCEDURE — 77338 PR  MLC IMRT DESIGN & CONSTRUCTION PER IMRT PLAN: ICD-10-PCS | Mod: 26,,, | Performed by: RADIOLOGY

## 2021-02-26 PROCEDURE — 77300 RADIATION THERAPY DOSE PLAN: CPT | Mod: 26,,, | Performed by: RADIOLOGY

## 2021-02-26 PROCEDURE — 77338 DESIGN MLC DEVICE FOR IMRT: CPT | Mod: TC | Performed by: RADIOLOGY

## 2021-02-26 PROCEDURE — 77338 DESIGN MLC DEVICE FOR IMRT: CPT | Mod: 26,,, | Performed by: RADIOLOGY

## 2021-02-26 PROCEDURE — 77300 RADIATION THERAPY DOSE PLAN: CPT | Mod: TC | Performed by: RADIOLOGY

## 2021-02-26 PROCEDURE — 77300 PR RADIATION THERAPY,DOSIMETRY PLAN: ICD-10-PCS | Mod: 26,,, | Performed by: RADIOLOGY

## 2021-03-01 ENCOUNTER — HOSPITAL ENCOUNTER (OUTPATIENT)
Dept: RADIATION THERAPY | Facility: HOSPITAL | Age: 71
Discharge: HOME | End: 2021-03-01
Attending: RADIOLOGY
Payer: COMMERCIAL

## 2021-03-01 ENCOUNTER — TELEPHONE (OUTPATIENT)
Dept: GYNECOLOGIC ONCOLOGY | Facility: CLINIC | Age: 71
End: 2021-03-01

## 2021-03-01 ENCOUNTER — DOCUMENTATION ONLY (OUTPATIENT)
Dept: RADIATION ONCOLOGY | Facility: CLINIC | Age: 71
End: 2021-03-01

## 2021-03-01 PROCEDURE — 77386 HC IMRT, COMPLEX: CPT | Performed by: RADIOLOGY

## 2021-03-01 PROCEDURE — 77014 PR  CT GUIDANCE PLACEMENT RAD THERAPY FIELDS: ICD-10-PCS | Mod: 26,,, | Performed by: RADIOLOGY

## 2021-03-01 PROCEDURE — 77014 PR  CT GUIDANCE PLACEMENT RAD THERAPY FIELDS: CPT | Mod: 26,,, | Performed by: RADIOLOGY

## 2021-03-01 PROCEDURE — 77014 HC CT GUIDANCE RADIATION THERAPY FLDS PLACEMENT: CPT | Mod: TC | Performed by: RADIOLOGY

## 2021-03-02 PROCEDURE — 77386 HC IMRT, COMPLEX: CPT | Performed by: RADIOLOGY

## 2021-03-02 PROCEDURE — 77387 GUIDANCE FOR RADJ TX DLVR: CPT | Mod: 26,,, | Performed by: RADIOLOGY

## 2021-03-02 PROCEDURE — 77387 PR GUIDANCE FOR RADIATION TREATMENT DELIVERY: ICD-10-PCS | Mod: 26,,, | Performed by: RADIOLOGY

## 2021-03-02 PROCEDURE — 77417 THER RADIOLOGY PORT IMAGE(S): CPT | Performed by: RADIOLOGY

## 2021-03-03 PROCEDURE — 77387 PR GUIDANCE FOR RADIATION TREATMENT DELIVERY: ICD-10-PCS | Mod: 26,,, | Performed by: RADIOLOGY

## 2021-03-03 PROCEDURE — 77386 HC IMRT, COMPLEX: CPT | Performed by: RADIOLOGY

## 2021-03-03 PROCEDURE — 77387 GUIDANCE FOR RADJ TX DLVR: CPT | Mod: 26,,, | Performed by: RADIOLOGY

## 2021-03-04 PROCEDURE — 77014 HC CT GUIDANCE RADIATION THERAPY FLDS PLACEMENT: CPT | Mod: TC | Performed by: RADIOLOGY

## 2021-03-04 PROCEDURE — 77386 HC IMRT, COMPLEX: CPT | Performed by: RADIOLOGY

## 2021-03-04 PROCEDURE — 77387 PR GUIDANCE FOR RADIATION TREATMENT DELIVERY: ICD-10-PCS | Mod: 26,,, | Performed by: RADIOLOGY

## 2021-03-04 PROCEDURE — 77387 GUIDANCE FOR RADJ TX DLVR: CPT | Mod: 26,,, | Performed by: RADIOLOGY

## 2021-03-04 PROCEDURE — 77387 GUIDANCE FOR RADJ TX DLVR: CPT | Performed by: RADIOLOGY

## 2021-03-05 ENCOUNTER — DOCUMENTATION ONLY (OUTPATIENT)
Dept: RADIATION ONCOLOGY | Facility: CLINIC | Age: 71
End: 2021-03-05

## 2021-03-05 PROCEDURE — 77387 GUIDANCE FOR RADJ TX DLVR: CPT | Mod: 26,,, | Performed by: RADIOLOGY

## 2021-03-05 PROCEDURE — 77387 PR GUIDANCE FOR RADIATION TREATMENT DELIVERY: ICD-10-PCS | Mod: 26,,, | Performed by: RADIOLOGY

## 2021-03-05 PROCEDURE — 77386 HC IMRT, COMPLEX: CPT | Performed by: RADIOLOGY

## 2021-03-08 PROCEDURE — 77014 PR  CT GUIDANCE PLACEMENT RAD THERAPY FIELDS: CPT | Mod: 26,,, | Performed by: RADIOLOGY

## 2021-03-08 PROCEDURE — 77386 HC IMRT, COMPLEX: CPT | Performed by: RADIOLOGY

## 2021-03-08 PROCEDURE — 77014 PR  CT GUIDANCE PLACEMENT RAD THERAPY FIELDS: ICD-10-PCS | Mod: 26,,, | Performed by: RADIOLOGY

## 2021-03-08 PROCEDURE — 77336 RADIATION PHYSICS CONSULT: CPT | Performed by: RADIOLOGY

## 2021-03-08 PROCEDURE — 77014 HC CT GUIDANCE RADIATION THERAPY FLDS PLACEMENT: CPT | Mod: TC | Performed by: RADIOLOGY

## 2021-03-09 PROCEDURE — 77387 PR GUIDANCE FOR RADIATION TREATMENT DELIVERY: ICD-10-PCS | Mod: 26,,, | Performed by: RADIOLOGY

## 2021-03-09 PROCEDURE — 77386 HC IMRT, COMPLEX: CPT | Performed by: RADIOLOGY

## 2021-03-09 PROCEDURE — 77387 GUIDANCE FOR RADJ TX DLVR: CPT | Mod: 26,,, | Performed by: RADIOLOGY

## 2021-03-09 PROCEDURE — 77417 THER RADIOLOGY PORT IMAGE(S): CPT | Performed by: RADIOLOGY

## 2021-03-10 PROCEDURE — 77386 HC IMRT, COMPLEX: CPT | Performed by: RADIOLOGY

## 2021-03-10 PROCEDURE — 77387 GUIDANCE FOR RADJ TX DLVR: CPT | Mod: 26,,, | Performed by: RADIOLOGY

## 2021-03-10 PROCEDURE — 77387 PR GUIDANCE FOR RADIATION TREATMENT DELIVERY: ICD-10-PCS | Mod: 26,,, | Performed by: RADIOLOGY

## 2021-03-11 PROCEDURE — 77387 GUIDANCE FOR RADJ TX DLVR: CPT | Mod: 26,,, | Performed by: RADIOLOGY

## 2021-03-11 PROCEDURE — 77387 PR GUIDANCE FOR RADIATION TREATMENT DELIVERY: ICD-10-PCS | Mod: 26,,, | Performed by: RADIOLOGY

## 2021-03-11 PROCEDURE — 77386 HC IMRT, COMPLEX: CPT | Performed by: RADIOLOGY

## 2021-03-12 ENCOUNTER — DOCUMENTATION ONLY (OUTPATIENT)
Dept: RADIATION ONCOLOGY | Facility: CLINIC | Age: 71
End: 2021-03-12

## 2021-03-12 PROCEDURE — 77387 PR GUIDANCE FOR RADIATION TREATMENT DELIVERY: ICD-10-PCS | Mod: 26,,, | Performed by: RADIOLOGY

## 2021-03-12 PROCEDURE — 77386 HC IMRT, COMPLEX: CPT | Performed by: RADIOLOGY

## 2021-03-12 PROCEDURE — 77387 GUIDANCE FOR RADJ TX DLVR: CPT | Mod: 26,,, | Performed by: RADIOLOGY

## 2021-03-15 PROCEDURE — 77387 PR GUIDANCE FOR RADIATION TREATMENT DELIVERY: ICD-10-PCS | Mod: 26,,, | Performed by: RADIOLOGY

## 2021-03-15 PROCEDURE — 77336 RADIATION PHYSICS CONSULT: CPT | Performed by: RADIOLOGY

## 2021-03-15 PROCEDURE — 77387 GUIDANCE FOR RADJ TX DLVR: CPT | Mod: 26,,, | Performed by: RADIOLOGY

## 2021-03-15 PROCEDURE — 77386 HC IMRT, COMPLEX: CPT | Performed by: RADIOLOGY

## 2021-03-16 ENCOUNTER — DOCUMENTATION ONLY (OUTPATIENT)
Dept: RADIATION ONCOLOGY | Facility: CLINIC | Age: 71
End: 2021-03-16

## 2021-03-16 PROCEDURE — 77386 HC IMRT, COMPLEX: CPT | Performed by: RADIOLOGY

## 2021-03-16 PROCEDURE — 77014 PR  CT GUIDANCE PLACEMENT RAD THERAPY FIELDS: CPT | Mod: 26,,, | Performed by: RADIOLOGY

## 2021-03-16 PROCEDURE — 77014 PR  CT GUIDANCE PLACEMENT RAD THERAPY FIELDS: ICD-10-PCS | Mod: 26,,, | Performed by: RADIOLOGY

## 2021-03-16 PROCEDURE — 77014 HC CT GUIDANCE RADIATION THERAPY FLDS PLACEMENT: CPT | Mod: TC | Performed by: RADIOLOGY

## 2021-03-17 PROCEDURE — 77417 THER RADIOLOGY PORT IMAGE(S): CPT | Performed by: RADIOLOGY

## 2021-03-17 PROCEDURE — 77386 HC IMRT, COMPLEX: CPT | Performed by: RADIOLOGY

## 2021-03-17 PROCEDURE — 77387 PR GUIDANCE FOR RADIATION TREATMENT DELIVERY: ICD-10-PCS | Mod: 26,,, | Performed by: RADIOLOGY

## 2021-03-17 PROCEDURE — 77387 GUIDANCE FOR RADJ TX DLVR: CPT | Mod: 26,,, | Performed by: RADIOLOGY

## 2021-03-18 PROCEDURE — 77386 HC IMRT, COMPLEX: CPT | Performed by: RADIOLOGY

## 2021-03-18 PROCEDURE — 77387 PR GUIDANCE FOR RADIATION TREATMENT DELIVERY: ICD-10-PCS | Mod: 26,,, | Performed by: RADIOLOGY

## 2021-03-18 PROCEDURE — 77387 GUIDANCE FOR RADJ TX DLVR: CPT | Mod: 26,,, | Performed by: RADIOLOGY

## 2021-03-19 ENCOUNTER — DOCUMENTATION ONLY (OUTPATIENT)
Dept: RADIATION ONCOLOGY | Facility: CLINIC | Age: 71
End: 2021-03-19

## 2021-03-19 PROCEDURE — 77387 GUIDANCE FOR RADJ TX DLVR: CPT | Mod: 26,,, | Performed by: RADIOLOGY

## 2021-03-19 PROCEDURE — 77387 PR GUIDANCE FOR RADIATION TREATMENT DELIVERY: ICD-10-PCS | Mod: 26,,, | Performed by: RADIOLOGY

## 2021-03-19 PROCEDURE — 77386 HC IMRT, COMPLEX: CPT | Performed by: RADIOLOGY

## 2021-03-20 PROCEDURE — 77336 RADIATION PHYSICS CONSULT: CPT | Performed by: RADIOLOGY

## 2021-03-22 PROCEDURE — 77386 HC IMRT, COMPLEX: CPT

## 2021-03-22 PROCEDURE — 77387 GUIDANCE FOR RADJ TX DLVR: CPT | Mod: 26,,, | Performed by: RADIOLOGY

## 2021-03-22 PROCEDURE — 77387 PR GUIDANCE FOR RADIATION TREATMENT DELIVERY: ICD-10-PCS | Mod: 26,,, | Performed by: RADIOLOGY

## 2021-03-23 PROCEDURE — 77014 PR  CT GUIDANCE PLACEMENT RAD THERAPY FIELDS: ICD-10-PCS | Mod: 26,,, | Performed by: RADIOLOGY

## 2021-03-23 PROCEDURE — 77014 PR  CT GUIDANCE PLACEMENT RAD THERAPY FIELDS: CPT | Mod: 26,,, | Performed by: RADIOLOGY

## 2021-03-23 PROCEDURE — 77386 HC IMRT, COMPLEX: CPT | Performed by: RADIOLOGY

## 2021-03-23 PROCEDURE — 77014 HC CT GUIDANCE RADIATION THERAPY FLDS PLACEMENT: CPT | Mod: TC | Performed by: RADIOLOGY

## 2021-03-24 PROCEDURE — 77387 PR GUIDANCE FOR RADIATION TREATMENT DELIVERY: ICD-10-PCS | Mod: 26,,, | Performed by: RADIOLOGY

## 2021-03-24 PROCEDURE — 77386 HC IMRT, COMPLEX: CPT | Performed by: RADIOLOGY

## 2021-03-24 PROCEDURE — 77387 GUIDANCE FOR RADJ TX DLVR: CPT | Mod: 26,,, | Performed by: RADIOLOGY

## 2021-03-24 PROCEDURE — 77417 THER RADIOLOGY PORT IMAGE(S): CPT | Performed by: RADIOLOGY

## 2021-03-25 PROCEDURE — 77387 PR GUIDANCE FOR RADIATION TREATMENT DELIVERY: ICD-10-PCS | Mod: 26,,, | Performed by: RADIOLOGY

## 2021-03-25 PROCEDURE — 77387 GUIDANCE FOR RADJ TX DLVR: CPT | Mod: 26,,, | Performed by: RADIOLOGY

## 2021-03-25 PROCEDURE — 77386 HC IMRT, COMPLEX: CPT | Performed by: RADIOLOGY

## 2021-03-26 ENCOUNTER — DOCUMENTATION ONLY (OUTPATIENT)
Dept: RADIATION ONCOLOGY | Facility: CLINIC | Age: 71
End: 2021-03-26

## 2021-03-26 DIAGNOSIS — R19.7 DIARRHEA, UNSPECIFIED TYPE: Primary | ICD-10-CM

## 2021-03-26 PROCEDURE — 77386 HC IMRT, COMPLEX: CPT | Performed by: RADIOLOGY

## 2021-03-26 PROCEDURE — 77387 PR GUIDANCE FOR RADIATION TREATMENT DELIVERY: ICD-10-PCS | Mod: 26,,, | Performed by: RADIOLOGY

## 2021-03-26 PROCEDURE — 77387 GUIDANCE FOR RADJ TX DLVR: CPT | Mod: 26,,, | Performed by: RADIOLOGY

## 2021-03-26 RX ORDER — DIPHENOXYLATE HYDROCHLORIDE AND ATROPINE SULFATE 2.5; .025 MG/1; MG/1
1 TABLET ORAL 4 TIMES DAILY PRN
Qty: 30 TABLET | Refills: 0 | Status: SHIPPED | OUTPATIENT
Start: 2021-03-26 | End: 2021-04-05

## 2021-03-29 PROCEDURE — 77336 RADIATION PHYSICS CONSULT: CPT | Performed by: RADIOLOGY

## 2021-03-29 PROCEDURE — 77014 PR  CT GUIDANCE PLACEMENT RAD THERAPY FIELDS: ICD-10-PCS | Mod: 26,,, | Performed by: RADIOLOGY

## 2021-03-29 PROCEDURE — 77386 HC IMRT, COMPLEX: CPT | Performed by: RADIOLOGY

## 2021-03-29 PROCEDURE — 77014 HC CT GUIDANCE RADIATION THERAPY FLDS PLACEMENT: CPT | Mod: TC | Performed by: RADIOLOGY

## 2021-03-29 PROCEDURE — 77014 PR  CT GUIDANCE PLACEMENT RAD THERAPY FIELDS: CPT | Mod: 26,,, | Performed by: RADIOLOGY

## 2021-03-30 PROCEDURE — 77386 HC IMRT, COMPLEX: CPT | Performed by: RADIOLOGY

## 2021-03-30 PROCEDURE — 77387 PR GUIDANCE FOR RADIATION TREATMENT DELIVERY: ICD-10-PCS | Mod: 26,,, | Performed by: RADIOLOGY

## 2021-03-30 PROCEDURE — 77387 GUIDANCE FOR RADJ TX DLVR: CPT | Mod: 26,,, | Performed by: RADIOLOGY

## 2021-03-31 ENCOUNTER — DOCUMENTATION ONLY (OUTPATIENT)
Dept: RADIATION ONCOLOGY | Facility: CLINIC | Age: 71
End: 2021-03-31

## 2021-03-31 PROCEDURE — 77417 THER RADIOLOGY PORT IMAGE(S): CPT | Performed by: RADIOLOGY

## 2021-03-31 PROCEDURE — 77386 HC IMRT, COMPLEX: CPT | Performed by: RADIOLOGY

## 2021-03-31 PROCEDURE — 77387 PR GUIDANCE FOR RADIATION TREATMENT DELIVERY: ICD-10-PCS | Mod: 26,,, | Performed by: RADIOLOGY

## 2021-03-31 PROCEDURE — 77387 GUIDANCE FOR RADJ TX DLVR: CPT | Mod: 26,,, | Performed by: RADIOLOGY

## 2021-04-01 ENCOUNTER — HOSPITAL ENCOUNTER (OUTPATIENT)
Dept: RADIATION THERAPY | Facility: HOSPITAL | Age: 71
Discharge: HOME | End: 2021-04-01
Attending: RADIOLOGY
Payer: COMMERCIAL

## 2021-04-01 PROCEDURE — 77386 HC IMRT, COMPLEX: CPT | Performed by: RADIOLOGY

## 2021-04-01 PROCEDURE — 77387 GUIDANCE FOR RADJ TX DLVR: CPT | Mod: 26,,, | Performed by: RADIOLOGY

## 2021-04-01 PROCEDURE — 77387 PR GUIDANCE FOR RADIATION TREATMENT DELIVERY: ICD-10-PCS | Mod: 26,,, | Performed by: RADIOLOGY

## 2021-04-05 ENCOUNTER — DOCUMENTATION ONLY (OUTPATIENT)
Dept: RADIATION ONCOLOGY | Facility: CLINIC | Age: 71
End: 2021-04-05

## 2021-04-05 PROCEDURE — 77386 HC IMRT, COMPLEX: CPT | Performed by: RADIOLOGY

## 2021-04-05 PROCEDURE — 77387 GUIDANCE FOR RADJ TX DLVR: CPT | Mod: 26,,, | Performed by: RADIOLOGY

## 2021-04-05 PROCEDURE — 77387 PR GUIDANCE FOR RADIATION TREATMENT DELIVERY: ICD-10-PCS | Mod: 26,,, | Performed by: RADIOLOGY

## 2021-04-05 PROCEDURE — 77336 RADIATION PHYSICS CONSULT: CPT | Performed by: RADIOLOGY

## 2021-04-12 ENCOUNTER — TELEPHONE (OUTPATIENT)
Dept: RADIATION ONCOLOGY | Facility: CLINIC | Age: 71
End: 2021-04-12

## 2021-04-14 ENCOUNTER — TELEPHONE (OUTPATIENT)
Dept: RADIATION ONCOLOGY | Facility: CLINIC | Age: 71
End: 2021-04-14

## 2021-04-15 ENCOUNTER — HOSPITAL ENCOUNTER (EMERGENCY)
Facility: HOSPITAL | Age: 71
Discharge: HOME | End: 2021-04-15
Attending: EMERGENCY MEDICINE
Payer: COMMERCIAL

## 2021-04-15 VITALS
BODY MASS INDEX: 35 KG/M2 | WEIGHT: 205 LBS | OXYGEN SATURATION: 100 % | HEART RATE: 56 BPM | SYSTOLIC BLOOD PRESSURE: 125 MMHG | DIASTOLIC BLOOD PRESSURE: 65 MMHG | HEIGHT: 64 IN | TEMPERATURE: 99 F | RESPIRATION RATE: 16 BRPM

## 2021-04-15 DIAGNOSIS — M10.9 GOUTY ARTHRITIS OF RIGHT FOOT: Primary | ICD-10-CM

## 2021-04-15 PROCEDURE — 99284 EMERGENCY DEPT VISIT MOD MDM: CPT | Mod: 25,ER

## 2021-04-15 PROCEDURE — 63600175 PHARM REV CODE 636 W HCPCS: Mod: ER | Performed by: EMERGENCY MEDICINE

## 2021-04-15 PROCEDURE — 96372 THER/PROPH/DIAG INJ SC/IM: CPT | Mod: ER

## 2021-04-15 PROCEDURE — 25000003 PHARM REV CODE 250: Mod: ER | Performed by: EMERGENCY MEDICINE

## 2021-04-15 RX ORDER — TRAMADOL HYDROCHLORIDE 50 MG/1
50 TABLET ORAL
Status: COMPLETED | OUTPATIENT
Start: 2021-04-15 | End: 2021-04-15

## 2021-04-15 RX ORDER — DEXAMETHASONE SODIUM PHOSPHATE 4 MG/ML
8 INJECTION, SOLUTION INTRA-ARTICULAR; INTRALESIONAL; INTRAMUSCULAR; INTRAVENOUS; SOFT TISSUE
Status: COMPLETED | OUTPATIENT
Start: 2021-04-15 | End: 2021-04-15

## 2021-04-15 RX ORDER — KETOROLAC TROMETHAMINE 30 MG/ML
30 INJECTION, SOLUTION INTRAMUSCULAR; INTRAVENOUS
Status: COMPLETED | OUTPATIENT
Start: 2021-04-15 | End: 2021-04-15

## 2021-04-15 RX ORDER — TRAMADOL HYDROCHLORIDE AND ACETAMINOPHEN 37.5; 325 MG/1; MG/1
1 TABLET, FILM COATED ORAL EVERY 6 HOURS PRN
Qty: 9 TABLET | Refills: 0 | Status: SHIPPED | OUTPATIENT
Start: 2021-04-15 | End: 2021-07-02

## 2021-04-15 RX ADMIN — DEXAMETHASONE SODIUM PHOSPHATE 8 MG: 4 INJECTION, SOLUTION INTRAMUSCULAR; INTRAVENOUS at 12:04

## 2021-04-15 RX ADMIN — TRAMADOL HYDROCHLORIDE 50 MG: 50 TABLET, COATED ORAL at 12:04

## 2021-04-15 RX ADMIN — KETOROLAC TROMETHAMINE 30 MG: 30 INJECTION, SOLUTION INTRAMUSCULAR at 12:04

## 2021-04-19 ENCOUNTER — HOSPITAL ENCOUNTER (OUTPATIENT)
Dept: RADIOLOGY | Facility: HOSPITAL | Age: 71
Discharge: HOME | End: 2021-04-19
Attending: FAMILY MEDICINE
Payer: COMMERCIAL

## 2021-04-19 ENCOUNTER — OFFICE VISIT (OUTPATIENT)
Dept: FAMILY MEDICINE | Facility: CLINIC | Age: 71
End: 2021-04-19
Payer: COMMERCIAL

## 2021-04-19 VITALS
OXYGEN SATURATION: 98 % | TEMPERATURE: 99 F | BODY MASS INDEX: 36.2 KG/M2 | HEART RATE: 57 BPM | WEIGHT: 212.06 LBS | DIASTOLIC BLOOD PRESSURE: 86 MMHG | SYSTOLIC BLOOD PRESSURE: 134 MMHG | HEIGHT: 64 IN

## 2021-04-19 DIAGNOSIS — M10.071 ACUTE IDIOPATHIC GOUT OF RIGHT FOOT: ICD-10-CM

## 2021-04-19 DIAGNOSIS — M10.071 ACUTE IDIOPATHIC GOUT OF RIGHT FOOT: Primary | ICD-10-CM

## 2021-04-19 DIAGNOSIS — N18.31 STAGE 3A CHRONIC KIDNEY DISEASE: ICD-10-CM

## 2021-04-19 PROCEDURE — 99214 OFFICE O/P EST MOD 30 MIN: CPT | Mod: S$GLB,,, | Performed by: FAMILY MEDICINE

## 2021-04-19 PROCEDURE — 1159F MED LIST DOCD IN RCRD: CPT | Mod: S$GLB,,, | Performed by: FAMILY MEDICINE

## 2021-04-19 PROCEDURE — 99999 PR PBB SHADOW E&M-EST. PATIENT-LVL V: ICD-10-PCS | Mod: PBBFAC,,, | Performed by: FAMILY MEDICINE

## 2021-04-19 PROCEDURE — 99214 PR OFFICE/OUTPT VISIT, EST, LEVL IV, 30-39 MIN: ICD-10-PCS | Mod: S$GLB,,, | Performed by: FAMILY MEDICINE

## 2021-04-19 PROCEDURE — 73630 X-RAY EXAM OF FOOT: CPT | Mod: TC,FY,PO,RT

## 2021-04-19 PROCEDURE — 99999 PR PBB SHADOW E&M-EST. PATIENT-LVL V: CPT | Mod: PBBFAC,,, | Performed by: FAMILY MEDICINE

## 2021-04-19 PROCEDURE — 73630 X-RAY EXAM OF FOOT: CPT | Mod: 26,RT,, | Performed by: RADIOLOGY

## 2021-04-19 PROCEDURE — 73630 XR FOOT COMPLETE 3 VIEW RIGHT: ICD-10-PCS | Mod: 26,RT,, | Performed by: RADIOLOGY

## 2021-04-19 PROCEDURE — 1159F PR MEDICATION LIST DOCUMENTED IN MEDICAL RECORD: ICD-10-PCS | Mod: S$GLB,,, | Performed by: FAMILY MEDICINE

## 2021-04-19 RX ORDER — METHYLPREDNISOLONE 4 MG/1
TABLET ORAL
Qty: 1 PACKAGE | Refills: 0 | Status: SHIPPED | OUTPATIENT
Start: 2021-04-19 | End: 2021-05-11

## 2021-04-19 RX ORDER — PREDNISONE 50 MG/1
50 TABLET ORAL DAILY
Qty: 5 TABLET | Refills: 0 | Status: SHIPPED | OUTPATIENT
Start: 2021-04-19 | End: 2021-04-24

## 2021-04-22 DIAGNOSIS — E89.0 POSTOPERATIVE HYPOTHYROIDISM: ICD-10-CM

## 2021-04-22 RX ORDER — LEVOTHYROXINE SODIUM 112 UG/1
TABLET ORAL
Qty: 96 TABLET | Refills: 0 | Status: SHIPPED | OUTPATIENT
Start: 2021-04-22 | End: 2021-07-02 | Stop reason: SDUPTHER

## 2021-05-10 ENCOUNTER — LAB VISIT (OUTPATIENT)
Dept: LAB | Facility: HOSPITAL | Age: 71
End: 2021-05-10
Attending: OBSTETRICS & GYNECOLOGY
Payer: COMMERCIAL

## 2021-05-10 ENCOUNTER — TELEPHONE (OUTPATIENT)
Dept: GYNECOLOGIC ONCOLOGY | Facility: CLINIC | Age: 71
End: 2021-05-10

## 2021-05-10 DIAGNOSIS — M10.071 ACUTE IDIOPATHIC GOUT OF RIGHT FOOT: ICD-10-CM

## 2021-05-10 DIAGNOSIS — C54.1 ENDOMETRIAL CA: ICD-10-CM

## 2021-05-10 LAB — URATE SERPL-MCNC: 7 MG/DL (ref 2.4–5.7)

## 2021-05-10 PROCEDURE — 86304 IMMUNOASSAY TUMOR CA 125: CPT | Mod: PO | Performed by: OBSTETRICS & GYNECOLOGY

## 2021-05-10 PROCEDURE — 84550 ASSAY OF BLOOD/URIC ACID: CPT | Performed by: FAMILY MEDICINE

## 2021-05-10 PROCEDURE — 36415 COLL VENOUS BLD VENIPUNCTURE: CPT | Mod: PO | Performed by: OBSTETRICS & GYNECOLOGY

## 2021-05-11 ENCOUNTER — OFFICE VISIT (OUTPATIENT)
Dept: GYNECOLOGIC ONCOLOGY | Facility: CLINIC | Age: 71
End: 2021-05-11
Payer: COMMERCIAL

## 2021-05-11 VITALS
SYSTOLIC BLOOD PRESSURE: 134 MMHG | WEIGHT: 218.25 LBS | HEART RATE: 57 BPM | BODY MASS INDEX: 37.46 KG/M2 | DIASTOLIC BLOOD PRESSURE: 65 MMHG

## 2021-05-11 DIAGNOSIS — C54.1 ENDOMETRIAL CA: Primary | ICD-10-CM

## 2021-05-11 DIAGNOSIS — G62.0 NEUROPATHY DUE TO CHEMOTHERAPEUTIC DRUG: ICD-10-CM

## 2021-05-11 DIAGNOSIS — T45.1X5A NEUROPATHY DUE TO CHEMOTHERAPEUTIC DRUG: ICD-10-CM

## 2021-05-11 LAB — CANCER AG125 SERPL-ACNC: 4 U/ML (ref 0–30)

## 2021-05-11 PROCEDURE — 99999 PR PBB SHADOW E&M-EST. PATIENT-LVL III: ICD-10-PCS | Mod: PBBFAC,,, | Performed by: OBSTETRICS & GYNECOLOGY

## 2021-05-11 PROCEDURE — 1159F PR MEDICATION LIST DOCUMENTED IN MEDICAL RECORD: ICD-10-PCS | Mod: S$GLB,,, | Performed by: OBSTETRICS & GYNECOLOGY

## 2021-05-11 PROCEDURE — 1159F MED LIST DOCD IN RCRD: CPT | Mod: S$GLB,,, | Performed by: OBSTETRICS & GYNECOLOGY

## 2021-05-11 PROCEDURE — 1126F PR PAIN SEVERITY QUANTIFIED, NO PAIN PRESENT: ICD-10-PCS | Mod: S$GLB,,, | Performed by: OBSTETRICS & GYNECOLOGY

## 2021-05-11 PROCEDURE — 99999 PR PBB SHADOW E&M-EST. PATIENT-LVL III: CPT | Mod: PBBFAC,,, | Performed by: OBSTETRICS & GYNECOLOGY

## 2021-05-11 PROCEDURE — 99213 OFFICE O/P EST LOW 20 MIN: CPT | Mod: S$GLB,,, | Performed by: OBSTETRICS & GYNECOLOGY

## 2021-05-11 PROCEDURE — 1101F PR PT FALLS ASSESS DOC 0-1 FALLS W/OUT INJ PAST YR: ICD-10-PCS | Mod: CPTII,S$GLB,, | Performed by: OBSTETRICS & GYNECOLOGY

## 2021-05-11 PROCEDURE — 3008F PR BODY MASS INDEX (BMI) DOCUMENTED: ICD-10-PCS | Mod: CPTII,S$GLB,, | Performed by: OBSTETRICS & GYNECOLOGY

## 2021-05-11 PROCEDURE — 3008F BODY MASS INDEX DOCD: CPT | Mod: CPTII,S$GLB,, | Performed by: OBSTETRICS & GYNECOLOGY

## 2021-05-11 PROCEDURE — 3288F PR FALLS RISK ASSESSMENT DOCUMENTED: ICD-10-PCS | Mod: CPTII,S$GLB,, | Performed by: OBSTETRICS & GYNECOLOGY

## 2021-05-11 PROCEDURE — 1126F AMNT PAIN NOTED NONE PRSNT: CPT | Mod: S$GLB,,, | Performed by: OBSTETRICS & GYNECOLOGY

## 2021-05-11 PROCEDURE — 1101F PT FALLS ASSESS-DOCD LE1/YR: CPT | Mod: CPTII,S$GLB,, | Performed by: OBSTETRICS & GYNECOLOGY

## 2021-05-11 PROCEDURE — 99213 PR OFFICE/OUTPT VISIT, EST, LEVL III, 20-29 MIN: ICD-10-PCS | Mod: S$GLB,,, | Performed by: OBSTETRICS & GYNECOLOGY

## 2021-05-11 PROCEDURE — 3288F FALL RISK ASSESSMENT DOCD: CPT | Mod: CPTII,S$GLB,, | Performed by: OBSTETRICS & GYNECOLOGY

## 2021-05-12 ENCOUNTER — TELEPHONE (OUTPATIENT)
Dept: FAMILY MEDICINE | Facility: CLINIC | Age: 71
End: 2021-05-12

## 2021-05-13 ENCOUNTER — INFUSION (OUTPATIENT)
Dept: INFUSION THERAPY | Facility: HOSPITAL | Age: 71
End: 2021-05-13
Payer: COMMERCIAL

## 2021-05-13 DIAGNOSIS — C54.1 ENDOMETRIAL CA: Primary | ICD-10-CM

## 2021-05-13 PROCEDURE — 63600175 PHARM REV CODE 636 W HCPCS: Performed by: OBSTETRICS & GYNECOLOGY

## 2021-05-13 PROCEDURE — A4216 STERILE WATER/SALINE, 10 ML: HCPCS | Performed by: OBSTETRICS & GYNECOLOGY

## 2021-05-13 PROCEDURE — 96523 IRRIG DRUG DELIVERY DEVICE: CPT

## 2021-05-13 PROCEDURE — 25000003 PHARM REV CODE 250: Performed by: OBSTETRICS & GYNECOLOGY

## 2021-05-13 RX ORDER — HEPARIN 100 UNIT/ML
500 SYRINGE INTRAVENOUS
Status: CANCELLED | OUTPATIENT
Start: 2021-05-13

## 2021-05-13 RX ORDER — SODIUM CHLORIDE 0.9 % (FLUSH) 0.9 %
10 SYRINGE (ML) INJECTION
Status: CANCELLED | OUTPATIENT
Start: 2021-05-13

## 2021-05-13 RX ORDER — SODIUM CHLORIDE 0.9 % (FLUSH) 0.9 %
10 SYRINGE (ML) INJECTION
Status: DISCONTINUED | OUTPATIENT
Start: 2021-05-13 | End: 2021-05-13 | Stop reason: HOSPADM

## 2021-05-13 RX ORDER — HEPARIN 100 UNIT/ML
500 SYRINGE INTRAVENOUS
Status: DISCONTINUED | OUTPATIENT
Start: 2021-05-13 | End: 2021-05-13 | Stop reason: HOSPADM

## 2021-05-13 RX ADMIN — HEPARIN 500 UNITS: 100 SYRINGE at 10:05

## 2021-05-13 RX ADMIN — Medication 10 ML: at 10:05

## 2021-05-17 ENCOUNTER — TELEPHONE (OUTPATIENT)
Dept: FAMILY MEDICINE | Facility: CLINIC | Age: 71
End: 2021-05-17

## 2021-05-17 DIAGNOSIS — M10.071 ACUTE IDIOPATHIC GOUT OF RIGHT FOOT: Primary | ICD-10-CM

## 2021-05-17 DIAGNOSIS — N18.31 STAGE 3A CHRONIC KIDNEY DISEASE: ICD-10-CM

## 2021-05-17 RX ORDER — ALLOPURINOL 100 MG/1
100 TABLET ORAL DAILY
Qty: 90 TABLET | Refills: 3 | Status: SHIPPED | OUTPATIENT
Start: 2021-05-17 | End: 2021-07-02 | Stop reason: SDUPTHER

## 2021-05-17 RX ORDER — COLCHICINE 0.6 MG/1
0.6 TABLET ORAL DAILY
Qty: 90 TABLET | Refills: 0 | Status: SHIPPED | OUTPATIENT
Start: 2021-05-17 | End: 2021-07-02

## 2021-05-19 ENCOUNTER — TELEPHONE (OUTPATIENT)
Dept: RADIATION ONCOLOGY | Facility: CLINIC | Age: 71
End: 2021-05-19

## 2021-06-08 ENCOUNTER — TELEPHONE (OUTPATIENT)
Dept: GYNECOLOGIC ONCOLOGY | Facility: CLINIC | Age: 71
End: 2021-06-08

## 2021-06-28 ENCOUNTER — OFFICE VISIT (OUTPATIENT)
Dept: RADIATION ONCOLOGY | Facility: CLINIC | Age: 71
End: 2021-06-28
Payer: COMMERCIAL

## 2021-06-28 VITALS
TEMPERATURE: 99 F | HEART RATE: 50 BPM | DIASTOLIC BLOOD PRESSURE: 78 MMHG | SYSTOLIC BLOOD PRESSURE: 161 MMHG | WEIGHT: 220.19 LBS | BODY MASS INDEX: 37.8 KG/M2

## 2021-06-28 DIAGNOSIS — C54.1 ENDOMETRIAL CA: Primary | ICD-10-CM

## 2021-06-28 PROCEDURE — 99999 PR PBB SHADOW E&M-EST. PATIENT-LVL II: ICD-10-PCS | Mod: PBBFAC,,, | Performed by: RADIOLOGY

## 2021-06-28 PROCEDURE — 1101F PR PT FALLS ASSESS DOC 0-1 FALLS W/OUT INJ PAST YR: ICD-10-PCS | Mod: CPTII,S$GLB,, | Performed by: RADIOLOGY

## 2021-06-28 PROCEDURE — 1126F AMNT PAIN NOTED NONE PRSNT: CPT | Mod: S$GLB,,, | Performed by: RADIOLOGY

## 2021-06-28 PROCEDURE — 3008F BODY MASS INDEX DOCD: CPT | Mod: CPTII,S$GLB,, | Performed by: RADIOLOGY

## 2021-06-28 PROCEDURE — 99024 PR POST-OP FOLLOW-UP VISIT: ICD-10-PCS | Mod: S$GLB,,, | Performed by: RADIOLOGY

## 2021-06-28 PROCEDURE — 99999 PR PBB SHADOW E&M-EST. PATIENT-LVL II: CPT | Mod: PBBFAC,,, | Performed by: RADIOLOGY

## 2021-06-28 PROCEDURE — 3288F FALL RISK ASSESSMENT DOCD: CPT | Mod: CPTII,S$GLB,, | Performed by: RADIOLOGY

## 2021-06-28 PROCEDURE — 1101F PT FALLS ASSESS-DOCD LE1/YR: CPT | Mod: CPTII,S$GLB,, | Performed by: RADIOLOGY

## 2021-06-28 PROCEDURE — 1126F PR PAIN SEVERITY QUANTIFIED, NO PAIN PRESENT: ICD-10-PCS | Mod: S$GLB,,, | Performed by: RADIOLOGY

## 2021-06-28 PROCEDURE — 99024 POSTOP FOLLOW-UP VISIT: CPT | Mod: S$GLB,,, | Performed by: RADIOLOGY

## 2021-06-28 PROCEDURE — 3008F PR BODY MASS INDEX (BMI) DOCUMENTED: ICD-10-PCS | Mod: CPTII,S$GLB,, | Performed by: RADIOLOGY

## 2021-06-28 PROCEDURE — 3288F PR FALLS RISK ASSESSMENT DOCUMENTED: ICD-10-PCS | Mod: CPTII,S$GLB,, | Performed by: RADIOLOGY

## 2021-07-02 ENCOUNTER — LAB VISIT (OUTPATIENT)
Dept: LAB | Facility: HOSPITAL | Age: 71
End: 2021-07-02
Attending: FAMILY MEDICINE
Payer: COMMERCIAL

## 2021-07-02 ENCOUNTER — OFFICE VISIT (OUTPATIENT)
Dept: FAMILY MEDICINE | Facility: CLINIC | Age: 71
End: 2021-07-02
Payer: COMMERCIAL

## 2021-07-02 VITALS
OXYGEN SATURATION: 96 % | WEIGHT: 218.25 LBS | SYSTOLIC BLOOD PRESSURE: 130 MMHG | DIASTOLIC BLOOD PRESSURE: 86 MMHG | HEIGHT: 64 IN | BODY MASS INDEX: 37.26 KG/M2 | HEART RATE: 49 BPM

## 2021-07-02 DIAGNOSIS — R00.1 BRADYCARDIA: ICD-10-CM

## 2021-07-02 DIAGNOSIS — I10 ESSENTIAL HYPERTENSION: ICD-10-CM

## 2021-07-02 DIAGNOSIS — M10.071 ACUTE IDIOPATHIC GOUT OF RIGHT FOOT: ICD-10-CM

## 2021-07-02 DIAGNOSIS — E89.0 POSTOPERATIVE HYPOTHYROIDISM: ICD-10-CM

## 2021-07-02 DIAGNOSIS — Z13.220 ENCOUNTER FOR LIPID SCREENING FOR CARDIOVASCULAR DISEASE: ICD-10-CM

## 2021-07-02 DIAGNOSIS — N18.31 STAGE 3A CHRONIC KIDNEY DISEASE: ICD-10-CM

## 2021-07-02 DIAGNOSIS — R00.1 BRADYCARDIA: Primary | ICD-10-CM

## 2021-07-02 DIAGNOSIS — R73.01 ELEVATED FASTING GLUCOSE: ICD-10-CM

## 2021-07-02 DIAGNOSIS — Z13.6 ENCOUNTER FOR LIPID SCREENING FOR CARDIOVASCULAR DISEASE: ICD-10-CM

## 2021-07-02 LAB
ALBUMIN SERPL BCP-MCNC: 3.6 G/DL (ref 3.5–5.2)
ALP SERPL-CCNC: 84 U/L (ref 55–135)
ALT SERPL W/O P-5'-P-CCNC: 6 U/L (ref 10–44)
ANION GAP SERPL CALC-SCNC: 9 MMOL/L (ref 8–16)
AST SERPL-CCNC: 15 U/L (ref 10–40)
BASOPHILS # BLD AUTO: 0.01 K/UL (ref 0–0.2)
BASOPHILS NFR BLD: 0.3 % (ref 0–1.9)
BILIRUB SERPL-MCNC: 0.6 MG/DL (ref 0.1–1)
BUN SERPL-MCNC: 22 MG/DL (ref 8–23)
CALCIUM SERPL-MCNC: 8.8 MG/DL (ref 8.7–10.5)
CHLORIDE SERPL-SCNC: 106 MMOL/L (ref 95–110)
CHOLEST SERPL-MCNC: 181 MG/DL (ref 120–199)
CHOLEST/HDLC SERPL: 2.7 {RATIO} (ref 2–5)
CO2 SERPL-SCNC: 25 MMOL/L (ref 23–29)
CREAT SERPL-MCNC: 1.5 MG/DL (ref 0.5–1.4)
DIFFERENTIAL METHOD: ABNORMAL
EOSINOPHIL # BLD AUTO: 0.1 K/UL (ref 0–0.5)
EOSINOPHIL NFR BLD: 3.8 % (ref 0–8)
ERYTHROCYTE [DISTWIDTH] IN BLOOD BY AUTOMATED COUNT: 16.2 % (ref 11.5–14.5)
EST. GFR  (AFRICAN AMERICAN): 40.1 ML/MIN/1.73 M^2
EST. GFR  (NON AFRICAN AMERICAN): 34.8 ML/MIN/1.73 M^2
ESTIMATED AVG GLUCOSE: 100 MG/DL (ref 68–131)
GLUCOSE SERPL-MCNC: 88 MG/DL (ref 70–110)
HBA1C MFR BLD: 5.1 % (ref 4–5.6)
HCT VFR BLD AUTO: 33.4 % (ref 37–48.5)
HDLC SERPL-MCNC: 68 MG/DL (ref 40–75)
HDLC SERPL: 37.6 % (ref 20–50)
HGB BLD-MCNC: 9.9 G/DL (ref 12–16)
IMM GRANULOCYTES # BLD AUTO: 0.01 K/UL (ref 0–0.04)
IMM GRANULOCYTES NFR BLD AUTO: 0.3 % (ref 0–0.5)
LDLC SERPL CALC-MCNC: 101 MG/DL (ref 63–159)
LYMPHOCYTES # BLD AUTO: 0.6 K/UL (ref 1–4.8)
LYMPHOCYTES NFR BLD: 20.9 % (ref 18–48)
MAGNESIUM SERPL-MCNC: 2 MG/DL (ref 1.6–2.6)
MCH RBC QN AUTO: 28.8 PG (ref 27–31)
MCHC RBC AUTO-ENTMCNC: 29.6 G/DL (ref 32–36)
MCV RBC AUTO: 97 FL (ref 82–98)
MONOCYTES # BLD AUTO: 0.3 K/UL (ref 0.3–1)
MONOCYTES NFR BLD: 10.8 % (ref 4–15)
NEUTROPHILS # BLD AUTO: 1.8 K/UL (ref 1.8–7.7)
NEUTROPHILS NFR BLD: 63.9 % (ref 38–73)
NONHDLC SERPL-MCNC: 113 MG/DL
NRBC BLD-RTO: 0 /100 WBC
PHOSPHATE SERPL-MCNC: 4.2 MG/DL (ref 2.7–4.5)
PLATELET # BLD AUTO: 166 K/UL (ref 150–450)
PMV BLD AUTO: 11.8 FL (ref 9.2–12.9)
POTASSIUM SERPL-SCNC: 3.9 MMOL/L (ref 3.5–5.1)
PROT SERPL-MCNC: 7.3 G/DL (ref 6–8.4)
RBC # BLD AUTO: 3.44 M/UL (ref 4–5.4)
SODIUM SERPL-SCNC: 140 MMOL/L (ref 136–145)
T4 FREE SERPL-MCNC: 1.02 NG/DL (ref 0.71–1.51)
TRIGL SERPL-MCNC: 60 MG/DL (ref 30–150)
TSH SERPL DL<=0.005 MIU/L-ACNC: 2.76 UIU/ML (ref 0.4–4)
URATE SERPL-MCNC: 7.4 MG/DL (ref 2.4–5.7)
WBC # BLD AUTO: 2.87 K/UL (ref 3.9–12.7)

## 2021-07-02 PROCEDURE — 1126F PR PAIN SEVERITY QUANTIFIED, NO PAIN PRESENT: ICD-10-PCS | Mod: S$GLB,,, | Performed by: FAMILY MEDICINE

## 2021-07-02 PROCEDURE — 83735 ASSAY OF MAGNESIUM: CPT | Performed by: FAMILY MEDICINE

## 2021-07-02 PROCEDURE — 3288F FALL RISK ASSESSMENT DOCD: CPT | Mod: CPTII,S$GLB,, | Performed by: FAMILY MEDICINE

## 2021-07-02 PROCEDURE — 3075F SYST BP GE 130 - 139MM HG: CPT | Mod: CPTII,S$GLB,, | Performed by: FAMILY MEDICINE

## 2021-07-02 PROCEDURE — 3079F DIAST BP 80-89 MM HG: CPT | Mod: CPTII,S$GLB,, | Performed by: FAMILY MEDICINE

## 2021-07-02 PROCEDURE — 84100 ASSAY OF PHOSPHORUS: CPT | Performed by: FAMILY MEDICINE

## 2021-07-02 PROCEDURE — 84443 ASSAY THYROID STIM HORMONE: CPT | Performed by: FAMILY MEDICINE

## 2021-07-02 PROCEDURE — 80061 LIPID PANEL: CPT | Performed by: FAMILY MEDICINE

## 2021-07-02 PROCEDURE — 84550 ASSAY OF BLOOD/URIC ACID: CPT | Performed by: FAMILY MEDICINE

## 2021-07-02 PROCEDURE — 1101F PT FALLS ASSESS-DOCD LE1/YR: CPT | Mod: CPTII,S$GLB,, | Performed by: FAMILY MEDICINE

## 2021-07-02 PROCEDURE — 3008F PR BODY MASS INDEX (BMI) DOCUMENTED: ICD-10-PCS | Mod: CPTII,S$GLB,, | Performed by: FAMILY MEDICINE

## 2021-07-02 PROCEDURE — 3008F BODY MASS INDEX DOCD: CPT | Mod: CPTII,S$GLB,, | Performed by: FAMILY MEDICINE

## 2021-07-02 PROCEDURE — 93005 ELECTROCARDIOGRAM TRACING: CPT | Mod: S$GLB,,, | Performed by: FAMILY MEDICINE

## 2021-07-02 PROCEDURE — 99214 PR OFFICE/OUTPT VISIT, EST, LEVL IV, 30-39 MIN: ICD-10-PCS | Mod: S$GLB,,, | Performed by: FAMILY MEDICINE

## 2021-07-02 PROCEDURE — 36415 COLL VENOUS BLD VENIPUNCTURE: CPT | Mod: PO | Performed by: FAMILY MEDICINE

## 2021-07-02 PROCEDURE — 83036 HEMOGLOBIN GLYCOSYLATED A1C: CPT | Performed by: FAMILY MEDICINE

## 2021-07-02 PROCEDURE — 1126F AMNT PAIN NOTED NONE PRSNT: CPT | Mod: S$GLB,,, | Performed by: FAMILY MEDICINE

## 2021-07-02 PROCEDURE — 80053 COMPREHEN METABOLIC PANEL: CPT | Performed by: FAMILY MEDICINE

## 2021-07-02 PROCEDURE — 93005 EKG 12-LEAD: ICD-10-PCS | Mod: S$GLB,,, | Performed by: FAMILY MEDICINE

## 2021-07-02 PROCEDURE — 99999 PR PBB SHADOW E&M-EST. PATIENT-LVL III: ICD-10-PCS | Mod: PBBFAC,,, | Performed by: FAMILY MEDICINE

## 2021-07-02 PROCEDURE — 93010 EKG 12-LEAD: ICD-10-PCS | Mod: S$GLB,,, | Performed by: INTERNAL MEDICINE

## 2021-07-02 PROCEDURE — 93010 ELECTROCARDIOGRAM REPORT: CPT | Mod: S$GLB,,, | Performed by: INTERNAL MEDICINE

## 2021-07-02 PROCEDURE — 3288F PR FALLS RISK ASSESSMENT DOCUMENTED: ICD-10-PCS | Mod: CPTII,S$GLB,, | Performed by: FAMILY MEDICINE

## 2021-07-02 PROCEDURE — 99214 OFFICE O/P EST MOD 30 MIN: CPT | Mod: S$GLB,,, | Performed by: FAMILY MEDICINE

## 2021-07-02 PROCEDURE — 99999 PR PBB SHADOW E&M-EST. PATIENT-LVL III: CPT | Mod: PBBFAC,,, | Performed by: FAMILY MEDICINE

## 2021-07-02 PROCEDURE — 85025 COMPLETE CBC W/AUTO DIFF WBC: CPT | Performed by: FAMILY MEDICINE

## 2021-07-02 PROCEDURE — 1101F PR PT FALLS ASSESS DOC 0-1 FALLS W/OUT INJ PAST YR: ICD-10-PCS | Mod: CPTII,S$GLB,, | Performed by: FAMILY MEDICINE

## 2021-07-02 PROCEDURE — 84439 ASSAY OF FREE THYROXINE: CPT | Performed by: FAMILY MEDICINE

## 2021-07-02 PROCEDURE — 1159F MED LIST DOCD IN RCRD: CPT | Mod: S$GLB,,, | Performed by: FAMILY MEDICINE

## 2021-07-02 PROCEDURE — 1159F PR MEDICATION LIST DOCUMENTED IN MEDICAL RECORD: ICD-10-PCS | Mod: S$GLB,,, | Performed by: FAMILY MEDICINE

## 2021-07-02 PROCEDURE — 3079F PR MOST RECENT DIASTOLIC BLOOD PRESSURE 80-89 MM HG: ICD-10-PCS | Mod: CPTII,S$GLB,, | Performed by: FAMILY MEDICINE

## 2021-07-02 PROCEDURE — 3075F PR MOST RECENT SYSTOLIC BLOOD PRESS GE 130-139MM HG: ICD-10-PCS | Mod: CPTII,S$GLB,, | Performed by: FAMILY MEDICINE

## 2021-07-02 RX ORDER — ALLOPURINOL 100 MG/1
100 TABLET ORAL DAILY
Qty: 90 TABLET | Refills: 3 | Status: SHIPPED | OUTPATIENT
Start: 2021-07-02 | End: 2021-07-06 | Stop reason: SDUPTHER

## 2021-07-02 RX ORDER — LEVOTHYROXINE SODIUM 112 UG/1
TABLET ORAL
Qty: 96 TABLET | Refills: 0 | Status: SHIPPED | OUTPATIENT
Start: 2021-07-02 | End: 2021-10-04

## 2021-07-02 RX ORDER — AMLODIPINE BESYLATE 10 MG/1
10 TABLET ORAL DAILY
Qty: 90 TABLET | Refills: 1 | Status: SHIPPED | OUTPATIENT
Start: 2021-07-02 | End: 2022-02-02 | Stop reason: SDUPTHER

## 2021-07-02 RX ORDER — VALSARTAN AND HYDROCHLOROTHIAZIDE 320; 25 MG/1; MG/1
1 TABLET, FILM COATED ORAL DAILY
Qty: 90 TABLET | Refills: 1 | Status: SHIPPED | OUTPATIENT
Start: 2021-07-02 | End: 2022-02-02 | Stop reason: SDUPTHER

## 2021-07-06 ENCOUNTER — TELEPHONE (OUTPATIENT)
Dept: FAMILY MEDICINE | Facility: CLINIC | Age: 71
End: 2021-07-06

## 2021-07-06 DIAGNOSIS — N18.31 STAGE 3A CHRONIC KIDNEY DISEASE: ICD-10-CM

## 2021-07-06 DIAGNOSIS — E78.49 OTHER HYPERLIPIDEMIA: Primary | ICD-10-CM

## 2021-07-06 DIAGNOSIS — M10.071 ACUTE IDIOPATHIC GOUT OF RIGHT FOOT: ICD-10-CM

## 2021-07-06 RX ORDER — ALLOPURINOL 300 MG/1
300 TABLET ORAL DAILY
Qty: 90 TABLET | Refills: 1 | Status: SHIPPED | OUTPATIENT
Start: 2021-07-06 | End: 2022-02-02 | Stop reason: SDUPTHER

## 2021-07-06 RX ORDER — COLCHICINE 0.6 MG/1
0.6 TABLET ORAL DAILY
Qty: 90 TABLET | Refills: 0 | Status: SHIPPED | OUTPATIENT
Start: 2021-07-06 | End: 2022-02-02

## 2021-07-06 RX ORDER — ROSUVASTATIN CALCIUM 5 MG/1
5 TABLET, COATED ORAL DAILY
Qty: 90 TABLET | Refills: 3 | Status: SHIPPED | OUTPATIENT
Start: 2021-07-06 | End: 2022-02-02 | Stop reason: SDUPTHER

## 2021-08-04 ENCOUNTER — LAB VISIT (OUTPATIENT)
Dept: LAB | Facility: HOSPITAL | Age: 71
End: 2021-08-04
Attending: OBSTETRICS & GYNECOLOGY
Payer: COMMERCIAL

## 2021-08-04 ENCOUNTER — OFFICE VISIT (OUTPATIENT)
Dept: GYNECOLOGIC ONCOLOGY | Facility: CLINIC | Age: 71
End: 2021-08-04
Payer: COMMERCIAL

## 2021-08-04 VITALS
BODY MASS INDEX: 37.16 KG/M2 | DIASTOLIC BLOOD PRESSURE: 73 MMHG | HEART RATE: 61 BPM | WEIGHT: 216.5 LBS | SYSTOLIC BLOOD PRESSURE: 128 MMHG

## 2021-08-04 DIAGNOSIS — C54.1 ENDOMETRIAL CA: Primary | ICD-10-CM

## 2021-08-04 DIAGNOSIS — Z12.31 SCREENING MAMMOGRAM, ENCOUNTER FOR: ICD-10-CM

## 2021-08-04 DIAGNOSIS — N18.31 STAGE 3A CHRONIC KIDNEY DISEASE: ICD-10-CM

## 2021-08-04 DIAGNOSIS — C54.1 ENDOMETRIAL CA: ICD-10-CM

## 2021-08-04 DIAGNOSIS — G62.0 NEUROPATHY DUE TO CHEMOTHERAPEUTIC DRUG: ICD-10-CM

## 2021-08-04 DIAGNOSIS — Z01.419 WELL WOMAN EXAM WITH ROUTINE GYNECOLOGICAL EXAM: ICD-10-CM

## 2021-08-04 DIAGNOSIS — M10.071 ACUTE IDIOPATHIC GOUT OF RIGHT FOOT: ICD-10-CM

## 2021-08-04 DIAGNOSIS — Z01.419 WELL WOMAN EXAM: ICD-10-CM

## 2021-08-04 DIAGNOSIS — T45.1X5A NEUROPATHY DUE TO CHEMOTHERAPEUTIC DRUG: ICD-10-CM

## 2021-08-04 LAB
ALBUMIN SERPL BCP-MCNC: 3.5 G/DL (ref 3.5–5.2)
ALP SERPL-CCNC: 72 U/L (ref 55–135)
ALT SERPL W/O P-5'-P-CCNC: 10 U/L (ref 10–44)
ANION GAP SERPL CALC-SCNC: 5 MMOL/L (ref 8–16)
AST SERPL-CCNC: 18 U/L (ref 10–40)
BASOPHILS # BLD AUTO: 0.02 K/UL (ref 0–0.2)
BASOPHILS NFR BLD: 0.5 % (ref 0–1.9)
BILIRUB SERPL-MCNC: 0.8 MG/DL (ref 0.1–1)
BNP SERPL-MCNC: 57 PG/ML (ref 0–99)
BUN SERPL-MCNC: 23 MG/DL (ref 8–23)
CALCIUM SERPL-MCNC: 9 MG/DL (ref 8.7–10.5)
CANCER AG125 SERPL-ACNC: 4 U/ML (ref 0–30)
CHLORIDE SERPL-SCNC: 105 MMOL/L (ref 95–110)
CO2 SERPL-SCNC: 27 MMOL/L (ref 23–29)
CREAT SERPL-MCNC: 1.2 MG/DL (ref 0.5–1.4)
DIFFERENTIAL METHOD: ABNORMAL
EOSINOPHIL # BLD AUTO: 0.3 K/UL (ref 0–0.5)
EOSINOPHIL NFR BLD: 8.1 % (ref 0–8)
ERYTHROCYTE [DISTWIDTH] IN BLOOD BY AUTOMATED COUNT: 15.6 % (ref 11.5–14.5)
EST. GFR  (AFRICAN AMERICAN): 52.2 ML/MIN/1.73 M^2
EST. GFR  (NON AFRICAN AMERICAN): 45.3 ML/MIN/1.73 M^2
GLUCOSE SERPL-MCNC: 93 MG/DL (ref 70–110)
HCT VFR BLD AUTO: 32.9 % (ref 37–48.5)
HGB BLD-MCNC: 10.1 G/DL (ref 12–16)
IMM GRANULOCYTES # BLD AUTO: 0.01 K/UL (ref 0–0.04)
IMM GRANULOCYTES NFR BLD AUTO: 0.2 % (ref 0–0.5)
LYMPHOCYTES # BLD AUTO: 0.7 K/UL (ref 1–4.8)
LYMPHOCYTES NFR BLD: 16.3 % (ref 18–48)
MCH RBC QN AUTO: 28.5 PG (ref 27–31)
MCHC RBC AUTO-ENTMCNC: 30.7 G/DL (ref 32–36)
MCV RBC AUTO: 93 FL (ref 82–98)
MONOCYTES # BLD AUTO: 0.3 K/UL (ref 0.3–1)
MONOCYTES NFR BLD: 7.4 % (ref 4–15)
NEUTROPHILS # BLD AUTO: 2.7 K/UL (ref 1.8–7.7)
NEUTROPHILS NFR BLD: 67.5 % (ref 38–73)
NRBC BLD-RTO: 0 /100 WBC
PLATELET # BLD AUTO: 166 K/UL (ref 150–450)
PMV BLD AUTO: 10.4 FL (ref 9.2–12.9)
POTASSIUM SERPL-SCNC: 3.5 MMOL/L (ref 3.5–5.1)
PROT SERPL-MCNC: 7 G/DL (ref 6–8.4)
RBC # BLD AUTO: 3.55 M/UL (ref 4–5.4)
SODIUM SERPL-SCNC: 137 MMOL/L (ref 136–145)
URATE SERPL-MCNC: 4.4 MG/DL (ref 2.4–5.7)
WBC # BLD AUTO: 4.05 K/UL (ref 3.9–12.7)

## 2021-08-04 PROCEDURE — 80053 COMPREHEN METABOLIC PANEL: CPT | Performed by: FAMILY MEDICINE

## 2021-08-04 PROCEDURE — 99397 PR PREVENTIVE VISIT,EST,65 & OVER: ICD-10-PCS | Mod: S$GLB,,, | Performed by: OBSTETRICS & GYNECOLOGY

## 2021-08-04 PROCEDURE — 36415 COLL VENOUS BLD VENIPUNCTURE: CPT | Performed by: OBSTETRICS & GYNECOLOGY

## 2021-08-04 PROCEDURE — 1126F AMNT PAIN NOTED NONE PRSNT: CPT | Mod: CPTII,S$GLB,, | Performed by: OBSTETRICS & GYNECOLOGY

## 2021-08-04 PROCEDURE — 3078F DIAST BP <80 MM HG: CPT | Mod: CPTII,S$GLB,, | Performed by: OBSTETRICS & GYNECOLOGY

## 2021-08-04 PROCEDURE — 3074F PR MOST RECENT SYSTOLIC BLOOD PRESSURE < 130 MM HG: ICD-10-PCS | Mod: CPTII,S$GLB,, | Performed by: OBSTETRICS & GYNECOLOGY

## 2021-08-04 PROCEDURE — 3288F FALL RISK ASSESSMENT DOCD: CPT | Mod: CPTII,S$GLB,, | Performed by: OBSTETRICS & GYNECOLOGY

## 2021-08-04 PROCEDURE — 83880 ASSAY OF NATRIURETIC PEPTIDE: CPT | Performed by: FAMILY MEDICINE

## 2021-08-04 PROCEDURE — 84550 ASSAY OF BLOOD/URIC ACID: CPT | Performed by: FAMILY MEDICINE

## 2021-08-04 PROCEDURE — 99397 PER PM REEVAL EST PAT 65+ YR: CPT | Mod: S$GLB,,, | Performed by: OBSTETRICS & GYNECOLOGY

## 2021-08-04 PROCEDURE — 3044F HG A1C LEVEL LT 7.0%: CPT | Mod: CPTII,S$GLB,, | Performed by: OBSTETRICS & GYNECOLOGY

## 2021-08-04 PROCEDURE — 3044F PR MOST RECENT HEMOGLOBIN A1C LEVEL <7.0%: ICD-10-PCS | Mod: CPTII,S$GLB,, | Performed by: OBSTETRICS & GYNECOLOGY

## 2021-08-04 PROCEDURE — 3074F SYST BP LT 130 MM HG: CPT | Mod: CPTII,S$GLB,, | Performed by: OBSTETRICS & GYNECOLOGY

## 2021-08-04 PROCEDURE — 1159F PR MEDICATION LIST DOCUMENTED IN MEDICAL RECORD: ICD-10-PCS | Mod: CPTII,S$GLB,, | Performed by: OBSTETRICS & GYNECOLOGY

## 2021-08-04 PROCEDURE — 1126F PR PAIN SEVERITY QUANTIFIED, NO PAIN PRESENT: ICD-10-PCS | Mod: CPTII,S$GLB,, | Performed by: OBSTETRICS & GYNECOLOGY

## 2021-08-04 PROCEDURE — 1101F PT FALLS ASSESS-DOCD LE1/YR: CPT | Mod: CPTII,S$GLB,, | Performed by: OBSTETRICS & GYNECOLOGY

## 2021-08-04 PROCEDURE — 1160F PR REVIEW ALL MEDS BY PRESCRIBER/CLIN PHARMACIST DOCUMENTED: ICD-10-PCS | Mod: CPTII,S$GLB,, | Performed by: OBSTETRICS & GYNECOLOGY

## 2021-08-04 PROCEDURE — 1101F PR PT FALLS ASSESS DOC 0-1 FALLS W/OUT INJ PAST YR: ICD-10-PCS | Mod: CPTII,S$GLB,, | Performed by: OBSTETRICS & GYNECOLOGY

## 2021-08-04 PROCEDURE — 3008F BODY MASS INDEX DOCD: CPT | Mod: CPTII,S$GLB,, | Performed by: OBSTETRICS & GYNECOLOGY

## 2021-08-04 PROCEDURE — 86304 IMMUNOASSAY TUMOR CA 125: CPT | Performed by: OBSTETRICS & GYNECOLOGY

## 2021-08-04 PROCEDURE — 99999 PR PBB SHADOW E&M-EST. PATIENT-LVL III: ICD-10-PCS | Mod: PBBFAC,,, | Performed by: OBSTETRICS & GYNECOLOGY

## 2021-08-04 PROCEDURE — 1159F MED LIST DOCD IN RCRD: CPT | Mod: CPTII,S$GLB,, | Performed by: OBSTETRICS & GYNECOLOGY

## 2021-08-04 PROCEDURE — 99999 PR PBB SHADOW E&M-EST. PATIENT-LVL III: CPT | Mod: PBBFAC,,, | Performed by: OBSTETRICS & GYNECOLOGY

## 2021-08-04 PROCEDURE — 3008F PR BODY MASS INDEX (BMI) DOCUMENTED: ICD-10-PCS | Mod: CPTII,S$GLB,, | Performed by: OBSTETRICS & GYNECOLOGY

## 2021-08-04 PROCEDURE — 85025 COMPLETE CBC W/AUTO DIFF WBC: CPT | Performed by: FAMILY MEDICINE

## 2021-08-04 PROCEDURE — 3078F PR MOST RECENT DIASTOLIC BLOOD PRESSURE < 80 MM HG: ICD-10-PCS | Mod: CPTII,S$GLB,, | Performed by: OBSTETRICS & GYNECOLOGY

## 2021-08-04 PROCEDURE — 1160F RVW MEDS BY RX/DR IN RCRD: CPT | Mod: CPTII,S$GLB,, | Performed by: OBSTETRICS & GYNECOLOGY

## 2021-08-04 PROCEDURE — 3288F PR FALLS RISK ASSESSMENT DOCUMENTED: ICD-10-PCS | Mod: CPTII,S$GLB,, | Performed by: OBSTETRICS & GYNECOLOGY

## 2021-08-11 ENCOUNTER — OFFICE VISIT (OUTPATIENT)
Dept: FAMILY MEDICINE | Facility: CLINIC | Age: 71
End: 2021-08-11
Payer: COMMERCIAL

## 2021-08-11 VITALS
DIASTOLIC BLOOD PRESSURE: 80 MMHG | SYSTOLIC BLOOD PRESSURE: 130 MMHG | OXYGEN SATURATION: 98 % | HEART RATE: 60 BPM | HEIGHT: 64 IN | WEIGHT: 218.5 LBS | BODY MASS INDEX: 37.3 KG/M2

## 2021-08-11 DIAGNOSIS — H57.89 EYE SWELLING, BILATERAL: Primary | ICD-10-CM

## 2021-08-11 PROCEDURE — 99214 PR OFFICE/OUTPT VISIT, EST, LEVL IV, 30-39 MIN: ICD-10-PCS | Mod: S$GLB,,, | Performed by: FAMILY MEDICINE

## 2021-08-11 PROCEDURE — 1126F AMNT PAIN NOTED NONE PRSNT: CPT | Mod: CPTII,S$GLB,, | Performed by: FAMILY MEDICINE

## 2021-08-11 PROCEDURE — 1159F PR MEDICATION LIST DOCUMENTED IN MEDICAL RECORD: ICD-10-PCS | Mod: CPTII,S$GLB,, | Performed by: FAMILY MEDICINE

## 2021-08-11 PROCEDURE — 3288F FALL RISK ASSESSMENT DOCD: CPT | Mod: CPTII,S$GLB,, | Performed by: FAMILY MEDICINE

## 2021-08-11 PROCEDURE — 1159F MED LIST DOCD IN RCRD: CPT | Mod: CPTII,S$GLB,, | Performed by: FAMILY MEDICINE

## 2021-08-11 PROCEDURE — 1101F PT FALLS ASSESS-DOCD LE1/YR: CPT | Mod: CPTII,S$GLB,, | Performed by: FAMILY MEDICINE

## 2021-08-11 PROCEDURE — 99999 PR PBB SHADOW E&M-EST. PATIENT-LVL V: ICD-10-PCS | Mod: PBBFAC,,, | Performed by: FAMILY MEDICINE

## 2021-08-11 PROCEDURE — 1101F PR PT FALLS ASSESS DOC 0-1 FALLS W/OUT INJ PAST YR: ICD-10-PCS | Mod: CPTII,S$GLB,, | Performed by: FAMILY MEDICINE

## 2021-08-11 PROCEDURE — 3008F BODY MASS INDEX DOCD: CPT | Mod: CPTII,S$GLB,, | Performed by: FAMILY MEDICINE

## 2021-08-11 PROCEDURE — 3075F PR MOST RECENT SYSTOLIC BLOOD PRESS GE 130-139MM HG: ICD-10-PCS | Mod: CPTII,S$GLB,, | Performed by: FAMILY MEDICINE

## 2021-08-11 PROCEDURE — 3079F PR MOST RECENT DIASTOLIC BLOOD PRESSURE 80-89 MM HG: ICD-10-PCS | Mod: CPTII,S$GLB,, | Performed by: FAMILY MEDICINE

## 2021-08-11 PROCEDURE — 1160F RVW MEDS BY RX/DR IN RCRD: CPT | Mod: CPTII,S$GLB,, | Performed by: FAMILY MEDICINE

## 2021-08-11 PROCEDURE — 3044F PR MOST RECENT HEMOGLOBIN A1C LEVEL <7.0%: ICD-10-PCS | Mod: CPTII,S$GLB,, | Performed by: FAMILY MEDICINE

## 2021-08-11 PROCEDURE — 99214 OFFICE O/P EST MOD 30 MIN: CPT | Mod: S$GLB,,, | Performed by: FAMILY MEDICINE

## 2021-08-11 PROCEDURE — 3008F PR BODY MASS INDEX (BMI) DOCUMENTED: ICD-10-PCS | Mod: CPTII,S$GLB,, | Performed by: FAMILY MEDICINE

## 2021-08-11 PROCEDURE — 3075F SYST BP GE 130 - 139MM HG: CPT | Mod: CPTII,S$GLB,, | Performed by: FAMILY MEDICINE

## 2021-08-11 PROCEDURE — 1126F PR PAIN SEVERITY QUANTIFIED, NO PAIN PRESENT: ICD-10-PCS | Mod: CPTII,S$GLB,, | Performed by: FAMILY MEDICINE

## 2021-08-11 PROCEDURE — 3079F DIAST BP 80-89 MM HG: CPT | Mod: CPTII,S$GLB,, | Performed by: FAMILY MEDICINE

## 2021-08-11 PROCEDURE — 3044F HG A1C LEVEL LT 7.0%: CPT | Mod: CPTII,S$GLB,, | Performed by: FAMILY MEDICINE

## 2021-08-11 PROCEDURE — 1160F PR REVIEW ALL MEDS BY PRESCRIBER/CLIN PHARMACIST DOCUMENTED: ICD-10-PCS | Mod: CPTII,S$GLB,, | Performed by: FAMILY MEDICINE

## 2021-08-11 PROCEDURE — 3288F PR FALLS RISK ASSESSMENT DOCUMENTED: ICD-10-PCS | Mod: CPTII,S$GLB,, | Performed by: FAMILY MEDICINE

## 2021-08-11 PROCEDURE — 99999 PR PBB SHADOW E&M-EST. PATIENT-LVL V: CPT | Mod: PBBFAC,,, | Performed by: FAMILY MEDICINE

## 2021-08-11 RX ORDER — CETIRIZINE HYDROCHLORIDE 10 MG/1
10 TABLET ORAL 2 TIMES DAILY
Qty: 60 TABLET | Refills: 0 | Status: SHIPPED | OUTPATIENT
Start: 2021-08-11 | End: 2022-02-02 | Stop reason: SDUPTHER

## 2021-08-11 RX ORDER — METHYLPREDNISOLONE 4 MG/1
TABLET ORAL
Qty: 1 PACKAGE | Refills: 0 | Status: SHIPPED | OUTPATIENT
Start: 2021-08-11 | End: 2022-02-02

## 2021-08-12 ENCOUNTER — OFFICE VISIT (OUTPATIENT)
Dept: OPTOMETRY | Facility: CLINIC | Age: 71
End: 2021-08-12
Payer: COMMERCIAL

## 2021-08-12 DIAGNOSIS — H57.89 EYE SWELLING, BILATERAL: ICD-10-CM

## 2021-08-12 DIAGNOSIS — H01.111 ALLERGIC DERMATITIS OF UPPER AND LOWER LIDS OF BOTH EYES: Primary | ICD-10-CM

## 2021-08-12 DIAGNOSIS — H01.114 ALLERGIC DERMATITIS OF UPPER AND LOWER LIDS OF BOTH EYES: Primary | ICD-10-CM

## 2021-08-12 DIAGNOSIS — H01.115 ALLERGIC DERMATITIS OF UPPER AND LOWER LIDS OF BOTH EYES: Primary | ICD-10-CM

## 2021-08-12 DIAGNOSIS — H02.849 SWELLING OF EYELID, UNSPECIFIED LATERALITY: ICD-10-CM

## 2021-08-12 DIAGNOSIS — H01.112 ALLERGIC DERMATITIS OF UPPER AND LOWER LIDS OF BOTH EYES: Primary | ICD-10-CM

## 2021-08-12 PROCEDURE — 3044F HG A1C LEVEL LT 7.0%: CPT | Mod: CPTII,S$GLB,, | Performed by: OPTOMETRIST

## 2021-08-12 PROCEDURE — 1160F RVW MEDS BY RX/DR IN RCRD: CPT | Mod: CPTII,S$GLB,, | Performed by: OPTOMETRIST

## 2021-08-12 PROCEDURE — 3044F PR MOST RECENT HEMOGLOBIN A1C LEVEL <7.0%: ICD-10-PCS | Mod: CPTII,S$GLB,, | Performed by: OPTOMETRIST

## 2021-08-12 PROCEDURE — 1159F MED LIST DOCD IN RCRD: CPT | Mod: CPTII,S$GLB,, | Performed by: OPTOMETRIST

## 2021-08-12 PROCEDURE — 99999 PR PBB SHADOW E&M-EST. PATIENT-LVL II: CPT | Mod: PBBFAC,,, | Performed by: OPTOMETRIST

## 2021-08-12 PROCEDURE — 92002 INTRM OPH EXAM NEW PATIENT: CPT | Mod: S$GLB,,, | Performed by: OPTOMETRIST

## 2021-08-12 PROCEDURE — 99999 PR PBB SHADOW E&M-EST. PATIENT-LVL II: ICD-10-PCS | Mod: PBBFAC,,, | Performed by: OPTOMETRIST

## 2021-08-12 PROCEDURE — 1160F PR REVIEW ALL MEDS BY PRESCRIBER/CLIN PHARMACIST DOCUMENTED: ICD-10-PCS | Mod: CPTII,S$GLB,, | Performed by: OPTOMETRIST

## 2021-08-12 PROCEDURE — 1159F PR MEDICATION LIST DOCUMENTED IN MEDICAL RECORD: ICD-10-PCS | Mod: CPTII,S$GLB,, | Performed by: OPTOMETRIST

## 2021-08-12 PROCEDURE — 92002 PR EYE EXAM, NEW PATIENT,INTERMED: ICD-10-PCS | Mod: S$GLB,,, | Performed by: OPTOMETRIST

## 2022-01-03 ENCOUNTER — TELEPHONE (OUTPATIENT)
Dept: GYNECOLOGIC ONCOLOGY | Facility: CLINIC | Age: 72
End: 2022-01-03

## 2022-01-03 ENCOUNTER — TELEPHONE (OUTPATIENT)
Dept: OPHTHALMOLOGY | Facility: CLINIC | Age: 72
End: 2022-01-03

## 2022-01-03 NOTE — TELEPHONE ENCOUNTER
----- Message from Bree Lunsford sent at 1/3/2022 12:40 PM CST -----  Regarding: Possible Allergic dermatitis  Contact: Ankita  [facility]  OPHTHALMOLOGY TRIAGE CALL    Date:  1/3/2022   Time:  12:41 PM  Person Calling: Ankita  Phone Number:  [phone] (home)   Call received by:  Bree Lunsford  Patient in Clinic now:  No  Which eye:  Right  Name of Patient's Eye :  Jackie  Date Last Seen:    Disposition of patient: Pt was seen last year for allergic dermatitis and she says she appears to be getting the symptoms again.  The eye lid is swelling up again and it is moving toward the face.  She also complains of a lot of tearing from the eye as well.     For How Long: Weekend    Additional Comments:  [phone]

## 2022-01-03 NOTE — TELEPHONE ENCOUNTER
----- Message from Loly Lacy NP sent at 1/3/2022 12:42 PM CST -----  Regarding: FW: Appt Inquiry  Please call her for 3 month follow up. She was due in November but not due for annual, just surveillance. Will also need a  lab appt and order is in.  She was a [facility] patient so whoever she wants to see, likely at [facility].  Thanks    ----- Message -----  From: Fabiola Sotelo  Sent: 1/3/2022  12:33 PM CST  To: [facility] Staff  Subject: Appt Inquiry                                     Pt called requesting to schedule Annual with physician . Requesting to be seen as soon as possible.    [phone]

## 2022-01-03 NOTE — TELEPHONE ENCOUNTER
Will start pataday, AT, zyrtec cool compresses. Having the same symptoms as before. No pain no changes in vision

## 2022-01-03 NOTE — TELEPHONE ENCOUNTER
Spoke with our patient about her reschedule appointment in gyn oncology she agreed she voiced understanding of the date, time and location. All questions answered . MA/NIALL /Preceptor Efren BRYANT

## 2022-01-04 ENCOUNTER — TELEPHONE (OUTPATIENT)
Dept: GYNECOLOGIC ONCOLOGY | Facility: CLINIC | Age: 72
End: 2022-01-04

## 2022-01-04 ENCOUNTER — LAB VISIT (OUTPATIENT)
Dept: LAB | Facility: HOSPITAL | Age: 72
End: 2022-01-04
Attending: OBSTETRICS & GYNECOLOGY

## 2022-01-04 ENCOUNTER — OFFICE VISIT (OUTPATIENT)
Dept: GYNECOLOGIC ONCOLOGY | Facility: CLINIC | Age: 72
End: 2022-01-04

## 2022-01-04 VITALS
BODY MASS INDEX: 38.94 KG/M2 | WEIGHT: 226.88 LBS | SYSTOLIC BLOOD PRESSURE: 122 MMHG | HEART RATE: 62 BPM | DIASTOLIC BLOOD PRESSURE: 67 MMHG

## 2022-01-04 DIAGNOSIS — C54.1 ENDOMETRIAL CA: ICD-10-CM

## 2022-01-04 DIAGNOSIS — C54.1 ENDOMETRIAL CA: Primary | ICD-10-CM

## 2022-01-04 PROCEDURE — 99999 PR PBB SHADOW E&M-EST. PATIENT-LVL II: ICD-10-PCS | Mod: PBBFAC,,, | Performed by: OBSTETRICS & GYNECOLOGY

## 2022-01-04 PROCEDURE — 36415 COLL VENOUS BLD VENIPUNCTURE: CPT | Performed by: OBSTETRICS & GYNECOLOGY

## 2022-01-04 PROCEDURE — 99214 PR OFFICE/OUTPT VISIT, EST, LEVL IV, 30-39 MIN: ICD-10-PCS | Mod: S$PBB,,, | Performed by: OBSTETRICS & GYNECOLOGY

## 2022-01-04 PROCEDURE — 99212 OFFICE O/P EST SF 10 MIN: CPT | Mod: PBBFAC | Performed by: OBSTETRICS & GYNECOLOGY

## 2022-01-04 PROCEDURE — 99214 OFFICE O/P EST MOD 30 MIN: CPT | Mod: S$PBB,,, | Performed by: OBSTETRICS & GYNECOLOGY

## 2022-01-04 PROCEDURE — 86304 IMMUNOASSAY TUMOR CA 125: CPT | Performed by: OBSTETRICS & GYNECOLOGY

## 2022-01-04 PROCEDURE — 99999 PR PBB SHADOW E&M-EST. PATIENT-LVL II: CPT | Mod: PBBFAC,,, | Performed by: OBSTETRICS & GYNECOLOGY

## 2022-01-04 RX ORDER — ALLOPURINOL 100 MG/1
TABLET ORAL
COMMUNITY
Start: 2021-08-10 | End: 2022-02-02

## 2022-01-04 RX ORDER — METOPROLOL SUCCINATE 50 MG/1
TABLET, EXTENDED RELEASE ORAL
COMMUNITY
Start: 2021-08-10 | End: 2022-02-02

## 2022-01-04 RX ORDER — DIPHENOXYLATE HYDROCHLORIDE AND ATROPINE SULFATE 2.5; .025 MG/1; MG/1
TABLET ORAL
COMMUNITY
Start: 2021-08-10

## 2022-01-04 NOTE — PROGRESS NOTES
Subjective:      Patient ID: Ankita Campo is a 71 y.o. female.    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      Has completed 6 cycles of Taxol and carboplatin in Feb 2021 and completed WPRT 4500 cGy with Dr. Freeman in April 2021.      Aug 2021: : 4 and 1/2021 4, normal and stable  Denies vaginal bleeding. .      Fully vaccinated.      Her oncologic history is:  July 2020: 69 y/o referred by Dr. 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 Patient complained of PMB onset 5 months ago. Subsequently had a D&C 7/7/2020 with final pathology revealing high grade serous endometrial carcinoma. Surgical history includes cholecystectomy and CS x 1. Family history significant for maternal aunt with breast cancer. Co-morbidities include HTN and obesity.     TVUS 3/2020  Uterus 7.72 x 4.27 cm with mass 2.7 x 2.8 cm. No evidence of L or R ovary.        Aug 2020: CT scan: There is a 1.6 cm soft tissue nodule in the left mid abdomen series 2, image 117 and series 601, image 51.  This may represent an enlarged mesenteric node. PET: Stable soft tissue nodule within the left mid abdomen, likely represents enlarged mesenteric lymph node.  No associated focal increased radiotracer uptake; however, lesion remains suspicious for metastasis due to size/CT appearance.     Aug 2020:Robotic assisted laparoscopic hysterectomy BSO and bilateral sentinel node biopsies and omentectomy for serous ca of the uterus on 8/24/2020. 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 No lymph nodes noted on SLN bx. (+) LVSI. Preop : 5.      Sep 2020: Started taxol and carboplatin. Plan is for 6 cycles taxol and carboplatin then reimage with PET(PET negative mesenteric LN) and then WPRT if PET negative     Dec 1, 2020  admitted after C4 with rectal bleeding. Ischemic colitis. Given flagyl.   No further blood in stool. No abdominal pain at this time. Also thrombocytopenia with plt radhika of 76,000.      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 PET: Postoperative change including robotic hysterectomy and bilateral salpingo oophorectomy.  No hypermetabolic activity in the pelvis to suggest disease recurrence. Stable appearance of a soft tissue nodule in the left mid abdominal mesentery measuring 1.6 x 1.4 cm (axial series 3, image 114), which does not demonstrate increased tracer uptake.      Apr 2021: completed WPRT 4500 cGy.   Review of Systems   Constitutional: Negative for appetite change, chills, fatigue and fever.   HENT: Negative for mouth sores.    Respiratory: Negative for cough and shortness of breath.    Cardiovascular: Negative for leg swelling.   Gastrointestinal: Negative for abdominal pain, blood in stool, constipation and diarrhea.   Endocrine: Negative for cold intolerance.   Genitourinary: Negative for dysuria and vaginal bleeding.   Musculoskeletal: Negative for myalgias.   Skin: Negative for rash.   Allergic/Immunologic: Negative.    Neurological: Negative for weakness and numbness.   Hematological: Negative for adenopathy. Does not bruise/bleed easily.   Psychiatric/Behavioral: Negative for confusion.       Objective:   Physical Exam:   Constitutional: She is oriented to person, place, and time. She appears well-developed and well-nourished.    HENT:   Head: Normocephalic and atraumatic.    Eyes: Pupils are equal, round, and reactive to light. EOM are normal.    Neck: No thyromegaly present.    Cardiovascular: Normal rate, regular rhythm and intact distal pulses.     Pulmonary/Chest: Effort normal and breath sounds normal. No respiratory distress. She has no wheezes.        Abdominal: Soft. Bowel sounds are normal. She  exhibits no distension, no ascites and no mass. There is no abdominal tenderness.     Genitourinary:    Vagina and rectum normal.      Pelvic exam was performed with patient supine.   There is no lesion on the right labia. There is no lesion on the left labia. There is an absent adnexa. Right adnexum displays no mass. Left adnexum displays no mass. Vaginal cuff normal.  Cervix is absent.Uterus is absent.           Musculoskeletal: Normal range of motion and moves all extremeties.      Lymphadenopathy:     She has no cervical adenopathy.        Right: No inguinal and no supraclavicular adenopathy present.        Left: No inguinal and no supraclavicular adenopathy present.    Neurological: She is alert and oriented to person, place, and time.    Skin: Skin is warm and dry. No rash noted.    Psychiatric: She has a normal mood and affect.       Assessment:     1. Endometrial ca        Plan:   No orders of the defined types were placed in this encounter.    No evidence of disease on today's exam  Feels well, no new symptoms  Disease free interval 8 months   Tumor markers  normal and stable.     Recommend continued surveillance. RTC 3 months with  or sooner if needed.     I spent approximately 30 minutes reviewing the available records and evaluating the patient, out of which over 50% of the time was spent face to face with the patient in counseling and coordinating this patient's care.

## 2022-01-05 LAB — CANCER AG125 SERPL-ACNC: 4 U/ML (ref 0–30)

## 2022-01-24 DIAGNOSIS — E89.0 POSTOPERATIVE HYPOTHYROIDISM: ICD-10-CM

## 2022-01-24 NOTE — TELEPHONE ENCOUNTER
No new care gaps identified.  Powered by [employer]. Reference number: [id number].   1/24/2022 1:55:30 PM CST

## 2022-02-02 ENCOUNTER — LAB VISIT (OUTPATIENT)
Dept: LAB | Facility: HOSPITAL | Age: 72
End: 2022-02-02
Attending: FAMILY MEDICINE

## 2022-02-02 ENCOUNTER — OFFICE VISIT (OUTPATIENT)
Dept: FAMILY MEDICINE | Facility: CLINIC | Age: 72
End: 2022-02-02

## 2022-02-02 VITALS
BODY MASS INDEX: 40.27 KG/M2 | HEART RATE: 66 BPM | WEIGHT: 235.88 LBS | OXYGEN SATURATION: 97 % | SYSTOLIC BLOOD PRESSURE: 130 MMHG | DIASTOLIC BLOOD PRESSURE: 80 MMHG | HEIGHT: 64 IN

## 2022-02-02 DIAGNOSIS — E89.0 POSTOPERATIVE HYPOTHYROIDISM: ICD-10-CM

## 2022-02-02 DIAGNOSIS — M1A.39X0 CHRONIC GOUT DUE TO RENAL IMPAIRMENT OF MULTIPLE SITES WITHOUT TOPHUS: ICD-10-CM

## 2022-02-02 DIAGNOSIS — C54.1 ENDOMETRIAL CA: ICD-10-CM

## 2022-02-02 DIAGNOSIS — I10 ESSENTIAL HYPERTENSION: Primary | ICD-10-CM

## 2022-02-02 DIAGNOSIS — N18.31 STAGE 3A CHRONIC KIDNEY DISEASE: ICD-10-CM

## 2022-02-02 DIAGNOSIS — Z23 NEED FOR INFLUENZA VACCINATION: ICD-10-CM

## 2022-02-02 DIAGNOSIS — T45.1X5A CHEMOTHERAPY-INDUCED THROMBOCYTOPENIA: ICD-10-CM

## 2022-02-02 DIAGNOSIS — M10.071 ACUTE IDIOPATHIC GOUT OF RIGHT FOOT: ICD-10-CM

## 2022-02-02 DIAGNOSIS — H57.89 EYE SWELLING, BILATERAL: ICD-10-CM

## 2022-02-02 DIAGNOSIS — D63.8 ANEMIA OF CHRONIC DISEASE: ICD-10-CM

## 2022-02-02 DIAGNOSIS — E78.49 OTHER HYPERLIPIDEMIA: ICD-10-CM

## 2022-02-02 DIAGNOSIS — R73.01 ELEVATED FASTING GLUCOSE: ICD-10-CM

## 2022-02-02 DIAGNOSIS — D69.59 CHEMOTHERAPY-INDUCED THROMBOCYTOPENIA: ICD-10-CM

## 2022-02-02 PROBLEM — Z01.419 WELL WOMAN EXAM WITH ROUTINE GYNECOLOGICAL EXAM: Status: RESOLVED | Noted: 2021-08-04 | Resolved: 2022-02-02

## 2022-02-02 PROCEDURE — 99214 PR OFFICE/OUTPT VISIT, EST, LEVL IV, 30-39 MIN: ICD-10-PCS | Mod: S$PBB,,, | Performed by: FAMILY MEDICINE

## 2022-02-02 PROCEDURE — 90694 VACC AIIV4 NO PRSRV 0.5ML IM: CPT | Mod: PBBFAC,PO

## 2022-02-02 PROCEDURE — 99214 OFFICE O/P EST MOD 30 MIN: CPT | Mod: S$PBB,,, | Performed by: FAMILY MEDICINE

## 2022-02-02 PROCEDURE — 99999 PR PBB SHADOW E&M-EST. PATIENT-LVL IV: ICD-10-PCS | Mod: PBBFAC,,, | Performed by: FAMILY MEDICINE

## 2022-02-02 PROCEDURE — 99214 OFFICE O/P EST MOD 30 MIN: CPT | Mod: PBBFAC,PO | Performed by: FAMILY MEDICINE

## 2022-02-02 PROCEDURE — 99999 PR PBB SHADOW E&M-EST. PATIENT-LVL IV: CPT | Mod: PBBFAC,,, | Performed by: FAMILY MEDICINE

## 2022-02-02 PROCEDURE — 36415 COLL VENOUS BLD VENIPUNCTURE: CPT | Mod: PO | Performed by: FAMILY MEDICINE

## 2022-02-02 PROCEDURE — 80048 BASIC METABOLIC PNL TOTAL CA: CPT | Performed by: FAMILY MEDICINE

## 2022-02-02 RX ORDER — LEVOTHYROXINE SODIUM 112 UG/1
TABLET ORAL
Qty: 96 TABLET | Refills: 1 | Status: SHIPPED | OUTPATIENT
Start: 2022-02-02 | End: 2022-09-09

## 2022-02-02 RX ORDER — AMLODIPINE BESYLATE 10 MG/1
10 TABLET ORAL DAILY
Qty: 90 TABLET | Refills: 1 | Status: SHIPPED | OUTPATIENT
Start: 2022-02-02 | End: 2022-11-29

## 2022-02-02 RX ORDER — VALSARTAN AND HYDROCHLOROTHIAZIDE 320; 25 MG/1; MG/1
1 TABLET, FILM COATED ORAL DAILY
Qty: 90 TABLET | Refills: 1 | Status: SHIPPED | OUTPATIENT
Start: 2022-02-02 | End: 2023-07-11 | Stop reason: SDUPTHER

## 2022-02-02 RX ORDER — ROSUVASTATIN CALCIUM 5 MG/1
5 TABLET, COATED ORAL DAILY
Qty: 90 TABLET | Refills: 3 | Status: SHIPPED | OUTPATIENT
Start: 2022-02-02 | End: 2022-11-29

## 2022-02-02 RX ORDER — ALLOPURINOL 300 MG/1
300 TABLET ORAL DAILY
Qty: 90 TABLET | Refills: 1 | Status: SHIPPED | OUTPATIENT
Start: 2022-02-02 | End: 2022-11-29

## 2022-02-02 RX ORDER — CETIRIZINE HYDROCHLORIDE 10 MG/1
10 TABLET ORAL NIGHTLY
Qty: 90 TABLET | Refills: 3 | Status: SHIPPED | OUTPATIENT
Start: 2022-02-02 | End: 2023-02-02

## 2022-02-02 NOTE — PROGRESS NOTES
"Subjective:       Patient ID: Ankita Campo is a 71 y.o. female.    Chief Complaint: Hypertension    Ankita is a 71 y.o. 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     Increased valsartan/hctz from 320/12.5 to 320/25 and added metoprolol In late 2020. This was then stopped. She is on amlodipine. She reports not taking metoprolol today. BP is stable.      Gout: on allopurinol. Acute gout has resolved.   DLD: on crestor. No side effects.      stage IIIA serous carcinoma of the endometrium.  She is status post hysterectomy. Seeing Gyn/Onc. At last visit, 1/2022, "No evidence of disease on today's exam  Feels well, no new symptoms Disease free interval 8 months Tumor markers  normal and stable"    Still has some eye swelling, in the AM. This has improved from last visit. Resolves spontaneously during the day otherwise.       Review of Systems   Constitutional: Negative for chills and fever.   Eyes:        Eye swelling   Gastrointestinal: Negative for diarrhea, nausea and vomiting.   Genitourinary: Negative for difficulty urinating and dysuria.   Skin: Negative for rash.   Neurological: Negative for dizziness, light-headedness and headaches.                  Objective:     Vitals:    02/02/22 1349   BP: 130/80   Pulse: 66        Physical Exam  Vitals and nursing note reviewed.   Constitutional:       General: She is not in acute distress.     Appearance: She is obese. She is not ill-appearing, toxic-appearing or diaphoretic.   Eyes:      Comments: Eyes appear normal in exam today   Cardiovascular:      Rate and Rhythm: Normal rate and regular rhythm.   Pulmonary:      Effort: Pulmonary effort is normal.      Breath sounds: Normal breath sounds.   Abdominal:      Palpations: Abdomen is soft.      Tenderness: There is no abdominal tenderness.   Musculoskeletal:         General: No swelling.   Skin:     Findings: No rash.   Neurological:      " General: No focal deficit present.      Mental Status: She is alert.   Psychiatric:         Mood and Affect: Mood normal.         Behavior: Behavior normal.         Thought Content: Thought content normal.         Judgment: Judgment normal.         Assessment:       1. Essential hypertension    2. Other hyperlipidemia    3. Stage 3a chronic kidney disease    4. Postoperative hypothyroidism    5. Chronic gout due to renal impairment of multiple sites without tophus    6. Chemotherapy-induced thrombocytopenia    7. Endometrial ca    8. Eye swelling, bilateral    9. Acute idiopathic gout of right foot    10. Need for influenza vaccination    11. Elevated fasting glucose    12. Anemia of chronic disease        Plan:       Continue not taking metoprolol  Continue valsartan/hctz  Continue amlodipine 10 mg  Continue allopurinol 300 mg  Continue synthroid 112, daily, except once weekly 1/2 pill  Continue crestor 5 mg for cholesterol    Start zyrtec nightly for intermittent eye swelling.     Kidney function is a little low. No NSAIDS such as ibuprofen or naproxen. Avoid Red meats. Drink a lot of water. I will continue monitoring kidney function q3-6 months.    DEXA in 2023        Essential hypertension  -     Comprehensive Metabolic Panel; Future; Expected date: 02/02/2022  -     amLODIPine (NORVASC) 10 MG tablet; Take 1 tablet (10 mg total) by mouth once daily.  Dispense: 90 tablet; Refill: 1  -     valsartan-hydrochlorothiazide (DIOVAN-HCT) 320-25 mg per tablet; Take 1 tablet by mouth once daily.  Dispense: 90 tablet; Refill: 1    Other hyperlipidemia  -     Lipid Panel; Future; Expected date: 02/02/2022  -     rosuvastatin (CRESTOR) 5 MG tablet; Take 1 tablet (5 mg total) by mouth once daily.  Dispense: 90 tablet; Refill: 3    Stage 3a chronic kidney disease  -     Comprehensive Metabolic Panel; Future; Expected date: 02/02/2022  -     Basic Metabolic Panel; Future; Expected date: 02/02/2022  -     allopurinoL (ZYLOPRIM)  300 MG tablet; Take 1 tablet (300 mg total) by mouth once daily. 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  Dispense: 90 tablet; Refill: 3    Acute idiopathic gout of right foot  -     allopurinoL (ZYLOPRIM) 300 MG tablet; Take 1 tablet (300 mg total) by mouth once daily. Take in place of 100 mg  Dispense: 90 tablet; Refill: 1    Need for influenza vaccination  -     Influenza (FLUAD) - Quadrivalent (Adjuvanted) *Preferred* (65+) (PF)    Elevated fasting glucose  -     Hemoglobin A1C; Future; Expected date: 02/02/2022    Anemia of chronic disease  -     CBC Auto Differential; Future; Expected date: 02/02/2022  -     Iron and TIBC; Future; Expected date: 02/02/2022  -     Ferritin; Future; Expected date: 02/02/2022        Warning signs discussed, patient to call with any further issues or worsening of symptoms.

## 2022-02-02 NOTE — PATIENT INSTRUCTIONS
Continue not taking metoprolol  Continue valsartan/hctz  Continue amlodipine 10 mg  Continue allopurinol 300 mg  Continue synthroid 112, daily, except once weekly 1/2 pill  Continue crestor 5 mg for cholesterol    Start zyrtec nightly for intermittent eye swelling.     Kidney function is a little low. No NSAIDS such as ibuprofen or naproxen. Avoid Red meats. Drink a lot of water. I will continue monitoring kidney function q3-6 months.    DEXA in 2023

## 2022-02-03 ENCOUNTER — TELEPHONE (OUTPATIENT)
Dept: INTERNAL MEDICINE | Facility: CLINIC | Age: 72
End: 2022-02-03

## 2022-02-03 LAB
ANION GAP SERPL CALC-SCNC: 12 MMOL/L (ref 8–16)
BUN SERPL-MCNC: 24 MG/DL (ref 8–23)
CALCIUM SERPL-MCNC: 9.2 MG/DL (ref 8.7–10.5)
CHLORIDE SERPL-SCNC: 107 MMOL/L (ref 95–110)
CO2 SERPL-SCNC: 22 MMOL/L (ref 23–29)
CREAT SERPL-MCNC: 1.2 MG/DL (ref 0.5–1.4)
EST. GFR  (AFRICAN AMERICAN): 52.2 ML/MIN/1.73 M^2
EST. GFR  (NON AFRICAN AMERICAN): 45.3 ML/MIN/1.73 M^2
GLUCOSE SERPL-MCNC: 84 MG/DL (ref 70–110)
POTASSIUM SERPL-SCNC: 4 MMOL/L (ref 3.5–5.1)
SODIUM SERPL-SCNC: 141 MMOL/L (ref 136–145)

## 2022-02-03 NOTE — TELEPHONE ENCOUNTER
Refill Authorization Note   Ankita Campo  is requesting a refill authorization.  Brief Assessment and Rationale for Refill:  Approve     Medication Therapy Plan:       Medication Reconciliation Completed: No   Comments:   --->Care Gap information included below if applicable.   Orders Placed This Encounter    levothyroxine (SYNTHROID) 112 MCG tablet      Requested Prescriptions   Signed Prescriptions Disp Refills    levothyroxine (SYNTHROID) 112 MCG tablet 96 tablet 1     Sig: TAKE 1 TABLET BY MOUTH 6 DAYS A WEEK AND 1 &1/2 TABLETS ONE DAY A WEEK       Endocrinology:  Hypothyroid Agents Passed - 2/2/2022  8:11 PM        Passed - Patient is at least 18 years old        Passed - Valid encounter within last 15 months     Recent Visits  Date Type Provider Dept   08/11/21 Office Visit Chidi Mccurdy DO [facility]   07/02/21 Office Visit Chidi Mccurdy,  [facility]   04/19/21 Office Visit Chidi Mccurdy,  [facility]   12/15/20 Office Visit Chidi Mccurdy,  [facility]   11/02/20 Office Visit Chidi Mccurdy DO [facility]   09/15/20 Office Visit Chidi Mccurdy,  [facility]   05/19/20 Office Visit Michael Elias MD [facility]   04/07/20 Office Visit Michael Elias MD [facility]   Showing recent visits within past 720 days and meeting all other requirements  Today's Visits  Date Type Provider Dept   02/02/22 Office Visit Chidi Mccurdy DO [facility]   Showing today's visits and meeting all other requirements  Future Appointments  No visits were found meeting these conditions.  Showing future appointments within next 150 days and meeting all other requirements      Future Appointments              In 2 months MD [facility] - Gyn Onc 2nd Fl, [place]    In 6 months LAB, [facility] [place]    In 6 months DO [place] - [facility], [place]                 Passed - Manual Review: FT4 is not required if last TSH is WNL.        Passed - TSH in normal range and within 360 days     TSH   Date Value Ref Range Status   07/02/2021 2.761 0.400 - 4.000 uIU/mL Final   05/15/2020 3.650 0.400 - 4.000 uIU/mL Final   04/02/2020 92.623 (H) 0.400 - 4.000 uIU/mL Final              Passed - T4 free within 1080 days     Free T4   Date Value Ref Range Status   07/02/2021 1.02 0.71 - 1.51 ng/dL Final   04/02/2020 <0.40 (L) 0.71 - 1.51 ng/dL Final   12/10/2018 0.90 0.71 - 1.51 ng/dL Final                  Appointments  past 12m or future 3m with PCP    Date Provider   Last Visit   8/11/2021 Chidi Mccurdy DO   Next Visit   2/2/2022 Chidi Mccurdy DO   ED visits in past 90 days: 0     Note composed:8:13 PM 02/02/2022

## 2022-02-03 NOTE — TELEPHONE ENCOUNTER
----- Message from Dori Dutta MD sent at 2/3/2022  3:38 PM CST -----  Contact: [phone]    ----- Message -----  From: Brit Herman  Sent: 2/3/2022  12:28 PM CST  To: [facility] Staff    Who Called: PT  Regarding: returning call Would the patient rather a call back or a response via [facility]? Call back  Best Call Back Number: [phone]  Additional Information:

## 2022-02-14 NOTE — ASSESSMENT & PLAN NOTE
Encouraged compression stockings to reduce leg fatigue, swelling, or pain. Increased risk of thrombosis.   Thank you for coming to our Emergency Department today. It is important to remember that some problems are difficult to diagnose and may not be found during your first visit. Be sure to follow up with your primary care doctor.  If you do not have one, you may contact the one listed on your discharge paperwork or you may also call the [facility] Appointment Desk at [phone] to schedule an appointment with one.     Return to the ER with any questions/concerns, new/concerning symptoms, worsening or failure to improve. Do not drive or make any important decisions for 24 hours if you have received any pain medications, sedatives or mood altering drugs during your ER visit.

## 2022-08-31 DIAGNOSIS — Z78.0 MENOPAUSE: ICD-10-CM

## 2022-10-10 ENCOUNTER — PATIENT MESSAGE (OUTPATIENT)
Dept: ADMINISTRATIVE | Facility: HOSPITAL | Age: 72
End: 2022-10-10

## 2022-11-28 DIAGNOSIS — E78.49 OTHER HYPERLIPIDEMIA: ICD-10-CM

## 2022-11-28 DIAGNOSIS — N18.31 STAGE 3A CHRONIC KIDNEY DISEASE: ICD-10-CM

## 2022-11-28 DIAGNOSIS — M10.071 ACUTE IDIOPATHIC GOUT OF RIGHT FOOT: ICD-10-CM

## 2022-11-28 DIAGNOSIS — E89.0 POSTOPERATIVE HYPOTHYROIDISM: ICD-10-CM

## 2022-11-28 DIAGNOSIS — I10 ESSENTIAL HYPERTENSION: ICD-10-CM

## 2022-11-28 NOTE — TELEPHONE ENCOUNTER
Care Due:                  Date            Visit Type   Department     Provider  --------------------------------------------------------------------------------                                EP -                              PRIMARY      [facility]  Last Visit: 02-      [facility]   MEDICINE       Chidi Mccurdy  Next Visit: None Scheduled  None         None Found                                                            Last  Test          Frequency    Reason                     Performed    Due Date  --------------------------------------------------------------------------------    Office Visit  12 months..  allopurinoL, amLODIPine,   02- 01-                             cetirizine, rosuvastatin,                             valsartan-hydrochlorothia                             zide.....................    CBC.........  12 months..  allopurinoL..............  08- 07-    CMP.........  12 months..  allopurinoL,               08- 07-                             rosuvastatin,                             valsartan-hydrochlorothia                             zide.....................    Lipid Panel.  12 months..  rosuvastatin.............  07- 06-    Uric Acid...  12 months..  allopurinoL..............  08- 07-    [facility] Embedded Care Gaps. Reference number: [id number]. 11/28/2022   12:38:06 PM CST

## 2022-11-29 RX ORDER — AMLODIPINE BESYLATE 10 MG/1
TABLET ORAL
Qty: 45 TABLET | Refills: 0 | Status: SHIPPED | OUTPATIENT
Start: 2022-11-29 | End: 2023-07-11 | Stop reason: SDUPTHER

## 2022-11-29 RX ORDER — ROSUVASTATIN CALCIUM 5 MG/1
TABLET, COATED ORAL
Qty: 45 TABLET | Refills: 0 | Status: SHIPPED | OUTPATIENT
Start: 2022-11-29 | End: 2023-07-11 | Stop reason: SDUPTHER

## 2022-11-29 RX ORDER — LEVOTHYROXINE SODIUM 112 UG/1
TABLET ORAL
Qty: 45 TABLET | Refills: 0 | Status: SHIPPED | OUTPATIENT
Start: 2022-11-29 | End: 2023-01-31 | Stop reason: SDUPTHER

## 2022-11-29 RX ORDER — ALLOPURINOL 300 MG/1
TABLET ORAL
Qty: 45 TABLET | Refills: 0 | Status: SHIPPED | OUTPATIENT
Start: 2022-11-29 | End: 2023-07-11 | Stop reason: SDUPTHER

## 2022-11-29 NOTE — TELEPHONE ENCOUNTER
Refill Routing Note   Medication(s) are not appropriate for processing by [facility] for the following reason(s):      - Required laboratory values are outdated  - Drug-Disease Interaction (amLODIPine and Hepatomegaly)    [facility] action(s):  Defer Medication-related problems identified:   Drug-disease interaction  Requires labs  Requires appointment        Medication reconciliation completed: No     Appointments  past 12m or future 3m with PCP    Date Provider   Last Visit   2/2/2022 Chidi Mccurdy DO   Next Visit   Visit date not found Chidi Mccurdy DO   ED visits in past 90 days: 0        Note composed:10:41 AM 11/29/2022

## 2023-01-17 ENCOUNTER — PATIENT MESSAGE (OUTPATIENT)
Dept: ADMINISTRATIVE | Facility: HOSPITAL | Age: 73
End: 2023-01-17
Payer: COMMERCIAL

## 2023-01-17 NOTE — TELEPHONE ENCOUNTER
----- Message from Nathalie Mccallum MD sent at 5/11/2017  4:37 PM CDT -----  pls call to schedule an appt next avail and put on wait list. elizabeth  ----- Message -----     From: Michael Elias MD     Sent: 5/11/2017  12:24 PM       To: Nathalie Mccallum MD    Patient came in recently for bullous lesions along the ankles. It was my first impression that because she has chronic venous incompetence and ankle edema, these were venous stasis ulcers but on reviewing her chart in more detail I see you evaluated her for a similar presentation. I am treating as a venous stasis issue but wonder if in fact she should be seeing you instead. Happy to hear your input.      DATE OF SERVICE: 01-17-23 @ 13:36    Patient is a 87y old  Female who presents with a chief complaint of Fall (17 Jan 2023 06:08)      INTERVAL HISTORY: Feels ok. Daughter Smiley at bedside.     REVIEW OF SYSTEMS:  CONSTITUTIONAL: No weakness  EYES/ENT: No visual changes;  No throat pain   NECK: No pain or stiffness  RESPIRATORY: No cough, wheezing; No shortness of breath  CARDIOVASCULAR: No chest pain or palpitations  GASTROINTESTINAL: No abdominal  pain. No nausea, vomiting, or hematemesis  GENITOURINARY: No dysuria, frequency or hematuria  NEUROLOGICAL: No stroke like symptoms  SKIN: No rashes    	  MEDICATIONS:  losartan 25 milliGRAM(s) Oral daily  metoprolol succinate ER 25 milliGRAM(s) Oral daily        PHYSICAL EXAM:  T(C): 36.6 (01-17-23 @ 11:33), Max: 36.6 (01-17-23 @ 11:33)  HR: 82 (01-17-23 @ 11:33) (70 - 86)  BP: 105/62 (01-17-23 @ 11:33) (105/62 - 131/69)  RR: 18 (01-17-23 @ 11:33) (18 - 18)  SpO2: 96% (01-17-23 @ 11:33) (93% - 96%)  Wt(kg): --  I&O's Summary    16 Jan 2023 07:01  -  17 Jan 2023 07:00  --------------------------------------------------------  IN: 340 mL / OUT: 290 mL / NET: 50 mL    17 Jan 2023 07:01  -  17 Jan 2023 13:36  --------------------------------------------------------  IN: 490 mL / OUT: 0 mL / NET: 490 mL          Appearance: In no distress	  HEENT:    PERRL, EOMI	  Cardiovascular:  S1 S2, No JVD  Respiratory: Lungs clear to auscultation	  Gastrointestinal:  Soft, Non-tender, + BS	  Vascularature:  No edema of LE  Psychiatric: Appropriate affect   Neuro: no acute focal deficits                               11.3   9.85  )-----------( 291      ( 16 Jan 2023 19:57 )             33.8     01-16    138  |  102  |  16  ----------------------------<  113<H>  3.9   |  25  |  0.60    Ca    8.6      16 Jan 2023 06:34    TPro  5.9<L>  /  Alb  3.3  /  TBili  0.5  /  DBili  x   /  AST  16  /  ALT  5<L>  /  AlkPhos  50  01-16        Labs personally reviewed      ASSESSMENT/PLAN: 	    87-year-old female past medical history of PE on Xarelto, anxiety, depression, hyperlipidemia, MVP who presented after a fall at home found to have impacted left femoral neck fracture    Problem/Plan - 1:  ·  Problem: New Systolic HF  ·  Plan: Troponin level elevated on admission, repeats essentially flat. Likely elevated in setting of systolic HF with EF 35% and downtrending from MI in late November   - [facility] hospitalization records obtained from November 24, 2022  - Admitted there with chest pain, found to have as noted on CTA report "small volume PE in the left upper lobe segmental branches and ? RLL subsegmental branches"  - Trop noted to be 1597 but was never trended to peak,   EKG there with LBBB  - I suspect her presentation there was consistent with ?MI (small PE shouldn't mount a trop og 1597 or likely higher) with now resultant drop in EF now  - She has been chest pain free since her hospitalization and not SOB  - Will need to initiate GDMT for sHF post op.  - c/w Metoprolol 25mg PO daily.   - Will start Losartan 25mg PO daily. If will tolerated, will consider switching to Entresto.     Problem/Plan - 2:  ·  Problem: Preop Risk Stratification  ·  Plan: Given recent nonvascularized MI with sHF she at high risk for mod risk non-elective urgent ORIF  - discussed with daughter that pt is high risk for torrey-operative CV events including MI and death   - Tolerated surgery well.     Problem/Plan - 3:  ·  Problem: Pulmonary embolism.   ·  Plan:    - Xarelto resumed    Problem/Plan - 4:  ·  Problem: Essential hypertension.   ·  Plan: BP overall acceptable   - Amlodipine DC.  - c/w Metoprolol  - Started Losartan 25mg PO daily with hold parameters        Raysa Vásquez, LINA-NP   Clyde Anguiano DO [facility]  Cardiovascular Medicine  [address] [place] [address], [address]  Available through call or text on [employer]  Office: [phone]   DATE OF SERVICE: 01-17-23 @ 13:36    Patient is a 87y old  Female who presents with a chief complaint of Fall (17 Jan 2023 06:08)      INTERVAL HISTORY: Feels ok. Daughter Smiley at bedside.     REVIEW OF SYSTEMS:  CONSTITUTIONAL: No weakness  EYES/ENT: No visual changes;  No throat pain   NECK: No pain or stiffness  RESPIRATORY: No cough, wheezing; No shortness of breath  CARDIOVASCULAR: No chest pain or palpitations  GASTROINTESTINAL: No abdominal  pain. No nausea, vomiting, or hematemesis  GENITOURINARY: No dysuria, frequency or hematuria  NEUROLOGICAL: No stroke like symptoms  SKIN: No rashes    	  MEDICATIONS:  losartan 25 milliGRAM(s) Oral daily  metoprolol succinate ER 25 milliGRAM(s) Oral daily        PHYSICAL EXAM:  T(C): 36.6 (01-17-23 @ 11:33), Max: 36.6 (01-17-23 @ 11:33)  HR: 82 (01-17-23 @ 11:33) (70 - 86)  BP: 105/62 (01-17-23 @ 11:33) (105/62 - 131/69)  RR: 18 (01-17-23 @ 11:33) (18 - 18)  SpO2: 96% (01-17-23 @ 11:33) (93% - 96%)  Wt(kg): --  I&O's Summary    16 Jan 2023 07:01  -  17 Jan 2023 07:00  --------------------------------------------------------  IN: 340 mL / OUT: 290 mL / NET: 50 mL    17 Jan 2023 07:01  -  17 Jan 2023 13:36  --------------------------------------------------------  IN: 490 mL / OUT: 0 mL / NET: 490 mL          Appearance: In no distress	  HEENT:    PERRL, EOMI	  Cardiovascular:  S1 S2, No JVD  Respiratory: Lungs clear to auscultation	  Gastrointestinal:  Soft, Non-tender, + BS	  Vascularature:  No edema of LE  Psychiatric: Appropriate affect   Neuro: no acute focal deficits                               11.3   9.85  )-----------( 291      ( 16 Jan 2023 19:57 )             33.8     01-16    138  |  102  |  16  ----------------------------<  113<H>  3.9   |  25  |  0.60    Ca    8.6      16 Jan 2023 06:34    TPro  5.9<L>  /  Alb  3.3  /  TBili  0.5  /  DBili  x   /  AST  16  /  ALT  5<L>  /  AlkPhos  50  01-16        Labs personally reviewed      ASSESSMENT/PLAN: 	    87-year-old female past medical history of PE on Xarelto, anxiety, depression, hyperlipidemia, MVP who presented after a fall at home found to have impacted left femoral neck fracture    Problem/Plan - 1:  ·  Problem: New Systolic HF  ·  Plan: Troponin level elevated on admission, repeats essentially flat. Likely elevated in setting of systolic HF with EF 35% and downtrending from MI in late November   - [facility] hospitalization records obtained from November 24, 2022  - Admitted there with chest pain, found to have as noted on CTA report "small volume PE in the left upper lobe segmental branches and ? RLL subsegmental branches"  - Trop noted to be 1597 but was never trended to peak,   EKG there with LBBB  - I suspect her presentation there was consistent with ?MI (small PE shouldn't mount a trop og 1597 or likely higher) with now resultant drop in EF now  - She has been chest pain free since her hospitalization and not SOB  - Will need to initiate GDMT for sHF post op.  - Will start ASA 81mg PO daily and atorvastatin 40mg PO daily and cont BB as medical management for presumed CAD.   - Tolerated losartan well. Will change to Entresto 24/26mg PO BID starting tomorrow, 1/18.    Problem/Plan - 2:  ·  Problem: Preop Risk Stratification  ·  Plan: Given recent nonvascularized MI with sHF she at high risk for mod risk non-elective urgent ORIF  - discussed with daughter that pt is high risk for torrey-operative CV events including MI and death   - Tolerated surgery well.     Problem/Plan - 3:  ·  Problem: Pulmonary embolism.   ·  Plan:    - Xarelto resumed    Problem/Plan - 4:  ·  Problem: Essential hypertension.   ·  Plan: BP overall acceptable   - Amlodipine DC.  - c/w Metoprolol and losartan to be transition to Entresto  - BP wnl.    To follow up as OP with Dr. Anguiano for further titration of sHF GDMT.        Raysa Vásquez, AG-NP   Clyde Anguiano,  [facility]  Cardiovascular Medicine  [address] [place] [address], [address]  Available through call or text on [employer] TE[employer]  Office: [phone]   DATE OF SERVICE: 01-17-23 @ 13:36    Patient is a 87y old  Female who presents with a chief complaint of Fall (17 Jan 2023 06:08)      INTERVAL HISTORY: Feels ok. Daughter Smiley at bedside.     REVIEW OF SYSTEMS:  CONSTITUTIONAL: No weakness  EYES/ENT: No visual changes;  No throat pain   NECK: No pain or stiffness  RESPIRATORY: No cough, wheezing; No shortness of breath  CARDIOVASCULAR: No chest pain or palpitations  GASTROINTESTINAL: No abdominal  pain. No nausea, vomiting, or hematemesis  GENITOURINARY: No dysuria, frequency or hematuria  NEUROLOGICAL: No stroke like symptoms  SKIN: No rashes    	  MEDICATIONS:  losartan 25 milliGRAM(s) Oral daily  metoprolol succinate ER 25 milliGRAM(s) Oral daily        PHYSICAL EXAM:  T(C): 36.6 (01-17-23 @ 11:33), Max: 36.6 (01-17-23 @ 11:33)  HR: 82 (01-17-23 @ 11:33) (70 - 86)  BP: 105/62 (01-17-23 @ 11:33) (105/62 - 131/69)  RR: 18 (01-17-23 @ 11:33) (18 - 18)  SpO2: 96% (01-17-23 @ 11:33) (93% - 96%)  Wt(kg): --  I&O's Summary    16 Jan 2023 07:01  -  17 Jan 2023 07:00  --------------------------------------------------------  IN: 340 mL / OUT: 290 mL / NET: 50 mL    17 Jan 2023 07:01  -  17 Jan 2023 13:36  --------------------------------------------------------  IN: 490 mL / OUT: 0 mL / NET: 490 mL          Appearance: In no distress	  HEENT:    PERRL, EOMI	  Cardiovascular:  S1 S2, No JVD  Respiratory: Lungs clear to auscultation	  Gastrointestinal:  Soft, Non-tender, + BS	  Vascularature:  No edema of LE  Psychiatric: Appropriate affect   Neuro: no acute focal deficits                               11.3   9.85  )-----------( 291      ( 16 Jan 2023 19:57 )             33.8     01-16    138  |  102  |  16  ----------------------------<  113<H>  3.9   |  25  |  0.60    Ca    8.6      16 Jan 2023 06:34    TPro  5.9<L>  /  Alb  3.3  /  TBili  0.5  /  DBili  x   /  AST  16  /  ALT  5<L>  /  AlkPhos  50  01-16        Labs personally reviewed      ASSESSMENT/PLAN: 	    87-year-old female past medical history of PE on Xarelto, anxiety, depression, hyperlipidemia, MVP who presented after a fall at home found to have impacted left femoral neck fracture    Problem/Plan - 1:  ·  Problem: New Systolic HF  ·  Plan: Troponin level elevated on admission, repeats essentially flat. Likely elevated in setting of systolic HF with EF 35% and downtrending from MI in late November   - [facility] hospitalization records obtained from November 24, 2022  - Admitted there with chest pain, found to have as noted on CTA report "small volume PE in the left upper lobe segmental branches and ? RLL subsegmental branches"  - Trop noted to be 1597 but was never trended to peak,   EKG there with LBBB  - I suspect her presentation there was consistent with ?MI (small PE shouldn't mount a trop og 1597 or likely higher) with now resultant drop in EF now  - She has been chest pain free since her hospitalization and not SOB  - Will need to initiate GDMT for sHF post op.  - Will start ASA 81mg PO daily and atorvastatin 40mg PO daily and cont BB as medical management for presumed CAD.   - Tolerated losartan well. Will change to Entresto 24/26mg PO BID starting tomorrow, 1/18.    Problem/Plan - 2:  ·  Problem: Preop Risk Stratification  ·  Plan: Given recent nonvascularized MI with sHF she at high risk for mod risk non-elective urgent ORIF  - discussed with daughter that pt is high risk for torrey-operative CV events including MI and death   - Tolerated surgery well.     Problem/Plan - 3:  ·  Problem: Pulmonary embolism.   ·  Plan:    - Xarelto resumed    Problem/Plan - 4:  ·  Problem: Essential hypertension.   ·  Plan: BP overall acceptable   - Amlodipine DC.  - c/w Metoprolol and losartan to be transition to Entresto  - BP wnl.    To follow up as OP with Dr. Anguiano for further titration of sHF GDMT.        I will be covered by Dr John Ayala 1/18 - 1/23. He can be reached at [phone]        LINA Forbes-MICHELLE Anguiano,  [facility]  Cardiovascular Medicine  [address] [place] [address], [address]  Available through call or text on [employer] TE[employer]  Office: [phone]   DATE OF SERVICE: 01-17-23 @ 13:36    Patient is a 87y old  Female who presents with a chief complaint of Fall (17 Jan 2023 06:08)      INTERVAL HISTORY: Feels ok. Daughter Smiley at bedside.     REVIEW OF SYSTEMS:  CONSTITUTIONAL: No weakness  EYES/ENT: No visual changes;  No throat pain   NECK: No pain or stiffness  RESPIRATORY: No cough, wheezing; No shortness of breath  CARDIOVASCULAR: No chest pain or palpitations  GASTROINTESTINAL: No abdominal  pain. No nausea, vomiting, or hematemesis  GENITOURINARY: No dysuria, frequency or hematuria  NEUROLOGICAL: No stroke like symptoms  SKIN: No rashes    	  MEDICATIONS:  losartan 25 milliGRAM(s) Oral daily  metoprolol succinate ER 25 milliGRAM(s) Oral daily        PHYSICAL EXAM:  T(C): 36.6 (01-17-23 @ 11:33), Max: 36.6 (01-17-23 @ 11:33)  HR: 82 (01-17-23 @ 11:33) (70 - 86)  BP: 105/62 (01-17-23 @ 11:33) (105/62 - 131/69)  RR: 18 (01-17-23 @ 11:33) (18 - 18)  SpO2: 96% (01-17-23 @ 11:33) (93% - 96%)  Wt(kg): --  I&O's Summary    16 Jan 2023 07:01  -  17 Jan 2023 07:00  --------------------------------------------------------  IN: 340 mL / OUT: 290 mL / NET: 50 mL    17 Jan 2023 07:01  -  17 Jan 2023 13:36  --------------------------------------------------------  IN: 490 mL / OUT: 0 mL / NET: 490 mL          Appearance: In no distress	  HEENT:    PERRL, EOMI	  Cardiovascular:  S1 S2, No JVD  Respiratory: Lungs clear to auscultation	  Gastrointestinal:  Soft, Non-tender, + BS	  Vascularature:  No edema of LE  Psychiatric: Appropriate affect   Neuro: no acute focal deficits                               11.3   9.85  )-----------( 291      ( 16 Jan 2023 19:57 )             33.8     01-16    138  |  102  |  16  ----------------------------<  113<H>  3.9   |  25  |  0.60    Ca    8.6      16 Jan 2023 06:34    TPro  5.9<L>  /  Alb  3.3  /  TBili  0.5  /  DBili  x   /  AST  16  /  ALT  5<L>  /  AlkPhos  50  01-16        Labs personally reviewed      ASSESSMENT/PLAN: 	    87-year-old female past medical history of PE on Xarelto, anxiety, depression, hyperlipidemia, MVP who presented after a fall at home found to have impacted left femoral neck fracture    Problem/Plan - 1:  ·  Problem: New Systolic HF  ·  Plan: Troponin level elevated on admission, repeats essentially flat. Likely elevated in setting of systolic HF with EF 35% and downtrending from MI in late November   - [facility] hospitalization records obtained from November 24, 2022  - Admitted there with chest pain, found to have as noted on CTA report "small volume PE in the left upper lobe segmental branches and ? RLL subsegmental branches"  - Trop noted to be 1597 but was never trended to peak,   EKG there with LBBB  - I suspect her presentation there was consistent with ?MI (small PE shouldn't mount a trop og 1597 or likely higher) with now resultant drop in EF now  - She has been chest pain free since her hospitalization and not SOB  - Will need to initiate GDMT for sHF post op.  - Will start ASA 81mg PO daily and atorvastatin 40mg PO daily and cont BB as medical management for presumed CAD.   - Tolerated losartan well. Will change to Entresto 24/26mg PO BID starting tomorrow, 1/18.    Problem/Plan - 2:  ·  Problem: Preop Risk Stratification  ·  Plan: Given recent nonvascularized MI with sHF she at high risk for mod risk non-elective urgent ORIF  - discussed with daughter that pt is high risk for torrey-operative CV events including MI and death   - Tolerated surgery well.     Problem/Plan - 3:  ·  Problem: Pulmonary embolism.   ·  Plan:    - Xarelto resumed    Problem/Plan - 4:  ·  Problem: Essential hypertension.   ·  Plan: BP overall acceptable   - Amlodipine DC.  - c/w Metoprolol and losartan to be transition to Entresto  - BP wnl.    To follow up as OP with Dr. Anguiano for further titration of sHF GDMT.        I will be covered by Dr Neal Morris 1/18 - 1/23. He can be reached at [phone]        LINA Forbes-MICHELLE Anguiano,  [facility]  Cardiovascular Medicine  [address] [place] [address], [address]  Available through call or text on [employer] TE[employer]  Office: [phone]

## 2023-01-31 DIAGNOSIS — E89.0 POSTOPERATIVE HYPOTHYROIDISM: ICD-10-CM

## 2023-01-31 RX ORDER — LEVOTHYROXINE SODIUM 112 UG/1
TABLET ORAL
Qty: 45 TABLET | Refills: 0 | Status: SHIPPED | OUTPATIENT
Start: 2023-01-31 | End: 2023-07-11 | Stop reason: SDUPTHER

## 2023-01-31 NOTE — TELEPHONE ENCOUNTER
Care Due:                  Date            Visit Type   Department     Provider  --------------------------------------------------------------------------------                                EP -                              PRIMARY      [facility]  Last Visit: 02-      [facility] ([facility]   MEDICINE       Chidi Mccurdy  Next Visit: None Scheduled  None         None Found                                                            Last  Test          Frequency    Reason                     Performed    Due Date  --------------------------------------------------------------------------------    Office Visit  12 months..  allopurinoL, cetirizine,   02- 01-                             rosuvastatin,                             valsartan-hydrochlorothia                             zide.....................    CBC.........  12 months..  allopurinoL..............  08- 07-    CMP.........  12 months..  allopurinoL,               08- 07-                             rosuvastatin,                             valsartan-hydrochlorothia                             zide.....................    Lipid Panel.  12 months..  rosuvastatin.............  07- 06-    Uric Acid...  12 months..  allopurinoL..............  08- 07-    Health [facility] Embedded Care Gaps. Reference number: [id number]. 1/31/2023   4:13:57 PM CST

## 2023-01-31 NOTE — TELEPHONE ENCOUNTER
Please refill Rx. Patient have been without insurance for a year. I gave patient medicaid number [id number].Patient is also going to reach out to "[employer] insurance to see if she have coverage. Patient will give a return call to scheduled appointment once insurance is active.

## 2023-06-21 ENCOUNTER — PATIENT MESSAGE (OUTPATIENT)
Dept: ADMINISTRATIVE | Facility: HOSPITAL | Age: 73
End: 2023-06-21
Payer: COMMERCIAL

## 2023-06-23 ENCOUNTER — PATIENT OUTREACH (OUTPATIENT)
Dept: ADMINISTRATIVE | Facility: HOSPITAL | Age: 73
End: 2023-06-23
Payer: COMMERCIAL

## 2023-07-10 ENCOUNTER — TELEPHONE (OUTPATIENT)
Dept: GYNECOLOGIC ONCOLOGY | Facility: CLINIC | Age: 73
End: 2023-07-10
Payer: COMMERCIAL

## 2023-07-10 NOTE — TELEPHONE ENCOUNTER
----- Message from Epi Salinas RN sent at 7/10/2023 11:46 AM CDT -----  Contact: [phone]  Marta, please get this patient scheduled to see Dr. Chip adamson for a f/u? She's overdue. Thanks.  ----- Message -----  From: Efren José MA  Sent: 7/10/2023  11:44 AM CDT  To: Chip Reynolds [facility], patient called to scheduled a follow up appointment last seen January 2022. Please advise. Thanks Efren

## 2023-07-11 ENCOUNTER — LAB VISIT (OUTPATIENT)
Dept: LAB | Facility: HOSPITAL | Age: 73
End: 2023-07-11
Attending: FAMILY MEDICINE
Payer: COMMERCIAL

## 2023-07-11 ENCOUNTER — OFFICE VISIT (OUTPATIENT)
Dept: FAMILY MEDICINE | Facility: CLINIC | Age: 73
End: 2023-07-11
Payer: COMMERCIAL

## 2023-07-11 VITALS
DIASTOLIC BLOOD PRESSURE: 78 MMHG | WEIGHT: 247.81 LBS | OXYGEN SATURATION: 99 % | HEART RATE: 55 BPM | BODY MASS INDEX: 48.65 KG/M2 | TEMPERATURE: 98 F | HEIGHT: 60 IN | SYSTOLIC BLOOD PRESSURE: 124 MMHG

## 2023-07-11 DIAGNOSIS — Z12.31 ENCOUNTER FOR SCREENING MAMMOGRAM FOR MALIGNANT NEOPLASM OF BREAST: ICD-10-CM

## 2023-07-11 DIAGNOSIS — Z90.5 ACQUIRED SOLITARY KIDNEY: ICD-10-CM

## 2023-07-11 DIAGNOSIS — E78.49 OTHER HYPERLIPIDEMIA: ICD-10-CM

## 2023-07-11 DIAGNOSIS — E66.01 MORBID OBESITY: ICD-10-CM

## 2023-07-11 DIAGNOSIS — Z79.899 ENCOUNTER FOR LONG-TERM CURRENT USE OF MEDICATION: ICD-10-CM

## 2023-07-11 DIAGNOSIS — R10.9 FLANK PAIN: ICD-10-CM

## 2023-07-11 DIAGNOSIS — Z00.00 ENCOUNTER FOR ROUTINE HISTORY AND PHYSICAL EXAM IN FEMALE: ICD-10-CM

## 2023-07-11 DIAGNOSIS — I10 ESSENTIAL HYPERTENSION: ICD-10-CM

## 2023-07-11 DIAGNOSIS — M10.071 ACUTE IDIOPATHIC GOUT OF RIGHT FOOT: ICD-10-CM

## 2023-07-11 DIAGNOSIS — E89.0 POSTOPERATIVE HYPOTHYROIDISM: ICD-10-CM

## 2023-07-11 DIAGNOSIS — T45.1X5A NEUROPATHY DUE TO CHEMOTHERAPEUTIC DRUG: ICD-10-CM

## 2023-07-11 DIAGNOSIS — N18.31 STAGE 3A CHRONIC KIDNEY DISEASE: ICD-10-CM

## 2023-07-11 DIAGNOSIS — Z12.11 COLON CANCER SCREENING: ICD-10-CM

## 2023-07-11 DIAGNOSIS — C54.1 ENDOMETRIAL CA: ICD-10-CM

## 2023-07-11 DIAGNOSIS — E55.9 VITAMIN D DEFICIENCY: ICD-10-CM

## 2023-07-11 DIAGNOSIS — Z00.00 ENCOUNTER FOR ROUTINE HISTORY AND PHYSICAL EXAM IN FEMALE: Primary | ICD-10-CM

## 2023-07-11 DIAGNOSIS — G62.0 NEUROPATHY DUE TO CHEMOTHERAPEUTIC DRUG: ICD-10-CM

## 2023-07-11 DIAGNOSIS — R73.01 ELEVATED FASTING GLUCOSE: ICD-10-CM

## 2023-07-11 LAB
25(OH)D3+25(OH)D2 SERPL-MCNC: 18 NG/ML (ref 30–96)
ALBUMIN SERPL BCP-MCNC: 3.4 G/DL (ref 3.5–5.2)
ALP SERPL-CCNC: 97 U/L (ref 55–135)
ALT SERPL W/O P-5'-P-CCNC: 13 U/L (ref 10–44)
ANION GAP SERPL CALC-SCNC: 11 MMOL/L (ref 8–16)
AST SERPL-CCNC: 24 U/L (ref 10–40)
BASOPHILS # BLD AUTO: 0.02 K/UL (ref 0–0.2)
BASOPHILS NFR BLD: 0.5 % (ref 0–1.9)
BILIRUB SERPL-MCNC: 0.5 MG/DL (ref 0.1–1)
BUN SERPL-MCNC: 31 MG/DL (ref 8–23)
CALCIUM SERPL-MCNC: 8.9 MG/DL (ref 8.7–10.5)
CANCER AG125 SERPL-ACNC: 5 U/ML (ref 0–30)
CHLORIDE SERPL-SCNC: 109 MMOL/L (ref 95–110)
CHOLEST SERPL-MCNC: 129 MG/DL (ref 120–199)
CHOLEST/HDLC SERPL: 2.2 {RATIO} (ref 2–5)
CO2 SERPL-SCNC: 22 MMOL/L (ref 23–29)
CREAT SERPL-MCNC: 1.3 MG/DL (ref 0.5–1.4)
DIFFERENTIAL METHOD: ABNORMAL
EOSINOPHIL # BLD AUTO: 0.2 K/UL (ref 0–0.5)
EOSINOPHIL NFR BLD: 4.1 % (ref 0–8)
ERYTHROCYTE [DISTWIDTH] IN BLOOD BY AUTOMATED COUNT: 15.9 % (ref 11.5–14.5)
EST. GFR  (NO RACE VARIABLE): 43.4 ML/MIN/1.73 M^2
ESTIMATED AVG GLUCOSE: 108 MG/DL (ref 68–131)
FERRITIN SERPL-MCNC: 259 NG/ML (ref 20–300)
GLUCOSE SERPL-MCNC: 87 MG/DL (ref 70–110)
HBA1C MFR BLD: 5.4 % (ref 4–5.6)
HCT VFR BLD AUTO: 35.9 % (ref 37–48.5)
HDLC SERPL-MCNC: 59 MG/DL (ref 40–75)
HDLC SERPL: 45.7 % (ref 20–50)
HGB BLD-MCNC: 10.7 G/DL (ref 12–16)
IMM GRANULOCYTES # BLD AUTO: 0.01 K/UL (ref 0–0.04)
IMM GRANULOCYTES NFR BLD AUTO: 0.2 % (ref 0–0.5)
IRON SERPL-MCNC: 73 UG/DL (ref 30–160)
LDLC SERPL CALC-MCNC: 59.8 MG/DL (ref 63–159)
LYMPHOCYTES # BLD AUTO: 1 K/UL (ref 1–4.8)
LYMPHOCYTES NFR BLD: 25 % (ref 18–48)
MCH RBC QN AUTO: 28.7 PG (ref 27–31)
MCHC RBC AUTO-ENTMCNC: 29.8 G/DL (ref 32–36)
MCV RBC AUTO: 96 FL (ref 82–98)
MONOCYTES # BLD AUTO: 0.4 K/UL (ref 0.3–1)
MONOCYTES NFR BLD: 9.4 % (ref 4–15)
NEUTROPHILS # BLD AUTO: 2.5 K/UL (ref 1.8–7.7)
NEUTROPHILS NFR BLD: 60.8 % (ref 38–73)
NONHDLC SERPL-MCNC: 70 MG/DL
NRBC BLD-RTO: 0 /100 WBC
PLATELET # BLD AUTO: 219 K/UL (ref 150–450)
PMV BLD AUTO: 11.6 FL (ref 9.2–12.9)
POTASSIUM SERPL-SCNC: 4.4 MMOL/L (ref 3.5–5.1)
PROT SERPL-MCNC: 7.4 G/DL (ref 6–8.4)
RBC # BLD AUTO: 3.73 M/UL (ref 4–5.4)
SATURATED IRON: 26 % (ref 20–50)
SODIUM SERPL-SCNC: 142 MMOL/L (ref 136–145)
T4 FREE SERPL-MCNC: 1.11 NG/DL (ref 0.71–1.51)
TOTAL IRON BINDING CAPACITY: 286 UG/DL (ref 250–450)
TRANSFERRIN SERPL-MCNC: 193 MG/DL (ref 200–375)
TRIGL SERPL-MCNC: 51 MG/DL (ref 30–150)
TSH SERPL DL<=0.005 MIU/L-ACNC: 0.81 UIU/ML (ref 0.4–4)
URATE SERPL-MCNC: 5.8 MG/DL (ref 2.4–5.7)
VIT B12 SERPL-MCNC: 459 PG/ML (ref 210–950)
WBC # BLD AUTO: 4.16 K/UL (ref 3.9–12.7)

## 2023-07-11 PROCEDURE — 86304 IMMUNOASSAY TUMOR CA 125: CPT | Performed by: FAMILY MEDICINE

## 2023-07-11 PROCEDURE — 1159F MED LIST DOCD IN RCRD: CPT | Mod: CPTII,S$GLB,, | Performed by: FAMILY MEDICINE

## 2023-07-11 PROCEDURE — 82728 ASSAY OF FERRITIN: CPT | Performed by: FAMILY MEDICINE

## 2023-07-11 PROCEDURE — 80061 LIPID PANEL: CPT | Performed by: FAMILY MEDICINE

## 2023-07-11 PROCEDURE — 1160F PR REVIEW ALL MEDS BY PRESCRIBER/CLIN PHARMACIST DOCUMENTED: ICD-10-PCS | Mod: CPTII,S$GLB,, | Performed by: FAMILY MEDICINE

## 2023-07-11 PROCEDURE — 36415 COLL VENOUS BLD VENIPUNCTURE: CPT | Mod: PO | Performed by: FAMILY MEDICINE

## 2023-07-11 PROCEDURE — 3078F PR MOST RECENT DIASTOLIC BLOOD PRESSURE < 80 MM HG: ICD-10-PCS | Mod: CPTII,S$GLB,, | Performed by: FAMILY MEDICINE

## 2023-07-11 PROCEDURE — 85025 COMPLETE CBC W/AUTO DIFF WBC: CPT | Performed by: FAMILY MEDICINE

## 2023-07-11 PROCEDURE — 99397 PR PREVENTIVE VISIT,EST,65 & OVER: ICD-10-PCS | Mod: S$GLB,,, | Performed by: FAMILY MEDICINE

## 2023-07-11 PROCEDURE — 3074F SYST BP LT 130 MM HG: CPT | Mod: CPTII,S$GLB,, | Performed by: FAMILY MEDICINE

## 2023-07-11 PROCEDURE — 3288F PR FALLS RISK ASSESSMENT DOCUMENTED: ICD-10-PCS | Mod: CPTII,S$GLB,, | Performed by: FAMILY MEDICINE

## 2023-07-11 PROCEDURE — 99999 PR PBB SHADOW E&M-EST. PATIENT-LVL V: CPT | Mod: PBBFAC,,, | Performed by: FAMILY MEDICINE

## 2023-07-11 PROCEDURE — 84466 ASSAY OF TRANSFERRIN: CPT | Performed by: FAMILY MEDICINE

## 2023-07-11 PROCEDURE — 99397 PER PM REEVAL EST PAT 65+ YR: CPT | Mod: S$GLB,,, | Performed by: FAMILY MEDICINE

## 2023-07-11 PROCEDURE — 1100F PTFALLS ASSESS-DOCD GE2>/YR: CPT | Mod: CPTII,S$GLB,, | Performed by: FAMILY MEDICINE

## 2023-07-11 PROCEDURE — 1159F PR MEDICATION LIST DOCUMENTED IN MEDICAL RECORD: ICD-10-PCS | Mod: CPTII,S$GLB,, | Performed by: FAMILY MEDICINE

## 2023-07-11 PROCEDURE — 3008F PR BODY MASS INDEX (BMI) DOCUMENTED: ICD-10-PCS | Mod: CPTII,S$GLB,, | Performed by: FAMILY MEDICINE

## 2023-07-11 PROCEDURE — 83036 HEMOGLOBIN GLYCOSYLATED A1C: CPT | Performed by: FAMILY MEDICINE

## 2023-07-11 PROCEDURE — 1126F PR PAIN SEVERITY QUANTIFIED, NO PAIN PRESENT: ICD-10-PCS | Mod: CPTII,S$GLB,, | Performed by: FAMILY MEDICINE

## 2023-07-11 PROCEDURE — 3078F DIAST BP <80 MM HG: CPT | Mod: CPTII,S$GLB,, | Performed by: FAMILY MEDICINE

## 2023-07-11 PROCEDURE — 84550 ASSAY OF BLOOD/URIC ACID: CPT | Performed by: FAMILY MEDICINE

## 2023-07-11 PROCEDURE — 80053 COMPREHEN METABOLIC PANEL: CPT | Performed by: FAMILY MEDICINE

## 2023-07-11 PROCEDURE — 84443 ASSAY THYROID STIM HORMONE: CPT | Performed by: FAMILY MEDICINE

## 2023-07-11 PROCEDURE — 1100F PR PT FALLS ASSESS DOC 2+ FALLS/FALL W/INJURY/YR: ICD-10-PCS | Mod: CPTII,S$GLB,, | Performed by: FAMILY MEDICINE

## 2023-07-11 PROCEDURE — 3288F FALL RISK ASSESSMENT DOCD: CPT | Mod: CPTII,S$GLB,, | Performed by: FAMILY MEDICINE

## 2023-07-11 PROCEDURE — 82607 VITAMIN B-12: CPT | Performed by: FAMILY MEDICINE

## 2023-07-11 PROCEDURE — 3074F PR MOST RECENT SYSTOLIC BLOOD PRESSURE < 130 MM HG: ICD-10-PCS | Mod: CPTII,S$GLB,, | Performed by: FAMILY MEDICINE

## 2023-07-11 PROCEDURE — 82306 VITAMIN D 25 HYDROXY: CPT | Performed by: FAMILY MEDICINE

## 2023-07-11 PROCEDURE — 99999 PR PBB SHADOW E&M-EST. PATIENT-LVL V: ICD-10-PCS | Mod: PBBFAC,,, | Performed by: FAMILY MEDICINE

## 2023-07-11 PROCEDURE — 1160F RVW MEDS BY RX/DR IN RCRD: CPT | Mod: CPTII,S$GLB,, | Performed by: FAMILY MEDICINE

## 2023-07-11 PROCEDURE — 3008F BODY MASS INDEX DOCD: CPT | Mod: CPTII,S$GLB,, | Performed by: FAMILY MEDICINE

## 2023-07-11 PROCEDURE — 84439 ASSAY OF FREE THYROXINE: CPT | Performed by: FAMILY MEDICINE

## 2023-07-11 PROCEDURE — 1126F AMNT PAIN NOTED NONE PRSNT: CPT | Mod: CPTII,S$GLB,, | Performed by: FAMILY MEDICINE

## 2023-07-11 RX ORDER — VALSARTAN AND HYDROCHLOROTHIAZIDE 320; 25 MG/1; MG/1
1 TABLET, FILM COATED ORAL DAILY
Qty: 90 TABLET | Refills: 1 | Status: SHIPPED | OUTPATIENT
Start: 2023-07-11 | End: 2024-07-10

## 2023-07-11 RX ORDER — AMLODIPINE BESYLATE 10 MG/1
10 TABLET ORAL DAILY
Qty: 90 TABLET | Refills: 1 | Status: SHIPPED | OUTPATIENT
Start: 2023-07-11

## 2023-07-11 RX ORDER — LEVOTHYROXINE SODIUM 112 UG/1
TABLET ORAL
Qty: 100 TABLET | Refills: 1 | Status: SHIPPED | OUTPATIENT
Start: 2023-07-11

## 2023-07-11 RX ORDER — ALLOPURINOL 300 MG/1
300 TABLET ORAL DAILY
Qty: 90 TABLET | Refills: 1 | Status: SHIPPED | OUTPATIENT
Start: 2023-07-11

## 2023-07-11 RX ORDER — ROSUVASTATIN CALCIUM 5 MG/1
5 TABLET, COATED ORAL DAILY
Qty: 90 TABLET | Refills: 1 | Status: SHIPPED | OUTPATIENT
Start: 2023-07-11

## 2023-07-11 NOTE — PATIENT INSTRUCTIONS
Continue valsartan/hctz  Continue amlodipine 10 mg  Continue allopurinol 300 mg  Continue synthroid 112, daily, except once weekly 1.5 pills  Continue crestor 5 mg for cholesterol    Kidney function is a little low. No NSAIDS such as ibuprofen or naproxen. Avoid Red meats. Drink a lot of water. I will continue monitoring kidney function q3-6 months.    US to start for flank pain. If pain persists, contact me, I will order CT abdomen vs MRI thoracic spine?    Weight loss!!  Increase exercise!!  No more late night snacking.

## 2023-07-11 NOTE — PROGRESS NOTES
Subjective:       Patient ID: Ankita Campo is a 72 y.o. female.    Chief Complaint: Follow-up    Ankita is a 72 y.o. female who presents today for a f/u on her chronic medical issues  She has been lost to f/u since 2/2/22, about 18 months ago    Was seeing a free clinic due to loss of benefits s/p Nessa/intermediate.   Didn't have insurance until 1 month ago.     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 Seeing Gyn/Onc. Over due for f/u. Has an apt pending. Will gets labs for them.     HTN: on amlodipine and valsartan.   Gout: on allopurinol.   DLD: on crestor.     She reports flank pain, on the left pain.   She donated her kidney in 1995. New onset pain.   Standing is better. Sitting is worse.   Denies back pain    Colonoscopy: due, ordered.      Retired.     PMHx: reviewed in EMR and updated  Meds: reviewed in EMR and updated  Shx: reviewed in EMR and updated  FMHx: reviewed in EMR and updated  Social: reviewed in EMR and updated    Review of Systems   Constitutional:  Negative for chills and fever.   Respiratory:  Negative for shortness of breath.    Cardiovascular:  Negative for chest pain.   Gastrointestinal:  Negative for abdominal pain, nausea and vomiting.   Genitourinary:  Negative for difficulty urinating and dysuria.   Musculoskeletal:  Positive for back pain.   Neurological:  Negative for dizziness, light-headedness and headaches.               Objective:     Vitals:    07/11/23 1009   BP: 124/78   BP Location: Left arm   Patient Position: Sitting   BP Method: Large (Manual)   Pulse: (!) 55   Temp: 97.9 °F (36.6 °C)   SpO2: 99%   Weight: 112.4 kg (247 lb 12.8 oz)   Height: 5' (1.524 m)        Physical Exam  Constitutional:       General: She is not in acute distress.     Appearance: She is obese. She is not ill-appearing, toxic-appearing or diaphoretic.   Eyes:      Conjunctiva/sclera: Conjunctivae normal.   Cardiovascular:      Rate  and Rhythm: Regular rhythm. Bradycardia present.   Pulmonary:      Effort: Pulmonary effort is normal.      Breath sounds: Normal breath sounds.   Abdominal:      General: There is no distension.      Palpations: Abdomen is soft.      Tenderness: There is no abdominal tenderness.      Comments: Scar from previous nephrectomy noted  However, no palpable mass at area of pain  No rash  No paraspinal muscle spasm   Musculoskeletal:         General: No swelling.   Neurological:      General: No focal deficit present.      Mental Status: She is alert.   Psychiatric:         Mood and Affect: Mood normal.         Behavior: Behavior normal.         Thought Content: Thought content normal.         Judgment: Judgment normal.       Assessment:       1. Encounter for routine history and physical exam in female    2. Stage 3a chronic kidney disease    3. Essential hypertension    4. Other hyperlipidemia    5. Postoperative hypothyroidism    6. Acute idiopathic gout of right foot    7. Elevated fasting glucose    8. Encounter for screening mammogram for malignant neoplasm of breast    9. Encounter for long-term current use of medication    10. Colon cancer screening    11. Acquired solitary kidney    12. Vitamin D deficiency    13. Endometrial ca    14. Neuropathy due to chemotherapeutic drug    15. Morbid obesity    16. Flank pain        Plan:       Continue valsartan/hctz  Continue amlodipine 10 mg  Continue allopurinol 300 mg  Continue synthroid 112, daily, except once weekly 1.5 pills  Continue crestor 5 mg for cholesterol    Kidney function is a little low. No NSAIDS such as ibuprofen or naproxen. Avoid Red meats. Drink a lot of water. I will continue monitoring kidney function q3-6 months.    US to start for flank pain. If pain persists, contact me, I will order CT abdomen vs MRI thoracic spine? Will try to avoid contrast given single kidney, but may be necessary.     Will get  for gyn/onc.     Weight loss!!  Increase  exercise!!  No more late night snacking.       Encounter for routine history and physical exam in female  -     CBC Auto Differential; Future; Expected date: 07/11/2023  -     Comprehensive Metabolic Panel; Future; Expected date: 07/11/2023  -     Hemoglobin A1C; Future; Expected date: 07/11/2023  -     Lipid Panel; Future; Expected date: 07/11/2023  -     TSH; Future; Expected date: 07/11/2023  -     Vitamin B12; Future; Expected date: 07/11/2023  -     Vitamin D; Future; Expected date: 07/11/2023  -     URIC ACID; Future; Expected date: 07/11/2023  -     Iron and TIBC; Future; Expected date: 07/11/2023  -     FERRITIN; Future; Expected date: 07/11/2023    Stage 3a chronic kidney disease  -     CBC Auto Differential; Future; Expected date: 07/11/2023  -     Comprehensive Metabolic Panel; Future; Expected date: 07/11/2023  -     TSH; Future; Expected date: 07/11/2023  -     allopurinoL (ZYLOPRIM) 300 MG tablet; Take 1 tablet (300 mg total) by mouth once daily.  Dispense: 90 tablet; Refill: 1    Essential hypertension  -     TSH; Future; Expected date: 07/11/2023  -     valsartan-hydrochlorothiazide (DIOVAN-HCT) 320-25 mg per tablet; Take 1 tablet by mouth once daily.  Dispense: 90 tablet; Refill: 1  -     amLODIPine (NORVASC) 10 MG tablet; Take 1 tablet (10 mg total) by mouth once daily.  Dispense: 90 tablet; Refill: 1    Other hyperlipidemia  -     Lipid Panel; Future; Expected date: 07/11/2023  -     TSH; Future; Expected date: 07/11/2023  -     rosuvastatin (CRESTOR) 5 MG tablet; Take 1 tablet (5 mg total) by mouth once daily.  Dispense: 90 tablet; Refill: 1    Postoperative hypothyroidism  -     TSH; Future; Expected date: 07/11/2023  -     T4, Free; Future; Expected date: 07/11/2023  -     levothyroxine (SYNTHROID) 112 MCG tablet; TAKE 1 TABLET BY MOUTH ONCE DAILY 6  DAYS  A  WEEK  AND  1.5  TABLETS  ONE  DAY  PER  WEEK  Dispense: 100 tablet; Refill: 1    Acute idiopathic gout of right foot  -     TSH; Future;  Expected date: 07/11/2023  -     URIC ACID; Future; Expected date: 07/11/2023  -     allopurinoL (ZYLOPRIM) 300 MG tablet; Take 1 tablet (300 mg total) by mouth once daily.  Dispense: 90 tablet; Refill: 1    Elevated fasting glucose  -     Hemoglobin A1C; Future; Expected date: 07/11/2023  -     TSH; Future; Expected date: 07/11/2023    Encounter for screening mammogram for malignant neoplasm of breast  -     TSH; Future; Expected date: 07/11/2023  -     Mammo Digital Screening Bilat w/ Martin; Future; Expected date: 07/11/2023    Encounter for long-term current use of medication  -     TSH; Future; Expected date: 07/11/2023  -     Vitamin B12; Future; Expected date: 07/11/2023  -     Vitamin D; Future; Expected date: 07/11/2023    Colon cancer screening  -     Ambulatory referral/consult to Endo Procedure ; Future; Expected date: 07/12/2023    Acquired solitary kidney    Vitamin D deficiency    Endometrial ca  -     CANCER ANTIGEN 125; Future; Expected date: 07/11/2023    Neuropathy due to chemotherapeutic drug    Morbid obesity    Flank pain  -     US Abdomen Complete; Future; Expected date: 07/11/2023  -     Urine culture; Future; Expected date: 07/11/2023  -     Urinalysis; Future; Expected date: 07/11/2023            Warning signs discussed, patient to call with any further issues or worsening of symptoms. Above note may have utilized dictation, please excuse any transcription errors.

## 2023-07-12 ENCOUNTER — TELEPHONE (OUTPATIENT)
Dept: ADMINISTRATIVE | Facility: OTHER | Age: 73
End: 2023-07-12
Payer: COMMERCIAL

## 2023-07-12 ENCOUNTER — TELEPHONE (OUTPATIENT)
Dept: FAMILY MEDICINE | Facility: CLINIC | Age: 73
End: 2023-07-12
Payer: COMMERCIAL

## 2023-07-12 DIAGNOSIS — R10.9 FLANK PAIN: Primary | ICD-10-CM

## 2023-07-12 DIAGNOSIS — R39.9 UTI SYMPTOMS: ICD-10-CM

## 2023-07-12 RX ORDER — CIPROFLOXACIN 500 MG/1
500 TABLET ORAL EVERY 12 HOURS
Qty: 14 TABLET | Refills: 0 | Status: SHIPPED | OUTPATIENT
Start: 2023-07-12 | End: 2023-07-19

## 2023-07-17 ENCOUNTER — TELEPHONE (OUTPATIENT)
Dept: FAMILY MEDICINE | Facility: CLINIC | Age: 73
End: 2023-07-17
Payer: COMMERCIAL

## 2023-07-17 NOTE — TELEPHONE ENCOUNTER
I advised patient that her urine is positive for a UTI. Patient have started oral antibiotic. I advised patient to complete all medication . Patient voiced understanding.

## 2023-07-17 NOTE — TELEPHONE ENCOUNTER
----- Message from Chidi Mccurdy DO sent at 7/14/2023  8:08 AM CDT -----  Please call patient with lab results.   I don't think she has checked her portal  She has a uti  I sent her antibiotics.

## 2023-09-12 ENCOUNTER — TELEPHONE (OUTPATIENT)
Dept: ENDOSCOPY | Facility: HOSPITAL | Age: 73
End: 2023-09-12

## 2023-09-12 ENCOUNTER — CLINICAL SUPPORT (OUTPATIENT)
Dept: ENDOSCOPY | Facility: HOSPITAL | Age: 73
End: 2023-09-12
Attending: FAMILY MEDICINE
Payer: COMMERCIAL

## 2023-09-12 DIAGNOSIS — Z12.11 COLON CANCER SCREENING: ICD-10-CM

## 2023-09-12 NOTE — TELEPHONE ENCOUNTER
Called patient for PAT appointment to schedule colonoscopy. Patient stated she does not want to schedule colonoscopy at this time.

## 2023-09-12 NOTE — PLAN OF CARE
Called patient for PAT appointment to schedule colonoscopy. Patient does not want to schedule colonoscopy at this time.

## 2023-09-21 DIAGNOSIS — Z78.0 MENOPAUSE: ICD-10-CM

## 2023-09-26 ENCOUNTER — PATIENT MESSAGE (OUTPATIENT)
Dept: ADMINISTRATIVE | Facility: HOSPITAL | Age: 73
End: 2023-09-26
Payer: COMMERCIAL

## 2023-11-21 ENCOUNTER — OFFICE VISIT (OUTPATIENT)
Dept: GYNECOLOGIC ONCOLOGY | Facility: CLINIC | Age: 73
End: 2023-11-21
Payer: COMMERCIAL

## 2023-11-21 VITALS
SYSTOLIC BLOOD PRESSURE: 145 MMHG | HEIGHT: 65 IN | HEART RATE: 67 BPM | BODY MASS INDEX: 41.03 KG/M2 | DIASTOLIC BLOOD PRESSURE: 79 MMHG | WEIGHT: 246.25 LBS

## 2023-11-21 DIAGNOSIS — C54.1 ENDOMETRIAL CA: Primary | ICD-10-CM

## 2023-11-21 PROCEDURE — 99214 PR OFFICE/OUTPT VISIT, EST, LEVL IV, 30-39 MIN: ICD-10-PCS | Mod: S$GLB,,, | Performed by: OBSTETRICS & GYNECOLOGY

## 2023-11-21 PROCEDURE — 3077F SYST BP >= 140 MM HG: CPT | Mod: CPTII,S$GLB,, | Performed by: OBSTETRICS & GYNECOLOGY

## 2023-11-21 PROCEDURE — 3288F FALL RISK ASSESSMENT DOCD: CPT | Mod: CPTII,S$GLB,, | Performed by: OBSTETRICS & GYNECOLOGY

## 2023-11-21 PROCEDURE — 3008F BODY MASS INDEX DOCD: CPT | Mod: CPTII,S$GLB,, | Performed by: OBSTETRICS & GYNECOLOGY

## 2023-11-21 PROCEDURE — 1101F PT FALLS ASSESS-DOCD LE1/YR: CPT | Mod: CPTII,S$GLB,, | Performed by: OBSTETRICS & GYNECOLOGY

## 2023-11-21 PROCEDURE — 3288F PR FALLS RISK ASSESSMENT DOCUMENTED: ICD-10-PCS | Mod: CPTII,S$GLB,, | Performed by: OBSTETRICS & GYNECOLOGY

## 2023-11-21 PROCEDURE — 3044F HG A1C LEVEL LT 7.0%: CPT | Mod: CPTII,S$GLB,, | Performed by: OBSTETRICS & GYNECOLOGY

## 2023-11-21 PROCEDURE — 99214 OFFICE O/P EST MOD 30 MIN: CPT | Mod: S$GLB,,, | Performed by: OBSTETRICS & GYNECOLOGY

## 2023-11-21 PROCEDURE — 3008F PR BODY MASS INDEX (BMI) DOCUMENTED: ICD-10-PCS | Mod: CPTII,S$GLB,, | Performed by: OBSTETRICS & GYNECOLOGY

## 2023-11-21 PROCEDURE — 3044F PR MOST RECENT HEMOGLOBIN A1C LEVEL <7.0%: ICD-10-PCS | Mod: CPTII,S$GLB,, | Performed by: OBSTETRICS & GYNECOLOGY

## 2023-11-21 PROCEDURE — 1159F PR MEDICATION LIST DOCUMENTED IN MEDICAL RECORD: ICD-10-PCS | Mod: CPTII,S$GLB,, | Performed by: OBSTETRICS & GYNECOLOGY

## 2023-11-21 PROCEDURE — 1126F AMNT PAIN NOTED NONE PRSNT: CPT | Mod: CPTII,S$GLB,, | Performed by: OBSTETRICS & GYNECOLOGY

## 2023-11-21 PROCEDURE — 3077F PR MOST RECENT SYSTOLIC BLOOD PRESSURE >= 140 MM HG: ICD-10-PCS | Mod: CPTII,S$GLB,, | Performed by: OBSTETRICS & GYNECOLOGY

## 2023-11-21 PROCEDURE — 3078F DIAST BP <80 MM HG: CPT | Mod: CPTII,S$GLB,, | Performed by: OBSTETRICS & GYNECOLOGY

## 2023-11-21 PROCEDURE — 1126F PR PAIN SEVERITY QUANTIFIED, NO PAIN PRESENT: ICD-10-PCS | Mod: CPTII,S$GLB,, | Performed by: OBSTETRICS & GYNECOLOGY

## 2023-11-21 PROCEDURE — 3078F PR MOST RECENT DIASTOLIC BLOOD PRESSURE < 80 MM HG: ICD-10-PCS | Mod: CPTII,S$GLB,, | Performed by: OBSTETRICS & GYNECOLOGY

## 2023-11-21 PROCEDURE — 99999 PR PBB SHADOW E&M-EST. PATIENT-LVL III: CPT | Mod: PBBFAC,,, | Performed by: OBSTETRICS & GYNECOLOGY

## 2023-11-21 PROCEDURE — 1159F MED LIST DOCD IN RCRD: CPT | Mod: CPTII,S$GLB,, | Performed by: OBSTETRICS & GYNECOLOGY

## 2023-11-21 PROCEDURE — 1101F PR PT FALLS ASSESS DOC 0-1 FALLS W/OUT INJ PAST YR: ICD-10-PCS | Mod: CPTII,S$GLB,, | Performed by: OBSTETRICS & GYNECOLOGY

## 2023-11-21 PROCEDURE — 99999 PR PBB SHADOW E&M-EST. PATIENT-LVL III: ICD-10-PCS | Mod: PBBFAC,,, | Performed by: OBSTETRICS & GYNECOLOGY

## 2023-11-21 NOTE — PROGRESS NOTES
Subjective:      Patient ID: Ankita Campo is a 73 y.o. female.    Chief Complaint: No chief complaint on file.      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   Completed 6 cycles of Taxol and carboplatin in Feb 2021 and completed WPRT 4500 cGy with Dr. Freeman in April 2021.      Today's visit:  Has not been seen since 1/2022  Presents today for surveillance visit. Without complaints.   Disease free interval 2.5 years.   5  __________________    Her oncologic history is:  July 2020: 71 y/o referred by Dr. 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 Patient complained of PMB onset 5 months ago. Subsequently had a D&C 7/7/2020 with final pathology revealing high grade serous endometrial carcinoma. Surgical history includes cholecystectomy and CS x 1. Family history significant for maternal aunt with breast cancer. Co-morbidities include HTN and obesity.     TVUS 3/2020  Uterus 7.72 x 4.27 cm with mass 2.7 x 2.8 cm. No evidence of L or R ovary.        Aug 2020: CT scan: There is a 1.6 cm soft tissue nodule in the left mid abdomen series 2, image 117 and series 601, image 51.  This may represent an enlarged mesenteric node. PET: Stable soft tissue nodule within the left mid abdomen, likely represents enlarged mesenteric lymph node.  No associated focal increased radiotracer uptake; however, lesion remains suspicious for metastasis due to size/CT appearance.     Aug 2020:Robotic assisted laparoscopic hysterectomy BSO and bilateral sentinel node biopsies and omentectomy for serous ca of the uterus on 8/24/2020. 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 No lymph nodes noted on SLN bx. (+) LVSI. Preop : 5.      Sep 2020: Started taxol and carboplatin. Plan is for 6 cycles taxol and carboplatin then reimage with PET(PET negative  mesenteric LN) and then WPRT if PET negative     Dec 1, 2020 admitted after C4 with rectal bleeding. Ischemic colitis. Given flagyl.   No further blood in stool. No abdominal pain at this time. Also thrombocytopenia with plt radhika of 76,000.      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 PET: Postoperative change including robotic hysterectomy and bilateral salpingo oophorectomy.  No hypermetabolic activity in the pelvis to suggest disease recurrence. Stable appearance of a soft tissue nodule in the left mid abdominal mesentery measuring 1.6 x 1.4 cm (axial series 3, image 114), which does not demonstrate increased tracer uptake.      Apr 2021: completed WPRT 4500 cGy.   Review of Systems   Constitutional:  Negative for appetite change, chills, fatigue and fever.   HENT:  Negative for mouth sores.    Respiratory:  Negative for cough and shortness of breath.    Cardiovascular:  Negative for leg swelling.   Gastrointestinal:  Negative for abdominal pain, blood in stool, constipation and diarrhea.   Endocrine: Negative for cold intolerance.   Genitourinary:  Negative for dysuria and vaginal bleeding.   Musculoskeletal:  Negative for myalgias.   Skin:  Negative for rash.   Allergic/Immunologic: Negative.    Neurological:  Negative for weakness and numbness.   Hematological:  Negative for adenopathy. Does not bruise/bleed easily.   Psychiatric/Behavioral:  Negative for confusion.        Objective:   Physical Exam:   Constitutional: She is oriented to person, place, and time. She appears well-developed and well-nourished.    HENT:   Head: Normocephalic and atraumatic.    Eyes: Pupils are equal, round, and reactive to light. EOM are normal.    Neck: No thyromegaly present.    Cardiovascular:  Normal rate, regular rhythm and intact distal pulses.             Pulmonary/Chest: Effort normal and breath sounds normal. No respiratory  distress. She has no wheezes.        Abdominal: Soft. Bowel sounds are normal. She exhibits no distension and no mass. There is no abdominal tenderness.     Genitourinary:    Vagina and rectum normal.      Pelvic exam was performed with patient supine.   There is no lesion on the right labia. There is no lesion on the left labia. Right adnexum displays no mass. Left adnexum displays no mass. Vaginal cuff normal.  Cervix is absent.Uterus is absent.           Musculoskeletal: Normal range of motion and moves all extremeties.      Lymphadenopathy:     She has no cervical adenopathy. No inguinal adenopathy noted on the right or left side.        Right: No supraclavicular adenopathy present.        Left: No supraclavicular adenopathy present.    Neurological: She is alert and oriented to person, place, and time.    Skin: Skin is warm and dry. No rash noted.    Psychiatric: She has a normal mood and affect.       Assessment:     1. Endometrial ca        Plan:     Orders Placed This Encounter   Procedures         No evidence of disease on today's exam  Feels well, no new symptoms  Disease free interval 2.5 years   Tumor markers  normal and stable.      Recommend continued surveillance. RTC 6 months with  or sooner if needed.      I spent approximately 30 minutes reviewing the available records and evaluating the patient, out of which over 50% of the time was spent face to face with the patient in counseling and coordinating this patient's care.

## 2024-03-15 ENCOUNTER — TELEPHONE (OUTPATIENT)
Dept: NEPHROLOGY | Facility: CLINIC | Age: 74
End: 2024-03-15
Payer: COMMERCIAL

## 2024-03-20 ENCOUNTER — HOSPITAL ENCOUNTER (OUTPATIENT)
Dept: CARDIOLOGY | Facility: CLINIC | Age: 74
Discharge: HOME | End: 2024-03-20
Payer: COMMERCIAL

## 2024-03-20 ENCOUNTER — LAB VISIT (OUTPATIENT)
Dept: LAB | Facility: HOSPITAL | Age: 74
End: 2024-03-20
Attending: INTERNAL MEDICINE
Payer: COMMERCIAL

## 2024-03-20 ENCOUNTER — OFFICE VISIT (OUTPATIENT)
Dept: CARDIOLOGY | Facility: CLINIC | Age: 74
End: 2024-03-20
Payer: COMMERCIAL

## 2024-03-20 VITALS
BODY MASS INDEX: 39.3 KG/M2 | DIASTOLIC BLOOD PRESSURE: 73 MMHG | WEIGHT: 235.88 LBS | HEART RATE: 59 BPM | SYSTOLIC BLOOD PRESSURE: 137 MMHG | HEIGHT: 65 IN | OXYGEN SATURATION: 96 %

## 2024-03-20 DIAGNOSIS — N18.31 STAGE 3A CHRONIC KIDNEY DISEASE: ICD-10-CM

## 2024-03-20 DIAGNOSIS — R55 SYNCOPE AND COLLAPSE: ICD-10-CM

## 2024-03-20 DIAGNOSIS — I10 ESSENTIAL HYPERTENSION: Primary | ICD-10-CM

## 2024-03-20 DIAGNOSIS — R55 SYNCOPE AND COLLAPSE: Primary | ICD-10-CM

## 2024-03-20 DIAGNOSIS — E78.49 OTHER HYPERLIPIDEMIA: ICD-10-CM

## 2024-03-20 DIAGNOSIS — E66.01 MORBID OBESITY: ICD-10-CM

## 2024-03-20 DIAGNOSIS — C54.1 ENDOMETRIAL CA: ICD-10-CM

## 2024-03-20 LAB
ALBUMIN SERPL BCP-MCNC: 3.5 G/DL (ref 3.5–5.2)
ALP SERPL-CCNC: 104 U/L (ref 55–135)
ALT SERPL W/O P-5'-P-CCNC: 8 U/L (ref 10–44)
ANION GAP SERPL CALC-SCNC: 9 MMOL/L (ref 8–16)
AST SERPL-CCNC: 15 U/L (ref 10–40)
BASOPHILS # BLD AUTO: 0.02 K/UL (ref 0–0.2)
BASOPHILS NFR BLD: 0.4 % (ref 0–1.9)
BILIRUB SERPL-MCNC: 0.4 MG/DL (ref 0.1–1)
BUN SERPL-MCNC: 31 MG/DL (ref 8–23)
CALCIUM SERPL-MCNC: 9 MG/DL (ref 8.7–10.5)
CHLORIDE SERPL-SCNC: 110 MMOL/L (ref 95–110)
CO2 SERPL-SCNC: 25 MMOL/L (ref 23–29)
CREAT SERPL-MCNC: 1.3 MG/DL (ref 0.5–1.4)
DIFFERENTIAL METHOD BLD: ABNORMAL
EOSINOPHIL # BLD AUTO: 0.1 K/UL (ref 0–0.5)
EOSINOPHIL NFR BLD: 2.4 % (ref 0–8)
ERYTHROCYTE [DISTWIDTH] IN BLOOD BY AUTOMATED COUNT: 15.5 % (ref 11.5–14.5)
EST. GFR  (NO RACE VARIABLE): 43.1 ML/MIN/1.73 M^2
GLUCOSE SERPL-MCNC: 97 MG/DL (ref 70–110)
HCT VFR BLD AUTO: 37 % (ref 37–48.5)
HGB BLD-MCNC: 11.2 G/DL (ref 12–16)
IMM GRANULOCYTES # BLD AUTO: 0.01 K/UL (ref 0–0.04)
IMM GRANULOCYTES NFR BLD AUTO: 0.2 % (ref 0–0.5)
LYMPHOCYTES # BLD AUTO: 1.3 K/UL (ref 1–4.8)
LYMPHOCYTES NFR BLD: 23.7 % (ref 18–48)
MAGNESIUM SERPL-MCNC: 1.8 MG/DL (ref 1.6–2.6)
MCH RBC QN AUTO: 28.9 PG (ref 27–31)
MCHC RBC AUTO-ENTMCNC: 30.3 G/DL (ref 32–36)
MCV RBC AUTO: 95 FL (ref 82–98)
MONOCYTES # BLD AUTO: 0.5 K/UL (ref 0.3–1)
MONOCYTES NFR BLD: 9.9 % (ref 4–15)
NEUTROPHILS # BLD AUTO: 3.5 K/UL (ref 1.8–7.7)
NEUTROPHILS NFR BLD: 63.4 % (ref 38–73)
NRBC BLD-RTO: 0 /100 WBC
OHS QRS DURATION: 82 MS
OHS QTC CALCULATION: 414 MS
PLATELET # BLD AUTO: 240 K/UL (ref 150–450)
PMV BLD AUTO: 10.7 FL (ref 9.2–12.9)
POTASSIUM SERPL-SCNC: 4.6 MMOL/L (ref 3.5–5.1)
PROT SERPL-MCNC: 7.4 G/DL (ref 6–8.4)
RBC # BLD AUTO: 3.88 M/UL (ref 4–5.4)
SODIUM SERPL-SCNC: 144 MMOL/L (ref 136–145)
WBC # BLD AUTO: 5.45 K/UL (ref 3.9–12.7)

## 2024-03-20 PROCEDURE — 1101F PT FALLS ASSESS-DOCD LE1/YR: CPT | Mod: CPTII,S$GLB,, | Performed by: INTERNAL MEDICINE

## 2024-03-20 PROCEDURE — 85025 COMPLETE CBC W/AUTO DIFF WBC: CPT | Performed by: INTERNAL MEDICINE

## 2024-03-20 PROCEDURE — 3288F FALL RISK ASSESSMENT DOCD: CPT | Mod: CPTII,S$GLB,, | Performed by: INTERNAL MEDICINE

## 2024-03-20 PROCEDURE — 3078F DIAST BP <80 MM HG: CPT | Mod: CPTII,S$GLB,, | Performed by: INTERNAL MEDICINE

## 2024-03-20 PROCEDURE — 3008F BODY MASS INDEX DOCD: CPT | Mod: CPTII,S$GLB,, | Performed by: INTERNAL MEDICINE

## 2024-03-20 PROCEDURE — 83735 ASSAY OF MAGNESIUM: CPT | Performed by: INTERNAL MEDICINE

## 2024-03-20 PROCEDURE — 99204 OFFICE O/P NEW MOD 45 MIN: CPT | Mod: S$GLB,,, | Performed by: INTERNAL MEDICINE

## 2024-03-20 PROCEDURE — 93000 ELECTROCARDIOGRAM COMPLETE: CPT | Mod: S$GLB,,, | Performed by: INTERNAL MEDICINE

## 2024-03-20 PROCEDURE — 80053 COMPREHEN METABOLIC PANEL: CPT | Performed by: INTERNAL MEDICINE

## 2024-03-20 PROCEDURE — 36415 COLL VENOUS BLD VENIPUNCTURE: CPT | Performed by: INTERNAL MEDICINE

## 2024-03-20 PROCEDURE — 1159F MED LIST DOCD IN RCRD: CPT | Mod: CPTII,S$GLB,, | Performed by: INTERNAL MEDICINE

## 2024-03-20 PROCEDURE — 99999 PR PBB SHADOW E&M-EST. PATIENT-LVL IV: CPT | Mod: PBBFAC,,, | Performed by: INTERNAL MEDICINE

## 2024-03-20 PROCEDURE — 1160F RVW MEDS BY RX/DR IN RCRD: CPT | Mod: CPTII,S$GLB,, | Performed by: INTERNAL MEDICINE

## 2024-03-20 PROCEDURE — 3075F SYST BP GE 130 - 139MM HG: CPT | Mod: CPTII,S$GLB,, | Performed by: INTERNAL MEDICINE

## 2024-03-20 NOTE — PROGRESS NOTES
Subjective:   Patient ID:  Ankita Campo is a 73 y.o. female who presents for evaluation of Loss of Consciousness and Dizziness    PROBLEM LIST:  HTN  HLD  Endometrial cancer 2021 (s/p taxol, carboplatin, XRT)  CKD3    HPI:  She presents today for the evaluation of syncope.  She states she 1st began feeling lightheaded after undergoing chemotherapy for endometrial cancer back in 2021.  Her 1st syncopal event occurred last year and her most recent occurred this past Thanksgiving.  She states that she will suddenly began to feel very hot and become diaphoretic.  She feels dizzy and lightheaded.  She will then feel very cold in pass out.  The to time she actually lost consciousness she did have 2 have a bowel movement when she regained consciousness.  She has not sustained any serious injuries during either syncopal events.  Her symptoms do not sound orthostatic.  She denies any palpitations or chest discomfort.    Her EKG done today and personally reviewed by me shows normal sinus rhythm at 62 beats per minute with poor R-wave progression across the precordium and nonspecific ST flattening.    Echo 9/18:  CONCLUSIONS     1 - Normal left ventricular systolic function (EF 60-65%).     2 - No wall motion abnormalities.     3 - Biatrial enlargement.     4 - Indeterminate LV diastolic function.     5 - Normal right ventricular systolic function .     6 - The estimated PA systolic pressure is 27 mmHg.     7 - Trivial mitral regurgitation.     8 - Mild tricuspid regurgitation.         Past Medical History:   Diagnosis Date    Anemia     Arthritis     Chemotherapy-induced nausea 9/22/2020    Chemotherapy-induced thrombocytopenia 12/2/2020    Disorder of kidney and ureter     has only right kidney. 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     Endometrial ca 7/28/2020    Family history of gastric cancer 2/8/2016    Goiter     Gout 06/2016    Hypertension     Hypothyroidism, postsurgical     Multiple thyroid nodules     Neuropathy due to  chemotherapeutic drug 2021    Retroperitoneal mass 2020       Past Surgical History:   Procedure Laterality Date    cararact       SECTION      CHOLECYSTECTOMY      Donor Nephrectomy Left     fibroidectomy      FLEXIBLE SIGMOIDOSCOPY N/A 2020    Procedure: SIGMOIDOSCOPY, FLEXIBLE;  Surgeon: Sukhi Lieberman MD;  Location: [address]);  Service: Endoscopy;  Laterality: N/A;    HAND SURGERY      left    HYSTERECTOMY      SC REMOVAL OF OVARY/TUBE(S)      ROBOT-ASSISTED LAPAROSCOPIC ABDOMINAL HYSTERECTOMY USING DA MARGARITO XI N/A 2020    Procedure: XI ROBOTIC HYSTERECTOMY;  Surgeon: Donte Weeks MD;  Location: [address];  Service: OB/GYN;  Laterality: N/A;    ROBOT-ASSISTED LAPAROSCOPIC LYSIS OF ADHESIONS USING DA MARGARITO XI N/A 2020    Procedure: XI ROBOTIC LYSIS, ADHESIONS;  Surgeon: Donte Weeks MD;  Location: [address];  Service: OB/GYN;  Laterality: N/A;  small bowel and sigmoid colon    ROBOT-ASSISTED LAPAROSCOPIC OMENTECTOMY USING DA MARGARITO XI N/A 2020    Procedure: XI ROBOTIC OMENTECTOMY;  Surgeon: Donte Weeks MD;  Location: [address];  Service: OB/GYN;  Laterality: N/A;    ROBOT-ASSISTED LAPAROSCOPIC SALPINGO-OOPHORECTOMY USING DA MARGARITO XI Bilateral 2020    Procedure: XI ROBOTIC SALPINGO-OOPHORECTOMY;  Surgeon: Donte Weeks MD;  Location: [address];  Service: OB/GYN;  Laterality: Bilateral;    TOTAL THYROIDECTOMY         Social History     Socioeconomic History    Marital status: Single   Occupational History    Occupation:      Employer: OTHER   Tobacco Use    Smoking status: Never    Smokeless tobacco: Never   Substance and Sexual Activity    Alcohol use: Yes     Alcohol/week: 2.0 standard drinks of alcohol     Types: 2 Shots of liquor per week     Comment: Rare use of alcohol    Drug use: No    Sexual activity: Not Currently   Other Topics Concern    Are you pregnant or think you may be? 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 She has one son, he lives nearby. 2 dogs at home.        Family History   Problem Relation Age of Onset    Cancer Father         stomach    Goiter Mother     Heart disease Mother     Breast cancer Paternal Aunt     Cancer Paternal Aunt     Cancer Brother         stomach    Kidney disease Brother     Diabetes Sister     Seizures Sister     Hypertension Sister     Uterine cancer Sister         cancer in a fibroid    Aneurysm Brother     Colon cancer Other     Eczema Neg Hx     Lupus Neg Hx     Psoriasis Neg Hx     Melanoma Neg Hx     Ovarian cancer Neg Hx        Patient's Medications   New Prescriptions    No medications on file   Previous Medications    ALLOPURINOL (ZYLOPRIM) 300 MG TABLET    Take 1 tablet (300 mg total) by mouth once daily.    AMLODIPINE (NORVASC) 10 MG TABLET    Take 1 tablet (10 mg total) by mouth once daily.    CETIRIZINE (ZYRTEC) 10 MG TABLET    Take 1 tablet (10 mg total) by mouth every evening.    DIPHENOXYLATE-ATROPINE 2.5-0.025 MG (LOMOTIL) 2.5-0.025 MG PER TABLET    Take by mouth.    LEVOTHYROXINE (SYNTHROID) 112 MCG TABLET    TAKE 1 TABLET BY MOUTH ONCE DAILY 6  DAYS  A  WEEK  AND  1.5  TABLETS  ONE  DAY  PER  WEEK    MULTIVITAMIN CAPSULE    Take 1 capsule by mouth once daily.    ROSUVASTATIN (CRESTOR) 5 MG TABLET    Take 1 tablet (5 mg total) by mouth once daily.    VALSARTAN-HYDROCHLOROTHIAZIDE (DIOVAN-HCT) 320-25 MG PER TABLET    Take 1 tablet by mouth once daily.   Modified Medications    No medications on file   Discontinued Medications    No medications on file       Review of Systems   Constitutional: Negative for malaise/fatigue and weight gain.   HENT:  Negative for hearing loss.    Eyes:  Negative for visual disturbance.   Cardiovascular:  Positive for syncope. Negative for chest pain, claudication, dyspnea on exertion, leg swelling,  "near-syncope, orthopnea, palpitations and paroxysmal nocturnal dyspnea.   Respiratory:  Negative for cough, shortness of breath, sleep disturbances due to breathing, snoring and wheezing.    Endocrine: Negative for cold intolerance, heat intolerance, polydipsia, polyphagia and polyuria.   Hematologic/Lymphatic: Negative for bleeding problem. Does not bruise/bleed easily.   Skin:  Negative for rash and suspicious lesions.   Musculoskeletal:  Negative for arthritis, falls, joint pain, muscle weakness and myalgias.   Gastrointestinal:  Negative for abdominal pain, change in bowel habit, constipation, diarrhea, heartburn, hematochezia, melena and nausea.   Genitourinary:  Negative for hematuria and nocturia.   Neurological:  Negative for excessive daytime sleepiness, dizziness, headaches, light-headedness, loss of balance and weakness.   Psychiatric/Behavioral:  Negative for depression. The patient is not nervous/anxious.    Allergic/Immunologic: Negative for environmental allergies.       /73   Pulse (!) 59   Ht 5' 5" (1.651 m)   Wt 107 kg (235 lb 14.3 oz)   SpO2 96%   BMI 39.25 kg/m²     Objective:   Physical Exam  Constitutional:       Appearance: She is well-developed.      Comments:      HENT:      Head: Normocephalic and atraumatic.   Eyes:      General: No scleral icterus.     Conjunctiva/sclera: Conjunctivae normal.      Pupils: Pupils are equal, round, and reactive to light.   Neck:      Thyroid: No thyromegaly.      Vascular: No hepatojugular reflux or JVD.      Trachea: No tracheal deviation.   Cardiovascular:      Rate and Rhythm: Normal rate and regular rhythm.      Chest Wall: PMI is not displaced.      Pulses: Intact distal pulses.           Carotid pulses are 2+ on the right side and 2+ on the left side.       Radial pulses are 2+ on the right side and 2+ on the left side.      Heart sounds: Normal heart sounds.   Pulmonary:      Effort: Pulmonary effort is normal.      Breath sounds: Normal " breath sounds.   Abdominal:      General: Bowel sounds are normal. There is no distension.      Palpations: Abdomen is soft. There is no mass.      Tenderness: There is no abdominal tenderness.   Musculoskeletal:         General: No tenderness.      Cervical back: Normal range of motion and neck supple.   Lymphadenopathy:      Cervical: No cervical adenopathy.   Skin:     General: Skin is warm and dry.      Findings: No erythema or rash.      Nails: There is no clubbing.   Neurological:      Mental Status: She is alert and oriented to person, place, and time.   Psychiatric:         Speech: Speech normal.         Behavior: Behavior normal.         Lab Results   Component Value Date     07/11/2023    K 4.4 07/11/2023     07/11/2023    CO2 22 (L) 07/11/2023    BUN 31 (H) 07/11/2023    CREATININE 1.3 07/11/2023    GLU 87 07/11/2023    HGBA1C 5.4 07/11/2023    MG 2.0 07/02/2021    AST 24 07/11/2023    ALT 13 07/11/2023    ALBUMIN 3.4 (L) 07/11/2023    PROT 7.4 07/11/2023    BILITOT 0.5 07/11/2023    WBC 4.16 07/11/2023    HGB 10.7 (L) 07/11/2023    HCT 35.9 (L) 07/11/2023    MCV 96 07/11/2023     07/11/2023    INR 1.0 12/01/2020    TSH 0.814 07/11/2023    CHOL 129 07/11/2023    HDL 59 07/11/2023    LDLCALC 59.8 (L) 07/11/2023    TRIG 51 07/11/2023    BNP 57 08/04/2021       Assessment:     1. Essential hypertension : Her blood pressure was slightly above goal today.  If she remains consistently greater than 130/80, consideration can be made to change her hydrochlorothiazide to chlorthalidone 25 mg daily.   2. Other hyperlipidemia :  Her lipids are at goal on rosuvastatin 5 mg daily.   3. Stage 3a chronic kidney disease    4. Endometrial cancer:  She is status post chemotherapy with Taxol and carboplatin as well as radiation therapy.   5. Morbid obesity : Following a heart health diet such as the Mediterranean Diet is recommended in addition to 150 minutes a week of moderate intensity exercise to lower  cholesterol, maintain a healthy body weight, and improve overall cardiovascular health.   6. Syncope and collapse :  Her cardiac exam is normal and her ECG does not show any evidence of conduction system disease.  Clinically this sounds like she is having vasovagal episodes.  I have ordered an echocardiogram to assess for any structural abnormalities in her heart as well as a 30 day event monitor.  We discussed that if she begins to feel her typical prodromal symptoms, the best thing is to lie down with her feet up or at least sit down so she does not injure herself if she does lose consciousness.       Plan:     Ankita was seen today for loss of consciousness and dizziness.    Diagnoses and all orders for this visit:    Essential hypertension    Other hyperlipidemia    Stage 3a chronic kidney disease    Endometrial ca    Morbid obesity    Syncope and collapse        Thank you for allowing me to participate in this patient's care. Please do not hesitate to contact me with any questions or concerns.

## 2024-04-22 DIAGNOSIS — N18.31 STAGE 3A CHRONIC KIDNEY DISEASE: Primary | ICD-10-CM

## 2024-04-26 ENCOUNTER — CLINICAL SUPPORT (OUTPATIENT)
Dept: CARDIOLOGY | Facility: HOSPITAL | Age: 74
End: 2024-04-26
Attending: INTERNAL MEDICINE
Payer: COMMERCIAL

## 2024-04-26 ENCOUNTER — HOSPITAL ENCOUNTER (OUTPATIENT)
Dept: CARDIOLOGY | Facility: HOSPITAL | Age: 74
Discharge: HOME | End: 2024-04-26
Attending: INTERNAL MEDICINE
Payer: COMMERCIAL

## 2024-04-26 VITALS
WEIGHT: 235.88 LBS | DIASTOLIC BLOOD PRESSURE: 72 MMHG | SYSTOLIC BLOOD PRESSURE: 132 MMHG | BODY MASS INDEX: 39.3 KG/M2 | HEART RATE: 78 BPM | HEIGHT: 65 IN

## 2024-04-26 DIAGNOSIS — R55 SYNCOPE AND COLLAPSE: ICD-10-CM

## 2024-04-26 LAB
ASCENDING AORTA: 3.35 CM
AV INDEX (PROSTH): 0.84
AV MEAN GRADIENT: 6 MMHG
AV PEAK GRADIENT: 13 MMHG
AV VALVE AREA BY VELOCITY RATIO: 2.6 CM²
AV VALVE AREA: 2.92 CM²
AV VELOCITY RATIO: 0.75
BSA FOR ECHO PROCEDURE: 2.22 M2
CV ECHO LV RWT: 0.32 CM
DOP CALC AO PEAK VEL: 1.8 M/S
DOP CALC AO VTI: 35.4 CM
DOP CALC LVOT AREA: 3.5 CM2
DOP CALC LVOT DIAMETER: 2.1 CM
DOP CALC LVOT PEAK VEL: 1.35 M/S
DOP CALC LVOT STROKE VOLUME: 103.27 CM3
DOP CALCLVOT PEAK VEL VTI: 29.83 CM
E WAVE DECELERATION TIME: 249.61 MSEC
E/A RATIO: 0.62
E/E' RATIO: 11.2 M/S
ECHO LV POSTERIOR WALL: 0.69 CM (ref 0.6–1.1)
EJECTION FRACTION: 65 %
FRACTIONAL SHORTENING: 32 % (ref 28–44)
INTERVENTRICULAR SEPTUM: 0.65 CM (ref 0.6–1.1)
LA MAJOR: 6.31 CM
LA MINOR: 6.55 CM
LA WIDTH: 4.45 CM
LEFT ATRIUM SIZE: 5.64 CM
LEFT ATRIUM VOLUME INDEX MOD: 56.1 ML/M2
LEFT ATRIUM VOLUME INDEX: 64.7 ML/M2
LEFT ATRIUM VOLUME MOD: 119 CM3
LEFT ATRIUM VOLUME: 137.13 CM3
LEFT INTERNAL DIMENSION IN SYSTOLE: 2.89 CM (ref 2.1–4)
LEFT VENTRICLE DIASTOLIC VOLUME INDEX: 38.74 ML/M2
LEFT VENTRICLE DIASTOLIC VOLUME: 82.12 ML
LEFT VENTRICLE MASS INDEX: 39 G/M2
LEFT VENTRICLE SYSTOLIC VOLUME INDEX: 15 ML/M2
LEFT VENTRICLE SYSTOLIC VOLUME: 31.81 ML
LEFT VENTRICULAR INTERNAL DIMENSION IN DIASTOLE: 4.28 CM (ref 3.5–6)
LEFT VENTRICULAR MASS: 83.05 G
LV LATERAL E/E' RATIO: 11.2 M/S
LV SEPTAL E/E' RATIO: 11.2 M/S
MV A" WAVE DURATION": 9.9 MSEC
MV PEAK A VEL: 0.9 M/S
MV PEAK E VEL: 0.56 M/S
MV STENOSIS PRESSURE HALF TIME: 72.39 MS
MV VALVE AREA P 1/2 METHOD: 3.04 CM2
OHS CV RV/LV RATIO: 0.89 CM
PISA TR MAX VEL: 2.7 M/S
PULM VEIN S/D RATIO: 2.13
PV PEAK D VEL: 0.3 M/S
PV PEAK S VEL: 0.64 M/S
RA MAJOR: 5.26 CM
RA PRESSURE ESTIMATED: 3 MMHG
RA WIDTH: 4.5 CM
RIGHT ATRIAL AREA: 16 CM2
RIGHT VENTRICULAR END-DIASTOLIC DIMENSION: 3.8 CM
RV TB RVSP: 6 MMHG
SINUS: 3.41 CM
STJ: 2.7 CM
TDI LATERAL: 0.05 M/S
TDI SEPTAL: 0.05 M/S
TDI: 0.05 M/S
TR MAX PG: 29 MMHG
TRICUSPID ANNULAR PLANE SYSTOLIC EXCURSION: 2.08 CM
TV REST PULMONARY ARTERY PRESSURE: 32 MMHG
Z-SCORE OF LEFT VENTRICULAR DIMENSION IN END DIASTOLE: -4.48
Z-SCORE OF LEFT VENTRICULAR DIMENSION IN END SYSTOLE: -2.74

## 2024-04-26 PROCEDURE — 93271 ECG/MONITORING AND ANALYSIS: CPT

## 2024-04-26 PROCEDURE — 93306 TTE W/DOPPLER COMPLETE: CPT | Mod: 26,,, | Performed by: INTERNAL MEDICINE

## 2024-04-26 PROCEDURE — 93272 ECG/REVIEW INTERPRET ONLY: CPT | Mod: ,,, | Performed by: INTERNAL MEDICINE

## 2024-04-26 PROCEDURE — 93306 TTE W/DOPPLER COMPLETE: CPT

## 2024-04-26 NOTE — PROGRESS NOTES
HPI  This 73 y.o. y/o female with PMH of HLD, HTN, hypothyroidism, endometrial ca in 2019 s/p hysterectomy and chemoradiation (carboplatin), donated a kidney to brother in  came in for CKD.    Renal history  Creatinine   Date Value Ref Range Status   2024 1.4 0.5 - 1.4 mg/dL Final   2024 1.3 0.5 - 1.4 mg/dL Final   2023 1.3 0.5 - 1.4 mg/dL Final   2022 1.2 0.5 - 1.4 mg/dL Final   2021 1.2 0.5 - 1.4 mg/dL Final   2021 1.5 (H) 0.5 - 1.4 mg/dL Final   2021 1.01 0.50 - 1.40 mg/dL Final   2020 1.4 0.5 - 1.4 mg/dL Final   2020 1.1 0.5 - 1.4 mg/dL Final   2020 1.4 0.5 - 1.4 mg/dL Final   Cr 1.1-1.4 since   Prot/Creat Ratio, Urine   Date Value Ref Range Status   2018 Unable to calculate 0.00 - 0.20 Final   2018 Unable to calculate 0.00 - 0.20 Final     Has been drinking 40 oz of fluid a day  Not taking NSAIDs  Brother has ESRD due to uncontrolled HTN  On heart monitor for dizziness    HTN  BP this visit 167/84 mmHg (has not taken BP meds today)  BP at home: rarely check  Current medication: amlodipine 10 mg daily, valsartan-HCTZ 320-25 mg daily    No DM  Lab Results   Component Value Date    HGBA1C 5.4 2023       Past Medical History:   Diagnosis Date    Anemia     Arthritis     Chemotherapy-induced nausea 2020    Chemotherapy-induced thrombocytopenia 2020    Disorder of kidney and ureter     has only right kidney. 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     Endometrial ca 2020    Family history of gastric cancer 2016    Goiter     Gout 2016    Hyperlipidemia     Hypertension     Hypothyroidism, postsurgical     Multiple thyroid nodules     Neuropathy due to chemotherapeutic drug 2021    Retroperitoneal mass 2020    Syncope and collapse        Past Surgical History:   Procedure Laterality Date    cararact       SECTION      CHOLECYSTECTOMY      Donor Nephrectomy Left     fibroidectomy      FLEXIBLE  SIGMOIDOSCOPY N/A 12/2/2020    Procedure: SIGMOIDOSCOPY, FLEXIBLE;  Surgeon: Sukhi Lieberman MD;  Location: [facility] (2ND FLR);  Service: Endoscopy;  Laterality: N/A;    HAND SURGERY      left    HYSTERECTOMY      SD REMOVAL OF OVARY/TUBE(S)      ROBOT-ASSISTED LAPAROSCOPIC ABDOMINAL HYSTERECTOMY USING DA MARGARITO XI N/A 8/24/2020    Procedure: XI ROBOTIC HYSTERECTOMY;  Surgeon: Donte Weeks MD;  [facility];  Service: OB/GYN;  Laterality: N/A;    ROBOT-ASSISTED LAPAROSCOPIC LYSIS OF ADHESIONS USING DA MARGARITO XI N/A 8/24/2020    Procedure: XI ROBOTIC LYSIS, ADHESIONS;  Surgeon: Donte Weeks MD;  Location[facility];  Service: OB/GYN;  Laterality: N/A;  small bowel and sigmoid colon    ROBOT-ASSISTED LAPAROSCOPIC OMENTECTOMY USING DA MARGARITO XI N/A 8/24/2020    Procedure: XI ROBOTIC OMENTECTOMY;  Surgeon: Donte Weeks MD;  Location: [facility];  Service: OB/GYN;  Laterality: N/A;    ROBOT-ASSISTED LAPAROSCOPIC SALPINGO-OOPHORECTOMY USING DA MARGARITO XI Bilateral 8/24/2020    Procedure: XI ROBOTIC SALPINGO-OOPHORECTOMY;  Surgeon: Donte Weeks MD;  Location: [facility];  Service: OB/GYN;  Laterality: Bilateral;    TOTAL THYROIDECTOMY         Review of patient's allergies indicates:   Allergen Reactions    Neosporin [benzalkonium chloride] Rash    Flanax [naproxen sodium]      Suspected bullous fixed drug eruption right ankle    Doxycycline Rash     Skin peels         Social History     Socioeconomic History    Marital status: Single   Occupational History    Occupation:      Employer: OTHER   Tobacco Use    Smoking status: Never    Smokeless tobacco: Never   Substance and Sexual Activity    Alcohol use: Yes     Alcohol/week: 2.0 standard drinks of alcohol     Types: 2 Shots of liquor per week     Comment: Rare use of alcohol    Drug use: No    Sexual activity: Not Currently   Other Topics Concern    Are you pregnant or think you may be? 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 She has one son, he lives nearby. 2 dogs at home.        Family History   Problem Relation Name Age of Onset    Cancer Father          stomach    Goiter Mother      Heart disease Mother      Breast cancer Paternal Aunt      Cancer Paternal Aunt      Cancer Brother          stomach    Kidney disease Brother      Diabetes Sister      Seizures Sister      Hypertension Sister      Uterine cancer Sister          cancer in a fibroid    Aneurysm Brother      Colon cancer Other nephew     Eczema Neg Hx      Lupus Neg Hx      Psoriasis Neg Hx      Melanoma Neg Hx      Ovarian cancer Neg Hx         ROS negative except listed above    PHYSICAL EXAMINATION:  Blood pressure (!) 167/84, pulse (!) 55, weight 106 kg (233 lb 11 oz), SpO2 95%.  Constitutional - No acute distress  HEENT - Grossly normal  Neck - supple.   Cardiovascular - JVP normal  Respiratory - Clear  Musculoskeletal - Peripheral edema no  Dermatologic/Skin - Skin warm and dry.  No rashes.    Neurologic - No acute neurological deficit  Psychiatric - AAOx3    Assessment and Plan  1. CKD Stage 3B: likely 2/2 HTN, solitary kidney due to kidney donation in 1995    Urine Protein Creatinine Ratios:    Prot/Creat Ratio, Urine   Date Value Ref Range Status   07/09/2018 Unable to calculate 0.00 - 0.20 Final   01/23/2018 Unable to calculate 0.00 - 0.20 Final   12/30/2016 0.06 0.00 - 0.20 Final         Acid-Base:   Lab Results   Component Value Date     04/26/2024    K 4.7 04/26/2024    CO2 26 04/26/2024     -Counseled to avoid NSAIDs  -Counseled to drink 50 oz of fluid a day  -Waiting for the abdominal US report, and will check UPCR    2. HTN: BP goal 130/80 mmHg  -Counseled for low salt diet  -Continue amlodipine 10 mg daily, valsartan-HCTZ 320-25 mg daily  -Counseled for checking BP at home    3. Secondary Hyperparathyroidism  Lab Results    Component Value Date    PTH 98.3 (H) 04/26/2024    PTH 64.6 06/20/2017    CALCIUM 9.6 04/26/2024    CALCIUM 9.0 03/20/2024    PHOS 3.5 04/26/2024    PHOS 4.2 07/02/2021     Vit D, 25-Hydroxy   Date Value Ref Range Status   07/11/2023 18 (L) 30 - 96 ng/mL Final     Comment:     Vitamin D deficiency.........<10 ng/mL                              Vitamin D insufficiency......10-29 ng/mL       Vitamin D sufficiency........> or equal to 30 ng/mL  Vitamin D toxicity............>100 ng/mL     Advise to take vit D supplement 1000 units daily  Will continue to monitor    4. Anemia:   Lab Results   Component Value Date    HGB 10.9 (L) 04/26/2024    FERRITIN 259 07/11/2023    UIBC 251 04/02/2007    IRON 73 07/11/2023    TRANSFERRIN 193 (L) 07/11/2023    TIBC 286 07/11/2023    FESATURATED 26 07/11/2023   Continue iron supplement    5. No DM:  Last HbA1C   Lab Results   Component Value Date    HGBA1C 5.4 07/11/2023       6. Lipid management:   Lab Results   Component Value Date    LDLCALC 59.8 (L) 07/11/2023   Continue statin    7. Severe obesity  Counseled for weight loss since the weight is associated with CKD progression    ESRD planing when eGFR < 15   Will consider txp referral when eGFR < 20    Follow up in 2 months

## 2024-04-29 ENCOUNTER — OFFICE VISIT (OUTPATIENT)
Dept: NEPHROLOGY | Facility: CLINIC | Age: 74
End: 2024-04-29
Payer: COMMERCIAL

## 2024-04-29 VITALS
SYSTOLIC BLOOD PRESSURE: 167 MMHG | DIASTOLIC BLOOD PRESSURE: 84 MMHG | WEIGHT: 233.69 LBS | BODY MASS INDEX: 38.89 KG/M2 | HEART RATE: 55 BPM | OXYGEN SATURATION: 95 %

## 2024-04-29 DIAGNOSIS — N18.32 STAGE 3B CHRONIC KIDNEY DISEASE: Primary | ICD-10-CM

## 2024-04-29 DIAGNOSIS — E66.01 SEVERE OBESITY (BMI 35.0-35.9 WITH COMORBIDITY): ICD-10-CM

## 2024-04-29 DIAGNOSIS — I10 HYPERTENSION, UNSPECIFIED TYPE: ICD-10-CM

## 2024-04-29 DIAGNOSIS — N25.81 SECONDARY HYPERPARATHYROIDISM: ICD-10-CM

## 2024-04-29 PROCEDURE — 99999 PR PBB SHADOW E&M-EST. PATIENT-LVL IV: CPT | Mod: PBBFAC,,, | Performed by: INTERNAL MEDICINE

## 2024-04-29 PROCEDURE — 99204 OFFICE O/P NEW MOD 45 MIN: CPT | Mod: S$GLB,,, | Performed by: INTERNAL MEDICINE

## 2024-04-29 PROCEDURE — 1101F PT FALLS ASSESS-DOCD LE1/YR: CPT | Mod: CPTII,S$GLB,, | Performed by: INTERNAL MEDICINE

## 2024-04-29 PROCEDURE — 3288F FALL RISK ASSESSMENT DOCD: CPT | Mod: CPTII,S$GLB,, | Performed by: INTERNAL MEDICINE

## 2024-04-29 PROCEDURE — 1159F MED LIST DOCD IN RCRD: CPT | Mod: CPTII,S$GLB,, | Performed by: INTERNAL MEDICINE

## 2024-04-29 PROCEDURE — 1160F RVW MEDS BY RX/DR IN RCRD: CPT | Mod: CPTII,S$GLB,, | Performed by: INTERNAL MEDICINE

## 2024-04-29 PROCEDURE — 3077F SYST BP >= 140 MM HG: CPT | Mod: CPTII,S$GLB,, | Performed by: INTERNAL MEDICINE

## 2024-04-29 PROCEDURE — 1126F AMNT PAIN NOTED NONE PRSNT: CPT | Mod: CPTII,S$GLB,, | Performed by: INTERNAL MEDICINE

## 2024-04-29 PROCEDURE — 3066F NEPHROPATHY DOC TX: CPT | Mod: CPTII,S$GLB,, | Performed by: INTERNAL MEDICINE

## 2024-04-29 PROCEDURE — 3079F DIAST BP 80-89 MM HG: CPT | Mod: CPTII,S$GLB,, | Performed by: INTERNAL MEDICINE

## 2024-05-20 NOTE — PROGRESS NOTES
Subjective     Patient ID: Ankita Campo is a 73 y.o. female.    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   Completed 6 cycles of Taxol and carboplatin in Feb 2021 and completed WPRT 4500 cGy with Dr. Freeman in April 2021.      Today's visit:  Presents today for surveillance visit. Without complaints.   Disease free interval 3 years.   is 5, normal and stable  __________________     Her oncologic history is:  July 2020: 69 y/o referred by Dr. 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 Patient complained of PMB onset 5 months ago. Subsequently had a D&C 7/7/2020 with final pathology revealing high grade serous endometrial carcinoma. Surgical history includes cholecystectomy and CS x 1. Family history significant for maternal aunt with breast cancer. Co-morbidities include HTN and obesity.     TVUS 3/2020  Uterus 7.72 x 4.27 cm with mass 2.7 x 2.8 cm. No evidence of L or R ovary.        Aug 2020: CT scan: There is a 1.6 cm soft tissue nodule in the left mid abdomen series 2, image 117 and series 601, image 51.  This may represent an enlarged mesenteric node. PET: Stable soft tissue nodule within the left mid abdomen, likely represents enlarged mesenteric lymph node.  No associated focal increased radiotracer uptake; however, lesion remains suspicious for metastasis due to size/CT appearance.     Aug 2020:Robotic assisted laparoscopic hysterectomy BSO and bilateral sentinel node biopsies and omentectomy for serous ca of the uterus on 8/24/2020. 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 No lymph nodes noted on SLN bx. (+) LVSI. Preop : 5.      Sep 2020: Started taxol and carboplatin. Plan is for 6 cycles taxol and carboplatin then reimage with PET(PET negative mesenteric LN) and then WPRT if PET  negative     Dec 1, 2020 admitted after C4 with rectal bleeding. Ischemic colitis. Given flagyl.   No further blood in stool. No abdominal pain at this time. Also thrombocytopenia with plt radhika of 76,000.      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 PET: Postoperative change including robotic hysterectomy and bilateral salpingo oophorectomy.  No hypermetabolic activity in the pelvis to suggest disease recurrence. Stable appearance of a soft tissue nodule in the left mid abdominal mesentery measuring 1.6 x 1.4 cm (axial series 3, image 114), which does not demonstrate increased tracer uptake.      Apr 2021: completed WPRT 4500 cGy.     Review of Systems   Constitutional:  Negative for chills, diaphoresis, fatigue and fever.   Respiratory:  Negative for chest tightness, shortness of breath and wheezing.    Cardiovascular:  Negative for chest pain, palpitations and leg swelling.   Gastrointestinal:  Negative for abdominal pain, constipation, diarrhea, nausea and vomiting.   Genitourinary:  Negative for difficulty urinating, dysuria, flank pain, frequency, genital sores, hematuria, pelvic pain, urgency, vaginal bleeding, vaginal discharge and vaginal pain.   Integumentary:  Negative for color change.   Neurological:  Negative for dizziness, light-headedness and headaches.   Psychiatric/Behavioral:  Negative for agitation.      BP (!) 180/85   Pulse (!) 55   Wt 106.9 kg (235 lb 10.8 oz)   BMI 39.22 kg/m²        Objective     Physical Exam  Vitals and nursing note reviewed. Exam conducted with a chaperone present.   Constitutional:       General: She is not in acute distress.     Appearance: Normal appearance. She is well-developed. She is not diaphoretic.   HENT:      Head: Normocephalic and atraumatic.   Eyes:      General: No scleral icterus.  Pulmonary:      Effort: Pulmonary effort is normal. No respiratory distress.    Abdominal:      General: There is no distension.      Palpations: Abdomen is soft. There is no mass.      Tenderness: There is no abdominal tenderness.   Genitourinary:     General: Normal vulva.      Labia:         Right: No rash, tenderness or lesion.         Left: No rash, tenderness or lesion.       Vagina: Normal. No vaginal discharge or bleeding.      Uterus: Absent.       Adnexa:         Right: No mass, tenderness or fullness.          Left: No mass, tenderness or fullness.     Musculoskeletal:         General: Normal range of motion.      Cervical back: Normal range of motion.      Right lower leg: No edema.      Left lower leg: No edema.   Skin:     General: Skin is warm and dry.      Coloration: Skin is not pale.      Findings: No rash.   Neurological:      Mental Status: She is alert and oriented to person, place, and time.   Psychiatric:         Mood and Affect: Mood normal.         Behavior: Behavior normal.            Assessment and Plan     1. Endometrial ca  -     ; Future; Expected date: 05/21/2024        No evidence of disease on today's exam  Feels well, no new symptoms  Disease free interval 3 years   Tumor markers  normal and stable.      Recommend continued surveillance. RTC 6 months with  or sooner if needed.      I spent approximately 30 minutes reviewing the available records and evaluating the patient, out of which over 50% of the time was spent face to face with the patient in counseling and coordinating this patient's care.         No follow-ups on file.

## 2024-05-21 ENCOUNTER — OFFICE VISIT (OUTPATIENT)
Dept: GYNECOLOGIC ONCOLOGY | Facility: CLINIC | Age: 74
End: 2024-05-21
Payer: COMMERCIAL

## 2024-05-21 ENCOUNTER — LAB VISIT (OUTPATIENT)
Dept: LAB | Facility: HOSPITAL | Age: 74
End: 2024-05-21
Payer: COMMERCIAL

## 2024-05-21 VITALS
WEIGHT: 235.69 LBS | SYSTOLIC BLOOD PRESSURE: 180 MMHG | DIASTOLIC BLOOD PRESSURE: 85 MMHG | BODY MASS INDEX: 39.22 KG/M2 | HEART RATE: 55 BPM

## 2024-05-21 DIAGNOSIS — C54.1 ENDOMETRIAL CA: Primary | ICD-10-CM

## 2024-05-21 DIAGNOSIS — C54.1 ENDOMETRIAL CA: ICD-10-CM

## 2024-05-21 LAB — CANCER AG125 SERPL-ACNC: 5 U/ML (ref 0–30)

## 2024-05-21 PROCEDURE — 1101F PT FALLS ASSESS-DOCD LE1/YR: CPT | Mod: CPTII,S$GLB,, | Performed by: OBSTETRICS & GYNECOLOGY

## 2024-05-21 PROCEDURE — 3077F SYST BP >= 140 MM HG: CPT | Mod: CPTII,S$GLB,, | Performed by: OBSTETRICS & GYNECOLOGY

## 2024-05-21 PROCEDURE — 1160F RVW MEDS BY RX/DR IN RCRD: CPT | Mod: CPTII,S$GLB,, | Performed by: OBSTETRICS & GYNECOLOGY

## 2024-05-21 PROCEDURE — 3066F NEPHROPATHY DOC TX: CPT | Mod: CPTII,S$GLB,, | Performed by: OBSTETRICS & GYNECOLOGY

## 2024-05-21 PROCEDURE — 1159F MED LIST DOCD IN RCRD: CPT | Mod: CPTII,S$GLB,, | Performed by: OBSTETRICS & GYNECOLOGY

## 2024-05-21 PROCEDURE — 1126F AMNT PAIN NOTED NONE PRSNT: CPT | Mod: CPTII,S$GLB,, | Performed by: OBSTETRICS & GYNECOLOGY

## 2024-05-21 PROCEDURE — 99214 OFFICE O/P EST MOD 30 MIN: CPT | Mod: S$GLB,,, | Performed by: OBSTETRICS & GYNECOLOGY

## 2024-05-21 PROCEDURE — 99999 PR PBB SHADOW E&M-EST. PATIENT-LVL III: CPT | Mod: PBBFAC,,, | Performed by: OBSTETRICS & GYNECOLOGY

## 2024-05-21 PROCEDURE — 3079F DIAST BP 80-89 MM HG: CPT | Mod: CPTII,S$GLB,, | Performed by: OBSTETRICS & GYNECOLOGY

## 2024-05-21 PROCEDURE — 86304 IMMUNOASSAY TUMOR CA 125: CPT | Performed by: OBSTETRICS & GYNECOLOGY

## 2024-05-21 PROCEDURE — 36415 COLL VENOUS BLD VENIPUNCTURE: CPT | Performed by: OBSTETRICS & GYNECOLOGY

## 2024-05-21 PROCEDURE — 3288F FALL RISK ASSESSMENT DOCD: CPT | Mod: CPTII,S$GLB,, | Performed by: OBSTETRICS & GYNECOLOGY

## 2024-05-29 ENCOUNTER — PATIENT OUTREACH (OUTPATIENT)
Dept: ADMINISTRATIVE | Facility: HOSPITAL | Age: 74
End: 2024-05-29
Payer: COMMERCIAL

## 2024-05-29 NOTE — PROGRESS NOTES
Health Maintenance Topic(s) Outreach Outcomes & Actions Taken:    Provider Pt Reattribution - Outreach Outcomes & Actions Taken  : Pt Already Completed Appt with Another Provider, Updated Care Team if Applicable and Patient has primary care with [facility]               Additional Notes:  Patient has primary care with [facility]          Care Management, Digital Medicine, and/or Education Referrals      Next Steps - Referral Actions: refused opcm/comm resorces

## 2024-09-05 ENCOUNTER — TELEPHONE (OUTPATIENT)
Dept: NEPHROLOGY | Facility: CLINIC | Age: 74
End: 2024-09-05
Payer: COMMERCIAL

## 2024-09-05 NOTE — PROGRESS NOTES
HPI  This 74 y.o. y/o female with PMH of HLD, HTN, hypothyroidism, endometrial ca in 2019 s/p hysterectomy and chemoradiation (carboplatin), donated a kidney to brother in  came in for CKD.    Renal history  Creatinine   Date Value Ref Range Status   2024 1.4 0.5 - 1.4 mg/dL Final   2024 1.3 0.5 - 1.4 mg/dL Final   2023 1.3 0.5 - 1.4 mg/dL Final   2022 1.2 0.5 - 1.4 mg/dL Final   2021 1.2 0.5 - 1.4 mg/dL Final   2021 1.5 (H) 0.5 - 1.4 mg/dL Final   2021 1.01 0.50 - 1.40 mg/dL Final   2020 1.4 0.5 - 1.4 mg/dL Final   2020 1.1 0.5 - 1.4 mg/dL Final   2020 1.4 0.5 - 1.4 mg/dL Final   Cr 1.1-1.4 since   Prot/Creat Ratio, Urine   Date Value Ref Range Status   2018 Unable to calculate 0.00 - 0.20 Final   2018 Unable to calculate 0.00 - 0.20 Final     Has been drinking 50-55 oz of fluid a day  Not taking NSAIDs  Brother has ESRD due to uncontrolled HTN  On heart monitor for dizziness    HTN  BP this visit 157/93 mmHg (just took her meds before she came, mentioned she has white coat effect)  BP at home: rarely check  Current medication: amlodipine 10 mg daily, valsartan-HCTZ 320-25 mg daily    No DM  Lab Results   Component Value Date    HGBA1C 5.4 2023       Past Medical History:   Diagnosis Date    Anemia     Arthritis     Chemotherapy-induced nausea 2020    Chemotherapy-induced thrombocytopenia 2020    Disorder of kidney and ureter     has only right kidney. 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     Endometrial ca 2020    Family history of gastric cancer 2016    Goiter     Gout 2016    Hyperlipidemia     Hypertension     Hypothyroidism, postsurgical     Multiple thyroid nodules     Neuropathy due to chemotherapeutic drug 2021    Retroperitoneal mass 2020    Syncope and collapse        Past Surgical History:   Procedure Laterality Date    cararact       SECTION      CHOLECYSTECTOMY      Donor Nephrectomy  Left     fibroidectomy      FLEXIBLE SIGMOIDOSCOPY N/A 12/2/2020    Procedure: SIGMOIDOSCOPY, FLEXIBLE;  Surgeon: Sukhi Lieberman MD;  Location: [facility] (2ND FLR);  Service: Endoscopy;  Laterality: N/A;    HAND SURGERY      left    HYSTERECTOMY      TN REMOVAL OF OVARY/TUBE(S)      ROBOT-ASSISTED LAPAROSCOPIC ABDOMINAL HYSTERECTOMY USING DA MARGARITO XI N/A 8/24/2020    Procedure: XI ROBOTIC HYSTERECTOMY;  Surgeon: Donte Weeks MD;  [facility];  Service: OB/GYN;  Laterality: N/A;    ROBOT-ASSISTED LAPAROSCOPIC LYSIS OF ADHESIONS USING DA MARGARITO XI N/A 8/24/2020    Procedure: XI ROBOTIC LYSIS, ADHESIONS;  Surgeon: Donte Weeks MD;  Location: [facility] OR [address] [place] [address];  Service: OB/GYN;  Laterality: N/A;  small bowel and sigmoid colon    ROBOT-ASSISTED LAPAROSCOPIC OMENTECTOMY USING DA MARGARITO XI N/A 8/24/2020    Procedure: XI ROBOTIC OMENTECTOMY;  Surgeon: Donte Weeks MD;  [facility];  Service: OB/GYN;  Laterality: N/A;    ROBOT-ASSISTED LAPAROSCOPIC SALPINGO-OOPHORECTOMY USING DA MARGARITO XI Bilateral 8/24/2020    Procedure: XI ROBOTIC SALPINGO-OOPHORECTOMY;  Surgeon: Donte Weeks MD;  [facility];  Service: OB/GYN;  Laterality: Bilateral;    TOTAL THYROIDECTOMY         Review of patient's allergies indicates:   Allergen Reactions    Neosporin [benzalkonium chloride] Rash    Flanax [naproxen sodium]      Suspected bullous fixed drug eruption right ankle    Doxycycline Rash     Skin peels         Social History     Socioeconomic History    Marital status: Single   Occupational History    Occupation:      Employer: OTHER   Tobacco Use    Smoking status: Never    Smokeless tobacco: Never   Substance and Sexual Activity    Alcohol use: Yes     Alcohol/week: 2.0 standard drinks of alcohol     Types: 2 Shots of liquor per week     Comment: Rare use of alcohol    Drug use: No    Sexual activity: Not Currently   Other Topics Concern    Are you pregnant or think  "you may be? No    Breast-feeding No   Social History Narrative    9/15/20: she was working. 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 She lives alone. She has one son, he lives nearby. 2 dogs at home.        Family History   Problem Relation Name Age of Onset    Cancer Father          stomach    Goiter Mother      Heart disease Mother      Breast cancer Paternal Aunt      Cancer Paternal Aunt      Cancer Brother          stomach    Kidney disease Brother      Diabetes Sister      Seizures Sister      Hypertension Sister      Uterine cancer Sister          cancer in a fibroid    Aneurysm Brother      Colon cancer Other nephew     Eczema Neg Hx      Lupus Neg Hx      Psoriasis Neg Hx      Melanoma Neg Hx      Ovarian cancer Neg Hx         ROS negative except listed above    PHYSICAL EXAMINATION:  Blood pressure (!) 157/93, pulse (!) 56, height 5' 5" (1.651 m), weight 111.2 kg (245 lb 2.4 oz).  Constitutional - No acute distress  HEENT - Grossly normal  Neck - supple.   Cardiovascular - JVP normal  Respiratory - Clear  Musculoskeletal - Peripheral edema no  Dermatologic/Skin - Skin warm and dry.  No rashes.    Neurologic - No acute neurological deficit  Psychiatric - AAOx3    Assessment and Plan  1. CKD Stage 3B: likely 2/2 HTN, solitary kidney due to kidney donation in 1995    Urine Protein Creatinine Ratios:    Prot/Creat Ratio, Urine   Date Value Ref Range Status   07/09/2018 Unable to calculate 0.00 - 0.20 Final   01/23/2018 Unable to calculate 0.00 - 0.20 Final   12/30/2016 0.06 0.00 - 0.20 Final         Acid-Base:   Lab Results   Component Value Date     04/26/2024    K 4.7 04/26/2024    CO2 26 04/26/2024     -Counseled to avoid NSAIDs  -Counseled to drink 50 oz of fluid a day  -Will check RP US, will repeat CKD profile today    2. HTN: BP goal 130/80 mmHg  -Counseled for low salt diet  -Continue amlodipine 10 mg daily, valsartan-HCTZ 320-25 mg daily  -Counseled for checking BP at home and send " us the readings    3. Secondary Hyperparathyroidism  Lab Results   Component Value Date    PTH 98.3 (H) 04/26/2024    PTH 64.6 06/20/2017    CALCIUM 9.6 04/26/2024    CALCIUM 9.0 03/20/2024    PHOS 3.5 04/26/2024    PHOS 4.2 07/02/2021     Vit D, 25-Hydroxy   Date Value Ref Range Status   07/11/2023 18 (L) 30 - 96 ng/mL Final     Comment:     Vitamin D deficiency.........<10 ng/mL                              Vitamin D insufficiency......10-29 ng/mL       Vitamin D sufficiency........> or equal to 30 ng/mL  Vitamin D toxicity............>100 ng/mL     Advise to take vit D supplement 1000 units daily  Will continue to monitor    4. Anemia:   Lab Results   Component Value Date    HGB 10.9 (L) 04/26/2024    FERRITIN 259 07/11/2023    UIBC 251 04/02/2007    IRON 73 07/11/2023    TRANSFERRIN 193 (L) 07/11/2023    TIBC 286 07/11/2023    FESATURATED 26 07/11/2023   Continue iron supplement    5. No DM:  Last HbA1C   Lab Results   Component Value Date    HGBA1C 5.4 07/11/2023       6. Lipid management:   Lab Results   Component Value Date    LDLCALC 59.8 (L) 07/11/2023   Continue statin    7. Severe obesity  Counseled for weight loss since the weight is associated with CKD progression. The patient states she would like to avoid the medication for weight loss.    ESRD planing when eGFR < 15   Will consider txp referral when eGFR < 20    Follow up in 3 months

## 2024-09-09 ENCOUNTER — OFFICE VISIT (OUTPATIENT)
Dept: NEPHROLOGY | Facility: CLINIC | Age: 74
End: 2024-09-09
Payer: COMMERCIAL

## 2024-09-09 ENCOUNTER — LAB VISIT (OUTPATIENT)
Dept: LAB | Facility: HOSPITAL | Age: 74
End: 2024-09-09
Attending: INTERNAL MEDICINE
Payer: COMMERCIAL

## 2024-09-09 VITALS
BODY MASS INDEX: 40.84 KG/M2 | HEIGHT: 65 IN | SYSTOLIC BLOOD PRESSURE: 157 MMHG | WEIGHT: 245.13 LBS | HEART RATE: 56 BPM | DIASTOLIC BLOOD PRESSURE: 93 MMHG

## 2024-09-09 DIAGNOSIS — N25.81 SECONDARY HYPERPARATHYROIDISM: ICD-10-CM

## 2024-09-09 DIAGNOSIS — I10 HYPERTENSION, UNSPECIFIED TYPE: ICD-10-CM

## 2024-09-09 DIAGNOSIS — N18.32 STAGE 3B CHRONIC KIDNEY DISEASE: ICD-10-CM

## 2024-09-09 DIAGNOSIS — E66.01 SEVERE OBESITY (BMI >= 40): ICD-10-CM

## 2024-09-09 DIAGNOSIS — N18.32 STAGE 3B CHRONIC KIDNEY DISEASE: Primary | ICD-10-CM

## 2024-09-09 LAB
ALBUMIN SERPL BCP-MCNC: 3.4 G/DL (ref 3.5–5.2)
ANION GAP SERPL CALC-SCNC: 9 MMOL/L (ref 8–16)
BASOPHILS # BLD AUTO: 0.01 K/UL (ref 0–0.2)
BASOPHILS NFR BLD: 0.3 % (ref 0–1.9)
BUN SERPL-MCNC: 21 MG/DL (ref 8–23)
CALCIUM SERPL-MCNC: 9.2 MG/DL (ref 8.7–10.5)
CHLORIDE SERPL-SCNC: 108 MMOL/L (ref 95–110)
CO2 SERPL-SCNC: 26 MMOL/L (ref 23–29)
CREAT SERPL-MCNC: 1.2 MG/DL (ref 0.5–1.4)
DIFFERENTIAL METHOD BLD: ABNORMAL
EOSINOPHIL # BLD AUTO: 0.2 K/UL (ref 0–0.5)
EOSINOPHIL NFR BLD: 3.9 % (ref 0–8)
ERYTHROCYTE [DISTWIDTH] IN BLOOD BY AUTOMATED COUNT: 15.8 % (ref 11.5–14.5)
EST. GFR  (NO RACE VARIABLE): 47.2 ML/MIN/1.73 M^2
FERRITIN SERPL-MCNC: 251 NG/ML (ref 20–300)
GLUCOSE SERPL-MCNC: 89 MG/DL (ref 70–110)
HCT VFR BLD AUTO: 37 % (ref 37–48.5)
HGB BLD-MCNC: 10.9 G/DL (ref 12–16)
IMM GRANULOCYTES # BLD AUTO: 0 K/UL (ref 0–0.04)
IMM GRANULOCYTES NFR BLD AUTO: 0 % (ref 0–0.5)
IRON SERPL-MCNC: 68 UG/DL (ref 30–160)
LYMPHOCYTES # BLD AUTO: 1 K/UL (ref 1–4.8)
LYMPHOCYTES NFR BLD: 24.7 % (ref 18–48)
MCH RBC QN AUTO: 28.1 PG (ref 27–31)
MCHC RBC AUTO-ENTMCNC: 29.5 G/DL (ref 32–36)
MCV RBC AUTO: 95 FL (ref 82–98)
MONOCYTES # BLD AUTO: 0.4 K/UL (ref 0.3–1)
MONOCYTES NFR BLD: 11.4 % (ref 4–15)
NEUTROPHILS # BLD AUTO: 2.3 K/UL (ref 1.8–7.7)
NEUTROPHILS NFR BLD: 59.7 % (ref 38–73)
NRBC BLD-RTO: 0 /100 WBC
PHOSPHATE SERPL-MCNC: 3.9 MG/DL (ref 2.7–4.5)
PHOSPHATE SERPL-MCNC: 3.9 MG/DL (ref 2.7–4.5)
PLATELET # BLD AUTO: 167 K/UL (ref 150–450)
PMV BLD AUTO: 12.3 FL (ref 9.2–12.9)
POTASSIUM SERPL-SCNC: 4.4 MMOL/L (ref 3.5–5.1)
PTH-INTACT SERPL-MCNC: 107.8 PG/ML (ref 9–77)
RBC # BLD AUTO: 3.88 M/UL (ref 4–5.4)
SATURATED IRON: 24 % (ref 20–50)
SODIUM SERPL-SCNC: 143 MMOL/L (ref 136–145)
TOTAL IRON BINDING CAPACITY: 284 UG/DL (ref 250–450)
TRANSFERRIN SERPL-MCNC: 192 MG/DL (ref 200–375)
WBC # BLD AUTO: 3.85 K/UL (ref 3.9–12.7)

## 2024-09-09 PROCEDURE — 83970 ASSAY OF PARATHORMONE: CPT | Performed by: INTERNAL MEDICINE

## 2024-09-09 PROCEDURE — 1159F MED LIST DOCD IN RCRD: CPT | Mod: CPTII,S$GLB,, | Performed by: INTERNAL MEDICINE

## 2024-09-09 PROCEDURE — 36415 COLL VENOUS BLD VENIPUNCTURE: CPT | Performed by: INTERNAL MEDICINE

## 2024-09-09 PROCEDURE — 3080F DIAST BP >= 90 MM HG: CPT | Mod: CPTII,S$GLB,, | Performed by: INTERNAL MEDICINE

## 2024-09-09 PROCEDURE — 1160F RVW MEDS BY RX/DR IN RCRD: CPT | Mod: CPTII,S$GLB,, | Performed by: INTERNAL MEDICINE

## 2024-09-09 PROCEDURE — 85025 COMPLETE CBC W/AUTO DIFF WBC: CPT | Performed by: INTERNAL MEDICINE

## 2024-09-09 PROCEDURE — 82728 ASSAY OF FERRITIN: CPT | Performed by: INTERNAL MEDICINE

## 2024-09-09 PROCEDURE — 83540 ASSAY OF IRON: CPT | Performed by: INTERNAL MEDICINE

## 2024-09-09 PROCEDURE — 99999 PR PBB SHADOW E&M-EST. PATIENT-LVL III: CPT | Mod: PBBFAC,,, | Performed by: INTERNAL MEDICINE

## 2024-09-09 PROCEDURE — 3077F SYST BP >= 140 MM HG: CPT | Mod: CPTII,S$GLB,, | Performed by: INTERNAL MEDICINE

## 2024-09-09 PROCEDURE — 3066F NEPHROPATHY DOC TX: CPT | Mod: CPTII,S$GLB,, | Performed by: INTERNAL MEDICINE

## 2024-09-09 PROCEDURE — 80069 RENAL FUNCTION PANEL: CPT | Performed by: INTERNAL MEDICINE

## 2024-09-09 PROCEDURE — 99214 OFFICE O/P EST MOD 30 MIN: CPT | Mod: S$GLB,,, | Performed by: INTERNAL MEDICINE

## 2024-09-09 PROCEDURE — 1126F AMNT PAIN NOTED NONE PRSNT: CPT | Mod: CPTII,S$GLB,, | Performed by: INTERNAL MEDICINE

## 2024-09-09 RX ORDER — ACETAMINOPHEN 500 MG
1 TABLET ORAL 2 TIMES DAILY
Qty: 1 EACH | Refills: 0 | Status: SHIPPED | OUTPATIENT
Start: 2024-09-09

## 2025-05-08 DIAGNOSIS — N95.9 MENOPAUSAL AND PERIMENOPAUSAL DISORDER: Primary | ICD-10-CM

## 2025-08-04 ENCOUNTER — HOSPITAL ENCOUNTER (EMERGENCY)
Facility: HOSPITAL | Age: 75
Discharge: HOME | End: 2025-08-04
Attending: FAMILY MEDICINE
Payer: COMMERCIAL

## 2025-08-04 VITALS
DIASTOLIC BLOOD PRESSURE: 99 MMHG | HEART RATE: 56 BPM | TEMPERATURE: 98 F | OXYGEN SATURATION: 97 % | WEIGHT: 247 LBS | HEIGHT: 65 IN | RESPIRATION RATE: 20 BRPM | BODY MASS INDEX: 41.15 KG/M2 | SYSTOLIC BLOOD PRESSURE: 175 MMHG

## 2025-08-04 DIAGNOSIS — S39.012A LUMBAR STRAIN, INITIAL ENCOUNTER: Primary | ICD-10-CM

## 2025-08-04 LAB
BILIRUB UR QL STRIP.AUTO: NEGATIVE
CLARITY UR: ABNORMAL
COLOR UR AUTO: YELLOW
GLUCOSE UR QL STRIP: NEGATIVE
HGB UR QL STRIP: NEGATIVE
KETONES UR QL STRIP: NEGATIVE
LEUKOCYTE ESTERASE UR QL STRIP: NEGATIVE
NITRITE UR QL STRIP: NEGATIVE
PH UR STRIP: 6 [PH]
PROT UR QL STRIP: NEGATIVE
SP GR UR STRIP: 1.02
UROBILINOGEN UR STRIP-ACNC: NEGATIVE EU/DL

## 2025-08-04 PROCEDURE — 96372 THER/PROPH/DIAG INJ SC/IM: CPT | Performed by: PHYSICIAN ASSISTANT

## 2025-08-04 PROCEDURE — 63600175 PHARM REV CODE 636 W HCPCS: Mod: ER | Performed by: PHYSICIAN ASSISTANT

## 2025-08-04 PROCEDURE — 99284 EMERGENCY DEPT VISIT MOD MDM: CPT | Mod: 25,ER

## 2025-08-04 PROCEDURE — 81003 URINALYSIS AUTO W/O SCOPE: CPT | Mod: ER | Performed by: PHYSICIAN ASSISTANT

## 2025-08-04 RX ORDER — ORPHENADRINE CITRATE 30 MG/ML
30 INJECTION INTRAMUSCULAR; INTRAVENOUS
Status: COMPLETED | OUTPATIENT
Start: 2025-08-04 | End: 2025-08-04

## 2025-08-04 RX ORDER — METHOCARBAMOL 500 MG/1
500 TABLET, FILM COATED ORAL EVERY 12 HOURS PRN
Qty: 20 TABLET | Refills: 0 | Status: SHIPPED | OUTPATIENT
Start: 2025-08-04 | End: 2025-08-14

## 2025-08-04 RX ADMIN — ORPHENADRINE CITRATE 30 MG: 30 INJECTION, SOLUTION INTRAMUSCULAR; INTRAVENOUS at 06:08

## 2025-08-04 NOTE — DISCHARGE INSTRUCTIONS
Follow up with PCP for recheck. Return to the ED for severe pain, numbness, weakness or if worse in any way.

## 2025-08-05 NOTE — ED PROVIDER NOTES
Encounter Date: 2025       History     Chief Complaint   Patient presents with    Low-back Pain     Right lower back pain for the past 2 days. Patient is a kidney donor and only has her right kidney.      Patient is a 75-year-old female presenting with constant moderate aching pain in the right low back.  The pain is worse with movement.  No trauma but she was sick last week and spent a good bit of time lying in bed.  No radicular pain.  No numbness or focal weakness.  No urinary symptoms.    The history is provided by the patient.     Review of patient's allergies indicates:   Allergen Reactions    Neosporin [benzalkonium chloride] Rash    Flanax [naproxen sodium]      Suspected bullous fixed drug eruption right ankle    Doxycycline Rash     Skin peels     Past Medical History:   Diagnosis Date    Anemia     Arthritis     Chemotherapy-induced nausea 2020    Chemotherapy-induced thrombocytopenia 2020    Disorder of kidney and ureter     has only right kidney. 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     Endometrial ca 2020    Family history of gastric cancer 2016    Goiter     Gout 2016    Hyperlipidemia     Hypertension     Hypothyroidism, postsurgical     Multiple thyroid nodules     Neuropathy due to chemotherapeutic drug 2021    Retroperitoneal mass 2020    Syncope and collapse      Past Surgical History:   Procedure Laterality Date    cararact       SECTION      CHOLECYSTECTOMY      Donor Nephrectomy Left     fibroidectomy      FLEXIBLE SIGMOIDOSCOPY N/A 2020    Procedure: SIGMOIDOSCOPY, FLEXIBLE;  Surgeon: Sukhi Lieberman MD;  Location: [facility] ([address] [place] [address]);  Service: Endoscopy;  Laterality: N/A;    HAND SURGERY      left    HYSTERECTOMY      MN REMOVAL OF OVARY/TUBE(S)      ROBOT-ASSISTED LAPAROSCOPIC ABDOMINAL HYSTERECTOMY USING DA MARGARITO XI N/A 2020    Procedure: XI ROBOTIC HYSTERECTOMY;  Surgeon: Donte Weeks MD;  Location: [address];  Service:  OB/GYN;  Laterality: N/A;    ROBOT-ASSISTED LAPAROSCOPIC LYSIS OF ADHESIONS USING DA MARGARITO XI N/A 8/24/2020    Procedure: XI ROBOTIC LYSIS, ADHESIONS;  Surgeon: Donte Weeks MD;  Location: [facility] OR 2ND FLR;  Service: OB/GYN;  Laterality: N/A;  small bowel and sigmoid colon    ROBOT-ASSISTED LAPAROSCOPIC OMENTECTOMY USING DA MARGARITO XI N/A 8/24/2020    Procedure: XI ROBOTIC OMENTECTOMY;  Surgeon: Donte Weeks MD;  Location: [facility] OR [facility] FLR;  Service: OB/GYN;  Laterality: N/A;    ROBOT-ASSISTED LAPAROSCOPIC SALPINGO-OOPHORECTOMY USING DA MARGARITO XI Bilateral 8/24/2020    Procedure: XI ROBOTIC SALPINGO-OOPHORECTOMY;  Surgeon: Donte Weeks MD;  Location: [facility] OR [facility] FLR;  Service: OB/GYN;  Laterality: Bilateral;    TOTAL THYROIDECTOMY       Family History   Problem Relation Name Age of Onset    Cancer Father          stomach    Goiter Mother      Heart disease Mother      Breast cancer Paternal Aunt      Cancer Paternal Aunt      Cancer Brother          stomach    Kidney disease Brother      Diabetes Sister      Seizures Sister      Hypertension Sister      Uterine cancer Sister          cancer in a fibroid    Aneurysm Brother      Colon cancer Other nephew     Eczema Neg Hx      Lupus Neg Hx      Psoriasis Neg Hx      Melanoma Neg Hx      Ovarian cancer Neg Hx       Social History[1]  Review of Systems   Constitutional:  Negative for activity change, appetite change, chills and fever.   Gastrointestinal:  Negative for abdominal pain, diarrhea, nausea and vomiting.   Genitourinary:  Negative for decreased urine volume, dysuria and hematuria.   Musculoskeletal:  Positive for back pain. Negative for neck pain and neck stiffness.   Skin:  Negative for rash.   Neurological:  Negative for weakness and numbness.   All other systems reviewed and are negative.      Physical Exam     Initial Vitals [08/04/25 1725]   BP Pulse Resp Temp SpO2   (!) 175/99 (!) 56 20 98.1 °F (36.7 °C) 97 %      MAP       --         Physical  Exam    Nursing note and vitals reviewed.  Constitutional: She appears well-developed and well-nourished. She appears distressed.   HENT:   Head: Normocephalic and atraumatic. Mouth/Throat: Oropharynx is clear and moist.   Eyes: Conjunctivae and EOM are normal. Pupils are equal, round, and reactive to light.   Neck: Neck supple.   Normal range of motion.  Cardiovascular:  Normal rate, regular rhythm and normal heart sounds.           Pulmonary/Chest: Breath sounds normal. No respiratory distress.   Musculoskeletal:      Cervical back: Normal range of motion and neck supple.      Comments: No midline or spinous tenderness.  Left lower lumbar paraspinous tenderness to palpation.  No CVA tenderness.  Pain with rotation and flexion.     Lymphadenopathy:     She has no cervical adenopathy.   Neurological: She is alert and oriented to person, place, and time. No sensory deficit.         ED Course   Procedures  Labs Reviewed   URINALYSIS, REFLEX TO URINE CULTURE - Abnormal       Result Value    Color, UA Yellow      Appearance, UA Hazy (*)     pH, UA 6.0      Spec Grav UA 1.025      Protein, UA Negative      Glucose, UA Negative      Ketones, UA Negative      Bilirubin, UA Negative      Blood, UA Negative      Nitrites, UA Negative      Urobilinogen, UA Negative      Leukocyte Esterase, UA Negative     GREY TOP URINE HOLD          Imaging Results    None          Medications   orphenadrine injection 30 mg (30 mg Intramuscular Given 8/4/25 1843)     Medical Decision Making  Differential including but not limited to lumbosacral strain, UTI, renal stone    Amount and/or Complexity of Data Reviewed  Labs: ordered.     Details: No hematuria on urinalysis and no evidence of urinary tract infection    Risk  Prescription drug management.  Risk Details: Patient does not have gross hematuria to suggest renal stone.  The pain is worse with movement consistent with musculoskeletal strain likely brought on by spending several days in  bed while she had a cold.  No evidence of urinary tract infection on urinalysis.  Advised on supportive care and follow-up.  Return to the ED if worse in any way.                                          Clinical Impression:  Final diagnoses:  [S39.012A] Lumbar strain, initial encounter (Primary)          ED Disposition Condition    Discharge Stable          ED Prescriptions       Medication Sig Dispense Start Date End Date Auth. Provider    methocarbamoL (ROBAXIN) 500 MG Tab Take 1 tablet (500 mg total) by mouth every 12 (twelve) hours as needed (muscle spasm). 20 tablet 8/4/2025 8/14/2025 Jennifer Rodriguez PA          Follow-up Information    None                [1]   Social History  Tobacco Use    Smoking status: Never    Smokeless tobacco: Never   Substance Use Topics    Alcohol use: Yes     Alcohol/week: 2.0 standard drinks of alcohol     Types: 2 Shots of liquor per week     Comment: Rare use of alcohol    Drug use: No        Jennifer Rodriguez PA  08/04/25 [id number]

## (undated) DEVICE — SOL ELECTROLUBE ANTI-STIC

## (undated) DEVICE — SEE MEDLINE ITEM 157166

## (undated) DEVICE — SET TRI-LUMEN FILTERED TUBE

## (undated) DEVICE — DRAPE SCOPE PILLOW WARMER

## (undated) DEVICE — CONTAINER SPECIMEN STRL 4OZ

## (undated) DEVICE — NDL INSUF ULTRA VERESS 120MM

## (undated) DEVICE — ADHESIVE DERMABOND ADVANCED

## (undated) DEVICE — CLOSURE SKIN STERI STRIP 1/2X4

## (undated) DEVICE — SUT CTD VICRYL 0 UND BR

## (undated) DEVICE — SEE MEDLINE ITEM 146417

## (undated) DEVICE — SOL 9P NACL IRR PIC IL

## (undated) DEVICE — DRAPE ARM DAVINCI XI

## (undated) DEVICE — SEE MEDLINE ITEM 157181

## (undated) DEVICE — NDL HYPO REG 25G X 1 1/2

## (undated) DEVICE — COVER LIGHT HANDLE 80/CA

## (undated) DEVICE — NDL 20GX1-1/2IN IB

## (undated) DEVICE — SOL CLEARIFY VISUALIZATION LAP

## (undated) DEVICE — CLIPPER BLADE MOD 4406 (CAREF)

## (undated) DEVICE — SUT 2/0 54IN COATED VICRYL

## (undated) DEVICE — SEE MEDLINE ITEM 152622

## (undated) DEVICE — DRESSING TRANS 2X2 TEGADERM

## (undated) DEVICE — LOOP VESSEL YELLOW MAXI

## (undated) DEVICE — SOL WATER STRL IRR 1000ML

## (undated) DEVICE — DRESSING TELFA N ADH 3X8

## (undated) DEVICE — ELECTRODE BLD EXT INSUL 1

## (undated) DEVICE — SUT CTD VICRYL 0 VIL BR/CT

## (undated) DEVICE — DRESSING ABSRBNT ISLAND 3.6X8

## (undated) DEVICE — SYR 30CC LUER LOCK

## (undated) DEVICE — TRAY MINOR GEN SURG

## (undated) DEVICE — SYR 10CC LUER LOCK

## (undated) DEVICE — SPONGE LAP 18X18 PREWASHED

## (undated) DEVICE — CLIP HEMO TITANIUM MED

## (undated) DEVICE — SPONGE KITTNER 1/4X 5/8 L STRL

## (undated) DEVICE — EVACUATOR WOUND BULB 100CC

## (undated) DEVICE — SEE MEDLINE ITEM 156902

## (undated) DEVICE — COVER TIP CURVED SCISSORS XI

## (undated) DEVICE — SUT CTD VICRYL VIL BR CR/SH

## (undated) DEVICE — DRAPE COLUMN DAVINCI XI

## (undated) DEVICE — WARMER DRAPE STERILE LF

## (undated) DEVICE — GLOVE BIOGEL ORTHOPEDIC 7

## (undated) DEVICE — TUBE EMG NIM 7.0MM TRIVANTAGE

## (undated) DEVICE — TRAY FOLEY 16FR INFECTION CONT

## (undated) DEVICE — HEMOSTAT SURGICEL FIBRLR 1X2IN

## (undated) DEVICE — POSITIONER HEAD DONUT 9IN FOAM

## (undated) DEVICE — LUBRICANT SURGILUBE 2 OZ

## (undated) DEVICE — PORT ACCESS 8MM W/120MM LOW

## (undated) DEVICE — SUT 3-0 12-18IN SILK

## (undated) DEVICE — SEE MEDLINE ITEM 152532

## (undated) DEVICE — SEE MEDLINE ITEM 157194

## (undated) DEVICE — SYR 50CC LL

## (undated) DEVICE — ELECTRODE REM PLYHSV RETURN 9

## (undated) DEVICE — SUT MONOCRYL 5-0 P-3 UND 18

## (undated) DEVICE — DRAIN BLAKE HUBLS 10F RD

## (undated) DEVICE — SUT 2-0 12-18IN SILK

## (undated) DEVICE — SEE MEDLINE ITEM 154981

## (undated) DEVICE — SHEARS HARMONIC CRVD 9 CM

## (undated) DEVICE — SEAL UNIVERSAL 5MM-8MM XI

## (undated) DEVICE — CORD FOR BIPOLAR FORCEPS 12

## (undated) DEVICE — SUTURE PROLENE 5-0 BL RB-1

## (undated) DEVICE — SEE MEDLINE ITEM 157148

## (undated) DEVICE — DRAPE STERI INSTRUMENT 1018

## (undated) DEVICE — SUT CTD VICRYL VIL BR SH 27

## (undated) DEVICE — OBTURATOR BLADELESS 8MM XI

## (undated) DEVICE — SEE MEDLINE ITEM 157117

## (undated) DEVICE — SUT 0 54IN COATED VICRYL U

## (undated) DEVICE — DRAPE ABDOMINAL TIBURON 14X11

## (undated) DEVICE — DRESSING GZ XRAY 12PLY 4X8 STR

## (undated) DEVICE — APPLICATOR CHLORAPREP ORN 26ML

## (undated) DEVICE — SUT 1 48IN PDS II VIO MONO

## (undated) DEVICE — RETRACTOR WAVEGUIDE NAR/FLAT

## (undated) DEVICE — CLIP HEMO TITANIUM SM 10/CQ

## (undated) DEVICE — SCRUB HIBICLENS 4% CHG 4OZ

## (undated) DEVICE — MARKER SKIN STND TIP BLUE BARR

## (undated) DEVICE — SUT 4-0 12-18IN SILK BLACK

## (undated) DEVICE — TRAY DRY SKIN SCRUB PREP

## (undated) DEVICE — SEE MEDLINE ITEM 157150

## (undated) DEVICE — SUT MCRYL PLUS 4-0 PS2 27IN

## (undated) DEVICE — Device

## (undated) DEVICE — SUT VICRYL PLUS 3-0 SH 18IN

## (undated) DEVICE — SOL NS 1000CC

## (undated) DEVICE — COVER LIGHT HANDLE

## (undated) DEVICE — PROBE PRASS SLIM

## (undated) DEVICE — PAD ABD 8X10 STERILE

## (undated) DEVICE — LEGGINGS 48X31 INCH

## (undated) DEVICE — BLADE SURG STAINLESS STEEL #15

## (undated) DEVICE — IRRIGATOR ENDOSCOPY DISP.

## (undated) DEVICE — MANIPULATOR VCARE PLUS 37MM LG

## (undated) DEVICE — GOWN SURGICAL X-LARGE

## (undated) DEVICE — SET DECANTER MEDICHOICE

## (undated) DEVICE — POSITIONER IV ARMBOARD FOAM